# Patient Record
Sex: FEMALE | Race: WHITE | NOT HISPANIC OR LATINO | Employment: OTHER | URBAN - METROPOLITAN AREA
[De-identification: names, ages, dates, MRNs, and addresses within clinical notes are randomized per-mention and may not be internally consistent; named-entity substitution may affect disease eponyms.]

---

## 2021-01-15 DIAGNOSIS — Z23 NEED FOR VACCINATION: ICD-10-CM

## 2021-07-04 ENCOUNTER — APPOINTMENT (OUTPATIENT)
Dept: RADIOLOGY | Facility: MEDICAL CENTER | Age: 71
DRG: 638 | End: 2021-07-04
Attending: EMERGENCY MEDICINE
Payer: MEDICARE

## 2021-07-04 ENCOUNTER — HOSPITAL ENCOUNTER (INPATIENT)
Facility: MEDICAL CENTER | Age: 71
LOS: 2 days | DRG: 638 | End: 2021-07-09
Attending: EMERGENCY MEDICINE | Admitting: INTERNAL MEDICINE
Payer: MEDICARE

## 2021-07-04 DIAGNOSIS — E66.01 CLASS 3 SEVERE OBESITY IN ADULT, UNSPECIFIED BMI, UNSPECIFIED OBESITY TYPE, UNSPECIFIED WHETHER SERIOUS COMORBIDITY PRESENT (HCC): ICD-10-CM

## 2021-07-04 DIAGNOSIS — E86.0 DEHYDRATION: ICD-10-CM

## 2021-07-04 DIAGNOSIS — E11.69 TYPE 2 DIABETES MELLITUS WITH OTHER SPECIFIED COMPLICATION, WITH LONG-TERM CURRENT USE OF INSULIN (HCC): ICD-10-CM

## 2021-07-04 DIAGNOSIS — L89.301 PRESSURE INJURY OF BUTTOCK, STAGE 1, UNSPECIFIED LATERALITY: ICD-10-CM

## 2021-07-04 DIAGNOSIS — R73.9 HYPERGLYCEMIA: ICD-10-CM

## 2021-07-04 DIAGNOSIS — R79.89 ELEVATED TROPONIN: ICD-10-CM

## 2021-07-04 DIAGNOSIS — Z79.4 TYPE 2 DIABETES MELLITUS WITH OTHER SPECIFIED COMPLICATION, WITH LONG-TERM CURRENT USE OF INSULIN (HCC): ICD-10-CM

## 2021-07-04 DIAGNOSIS — I48.92 ATRIAL FLUTTER, UNSPECIFIED TYPE (HCC): ICD-10-CM

## 2021-07-04 DIAGNOSIS — N17.9 AKI (ACUTE KIDNEY INJURY) (HCC): ICD-10-CM

## 2021-07-04 PROBLEM — E87.1 HYPONATREMIA: Status: ACTIVE | Noted: 2021-07-04

## 2021-07-04 PROBLEM — I10 HTN (HYPERTENSION): Status: ACTIVE | Noted: 2021-07-04

## 2021-07-04 LAB
ALBUMIN SERPL BCP-MCNC: 3 G/DL (ref 3.2–4.9)
ALBUMIN/GLOB SERPL: 0.8 G/DL
ALP SERPL-CCNC: 139 U/L (ref 30–99)
ALT SERPL-CCNC: 11 U/L (ref 2–50)
ANION GAP SERPL CALC-SCNC: 11 MMOL/L (ref 7–16)
ANION GAP SERPL CALC-SCNC: 13 MMOL/L (ref 7–16)
APPEARANCE UR: CLEAR
AST SERPL-CCNC: 11 U/L (ref 12–45)
BASOPHILS # BLD AUTO: 0.4 % (ref 0–1.8)
BASOPHILS # BLD: 0.03 K/UL (ref 0–0.12)
BILIRUB SERPL-MCNC: 1 MG/DL (ref 0.1–1.5)
BILIRUB UR QL STRIP.AUTO: NEGATIVE
BUN SERPL-MCNC: 33 MG/DL (ref 8–22)
BUN SERPL-MCNC: 35 MG/DL (ref 8–22)
CALCIUM SERPL-MCNC: 9.1 MG/DL (ref 8.5–10.5)
CALCIUM SERPL-MCNC: 9.1 MG/DL (ref 8.5–10.5)
CHLORIDE SERPL-SCNC: 92 MMOL/L (ref 96–112)
CHLORIDE SERPL-SCNC: 94 MMOL/L (ref 96–112)
CO2 SERPL-SCNC: 22 MMOL/L (ref 20–33)
CO2 SERPL-SCNC: 23 MMOL/L (ref 20–33)
COLOR UR: YELLOW
CREAT SERPL-MCNC: 1.72 MG/DL (ref 0.5–1.4)
CREAT SERPL-MCNC: 1.92 MG/DL (ref 0.5–1.4)
CREAT UR-MCNC: 36.4 MG/DL
EKG IMPRESSION: NORMAL
EOSINOPHIL # BLD AUTO: 0.11 K/UL (ref 0–0.51)
EOSINOPHIL NFR BLD: 1.5 % (ref 0–6.9)
ERYTHROCYTE [DISTWIDTH] IN BLOOD BY AUTOMATED COUNT: 49.7 FL (ref 35.9–50)
EST. AVERAGE GLUCOSE BLD GHB EST-MCNC: 427 MG/DL
GLOBULIN SER CALC-MCNC: 3.8 G/DL (ref 1.9–3.5)
GLUCOSE BLD-MCNC: 198 MG/DL (ref 65–99)
GLUCOSE BLD-MCNC: 257 MG/DL (ref 65–99)
GLUCOSE BLD-MCNC: 370 MG/DL (ref 65–99)
GLUCOSE BLD-MCNC: 372 MG/DL (ref 65–99)
GLUCOSE BLD-MCNC: 588 MG/DL (ref 65–99)
GLUCOSE BLD-MCNC: >600 MG/DL (ref 65–99)
GLUCOSE SERPL-MCNC: 638 MG/DL (ref 65–99)
GLUCOSE SERPL-MCNC: 771 MG/DL (ref 65–99)
GLUCOSE UR STRIP.AUTO-MCNC: >=1000 MG/DL
HBA1C MFR BLD: 16.5 % (ref 4–5.6)
HCT VFR BLD AUTO: 34.9 % (ref 37–47)
HGB BLD-MCNC: 12.4 G/DL (ref 12–16)
IMM GRANULOCYTES # BLD AUTO: 0.06 K/UL (ref 0–0.11)
IMM GRANULOCYTES NFR BLD AUTO: 0.8 % (ref 0–0.9)
KETONES UR STRIP.AUTO-MCNC: NEGATIVE MG/DL
LEUKOCYTE ESTERASE UR QL STRIP.AUTO: NEGATIVE
LYMPHOCYTES # BLD AUTO: 2.12 K/UL (ref 1–4.8)
LYMPHOCYTES NFR BLD: 28.3 % (ref 22–41)
MCH RBC QN AUTO: 33.7 PG (ref 27–33)
MCHC RBC AUTO-ENTMCNC: 35.5 G/DL (ref 33.6–35)
MCV RBC AUTO: 94.8 FL (ref 81.4–97.8)
MICRO URNS: ABNORMAL
MONOCYTES # BLD AUTO: 0.58 K/UL (ref 0–0.85)
MONOCYTES NFR BLD AUTO: 7.7 % (ref 0–13.4)
NEUTROPHILS # BLD AUTO: 4.59 K/UL (ref 2–7.15)
NEUTROPHILS NFR BLD: 61.3 % (ref 44–72)
NITRITE UR QL STRIP.AUTO: NEGATIVE
NRBC # BLD AUTO: 0 K/UL
NRBC BLD-RTO: 0 /100 WBC
PH UR STRIP.AUTO: 5 [PH] (ref 5–8)
PLATELET # BLD AUTO: 171 K/UL (ref 164–446)
PMV BLD AUTO: 11.9 FL (ref 9–12.9)
POTASSIUM SERPL-SCNC: 4.2 MMOL/L (ref 3.6–5.5)
POTASSIUM SERPL-SCNC: 4.3 MMOL/L (ref 3.6–5.5)
PROT SERPL-MCNC: 6.8 G/DL (ref 6–8.2)
PROT UR QL STRIP: NEGATIVE MG/DL
RBC # BLD AUTO: 3.68 M/UL (ref 4.2–5.4)
RBC UR QL AUTO: NEGATIVE
SODIUM SERPL-SCNC: 127 MMOL/L (ref 135–145)
SODIUM SERPL-SCNC: 128 MMOL/L (ref 135–145)
SODIUM UR-SCNC: 40 MMOL/L
SP GR UR STRIP.AUTO: 1.02
TROPONIN T SERPL-MCNC: 25 NG/L (ref 6–19)
UROBILINOGEN UR STRIP.AUTO-MCNC: 0.2 MG/DL
WBC # BLD AUTO: 7.5 K/UL (ref 4.8–10.8)

## 2021-07-04 PROCEDURE — 96372 THER/PROPH/DIAG INJ SC/IM: CPT

## 2021-07-04 PROCEDURE — 80048 BASIC METABOLIC PNL TOTAL CA: CPT

## 2021-07-04 PROCEDURE — G0378 HOSPITAL OBSERVATION PER HR: HCPCS

## 2021-07-04 PROCEDURE — 96374 THER/PROPH/DIAG INJ IV PUSH: CPT

## 2021-07-04 PROCEDURE — 700102 HCHG RX REV CODE 250 W/ 637 OVERRIDE(OP): Performed by: NURSE PRACTITIONER

## 2021-07-04 PROCEDURE — 83036 HEMOGLOBIN GLYCOSYLATED A1C: CPT

## 2021-07-04 PROCEDURE — A9270 NON-COVERED ITEM OR SERVICE: HCPCS | Performed by: NURSE PRACTITIONER

## 2021-07-04 PROCEDURE — 71045 X-RAY EXAM CHEST 1 VIEW: CPT

## 2021-07-04 PROCEDURE — 85025 COMPLETE CBC W/AUTO DIFF WBC: CPT

## 2021-07-04 PROCEDURE — 96375 TX/PRO/DX INJ NEW DRUG ADDON: CPT

## 2021-07-04 PROCEDURE — 99285 EMERGENCY DEPT VISIT HI MDM: CPT

## 2021-07-04 PROCEDURE — 700111 HCHG RX REV CODE 636 W/ 250 OVERRIDE (IP): Performed by: STUDENT IN AN ORGANIZED HEALTH CARE EDUCATION/TRAINING PROGRAM

## 2021-07-04 PROCEDURE — 700105 HCHG RX REV CODE 258: Performed by: STUDENT IN AN ORGANIZED HEALTH CARE EDUCATION/TRAINING PROGRAM

## 2021-07-04 PROCEDURE — 82962 GLUCOSE BLOOD TEST: CPT

## 2021-07-04 PROCEDURE — 700105 HCHG RX REV CODE 258: Performed by: EMERGENCY MEDICINE

## 2021-07-04 PROCEDURE — 81003 URINALYSIS AUTO W/O SCOPE: CPT

## 2021-07-04 PROCEDURE — 82570 ASSAY OF URINE CREATININE: CPT

## 2021-07-04 PROCEDURE — 93005 ELECTROCARDIOGRAM TRACING: CPT

## 2021-07-04 PROCEDURE — 97161 PT EVAL LOW COMPLEX 20 MIN: CPT

## 2021-07-04 PROCEDURE — 700102 HCHG RX REV CODE 250 W/ 637 OVERRIDE(OP): Performed by: EMERGENCY MEDICINE

## 2021-07-04 PROCEDURE — 84484 ASSAY OF TROPONIN QUANT: CPT

## 2021-07-04 PROCEDURE — 80053 COMPREHEN METABOLIC PANEL: CPT

## 2021-07-04 PROCEDURE — 84300 ASSAY OF URINE SODIUM: CPT

## 2021-07-04 PROCEDURE — 700102 HCHG RX REV CODE 250 W/ 637 OVERRIDE(OP): Performed by: STUDENT IN AN ORGANIZED HEALTH CARE EDUCATION/TRAINING PROGRAM

## 2021-07-04 PROCEDURE — 99220 PR INITIAL OBSERVATION CARE,LEVL III: CPT | Performed by: STUDENT IN AN ORGANIZED HEALTH CARE EDUCATION/TRAINING PROGRAM

## 2021-07-04 PROCEDURE — 97166 OT EVAL MOD COMPLEX 45 MIN: CPT

## 2021-07-04 RX ORDER — SODIUM CHLORIDE, SODIUM LACTATE, POTASSIUM CHLORIDE, AND CALCIUM CHLORIDE .6; .31; .03; .02 G/100ML; G/100ML; G/100ML; G/100ML
2000 INJECTION, SOLUTION INTRAVENOUS ONCE
Status: DISCONTINUED | OUTPATIENT
Start: 2021-07-04 | End: 2021-07-04

## 2021-07-04 RX ORDER — SODIUM CHLORIDE 9 MG/ML
2000 INJECTION, SOLUTION INTRAVENOUS ONCE
Status: DISCONTINUED | OUTPATIENT
Start: 2021-07-04 | End: 2021-07-04

## 2021-07-04 RX ORDER — DEXTROSE AND SODIUM CHLORIDE 10; .45 G/100ML; G/100ML
INJECTION, SOLUTION INTRAVENOUS CONTINUOUS
Status: DISCONTINUED | OUTPATIENT
Start: 2021-07-04 | End: 2021-07-04

## 2021-07-04 RX ORDER — AMOXICILLIN 250 MG
2 CAPSULE ORAL 2 TIMES DAILY
Status: DISCONTINUED | OUTPATIENT
Start: 2021-07-04 | End: 2021-07-09 | Stop reason: HOSPADM

## 2021-07-04 RX ORDER — FLUOXETINE HYDROCHLORIDE 20 MG/1
40 CAPSULE ORAL DAILY
Status: ON HOLD | COMMUNITY
End: 2021-07-09

## 2021-07-04 RX ORDER — ENALAPRILAT 1.25 MG/ML
1.25 INJECTION INTRAVENOUS EVERY 6 HOURS PRN
Status: DISCONTINUED | OUTPATIENT
Start: 2021-07-04 | End: 2021-07-09 | Stop reason: HOSPADM

## 2021-07-04 RX ORDER — POLYETHYLENE GLYCOL 3350 17 G/17G
1 POWDER, FOR SOLUTION ORAL
Status: DISCONTINUED | OUTPATIENT
Start: 2021-07-04 | End: 2021-07-09 | Stop reason: HOSPADM

## 2021-07-04 RX ORDER — LISINOPRIL 20 MG/1
20 TABLET ORAL DAILY
Status: ON HOLD | COMMUNITY
End: 2021-07-09 | Stop reason: SDUPTHER

## 2021-07-04 RX ORDER — SODIUM CHLORIDE, SODIUM LACTATE, POTASSIUM CHLORIDE, CALCIUM CHLORIDE 600; 310; 30; 20 MG/100ML; MG/100ML; MG/100ML; MG/100ML
INJECTION, SOLUTION INTRAVENOUS CONTINUOUS
Status: DISCONTINUED | OUTPATIENT
Start: 2021-07-04 | End: 2021-07-05

## 2021-07-04 RX ORDER — SIMVASTATIN 40 MG
40 TABLET ORAL NIGHTLY
Status: ON HOLD | COMMUNITY
End: 2021-07-09

## 2021-07-04 RX ORDER — MAGNESIUM SULFATE HEPTAHYDRATE 40 MG/ML
4 INJECTION, SOLUTION INTRAVENOUS
Status: DISCONTINUED | OUTPATIENT
Start: 2021-07-04 | End: 2021-07-04

## 2021-07-04 RX ORDER — MAGNESIUM SULFATE HEPTAHYDRATE 40 MG/ML
2 INJECTION, SOLUTION INTRAVENOUS
Status: DISCONTINUED | OUTPATIENT
Start: 2021-07-04 | End: 2021-07-09 | Stop reason: HOSPADM

## 2021-07-04 RX ORDER — HEPARIN SODIUM 5000 [USP'U]/ML
5000 INJECTION, SOLUTION INTRAVENOUS; SUBCUTANEOUS EVERY 8 HOURS
Status: DISCONTINUED | OUTPATIENT
Start: 2021-07-04 | End: 2021-07-07

## 2021-07-04 RX ORDER — INSULIN LISPRO 100 [IU]/ML
4 INJECTION, SOLUTION INTRAVENOUS; SUBCUTANEOUS
Status: DISCONTINUED | OUTPATIENT
Start: 2021-07-04 | End: 2021-07-07

## 2021-07-04 RX ORDER — DEXTROSE AND SODIUM CHLORIDE 5; .45 G/100ML; G/100ML
INJECTION, SOLUTION INTRAVENOUS CONTINUOUS
Status: DISCONTINUED | OUTPATIENT
Start: 2021-07-04 | End: 2021-07-04

## 2021-07-04 RX ORDER — MICONAZOLE NITRATE 20 MG/G
CREAM TOPICAL 2 TIMES DAILY
Status: DISCONTINUED | OUTPATIENT
Start: 2021-07-04 | End: 2021-07-09 | Stop reason: HOSPADM

## 2021-07-04 RX ORDER — M-VIT,TX,IRON,MINS/CALC/FOLIC 27MG-0.4MG
1 TABLET ORAL DAILY
COMMUNITY
End: 2022-01-04

## 2021-07-04 RX ORDER — VITAMIN B COMPLEX
5000 TABLET ORAL DAILY
COMMUNITY
End: 2022-01-04

## 2021-07-04 RX ORDER — DEXTROSE MONOHYDRATE 25 G/50ML
50 INJECTION, SOLUTION INTRAVENOUS
Status: DISCONTINUED | OUTPATIENT
Start: 2021-07-04 | End: 2021-07-04

## 2021-07-04 RX ORDER — DEXTROSE MONOHYDRATE 25 G/50ML
50 INJECTION, SOLUTION INTRAVENOUS
Status: DISCONTINUED | OUTPATIENT
Start: 2021-07-04 | End: 2021-07-09 | Stop reason: HOSPADM

## 2021-07-04 RX ORDER — INSULIN LISPRO 100 [IU]/ML
3-14 INJECTION, SOLUTION INTRAVENOUS; SUBCUTANEOUS
Status: DISCONTINUED | OUTPATIENT
Start: 2021-07-04 | End: 2021-07-09 | Stop reason: HOSPADM

## 2021-07-04 RX ORDER — ACETAMINOPHEN 325 MG/1
650 TABLET ORAL EVERY 6 HOURS PRN
Status: DISCONTINUED | OUTPATIENT
Start: 2021-07-04 | End: 2021-07-09 | Stop reason: HOSPADM

## 2021-07-04 RX ORDER — LEVOTHYROXINE SODIUM 0.1 MG/1
100 TABLET ORAL
Status: ON HOLD | COMMUNITY
End: 2021-07-09 | Stop reason: SDUPTHER

## 2021-07-04 RX ORDER — ASPIRIN 81 MG/1
81 TABLET, CHEWABLE ORAL DAILY
Status: ON HOLD | COMMUNITY
End: 2021-07-09 | Stop reason: SDUPTHER

## 2021-07-04 RX ORDER — SODIUM CHLORIDE 9 MG/ML
1000 INJECTION, SOLUTION INTRAVENOUS ONCE
Status: COMPLETED | OUTPATIENT
Start: 2021-07-04 | End: 2021-07-04

## 2021-07-04 RX ORDER — LABETALOL HYDROCHLORIDE 5 MG/ML
10 INJECTION, SOLUTION INTRAVENOUS EVERY 4 HOURS PRN
Status: DISCONTINUED | OUTPATIENT
Start: 2021-07-04 | End: 2021-07-09 | Stop reason: HOSPADM

## 2021-07-04 RX ORDER — NYSTATIN 100000 [USP'U]/G
POWDER TOPICAL 2 TIMES DAILY
Status: DISCONTINUED | OUTPATIENT
Start: 2021-07-04 | End: 2021-07-09 | Stop reason: HOSPADM

## 2021-07-04 RX ORDER — ONDANSETRON 4 MG/1
4 TABLET, ORALLY DISINTEGRATING ORAL EVERY 4 HOURS PRN
Status: DISCONTINUED | OUTPATIENT
Start: 2021-07-04 | End: 2021-07-09 | Stop reason: HOSPADM

## 2021-07-04 RX ORDER — BISACODYL 10 MG
10 SUPPOSITORY, RECTAL RECTAL
Status: DISCONTINUED | OUTPATIENT
Start: 2021-07-04 | End: 2021-07-09 | Stop reason: HOSPADM

## 2021-07-04 RX ORDER — DABIGATRAN ETEXILATE 150 MG/1
150 CAPSULE ORAL 2 TIMES DAILY
Status: ON HOLD | COMMUNITY
End: 2021-07-09

## 2021-07-04 RX ORDER — INSULIN LISPRO 100 [IU]/ML
2-9 INJECTION, SOLUTION INTRAVENOUS; SUBCUTANEOUS EVERY 6 HOURS
Status: DISCONTINUED | OUTPATIENT
Start: 2021-07-04 | End: 2021-07-04

## 2021-07-04 RX ORDER — ONDANSETRON 2 MG/ML
4 INJECTION INTRAMUSCULAR; INTRAVENOUS EVERY 4 HOURS PRN
Status: DISCONTINUED | OUTPATIENT
Start: 2021-07-04 | End: 2021-07-09 | Stop reason: HOSPADM

## 2021-07-04 RX ADMIN — MICONAZOLE NITRATE: 20 CREAM TOPICAL at 18:07

## 2021-07-04 RX ADMIN — INSULIN HUMAN 10 UNITS: 100 INJECTION, SOLUTION PARENTERAL at 03:03

## 2021-07-04 RX ADMIN — NYSTATIN: 100000 POWDER TOPICAL at 18:07

## 2021-07-04 RX ADMIN — SODIUM CHLORIDE, POTASSIUM CHLORIDE, SODIUM LACTATE AND CALCIUM CHLORIDE: 600; 310; 30; 20 INJECTION, SOLUTION INTRAVENOUS at 20:52

## 2021-07-04 RX ADMIN — HEPARIN SODIUM 5000 UNITS: 5000 INJECTION, SOLUTION INTRAVENOUS; SUBCUTANEOUS at 12:37

## 2021-07-04 RX ADMIN — INSULIN LISPRO 3 UNITS: 100 INJECTION, SOLUTION INTRAVENOUS; SUBCUTANEOUS at 21:08

## 2021-07-04 RX ADMIN — INSULIN LISPRO 12 UNITS: 100 INJECTION, SOLUTION INTRAVENOUS; SUBCUTANEOUS at 12:35

## 2021-07-04 RX ADMIN — INSULIN LISPRO 7 UNITS: 100 INJECTION, SOLUTION INTRAVENOUS; SUBCUTANEOUS at 18:08

## 2021-07-04 RX ADMIN — SODIUM CHLORIDE 1000 ML: 9 INJECTION, SOLUTION INTRAVENOUS at 03:03

## 2021-07-04 RX ADMIN — NYSTATIN: 100000 POWDER TOPICAL at 09:44

## 2021-07-04 RX ADMIN — INSULIN GLARGINE 27 UNITS: 100 INJECTION, SOLUTION SUBCUTANEOUS at 18:05

## 2021-07-04 RX ADMIN — INSULIN LISPRO 4 UNITS: 100 INJECTION, SOLUTION INTRAVENOUS; SUBCUTANEOUS at 18:06

## 2021-07-04 RX ADMIN — HEPARIN SODIUM 5000 UNITS: 5000 INJECTION, SOLUTION INTRAVENOUS; SUBCUTANEOUS at 21:08

## 2021-07-04 RX ADMIN — HEPARIN SODIUM 5000 UNITS: 5000 INJECTION, SOLUTION INTRAVENOUS; SUBCUTANEOUS at 09:03

## 2021-07-04 RX ADMIN — INSULIN LISPRO 4 UNITS: 100 INJECTION, SOLUTION INTRAVENOUS; SUBCUTANEOUS at 12:33

## 2021-07-04 RX ADMIN — SODIUM CHLORIDE, POTASSIUM CHLORIDE, SODIUM LACTATE AND CALCIUM CHLORIDE: 600; 310; 30; 20 INJECTION, SOLUTION INTRAVENOUS at 05:20

## 2021-07-04 RX ADMIN — SODIUM CHLORIDE, POTASSIUM CHLORIDE, SODIUM LACTATE AND CALCIUM CHLORIDE: 600; 310; 30; 20 INJECTION, SOLUTION INTRAVENOUS at 11:49

## 2021-07-04 ASSESSMENT — COGNITIVE AND FUNCTIONAL STATUS - GENERAL
DRESSING REGULAR UPPER BODY CLOTHING: A LITTLE
TURNING FROM BACK TO SIDE WHILE IN FLAT BAD: A LITTLE
SUGGESTED CMS G CODE MODIFIER DAILY ACTIVITY: CK
DAILY ACTIVITIY SCORE: 19
CLIMB 3 TO 5 STEPS WITH RAILING: A LITTLE
MOVING TO AND FROM BED TO CHAIR: A LOT
MOVING FROM LYING ON BACK TO SITTING ON SIDE OF FLAT BED: A LITTLE
SUGGESTED CMS G CODE MODIFIER DAILY ACTIVITY: CK
STANDING UP FROM CHAIR USING ARMS: A LITTLE
CLIMB 3 TO 5 STEPS WITH RAILING: A LITTLE
DRESSING REGULAR UPPER BODY CLOTHING: A LITTLE
STANDING UP FROM CHAIR USING ARMS: A LITTLE
DRESSING REGULAR LOWER BODY CLOTHING: A LITTLE
MOBILITY SCORE: 19
PERSONAL GROOMING: A LITTLE
MOBILITY SCORE: 17
SUGGESTED CMS G CODE MODIFIER MOBILITY: CK
HELP NEEDED FOR BATHING: A LITTLE
DRESSING REGULAR LOWER BODY CLOTHING: A LOT
PERSONAL GROOMING: A LITTLE
WALKING IN HOSPITAL ROOM: A LITTLE
DAILY ACTIVITIY SCORE: 17
WALKING IN HOSPITAL ROOM: A LITTLE
MOVING TO AND FROM BED TO CHAIR: A LITTLE
HELP NEEDED FOR BATHING: A LOT
TOILETING: A LITTLE
SUGGESTED CMS G CODE MODIFIER MOBILITY: CK
TOILETING: A LITTLE
MOVING FROM LYING ON BACK TO SITTING ON SIDE OF FLAT BED: A LITTLE

## 2021-07-04 ASSESSMENT — LIFESTYLE VARIABLES
HOW MANY TIMES IN THE PAST YEAR HAVE YOU HAD 5 OR MORE DRINKS IN A DAY: 0
TOTAL SCORE: 0
CONSUMPTION TOTAL: NEGATIVE
DOES PATIENT WANT TO STOP DRINKING: CANNOT ASSESS
EVER FELT BAD OR GUILTY ABOUT YOUR DRINKING: NO
ALCOHOL_USE: NO
ON A TYPICAL DAY WHEN YOU DRINK ALCOHOL HOW MANY DRINKS DO YOU HAVE: 0
EVER HAD A DRINK FIRST THING IN THE MORNING TO STEADY YOUR NERVES TO GET RID OF A HANGOVER: NO
TOTAL SCORE: 0
TOTAL SCORE: 0
HAVE YOU EVER FELT YOU SHOULD CUT DOWN ON YOUR DRINKING: NO
HAVE PEOPLE ANNOYED YOU BY CRITICIZING YOUR DRINKING: NO
AVERAGE NUMBER OF DAYS PER WEEK YOU HAVE A DRINK CONTAINING ALCOHOL: 0

## 2021-07-04 ASSESSMENT — GAIT ASSESSMENTS
DISTANCE (FEET): 50
ASSISTIVE DEVICE: FRONT WHEEL WALKER
DISTANCE (FEET): 20
DEVIATION: SHUFFLED GAIT;DECREASED BASE OF SUPPORT
GAIT LEVEL OF ASSIST: MINIMAL ASSIST

## 2021-07-04 ASSESSMENT — PATIENT HEALTH QUESTIONNAIRE - PHQ9
1. LITTLE INTEREST OR PLEASURE IN DOING THINGS: NOT AT ALL
2. FEELING DOWN, DEPRESSED, IRRITABLE, OR HOPELESS: NOT AT ALL
SUM OF ALL RESPONSES TO PHQ9 QUESTIONS 1 AND 2: 0

## 2021-07-04 ASSESSMENT — FIBROSIS 4 INDEX: FIB4 SCORE: 1.38

## 2021-07-04 ASSESSMENT — PAIN DESCRIPTION - PAIN TYPE
TYPE: ACUTE PAIN
TYPE: ACUTE PAIN

## 2021-07-04 ASSESSMENT — ACTIVITIES OF DAILY LIVING (ADL): TOILETING: INDEPENDENT

## 2021-07-04 NOTE — THERAPY
Physical Therapy   Initial Evaluation     Patient Name: Beverley Dejesus  Age:  70 y.o., Sex:  female  Medical Record #: [id number]  Today's Date: 7/4/2021     Precautions: Fall Risk    Assessment  Patient is 70 y.o. female admitted with weakness, GLF, and hyperglycemia (BS in 700s).  Pt reports living in a mobile home with roommates who reside independently. Pt sleeps in a recliner at baseline and uses a SPC. At time of eval, pt evaluated ~50ft with FWW and CGA. Pt ambulated with very slow gait speed due to reported foot pain which pt attributed to diabetes. PT will cont while in acute care setting to address strength, balance, activity tolerance, and safety.     Plan    Recommend Physical Therapy 3 times per week until therapy goals are met for the following treatments:  Gait Training, Neuro Re-Education / Balance, Self Care/Home Evaluation, Stair Training, Therapeutic Activities and Therapeutic Exercises    DC Equipment Recommendations: Front-Wheel Walker  Discharge Recommendations: Recommend home health for continued physical therapy services        07/04/21 0821   Prior Living Situation   Prior Services None   Housing / Facility Mobile Home   Steps Into Home 5   Steps In Home 0   Rail Both Rail (Steps into Home)   Bathroom Set up Bathtub / Shower Combination   Equipment Owned Single Point Cane   Lives with - Patient's Self Care Capacity Unrelated Adult   Comments pt lives with unrelated roommates. All work and are independent living   Prior Level of Functional Mobility   Bed Mobility Independent   Transfer Status Independent   Ambulation Independent   Distance Ambulation (Feet)   (community)   Assistive Devices Used Single Point Cane   Stairs Independent   History of Falls   History of Falls Yes   Date of Last Fall   (reason for admit)   Sensation Lower Body   Lower Extremity Sensation   X   Comments chronic neuropathy   Gait Analysis   Gait Level Of Assist Minimal Assist  (CGA fo safety)   Assistive Device Front  Wheel Walker   Distance (Feet) 50   # of Times Distance was Traveled 1   Deviation Shuffled Gait;Decreased Base Of Support   # of Stairs Climbed 0   Weight Bearing Status no restrictions   Comments very cautious when ambulating   Bed Mobility    Supine to Sit Supervised   Sit to Supine Minimal Assist   Scooting Supervised   Rolling Supervised   Comments sleeps in recliner   Functional Mobility   Sit to Stand Supervised   Bed, Chair, Wheelchair Transfer Minimal Assist  (CGA for safety)   Transfer Method Stand Step   Mobility in hallway FWW   Short Term Goals    Short Term Goal # 1 pt will be able to ambulate 150ft with LRAD and SPV in 6tx in order to return home   Short Term Goal # 2 pt will be able to negotiate 5 steps with LRAD and SPV in 6tx in order to return home   Anticipated Discharge Equipment and Recommendations   DC Equipment Recommendations Front-Wheel Walker   Discharge Recommendations Recommend home health for continued physical therapy services

## 2021-07-04 NOTE — ED PROVIDER NOTES
"ED Provider Note    Scribed for Daniel Walsh M.D. by Maverick Wolfe. 7/4/2021,  1:54 AM.    Means of Arrival: EMS  History obtained from: Patient  History limited by: None      CHIEF COMPLAINT  Chief Complaint   Patient presents with   • Weakness     Pt states she wasat home wehn she slid off her, felt weak and was unable to stand up on her own. Per EMS pt's blood glucose read \"High\" on glucometer. Pt is insulin dependent T2DM, and has not been able to afford her unsulin for the last month. Pt has no other complaints.    • High Blood Sugar       HPI  Beverley Dejesus is a 70 y.o. female with history of diabetes and aflutter who presents to the Emergency Department via EMS for worsening general weakness secondary to ground level fall. Patient was able to ambulate to the bathroom but when she went to sit on the toilet, she fell to the ground. She states she was able to stand up and ambulate back to her bedroom. She went to sit on her bed, and she fell forward, falling to the floor. She was unable to get herself up this time, and had to call her neighbor to pick her up.  She denies any head strike or loss of consciousness.  The patient is an insulin dependent diabetic, who admits to worsening weakness over the past couple of months. She has been unable to take her insulin or check her blood sugars lately, because she can not afford her treatment. She admits that she finds it increasingly harder to concentrate or exert herself without her insulin. She currently lives in a large mobile home community. She further endorses associated swollen feet for a couple weakness. She also endorses redness of the legs, but denies head pain, loss of consciousness, or dizziness.    REVIEW OF SYSTEMS  CONSTITUTIONAL:  No fever. Weakness. No dizziness.  CARDIOVASCULAR:  No chest discomfort.  RESPIRATORY:  No pleuritic chest pain.  GASTROINTESTINAL:  No abdominal pain.  GENITOURINARY:   No dysuria.  MUSCULOSKELETAL:  No arthralgia. Leg pain. " "Leg redness. Swollen feet. No head pain. SKIN:  No rash or suspicious lesions.  NEUROLOGIC:   No headache. No loss of consciouness    See HPI for further details.   All other systems are negative.     PAST MEDICAL HISTORY  No past medical history noted    FAMILY HISTORY  No family history noted    SOCIAL HISTORY   None noted    SURGICAL HISTORY  No past surgical history noted    CURRENT MEDICATIONS  No current outpatient medications  Insulin    ALLERGIES  No Known Allergies    PHYSICAL EXAM  VITAL SIGNS: /59   Pulse 85   Temp 36.7 °C (98.1 °F) (Temporal)   Resp 18   Ht 1.626 m (5' 4\")   Wt (!) 136 kg (300 lb)   SpO2 97%   BMI 51.49 kg/m²    Gen: Alert, no acute distress  HEENT: ATNC, dry mucous membranes, no tenderness  Eyes: PERRL, EOMI, normal conjunctiva.   Neck: trachea midline, nontender  Resp: no respiratory distress, clear to auscultation bilaterally  CV: No JVD.  Irregularly irregular rate  Abd: non-distended, nontender  Ext: No deformities. 2+ pitting edema bilateral lower extremities. Venous stasis changes of bilateral lower extremities   Psych: normal mood  Neuro: speech fluent, moves all extremities, GCS 15      DIAGNOSTIC STUDIES / PROCEDURES     EKG  Results for orders placed or performed during the hospital encounter of 21   EKG   Result Value Ref Range    Report       [facility] Emergency Dept.    Test Date:  2021  Pt Name:    GAETANO RONQUILLO                  Department: ER  MRN:        [id number]                      Room:       RD 11  Gender:     Female                       Technician: [id number]  :        [id number]                   Requested By:ER TRIAGE PROTOCOL  Order #:    [id number]                    Reading MD: Daniel Walsh    Measurements  Intervals                                Axis  Rate:       92                           P:  MA:                                      QRS:        -18  QRSD:       142                          T:          -12  QT:         " 360  QTc:        446    Interpretive Statements  ATRIAL FLUTTER, A-RATE 258  MULTIFORM VENTRICULAR PREMATURE COMPLEXES  IVCD, CONSIDER ATYPICAL RBBB  LEFT VENTRICULAR HYPERTROPHY  INFERIOR INFARCT, AGE INDETERMINATE  No previous ECG available for comparison  Electronically Signed On 7-4-2021 3:15:47 PDT by Daniel Walsh          LABS     Labs Reviewed   COMP METABOLIC PANEL - Abnormal; Notable for the following components:       Result Value    Sodium 127 (*)     Chloride 92 (*)     Glucose 771 (*)     Bun 35 (*)     Creatinine 1.92 (*)     AST(SGOT) 11 (*)     Alkaline Phosphatase 139 (*)     Albumin 3.0 (*)     Globulin 3.8 (*)     All other components within normal limits   CBC WITH DIFFERENTIAL - Abnormal; Notable for the following components:    RBC 3.68 (*)     Hematocrit 34.9 (*)     MCH 33.7 (*)     MCHC 35.5 (*)     All other components within normal limits   TROPONIN - Abnormal; Notable for the following components:    Troponin T 25 (*)     All other components within normal limits   URINALYSIS - Abnormal; Notable for the following components:    Glucose >=1000 (*)     All other components within normal limits   ESTIMATED GFR - Abnormal; Notable for the following components:    GFR If  31 (*)     GFR If Non  26 (*)     All other components within normal limits   POCT GLUCOSE DEVICE RESULTS - Abnormal; Notable for the following components:    Glucose - Accu-Ck >600 (*)     All other components within normal limits   BASIC METABOLIC PANEL   HEMOGLOBIN A1C   URINE SODIUM RANDOM   URINE CREATININE RANDOM   All labs reviewed by me.    RADIOLOGY  DX-CHEST-PORTABLE (1 VIEW)   Final Result      Hypoinflation without other evidence for acute cardiopulmonary disease.      All imaging reviewed and interpreted by me as shown above    COURSE & MEDICAL DECISION MAKING  Pertinent Labs & Imaging studies reviewed. (See chart for details)    1:54 AM Patient seen and examined at bedside by  Resident. Ordered for labs and imaging to evaluate. Patient will be treated with NS 1 L for her symptoms.     2:50 AM- At this time I compared notes with the Resident and evaluated the patient for myself. Patient reports she recently moved to [place]. Plan of care was discussed. Patient was give the opportunity to ask questions. Patient verbalizes understanding and agreement to this plan of care.      HYDRATION: Based on the patient's presentation of Dehydration the patient was given IV fluids. IV Hydration was used because oral hydration was not adequate alone. Upon recheck following hydration, the patient was improved.       Medical Decision Making:  Patient with uncontrolled diabetes presents with weakness, dehydration, recent falls.  There is no evidence of head trauma.  Patient's weakness is likely from her nonadherence to medications and uncontrolled diabetes.  Labs demonstrate profound blood sugar elevations but no evidence of DKA or HHS.  Low suspicion for intracranial pathology.    The patient was also found to have an elevated creatinine of unknown duration.  Likely JULIETA in the setting of dehydration.  Given this, patient was given IV fluids, and will be hospitalized.  No evidence of urinary tract infection, anemia.  EKG nonischemic.  Troponin minimally elevated, likely due to demand ischemia/renal dysfunction.        Case discussed with Dr. Nye , who accepts hospitalization. Patient will be hospitalized in guarded condition.    FINAL IMPRESSION  1. Hyperglycemia    2. JULIETA (acute kidney injury) (HCC)    3. Dehydration    4. Elevated troponin            I, Maverick Wolfe (Scribe), am scribing for, and in the presence of, Daniel Walsh M.D..    Electronically signed by: Maverick Wolfe (Scribe), 7/4/2021    IDaniel M.D. personally performed the services described in this documentation, as scribed by Maverick Wolfe in my presence, and it is both accurate and complete.    The note accurately reflects work and  decisions made by me.  Daniel Walsh M.D.  7/4/2021  3:28 AM      This dictation was created using voice recognition software. The accuracy of the dictation is limited to the abilities of the software. I expect there may be some errors of grammar and possibly content. The nursing notes were reviewed and certain aspects of this information were incorporated into this note.

## 2021-07-04 NOTE — ASSESSMENT & PLAN NOTE
-likely 2/2 dehydration  -Fluid resuscitation  -Glucose control  -Daily renal panel    Improving.    7/7: There might be some CKD component as well.

## 2021-07-04 NOTE — ED NOTES
Technician from Lab called with critical result of blood glucose 771 at 0252. Critical lab result read back to 0252.   Dr. Walsh notified of critical lab result at 0253.  Critical lab result read back by  0253.

## 2021-07-04 NOTE — THERAPY
Occupational Therapy   Initial Evaluation     Patient Name: Beverley Dejesus  Age:  70 y.o., Sex:  female  Medical Record #: [id number]  Today's Date: 7/4/2021     Precautions  Precautions: (P) Fall Risk  Comments: (P) balance, strength and endurance limitations    Assessment  Patient is 70 y.o. female with a diagnosis of weakness, hyperglycemia.  Additional factors influencing patient status / progress: limited community support. willl benefit from OT to focus on ADLS, endurance, transfers.      Plan    Recommend Occupational Therapy 3 times per week until therapy goals are met for the following treatments:  Adaptive Equipment, Self Care/Activities of Daily Living, Therapeutic Activities and Therapeutic Exercises.    DC Equipment Recommendations: (P) Front-Wheel Walker  Discharge Recommendations: (P) Recommend home health for continued occupational therapy services     Subjective    Pleasant and cooperative     Objective       07/04/21 0751   Total Time Spent   Total Time Spent (Mins) 40   Charge Group   OT Evaluation OT Evaluation Mod   Initial Contact Note    Initial Contact Note Order Received and Verified, Occupational Therapy Evaluation in Progress with Full Report to Follow.   Prior Living Situation   Prior Services None   Housing / Facility Mobile Home   Steps Into Home 5   Steps In Home 0   Rail Left Rail  (Steps into Home)   Bathroom Set up Bathtub / Shower Combination   Equipment Owned Single Point Cane   Lives with - Patient's Self Care Capacity Other (Comments)  (unrelated room mates)   Prior Level of ADL Function   Self Feeding Independent   Grooming / Hygiene Independent   Bathing Independent   Dressing Independent   Toileting Independent   Prior Level of IADL Function   Medication Management Independent   Laundry Independent   Kitchen Mobility Independent   Finances Independent   Home Management Requires Assist   Shopping Independent   Prior Level Of Mobility Independent With Device in Home;Independent  With Device in Community   Driving / Transportation Driving Independent   History of Falls   History of Falls Yes   Date of Last Fall   (reason for admit)   Precautions   Precautions Fall Risk   Comments balance, strength and endurance limitations   Vitals   Pulse (!) 107   Patient BP Position Supine   Blood Pressure  (!) 98/56   Respiration 18   Pulse Oximetry 99 %   O2 (LPM) 0   O2 Delivery Device None - Room Air   Pain 0 - 10 Group   Therapist Pain Assessment During Activity;Nurse Notified;Post Activity Pain Same as Prior to Activity  (mild in feet, did not rate)   Cognition    Cognition / Consciousness WDL   Passive ROM Upper Body   Passive ROM Upper Body WDL   Active ROM Upper Body   Active ROM Upper Body  WDL   Dominant Hand Right   Strength Upper Body   Upper Body Strength  WDL   Sensation Upper Body   Upper Extremity Sensation  WDL   Upper Body Muscle Tone   Upper Body Muscle Tone  WDL   Coordination Upper Body   Coordination WDL   Balance Assessment   Sitting Balance (Static) Fair +   Sitting Balance (Dynamic) Fair +   Standing Balance (Static) Fair   Standing Balance (Dynamic) Fair -   Weight Shift Sitting Fair   Weight Shift Standing Fair   Bed Mobility    Supine to Sit Supervised   Sit to Supine Supervised   Scooting Supervised   Rolling Supervised   ADL Assessment   Eating Supervision   Grooming Supervision;Seated   Bathing   (NT)   Upper Body Dressing Supervision   Lower Body Dressing Maximal Assist  (reports wearing slip on shoes at home, no socks)   Toileting Minimal Assist   How much help from another person does the patient currently need...   Putting on and taking off regular lower body clothing? 2   Bathing (including washing, rinsing, and drying)? 2   Toileting, which includes using a toilet, bedpan, or urinal? 3   Putting on and taking off regular upper body clothing? 3   Taking care of personal grooming such as brushing teeth? 3   Eating meals? 4   6 Clicks Daily Activity Score 17   Functional  Mobility   Sit to Stand   (SBA)   Bed, Chair, Wheelchair Transfer   (SBA)   Transfer Method Stand Step   Distance (Feet) 20   # of Times Distance was Traveled 2   Visual Perception   Visual Perception  WDL   Activity Tolerance   Sitting Edge of Bed 6 mins   Standing 5 mins   Patient / Family Goals   Patient / Family Goal #1 get stronger   Short Term Goals   Short Term Goal # 1 distant supervision for toielting tasks   Short Term Goal # 2 set up for LB dressing, appropriate AE use   Short Term Goal # 3 tolerate 10 minutes standing at sink to complete grooming, prior to resting   Education Group   Role of Occupational Therapist Patient Response Patient;Acceptance;Explanation;Demonstration;Verbal Demonstration;Reinforcement Needed   Problem List   Problem List Decreased Active Daily Living Skills;Decreased Functional Mobility;Decreased Activity Tolerance;Impaired Postural Control / Balance   Anticipated Discharge Equipment and Recommendations   DC Equipment Recommendations Front-Wheel Walker   Discharge Recommendations Recommend home health for continued occupational therapy services   Interdisciplinary Plan of Care Collaboration   IDT Collaboration with  Physician;Nursing   Patient Position at End of Therapy In Bed;Call Light within Reach;Tray Table within Reach;Phone within Reach   Collaboration Comments report given   Session Information   Date / Session Number  7/4,1 ( 1/3, 7/20)   Priority 3      07/04/21 0751   Total Time Spent   Total Time Spent (Mins) 40   Charge Group   OT Evaluation OT Evaluation Mod   Initial Contact Note    Initial Contact Note Order Received and Verified, Occupational Therapy Evaluation in Progress with Full Report to Follow.   Prior Living Situation   Prior Services None   Housing / Facility Mobile Home   Steps Into Home 5   Steps In Home 0   Rail Left Rail  (Steps into Home)   Bathroom Set up Bathtub / Shower Combination   Equipment Owned Single Point Cane   Lives with - Patient's Self  Care Capacity Other (Comments)  (unrelated room mates)   Prior Level of ADL Function   Self Feeding Independent   Grooming / Hygiene Independent   Bathing Independent   Dressing Independent   Toileting Independent   Prior Level of IADL Function   Medication Management Independent   Laundry Independent   Kitchen Mobility Independent   Finances Independent   Home Management Requires Assist   Shopping Independent   Prior Level Of Mobility Independent With Device in Home;Independent With Device in Community   Driving / Transportation Driving Independent   History of Falls   History of Falls Yes   Date of Last Fall   (reason for admit)   Precautions   Precautions Fall Risk   Comments balance, strength and endurance limitations   Vitals   Pulse (!) 107   Patient BP Position Supine   Blood Pressure  (!) 98/56   Respiration 18   Pulse Oximetry 99 %   O2 (LPM) 0   O2 Delivery Device None - Room Air   Pain 0 - 10 Group   Therapist Pain Assessment During Activity;Nurse Notified;Post Activity Pain Same as Prior to Activity  (mild in feet, did not rate)   Cognition    Cognition / Consciousness WDL   Passive ROM Upper Body   Passive ROM Upper Body WDL   Active ROM Upper Body   Active ROM Upper Body  WDL   Dominant Hand Right   Strength Upper Body   Upper Body Strength  WDL   Sensation Upper Body   Upper Extremity Sensation  WDL   Upper Body Muscle Tone   Upper Body Muscle Tone  WDL   Coordination Upper Body   Coordination WDL   Balance Assessment   Sitting Balance (Static) Fair +   Sitting Balance (Dynamic) Fair +   Standing Balance (Static) Fair   Standing Balance (Dynamic) Fair -   Weight Shift Sitting Fair   Weight Shift Standing Fair   Bed Mobility    Supine to Sit Supervised   Sit to Supine Supervised   Scooting Supervised   Rolling Supervised   ADL Assessment   Eating Supervision   Grooming Supervision;Seated   Bathing   (NT)   Upper Body Dressing Supervision   Lower Body Dressing Maximal Assist  (reports wearing slip on  shoes at home, no socks)   Toileting Minimal Assist   How much help from another person does the patient currently need...   Putting on and taking off regular lower body clothing? 2   Bathing (including washing, rinsing, and drying)? 2   Toileting, which includes using a toilet, bedpan, or urinal? 3   Putting on and taking off regular upper body clothing? 3   Taking care of personal grooming such as brushing teeth? 3   Eating meals? 4   6 Clicks Daily Activity Score 17   Functional Mobility   Sit to Stand   (SBA)   Bed, Chair, Wheelchair Transfer   (SBA)   Transfer Method Stand Step   Distance (Feet) 20   # of Times Distance was Traveled 2   Visual Perception   Visual Perception  WDL   Activity Tolerance   Sitting Edge of Bed 6 mins   Standing 5 mins   Patient / Family Goals   Patient / Family Goal #1 get stronger   Short Term Goals   Short Term Goal # 1 distant supervision for toielting tasks   Short Term Goal # 2 set up for LB dressing, appropriate AE use   Short Term Goal # 3 tolerate 10 minutes standing at sink to complete grooming, prior to resting   Education Group   Role of Occupational Therapist Patient Response Patient;Acceptance;Explanation;Demonstration;Verbal Demonstration;Reinforcement Needed   Problem List   Problem List Decreased Active Daily Living Skills;Decreased Functional Mobility;Decreased Activity Tolerance;Impaired Postural Control / Balance   Anticipated Discharge Equipment and Recommendations   DC Equipment Recommendations Front-Wheel Walker   DischargeRecommendations Recommend home health for continued occupational therapy services   Interdisciplinary Plan of Care Collaboration   IDT Collaboration with  Physician;Nursing   Patient Position at End of Therapy In Bed;Call Light within Reach;Tray Table within Reach;Phone within Reach   Collaboration Comments report given   Session Information   Date / Session Number  7/4,1 ( 1/3, 7/20)   Priority 3

## 2021-07-04 NOTE — PROGRESS NOTES
4 Eyes Skin Assessment Completed by GARLAND Diaz and GARLAND Hurst.     Head WDL  Ears WDL  Nose WDL  Mouth WDL  Neck WDL  Breast/Chest Redness and Rash  Shoulder Blades WDL  Spine WDL  (R) Arm/Elbow/Hand Edema  (L) Arm/Elbow/Hand Edema  Abdomen Redness and Rash  Groin Redness and Rash  Scrotum/Coccyx/Buttocks Redness  (R) Leg Redness  (L) Leg Redness  (R) Heel/Foot/Toe Edema  (L) Heel/Foot/Toe Edema          Interventions In Place InterDry     Possible Skin Injury Yes     Pictures Uploaded Into Epic Yes  Wound Consult Placed Yes  RN Wound Prevention Protocol Ordered Yes

## 2021-07-04 NOTE — PROGRESS NOTES
Patient admitted earlier this morning by my colleague.  Seen and examined at the bedside.  Insulin regimens have been advanced to facilitate better glucose control.  Nystatin and wound care ordered as she has multiple regions with intertrigo and a sacral decubitus per RN.  PT has evaluated the patient and ambulated her in the hallway.  They recommend home health at discharge presently.  Patient lives alone and needs to demonstrate ability to manage her own insulin and get medication refills.  She has capacity for medical decision-making.  It is not clear if she is competent for self administration. Renal panel in a.m.

## 2021-07-04 NOTE — PROGRESS NOTES
4 Eyes Skin Assessment Completed by GARLAND Melo and GARLAND Becerra.    Head WDL  Ears WDL  Nose WDL  Mouth WDL  Neck WDL  Breast/Chest Redness and Rash  Shoulder Blades WDL  Spine WDL  (R) Arm/Elbow/Hand Edema  (L) Arm/Elbow/Hand Edema  Abdomen Redness and Rash  Groin Redness and Rash  Scrotum/Coccyx/Buttocks Redness  (R) Leg Redness  (L) Leg Redness  (R) Heel/Foot/Toe Edema  (L) Heel/Foot/Toe Edema          Devices In Places Pulse Ox      Interventions In Place InterDry    Possible Skin Injury Yes    Pictures Uploaded Into Epic Yes  Wound Consult Placed Yes  RN Wound Prevention Protocol Ordered Yes

## 2021-07-04 NOTE — ED TRIAGE NOTES
"• Weakness       Pt states she was at home when she slid off her bed, felt weak and was unable to stand up on her own. Per EMS pt's blood glucose read \"High\" on glucometer. Pt is insulin dependent T2DM, and has not been able to afford her unsulin for the last month. Pt has no other complaints. Hx A-flutter.    • High Blood Sugar     .  /59   Pulse 85   Temp 36.7 °C (98.1 °F) (Temporal)   Resp 16   Ht 1.626 m (5' 4\")   Wt (!) 136 kg (300 lb)   SpO2 95%   BMI 51.49 kg/m²     "

## 2021-07-04 NOTE — ED NOTES
Pt transferred to T213 at this time. Pt is A&Ox4 with stable vitals upon transfer. All personal belongings transferred with pt.

## 2021-07-04 NOTE — CARE PLAN
The patient is Watcher - Medium risk of patient condition declining or worsening    Shift Goals  Patient Goals: rest    Progress made toward(s) clinical / shift goals:    Problem: Fall Risk  Goal: Patient will remain free from falls  Outcome: Progressing  Note: Fall precautions in place. Patient compliant with fall protocol. No falls this shift. Patient utilizes call light appropriately. Pt worked with PT today and walked halls. Patient ambulating with stand by assist to and from bathroom. Will continue to monitor.     Problem: Skin Integrity  Goal: Skin integrity is maintained or improved  Outcome: Progressing  Note: Patient admitted with red/fungal rash in skin folds. Nystatin powder applied, photos taken and places in chart, wound care consulted and MD aware. Will continue to monitor and follow wound care instructions as ordered.     Patient is not progressing towards the following goals:    Problem: Diabetes Management  Goal: Patient will achieve and maintain glucose in satisfactory range  Outcome: Not Progressing  Note: Patient is a non-compliant diabetic. Admitted with hyperglycemia. Blood sugars checked ACHS and insulin administered as ordered. Diabetic education ordering and pending.

## 2021-07-04 NOTE — ASSESSMENT & PLAN NOTE
Corrected sodium in setting of hyperglycemia  Will likely correct with IVF and glucose correction    7/8: Resolved

## 2021-07-04 NOTE — ASSESSMENT & PLAN NOTE
We will restart patient's lisinopril at a dose of 5 mg with holding parameters.  Monitor, make changes accordingly.    7/8: We have discontinued lisinopril, continue metoprolol for now. We will try to titrate metoprolol up if possible.

## 2021-07-04 NOTE — H&P
"Hospital Medicine History & Physical Note    Date of Service  7/4/2021    Primary Care Physician  No primary care provider on file.    Consultants    Code Status  Full Code    Chief Complaint  Chief Complaint   Patient presents with   • Weakness     Pt states she wasat home wehn she slid off her, felt weak and was unable to stand up on her own. Per EMS pt's blood glucose read \"High\" on glucometer. Pt is insulin dependent T2DM, and has not been able to afford her unsulin for the last month. Pt has no other complaints.    • High Blood Sugar       History of Presenting Illness  70F with diabetes and aflutter presented with worsening general weakness secondary to ground level falls which were characterized by \"sliding to the floor\" Patient is insulin dependent who has had worsening weakness for the past months while not being able to afford her treatment or check her blood sugars. Pt endorses more difficulty concentrating. She lives in a mobile home community. She endorses swollen feet for the past few weeks with redness of the legs. Otherwise, patient denies sob, cough, chest pain/pressure, congestion, diaphoresis, dizziness, weakness, unintentional weight changes, fever, chills, nausea, vomiting, hemoptysis, diarrhea, constipation, dysuria/dyspareunia, polyuria, or polydipsia. Denies recent history/use of malignancy, drug, alcohol, or cannabinoid use.    Upon admission, notable findings include Na 127, AG 13, Glucose 771, Cr 1.92    Will admit patient for hyperglycemia and JULIETA. Does not appear to have DKA.    I discussed the plan of care with patient.    Review of Systems  ROS  All systems reviewed and negative except as noted in HPI.    Past Medical History  Dm2, htn    Surgical History   has no past surgical history on file.    Family History  Family History of HTN    Family history reviewed with patient. There is no family history that is pertinent to the chief complaint.     Social History       Allergies  No " Known Allergies    Medications  None       Physical Exam  Temp:  [36.7 °C (98.1 °F)] 36.7 °C (98.1 °F)  Pulse:  [] 73  Resp:  [12-19] 18  BP: (114-133)/(53-61) 116/53  SpO2:  [95 %-99 %] 98 %    Physical Exam  I have reviewed patients' vitals and Labs    Constitutional: Resting comfortably in NAD   HENT: Normocephalic, no obvious evidence of acute trauma.  Eyes: No scleral icterus. Normal conjunctiva   Neck: Comfortable movement without any obvious restriction in the range of motion.  Cardiovascular: Upon ascultation I appreciate a regular heart rhythm and a normal rate with no murmurs, rubs or gallops  Thorax & Lungs: No respiratory distress. No wheezing, rales or rhonchi heard on ausculation.  there is no obvious chest wall tenderness. I appreciate normal air movement throughout.   Abdomen: The abdomen is not visibly distended. Upon palpation, I find it to be without tenderness.  No mass appreciated.  Skin: The exposed portions of skin reveal no obvious rash or other abnormalities.  Extremities/Musculoskeletal: no lower extremity edema with no asymmetry. 2+ pitting edema  Neurologic: Alert & oriented. No focal deficits observed.   Psychiatric: Normal affect appropriate for the clinical situation.     Laboratory:  Recent Labs     07/04/21  0159   WBC 7.5   RBC 3.68*   HEMOGLOBIN 12.4   HEMATOCRIT 34.9*   MCV 94.8   MCH 33.7*   MCHC 35.5*   RDW 49.7   PLATELETCT 171   MPV 11.9     Recent Labs     07/04/21 0159   SODIUM 127*   POTASSIUM 4.2   CHLORIDE 92*   CO2 22   GLUCOSE 771*   BUN 35*   CREATININE 1.92*   CALCIUM 9.1     Recent Labs     07/04/21  0159   ALTSGPT 11   ASTSGOT 11*   ALKPHOSPHAT 139*   TBILIRUBIN 1.0   GLUCOSE 771*         No results for input(s): NTPROBNP in the last 72 hours.      Recent Labs     07/04/21  0159   TROPONINT 25*       Imaging:  DX-CHEST-PORTABLE (1 VIEW)   Final Result      Hypoinflation without other evidence for acute cardiopulmonary disease.          EKG:  I have personally  reviewed the images and compared with prior images.    Assessment/Plan:  I anticipate this patient is appropriate for observation status at this time.    Hyperglycemia- (present on admission)  Assessment & Plan  Initial Insulin bolus weight based ordered  Continue Basal + ISS  A1c  Hypoglycemia protocol      JULIETA (acute kidney injury) (HCC)- (present on admission)  Assessment & Plan  - c/w LR  - F/u Urine Na and Urine Cr  - Renal US pending response to IVF  - Monitor repeat labs  - likely 2/2 dehydration      HTN (hypertension)  Assessment & Plan  Continue Home Medications  Labetalol ivp prn with parameters      Hyponatremia- (present on admission)  Assessment & Plan  Corrected sodium in setting of hyperglycemia  Will likely correct with IVF and glucose correction    Dehydration- (present on admission)  Assessment & Plan  IVF    VTE prophylaxis: heparin ppx

## 2021-07-05 PROBLEM — L89.301 DECUBITUS ULCER OF BUTTOCK, STAGE 1: Status: ACTIVE | Noted: 2021-07-05

## 2021-07-05 PROBLEM — E11.9 TYPE 2 DIABETES MELLITUS, WITH LONG-TERM CURRENT USE OF INSULIN (HCC): Status: ACTIVE | Noted: 2021-07-04

## 2021-07-05 PROBLEM — Z79.4 TYPE 2 DIABETES MELLITUS, WITH LONG-TERM CURRENT USE OF INSULIN (HCC): Status: ACTIVE | Noted: 2021-07-04

## 2021-07-05 PROBLEM — E66.01 CLASS 3 SEVERE OBESITY IN ADULT (HCC): Status: ACTIVE | Noted: 2021-07-05

## 2021-07-05 LAB
ALBUMIN SERPL BCP-MCNC: 2.6 G/DL (ref 3.2–4.9)
BUN SERPL-MCNC: 22 MG/DL (ref 8–22)
CALCIUM SERPL-MCNC: 8.3 MG/DL (ref 8.5–10.5)
CHLORIDE SERPL-SCNC: 104 MMOL/L (ref 96–112)
CHOLEST SERPL-MCNC: 103 MG/DL (ref 100–199)
CO2 SERPL-SCNC: 23 MMOL/L (ref 20–33)
CREAT SERPL-MCNC: 1.35 MG/DL (ref 0.5–1.4)
ERYTHROCYTE [DISTWIDTH] IN BLOOD BY AUTOMATED COUNT: 48.5 FL (ref 35.9–50)
GLUCOSE BLD-MCNC: 200 MG/DL (ref 65–99)
GLUCOSE BLD-MCNC: 222 MG/DL (ref 65–99)
GLUCOSE BLD-MCNC: 266 MG/DL (ref 65–99)
GLUCOSE BLD-MCNC: 281 MG/DL (ref 65–99)
GLUCOSE SERPL-MCNC: 199 MG/DL (ref 65–99)
HCT VFR BLD AUTO: 34 % (ref 37–47)
HDLC SERPL-MCNC: 48 MG/DL
HGB BLD-MCNC: 10.8 G/DL (ref 12–16)
LDLC SERPL CALC-MCNC: 26 MG/DL
MCH RBC QN AUTO: 29.3 PG (ref 27–33)
MCHC RBC AUTO-ENTMCNC: 31.8 G/DL (ref 33.6–35)
MCV RBC AUTO: 92.1 FL (ref 81.4–97.8)
PHOSPHATE SERPL-MCNC: 2.8 MG/DL (ref 2.5–4.5)
PLATELET # BLD AUTO: 150 K/UL (ref 164–446)
PMV BLD AUTO: 11.9 FL (ref 9–12.9)
POTASSIUM SERPL-SCNC: 3.6 MMOL/L (ref 3.6–5.5)
RBC # BLD AUTO: 3.69 M/UL (ref 4.2–5.4)
SODIUM SERPL-SCNC: 136 MMOL/L (ref 135–145)
TRIGL SERPL-MCNC: 147 MG/DL (ref 0–149)
WBC # BLD AUTO: 6.3 K/UL (ref 4.8–10.8)

## 2021-07-05 PROCEDURE — 80061 LIPID PANEL: CPT

## 2021-07-05 PROCEDURE — 700102 HCHG RX REV CODE 250 W/ 637 OVERRIDE(OP): Performed by: STUDENT IN AN ORGANIZED HEALTH CARE EDUCATION/TRAINING PROGRAM

## 2021-07-05 PROCEDURE — G0378 HOSPITAL OBSERVATION PER HR: HCPCS

## 2021-07-05 PROCEDURE — 82962 GLUCOSE BLOOD TEST: CPT | Mod: 91

## 2021-07-05 PROCEDURE — 85027 COMPLETE CBC AUTOMATED: CPT

## 2021-07-05 PROCEDURE — 80069 RENAL FUNCTION PANEL: CPT

## 2021-07-05 PROCEDURE — A9270 NON-COVERED ITEM OR SERVICE: HCPCS | Performed by: STUDENT IN AN ORGANIZED HEALTH CARE EDUCATION/TRAINING PROGRAM

## 2021-07-05 PROCEDURE — 700111 HCHG RX REV CODE 636 W/ 250 OVERRIDE (IP): Performed by: STUDENT IN AN ORGANIZED HEALTH CARE EDUCATION/TRAINING PROGRAM

## 2021-07-05 PROCEDURE — 36415 COLL VENOUS BLD VENIPUNCTURE: CPT

## 2021-07-05 PROCEDURE — 96372 THER/PROPH/DIAG INJ SC/IM: CPT

## 2021-07-05 PROCEDURE — 99225 PR SUBSEQUENT OBSERVATION CARE,LEVEL II: CPT | Performed by: STUDENT IN AN ORGANIZED HEALTH CARE EDUCATION/TRAINING PROGRAM

## 2021-07-05 RX ADMIN — HEPARIN SODIUM 5000 UNITS: 5000 INJECTION, SOLUTION INTRAVENOUS; SUBCUTANEOUS at 14:13

## 2021-07-05 RX ADMIN — INSULIN LISPRO 3 UNITS: 100 INJECTION, SOLUTION INTRAVENOUS; SUBCUTANEOUS at 08:11

## 2021-07-05 RX ADMIN — MICONAZOLE NITRATE: 20 CREAM TOPICAL at 17:22

## 2021-07-05 RX ADMIN — MICONAZOLE NITRATE: 20 CREAM TOPICAL at 06:20

## 2021-07-05 RX ADMIN — ACETAMINOPHEN 650 MG: 325 TABLET, FILM COATED ORAL at 02:46

## 2021-07-05 RX ADMIN — INSULIN LISPRO 4 UNITS: 100 INJECTION, SOLUTION INTRAVENOUS; SUBCUTANEOUS at 08:11

## 2021-07-05 RX ADMIN — HEPARIN SODIUM 5000 UNITS: 5000 INJECTION, SOLUTION INTRAVENOUS; SUBCUTANEOUS at 21:50

## 2021-07-05 RX ADMIN — INSULIN LISPRO 7 UNITS: 100 INJECTION, SOLUTION INTRAVENOUS; SUBCUTANEOUS at 17:23

## 2021-07-05 RX ADMIN — HEPARIN SODIUM 5000 UNITS: 5000 INJECTION, SOLUTION INTRAVENOUS; SUBCUTANEOUS at 06:19

## 2021-07-05 RX ADMIN — INSULIN LISPRO 4 UNITS: 100 INJECTION, SOLUTION INTRAVENOUS; SUBCUTANEOUS at 21:50

## 2021-07-05 RX ADMIN — NYSTATIN: 100000 POWDER TOPICAL at 06:19

## 2021-07-05 RX ADMIN — INSULIN LISPRO 7 UNITS: 100 INJECTION, SOLUTION INTRAVENOUS; SUBCUTANEOUS at 12:35

## 2021-07-05 RX ADMIN — INSULIN LISPRO 4 UNITS: 100 INJECTION, SOLUTION INTRAVENOUS; SUBCUTANEOUS at 17:23

## 2021-07-05 RX ADMIN — INSULIN LISPRO 4 UNITS: 100 INJECTION, SOLUTION INTRAVENOUS; SUBCUTANEOUS at 12:34

## 2021-07-05 RX ADMIN — NYSTATIN: 100000 POWDER TOPICAL at 17:22

## 2021-07-05 ASSESSMENT — PATIENT HEALTH QUESTIONNAIRE - PHQ9
SUM OF ALL RESPONSES TO PHQ9 QUESTIONS 1 AND 2: 0
1. LITTLE INTEREST OR PLEASURE IN DOING THINGS: NOT AT ALL
2. FEELING DOWN, DEPRESSED, IRRITABLE, OR HOPELESS: NOT AT ALL

## 2021-07-05 ASSESSMENT — ENCOUNTER SYMPTOMS
GASTROINTESTINAL NEGATIVE: 1
CONSTITUTIONAL NEGATIVE: 1
CARDIOVASCULAR NEGATIVE: 1
RESPIRATORY NEGATIVE: 1

## 2021-07-05 ASSESSMENT — PAIN DESCRIPTION - PAIN TYPE
TYPE: ACUTE PAIN
TYPE: ACUTE PAIN

## 2021-07-05 ASSESSMENT — FIBROSIS 4 INDEX: FIB4 SCORE: 1.57

## 2021-07-05 NOTE — FACE TO FACE
Face to Face Supporting Documentation - Home Health    The encounter with this patient was in whole or in part the primary reason for home health admission.    Date of encounter:   Patient:                    MRN:                       YOB: 2022  Beverley Dejesus  [id number]  1950     Home health to see patient for:  Home health aide, Physical Therapy evaluation and treatment and Occupational therapy evaluation and treatment    Skilled need for:  Recent Deterioration of Health Status .    Homebound status evidenced by:  Need the aid of supportive devices such as crutches, canes, wheelchairs or walkers. Leaving home requires a considerable and taxing effort. There is a normal inability to leave the home.    Community Physician to provide follow up care: No primary care provider on file.     Optional Interventions? No      I certify the face to face encounter for this home health care referral meets the CMS requirements and the encounter/clinical assessment with the patient was, in whole, or in part, for the medical condition(s) listed above, which is the primary reason for home health care. Based on my clinical findings: the service(s) are medically necessary, support the need for home health care, and the homebound criteria are met.  I certify that this patient has had a face to face encounter by myself.  [place] D.O. - NPI: [id number]

## 2021-07-05 NOTE — DIETARY
NUTRITION SERVICES: BMI - Pt with BMI >40 (=Body mass index is 53.81 kg/m².), Class III obesity. Weight loss counseling not appropriate in acute care setting.     Additionally, alert received for newly identified wound. Wound team consult pending, will await wound staging to make recommendations if appropriate.     RECOMMEND - If appropriate at DC please refer to outpatient nutrition services for weight management.     RD will monitor per dept policy.

## 2021-07-05 NOTE — CARE PLAN
The patient is Stable - Low risk of patient condition declining or worsening    Shift Goals  Clinical Goals: improved blood sugar  Patient Goals: rest        Problem: Fall Risk  Goal: Patient will remain free from falls  Outcome: Progressing      Pt remains free from falls; educated on fall risk and precautions in place. Pt uses a walker/assist of one.    Problem: Knowledge Deficit - Standard  Goal: Patient and family/care givers will demonstrate understanding of plan of care, disease process/condition, diagnostic tests and medications  Outcome: Progressing     Educated pt on plan of care and importance of blood glucose management program.

## 2021-07-05 NOTE — DISCHARGE PLANNING
Care Transition Team Discharge Planning    Anticipated Discharge Disposition: DC with HH -pending.    Action: Lsw met with patient to complete CTT assessment. She presented A&Ox4 e/b answering answers on the facesheet correctly. She reported her phone number being . Her physical address is [address], [place] [address]. She lives with roommates.    The patient selected for choices of [facility], [facility], and [facility].    Lsw faxed CHOICE form to DPA. Lsw placed CHOICE form in CM basket.    Barriers to Discharge: Awaiting medical clearance.    Plan: Lsw will assist medical team with DC planning.    Care Transition Team Assessment    Information Source  Orientation Level: Oriented X4  Information Given By: Patient  Informant's Name:  (Asim Oleg)  Who is responsible for making decisions for patient? : Patient    Readmission Evaluation  Is this a readmission?: No    Elopement Risk  Legal Hold: No  Ambulatory or Self Mobile in Wheelchair: No-Not an Elopement Risk  Elopement Risk: Not at Risk for Elopement    Interdisciplinary Discharge Planning  Lives with - Patient's Self Care Capacity: Unrelated Adult  Patient or legal guardian wants to designate a caregiver: No  Housing / Facility: Mobile Home  Prior Services: None    Discharge Preparedness  What is your plan after discharge?: Home with help  What are your discharge supports?: Other (comment)  Difficulity with ADLs: None  Difficulity with IADLs: None         Finances  Financial Barriers to Discharge: No  Prescription Coverage: Yes    Vision / Hearing Impairment  Vision Impairment : Yes  Right Eye Vision: Impaired, Wears Glasses  Left Eye Vision: Impaired, Wears Glasses  Hearing Impairment : No         Advance Directive  Advance Directive?: None  Advance Directive offered?: AD Booklet refused    Domestic Abuse  Have you ever been the victim of abuse or violence?: No  Physical Abuse or Sexual Abuse: No  Verbal Abuse or Emotional Abuse: No  Possible  Abuse/Neglect Reported to:: Not Applicable    Psychological Assessment  History of Substance Abuse: None  History of Psychiatric Problems: No  Non-compliant with Treatment: No  Newly Diagnosed Illness: Yes    Discharge Risks or Barriers  Discharge risks or barriers?: No PCP  Patient risk factors: No PCP    Anticipated Discharge Information  Discharge Disposition: Still a Patient (30)  Discharge Address: [facility] [address], [place] [address]  Discharge Contact Phone Number:  ([phone])

## 2021-07-05 NOTE — PROGRESS NOTES
[facility] Medicine Daily Progress Note    Date of Service  7/5/2021    Chief Complaint  Beverley Dejesus is a 70 y.o. female admitted 7/4/2021 with hyperglycemia complicated by dehydration and JULIETA.    Interval Problem Update  Patient seen and examined at bedside this morning.  No acute events overnight.     Blood sugars better controlled on current insulin regimen.  JULIETA improving on IV fluids.  PT/OT recommending discharge home with home health, but patient needs bedside diabetic education teaching prior to be discharged back home. She has to show she is able to check her blood sugars herself and administer insulin as well. A1c 16.5.    I have personally seen and examined the patient at bedside. I discussed the plan of care with patient, bedside RN and .    Consultants/Specialty  none    Code Status  Full Code    Disposition  Patient is not medically cleared.   Anticipate discharge to to home with organized home healthcare and close outpatient follow-up.  I have placed the appropriate orders for post-discharge needs.    Review of Systems  Review of Systems   Constitutional: Negative.    Respiratory: Negative.    Cardiovascular: Negative.    Gastrointestinal: Negative.    All other systems reviewed and are negative.       Physical Exam  Temp:  [36.2 °C (97.2 °F)-36.7 °C (98 °F)] 36.2 °C (97.2 °F)  Pulse:  [] 92  Resp:  [17-20] 19  BP: ()/(46-52) 111/51  SpO2:  [95 %-97 %] 96 %    Physical Exam  Constitutional:       Appearance: She is obese.   HENT:      Head: Normocephalic and atraumatic.   Eyes:      Conjunctiva/sclera: Conjunctivae normal.   Cardiovascular:      Rate and Rhythm: Normal rate.      Pulses: Normal pulses.      Heart sounds: Normal heart sounds.   Pulmonary:      Effort: Pulmonary effort is normal.      Breath sounds: Normal breath sounds.   Abdominal:      General: Bowel sounds are normal.      Palpations: Abdomen is soft.   Musculoskeletal:         General: Swelling present.    Skin:     General: Skin is warm.   Neurological:      General: No focal deficit present.      Mental Status: She is alert and oriented to person, place, and time.   Psychiatric:         Mood and Affect: Mood normal.         Behavior: Behavior normal.         Fluids    Intake/Output Summary (Last 24 hours) at 7/5/2021 0936  Last data filed at 7/4/2021 2000  Gross per 24 hour   Intake 2350 ml   Output --   Net 2350 ml       Laboratory  Recent Labs     07/04/21  0159 07/05/21  0424   WBC 7.5 6.3   RBC 3.68* 3.69*   HEMOGLOBIN 12.4 10.8*   HEMATOCRIT 34.9* 34.0*   MCV 94.8 92.1   MCH 33.7* 29.3   MCHC 35.5* 31.8*   RDW 49.7 48.5   PLATELETCT 171 150*   MPV 11.9 11.9     Recent Labs     07/04/21  0159 07/04/21  0511 07/05/21  0424   SODIUM 127* 128* 136   POTASSIUM 4.2 4.3 3.6   CHLORIDE 92* 94* 104   CO2 22 23 23   GLUCOSE 771* 638* 199*   BUN 35* 33* 22   CREATININE 1.92* 1.72* 1.35   CALCIUM 9.1 9.1 8.3*             Recent Labs     07/05/21  0424   TRIGLYCERIDE 147   HDL 48   LDL 26       Imaging  DX-CHEST-PORTABLE (1 VIEW)   Final Result      Hypoinflation without other evidence for acute cardiopulmonary disease.           Assessment/Plan  * Type 2 diabetes mellitus, with long-term current use of insulin ([facility])- (present on admission)  Assessment & Plan  Uncontrolled.  A1c 16.5.    7/4 advance SSI to high dose and add mealtime coverage  Continue long-acting insulin at night.  Diabetes education as patient has [employer] and will need an insulin covered by her insurance as well as likely home health for med education, compliance monitoring  Hypoglycemia protocol.    Decubitus ulcer of buttock, stage 1  Assessment & Plan  Follow-up wound care recommendations.    Class 3 severe obesity in adult (HCC)  Assessment & Plan  Patient has been counseled on diet and lifestyle modifications  Recommended outpatient weight management program and bariatric surgery evaluation  Pt educated on the increase of morbidity and mortality  associated with excess weight including DM, Heart Disease, HTN, stroke, and sleep apnea.  Pt advised weight loss of 5% through reduced calorie, low carb diet and 150 mins of exercise a week    HTN (hypertension)  Assessment & Plan  Continue Home Medications.  Labetalol ivp prn with parameters      Hyponatremia- (present on admission)  Assessment & Plan  Corrected sodium in setting of hyperglycemia  Will likely correct with IVF and glucose correction    Improving.    Dehydration- (present on admission)  Assessment & Plan  IVF    JULIETA (acute kidney injury) (HCC)- (present on admission)  Assessment & Plan  -likely 2/2 dehydration  -Fluid resuscitation  -Glucose control  -Daily renal panel    Improving.           VTE prophylaxis: heparin ppx    I have performed a physical exam and reviewed and updated ROS and Plan today (7/5/2021). In review of yesterday's note (7/4/2021), there are no changes except as documented above.

## 2021-07-05 NOTE — ASSESSMENT & PLAN NOTE
Patient has been counseled on diet and lifestyle modifications  Recommended outpatient weight management program and bariatric surgery evaluation  Pt educated on the increase of morbidity and mortality associated with excess weight including DM, Heart Disease, HTN, stroke, and sleep apnea.  Pt advised weight loss of 5% through reduced calorie, low carb diet and 150 mins of exercise a week

## 2021-07-06 PROBLEM — D64.9 ANEMIA: Status: ACTIVE | Noted: 2021-07-06

## 2021-07-06 PROBLEM — D69.6 THROMBOCYTOPENIA (HCC): Status: ACTIVE | Noted: 2021-07-06

## 2021-07-06 LAB
ANION GAP SERPL CALC-SCNC: 10 MMOL/L (ref 7–16)
BUN SERPL-MCNC: 19 MG/DL (ref 8–22)
CALCIUM SERPL-MCNC: 8.6 MG/DL (ref 8.5–10.5)
CHLORIDE SERPL-SCNC: 105 MMOL/L (ref 96–112)
CO2 SERPL-SCNC: 24 MMOL/L (ref 20–33)
CREAT SERPL-MCNC: 1.2 MG/DL (ref 0.5–1.4)
GLUCOSE BLD-MCNC: 157 MG/DL (ref 65–99)
GLUCOSE BLD-MCNC: 194 MG/DL (ref 65–99)
GLUCOSE BLD-MCNC: 204 MG/DL (ref 65–99)
GLUCOSE BLD-MCNC: 227 MG/DL (ref 65–99)
GLUCOSE SERPL-MCNC: 193 MG/DL (ref 65–99)
POTASSIUM SERPL-SCNC: 3.8 MMOL/L (ref 3.6–5.5)
SODIUM SERPL-SCNC: 139 MMOL/L (ref 135–145)

## 2021-07-06 PROCEDURE — 80048 BASIC METABOLIC PNL TOTAL CA: CPT

## 2021-07-06 PROCEDURE — 36415 COLL VENOUS BLD VENIPUNCTURE: CPT

## 2021-07-06 PROCEDURE — 82962 GLUCOSE BLOOD TEST: CPT | Mod: 91

## 2021-07-06 PROCEDURE — 99225 PR SUBSEQUENT OBSERVATION CARE,LEVEL II: CPT | Performed by: INTERNAL MEDICINE

## 2021-07-06 PROCEDURE — 96372 THER/PROPH/DIAG INJ SC/IM: CPT

## 2021-07-06 PROCEDURE — 700111 HCHG RX REV CODE 636 W/ 250 OVERRIDE (IP): Performed by: STUDENT IN AN ORGANIZED HEALTH CARE EDUCATION/TRAINING PROGRAM

## 2021-07-06 PROCEDURE — A9270 NON-COVERED ITEM OR SERVICE: HCPCS | Performed by: INTERNAL MEDICINE

## 2021-07-06 PROCEDURE — G0378 HOSPITAL OBSERVATION PER HR: HCPCS

## 2021-07-06 PROCEDURE — 700102 HCHG RX REV CODE 250 W/ 637 OVERRIDE(OP): Performed by: INTERNAL MEDICINE

## 2021-07-06 RX ORDER — LISINOPRIL 5 MG/1
5 TABLET ORAL
Status: DISCONTINUED | OUTPATIENT
Start: 2021-07-06 | End: 2021-07-08

## 2021-07-06 RX ADMIN — INSULIN LISPRO 3 UNITS: 100 INJECTION, SOLUTION INTRAVENOUS; SUBCUTANEOUS at 17:50

## 2021-07-06 RX ADMIN — INSULIN LISPRO 4 UNITS: 100 INJECTION, SOLUTION INTRAVENOUS; SUBCUTANEOUS at 12:38

## 2021-07-06 RX ADMIN — INSULIN LISPRO 4 UNITS: 100 INJECTION, SOLUTION INTRAVENOUS; SUBCUTANEOUS at 17:49

## 2021-07-06 RX ADMIN — HEPARIN SODIUM 5000 UNITS: 5000 INJECTION, SOLUTION INTRAVENOUS; SUBCUTANEOUS at 20:13

## 2021-07-06 RX ADMIN — MICONAZOLE NITRATE: 20 CREAM TOPICAL at 05:52

## 2021-07-06 RX ADMIN — INSULIN LISPRO 3 UNITS: 100 INJECTION, SOLUTION INTRAVENOUS; SUBCUTANEOUS at 20:13

## 2021-07-06 RX ADMIN — LISINOPRIL 5 MG: 5 TABLET ORAL at 14:51

## 2021-07-06 RX ADMIN — INSULIN LISPRO 4 UNITS: 100 INJECTION, SOLUTION INTRAVENOUS; SUBCUTANEOUS at 08:06

## 2021-07-06 RX ADMIN — NYSTATIN: 100000 POWDER TOPICAL at 05:52

## 2021-07-06 RX ADMIN — HEPARIN SODIUM 5000 UNITS: 5000 INJECTION, SOLUTION INTRAVENOUS; SUBCUTANEOUS at 05:52

## 2021-07-06 RX ADMIN — HEPARIN SODIUM 5000 UNITS: 5000 INJECTION, SOLUTION INTRAVENOUS; SUBCUTANEOUS at 14:51

## 2021-07-06 ASSESSMENT — ENCOUNTER SYMPTOMS
NERVOUS/ANXIOUS: 0
FEVER: 0
FALLS: 0
COUGH: 0
BACK PAIN: 0
ABDOMINAL PAIN: 0
CHILLS: 0
BLURRED VISION: 0
DOUBLE VISION: 0
VOMITING: 0
HEARTBURN: 0
SHORTNESS OF BREATH: 0
HEADACHES: 0
DIZZINESS: 0
PALPITATIONS: 0

## 2021-07-06 ASSESSMENT — LIFESTYLE VARIABLES: SUBSTANCE_ABUSE: 0

## 2021-07-06 NOTE — PROGRESS NOTES
Assumed care of patient this am; AOx4. Patient denies SOB and pain at this time. Bed low and locked. Discussed discharge plan with patient. Will continue to monitor.

## 2021-07-06 NOTE — DISCHARGE PLANNING
Anticipated Discharge Disposition:   Home with Home Health, with FWW    Action:   Discussed discharge planning needs during rounds. Informed MD about need for FWW order. Per MD, pt needs DM Education, order in place.     Pt has an appointment to establish PCP on 7/8/2021.  Floyd Lozano M.D. On 7/8/2021.      Specialty: Internal Medicine  Why: 08:00 AM. , Establish PCP. Bring insurance card, picture ID and any medications you are taking.   Contact information  [address] [place] [address]   [place] [address] [phone]     RN CM called [facility], informed of PCP appointment. Per Sadiq from , they still need to review the file, then he will update DPA.     Barriers to Discharge:   Medical clearance  DM Education   acceptance  FWW DME order and delivery    Plan:   Hospital Care Management will continue to follow and assist with discharge planning needs.

## 2021-07-06 NOTE — FACE TO FACE
Face to Face Note  -  Durable Medical Equipment    Jeovany Currie M.D. - NPI: [id number]  I certify that this patient is under my care and that they had a durable medical equipment(DME)face to face encounter by myself that meets the physician DME face-to-face encounter requirements with this patient on:    Date of encounter:   Patient:                    MRN:                       YOB: 2022  Beverley Matthews Oleg  [id number]  1950     The encounter with the patient was in whole, or in part, for the following medical condition, which is the primary reason for durable medical equipment:  Other - debility    I certify that, based on my findings, the following durable medical equipment is medically necessary:  Walkers.      My Clinical findings support the need for the above equipment due to:  Other - debility

## 2021-07-06 NOTE — PROGRESS NOTES
[facility] Medicine Daily Progress Note    Date of Service  7/6/2021    Chief Complaint  Beverley Dejesus is a 70 y.o. female admitted 7/4/2021 with hyperglycemia complicated by dehydration and JULIETA.    Interval Problem Update  7/6: I have seen patient at bedside this morning.  Patient currently laying in bed comfortably.  We are pending diabetes mellitus educator.  Blood sugar somewhat better controlled.  The patient will require insulin upon discharge.  The patient will also require close follow-up as an outpatient at discharge.  As per nursing no other overnight events reported.      I have personally seen and examined the patient at bedside. I discussed the plan of care with patient, bedside RN and .    Consultants/Specialty  none    Code Status  Full Code    Disposition  Patient is not medically cleared.   Anticipate discharge to to home with organized home healthcare and close outpatient follow-up.  I have placed the appropriate orders for post-discharge needs.    Review of Systems  Review of Systems   Constitutional: Negative for chills and fever.        Generalized weakness   HENT: Negative for hearing loss and nosebleeds.    Eyes: Negative for blurred vision and double vision.   Respiratory: Negative for cough and shortness of breath.    Cardiovascular: Negative for chest pain and palpitations.   Gastrointestinal: Negative for abdominal pain, heartburn and vomiting.   Genitourinary: Negative for dysuria and urgency.   Musculoskeletal: Negative for back pain and falls.   Skin: Negative for itching and rash.   Neurological: Negative for dizziness and headaches.   Psychiatric/Behavioral: Negative for substance abuse. The patient is not nervous/anxious.         Physical Exam  Temp:  [36.1 °C (96.9 °F)-36.4 °C (97.6 °F)] 36.3 °C (97.3 °F)  Pulse:  [60-96] 66  Resp:  [18] 18  BP: (101-128)/(53-77) 111/53  SpO2:  [96 %-98 %] 98 %    Physical Exam  Constitutional:       General: She is not in acute  distress.     Appearance: She is obese. She is not diaphoretic.   HENT:      Head: Normocephalic and atraumatic.      Mouth/Throat:      Pharynx: No oropharyngeal exudate or posterior oropharyngeal erythema.   Eyes:      General:         Right eye: No discharge.         Left eye: No discharge.   Cardiovascular:      Rate and Rhythm: Normal rate and regular rhythm.      Pulses: Normal pulses.      Heart sounds: Normal heart sounds.   Pulmonary:      Effort: Pulmonary effort is normal.      Breath sounds: Normal breath sounds.   Abdominal:      General: Bowel sounds are normal. There is no distension.      Palpations: Abdomen is soft.      Tenderness: There is no abdominal tenderness.   Musculoskeletal:         General: Swelling present.   Skin:     General: Skin is warm and dry.   Neurological:      General: No focal deficit present.      Mental Status: She is alert and oriented to person, place, and time.   Psychiatric:         Mood and Affect: Mood normal.         Behavior: Behavior normal.         Fluids    Intake/Output Summary (Last 24 hours) at 7/6/2021 1441  Last data filed at 7/6/2021 1200  Gross per 24 hour   Intake 480 ml   Output --   Net 480 ml       Laboratory  Recent Labs     07/04/21  0159 07/05/21  0424   WBC 7.5 6.3   RBC 3.68* 3.69*   HEMOGLOBIN 12.4 10.8*   HEMATOCRIT 34.9* 34.0*   MCV 94.8 92.1   MCH 33.7* 29.3   MCHC 35.5* 31.8*   RDW 49.7 48.5   PLATELETCT 171 150*   MPV 11.9 11.9     Recent Labs     07/04/21  0511 07/05/21  0424 07/06/21  0440   SODIUM 128* 136 139   POTASSIUM 4.3 3.6 3.8   CHLORIDE 94* 104 105   CO2 23 23 24   GLUCOSE 638* 199* 193*   BUN 33* 22 19   CREATININE 1.72* 1.35 1.20   CALCIUM 9.1 8.3* 8.6             Recent Labs     07/05/21  0424   TRIGLYCERIDE 147   HDL 48   LDL 26       Imaging  DX-CHEST-PORTABLE (1 VIEW)   Final Result      Hypoinflation without other evidence for acute cardiopulmonary disease.           Assessment/Plan  * Type 2 diabetes mellitus, with  long-term current use of insulin (HCC)- (present on admission)  Assessment & Plan  Uncontrolled.  A1c 16.5.    7/4 advance SSI to high dose and add mealtime coverage  Continue long-acting insulin at night.  Diabetes education as patient has [employer] and will need an insulin covered by her insurance as well as likely home health for med education, compliance monitoring  Hypoglycemia protocol.    7/6: Pending DM educator.    Thrombocytopenia (HCC)  Assessment & Plan  Mild, we will repeat CBC if it continues to go down we will consider further workup.  Monitor repeat CBC.    Anemia  Assessment & Plan  No signs of bleeding  Repeat CBC, monitor    Decubitus ulcer of buttock, stage 1  Assessment & Plan  Follow-up wound care recommendations.    Class 3 severe obesity in adult ([facility])  Assessment & Plan  Patient has been counseled on diet and lifestyle modifications  Recommended outpatient weight management program and bariatric surgery evaluation  Pt educated on the increase of morbidity and mortality associated with excess weight including DM, Heart Disease, HTN, stroke, and sleep apnea.  Pt advised weight loss of 5% through reduced calorie, low carb diet and 150 mins of exercise a week    HTN (hypertension)  Assessment & Plan  We will restart patient's lisinopril at a dose of 5 mg with holding parameters.  Monitor, make changes accordingly.      Hyponatremia- (present on admission)  Assessment & Plan  Corrected sodium in setting of hyperglycemia  Will likely correct with IVF and glucose correction    7/6: Resolved    JULIETA (acute kidney injury) (HCC)- (present on admission)  Assessment & Plan  -likely 2/2 dehydration  -Fluid resuscitation  -Glucose control  -Daily renal panel    Improving.    7/6: There might be some CKD component as well.           VTE prophylaxis: heparin ppx    I have performed a physical exam and reviewed and updated ROS and Plan today (7/6/2021). In review of yesterday's note (7/5/2021), there are no  changes except as documented above.

## 2021-07-06 NOTE — WOUND TEAM
In to see pt for moisture associated skin damage to multiple skin folds of pt's torso anterior and posterior r/t body habitus. Most significant under panniculus, L lateral fold near axilla, L breast and buttocks/sacrococcygeal area. Nystatin powder with Interdry cloth for anterior/lateral folds and antifungal barrier paste to perineum, gluteal fold and sacrococcygeal area. Wound team not following, please consult if condition changes.

## 2021-07-06 NOTE — ASSESSMENT & PLAN NOTE
Mild, we will repeat CBC if it continues to go down we will consider further workup.  Monitor repeat CBC.    7/8: Improving.

## 2021-07-06 NOTE — DISCHARGE PLANNING
Anticipated Discharge Disposition:   Home with Home Health, with [facility]    Action:   HH order in place, pending acceptance. Pt does not have a PCP on file. PT/OT recommended [facility]. Will inform MD for order in rounds.     RN CM spoke to pt at bedside. Discussed discharge planning. Received DME choice. Received HH choice. Choice form faxed to [facility]. Per pt, she just switched to [employer] recently and has not found a PCP yet.  Pt is agreeable with RN JERICHO looking for PCP.     RN JERICHO called  line, received information for [facility] ([phone]) and [facility] ([phone]). RN JERICHO called [facility].  to check for available appointment but thinks they do not have an opening for a while.  to call back this RN JERICHO.     RN JERICHO called [facility]. Per , they do not have an available appointment for 2 weeks.     Pt has a cane at home. Pt does not use home oxygen. Per pt, her friend can pick her up at discharge.     Barriers to Discharge:   Medical clearance  Home Health acceptance  PCP appointment    Plan:   Hospital Care Management will continue to follow and assist with discharge planning needs.

## 2021-07-06 NOTE — DISCHARGE PLANNING
Received Choice form at 1445  Agency/Facility Name: [facility]   Referral sent per Choice form @ 1445     @1445  Agency/Facility Name: [facility]   Spoke To: Admission  Outcome: Declined due to insufficient staffing.    @1330  Agency/Facility Name: [facility]   Spoke To: Sadiq  Outcome: Will have managers check referral and call back.    Received Choice form at 1010  Agency/Facility Name: [facility]   Referral sent per Choice form @ 1010    @0920  Agency/Facility Name: [facility]   Spoke To: Smitha  Outcome: Did not receive referral, resent.

## 2021-07-06 NOTE — CARE PLAN
The patient is Stable - Low risk of patient condition declining or worsening    Shift Goals  Clinical Goals: improved blood glucose  Patient Goals: rest    Progress made toward(s) clinical / shift goals: improved blood glucose range    Problem: Knowledge Deficit - Standard  Goal: Patient and family/care givers will demonstrate understanding of plan of care, disease process/condition, diagnostic tests and medications  Outcome: Progressing  Note: Patient will be able express understanding of treatment regimen rationale.     Problem: Diabetes Management  Goal: Patient will achieve and maintain glucose in satisfactory range  Note: Patient will be able express understanding of treatment regimen rationale.

## 2021-07-07 ENCOUNTER — APPOINTMENT (OUTPATIENT)
Dept: RADIOLOGY | Facility: MEDICAL CENTER | Age: 71
DRG: 638 | End: 2021-07-07
Attending: INTERNAL MEDICINE
Payer: MEDICARE

## 2021-07-07 PROBLEM — I48.92 ATRIAL FLUTTER (HCC): Status: ACTIVE | Noted: 2021-07-07

## 2021-07-07 LAB
ALBUMIN SERPL BCP-MCNC: 2.9 G/DL (ref 3.2–4.9)
ALBUMIN/GLOB SERPL: 0.9 G/DL
ALP SERPL-CCNC: 105 U/L (ref 30–99)
ALT SERPL-CCNC: 11 U/L (ref 2–50)
ANION GAP SERPL CALC-SCNC: 11 MMOL/L (ref 7–16)
AST SERPL-CCNC: 23 U/L (ref 12–45)
BASOPHILS # BLD AUTO: 0.6 % (ref 0–1.8)
BASOPHILS # BLD: 0.04 K/UL (ref 0–0.12)
BILIRUB SERPL-MCNC: 0.6 MG/DL (ref 0.1–1.5)
BUN SERPL-MCNC: 13 MG/DL (ref 8–22)
CALCIUM SERPL-MCNC: 8.5 MG/DL (ref 8.5–10.5)
CHLORIDE SERPL-SCNC: 105 MMOL/L (ref 96–112)
CO2 SERPL-SCNC: 24 MMOL/L (ref 20–33)
CREAT SERPL-MCNC: 1.07 MG/DL (ref 0.5–1.4)
EKG IMPRESSION: NORMAL
EOSINOPHIL # BLD AUTO: 0.11 K/UL (ref 0–0.51)
EOSINOPHIL NFR BLD: 1.8 % (ref 0–6.9)
ERYTHROCYTE [DISTWIDTH] IN BLOOD BY AUTOMATED COUNT: 49.4 FL (ref 35.9–50)
GLOBULIN SER CALC-MCNC: 3.3 G/DL (ref 1.9–3.5)
GLUCOSE SERPL-MCNC: 146 MG/DL (ref 65–99)
HCT VFR BLD AUTO: 36.4 % (ref 37–47)
HGB BLD-MCNC: 11.8 G/DL (ref 12–16)
IMM GRANULOCYTES # BLD AUTO: 0.06 K/UL (ref 0–0.11)
IMM GRANULOCYTES NFR BLD AUTO: 1 % (ref 0–0.9)
LYMPHOCYTES # BLD AUTO: 2.18 K/UL (ref 1–4.8)
LYMPHOCYTES NFR BLD: 35.2 % (ref 22–41)
MAGNESIUM SERPL-MCNC: 1.6 MG/DL (ref 1.5–2.5)
MCH RBC QN AUTO: 30.2 PG (ref 27–33)
MCHC RBC AUTO-ENTMCNC: 32.4 G/DL (ref 33.6–35)
MCV RBC AUTO: 93.1 FL (ref 81.4–97.8)
MONOCYTES # BLD AUTO: 0.44 K/UL (ref 0–0.85)
MONOCYTES NFR BLD AUTO: 7.1 % (ref 0–13.4)
NEUTROPHILS # BLD AUTO: 3.37 K/UL (ref 2–7.15)
NEUTROPHILS NFR BLD: 54.3 % (ref 44–72)
NRBC # BLD AUTO: 0 K/UL
NRBC BLD-RTO: 0 /100 WBC
PLATELET # BLD AUTO: 154 K/UL (ref 164–446)
PMV BLD AUTO: 11.1 FL (ref 9–12.9)
POTASSIUM SERPL-SCNC: 4.1 MMOL/L (ref 3.6–5.5)
PROT SERPL-MCNC: 6.2 G/DL (ref 6–8.2)
RBC # BLD AUTO: 3.91 M/UL (ref 4.2–5.4)
SODIUM SERPL-SCNC: 140 MMOL/L (ref 135–145)
WBC # BLD AUTO: 6.2 K/UL (ref 4.8–10.8)

## 2021-07-07 PROCEDURE — 82962 GLUCOSE BLOOD TEST: CPT | Mod: 91

## 2021-07-07 PROCEDURE — 96372 THER/PROPH/DIAG INJ SC/IM: CPT

## 2021-07-07 PROCEDURE — 700102 HCHG RX REV CODE 250 W/ 637 OVERRIDE(OP): Performed by: INTERNAL MEDICINE

## 2021-07-07 PROCEDURE — 97116 GAIT TRAINING THERAPY: CPT

## 2021-07-07 PROCEDURE — 93010 ELECTROCARDIOGRAM REPORT: CPT | Performed by: INTERNAL MEDICINE

## 2021-07-07 PROCEDURE — 700111 HCHG RX REV CODE 636 W/ 250 OVERRIDE (IP): Performed by: INTERNAL MEDICINE

## 2021-07-07 PROCEDURE — 93005 ELECTROCARDIOGRAM TRACING: CPT | Performed by: INTERNAL MEDICINE

## 2021-07-07 PROCEDURE — 99214 OFFICE O/P EST MOD 30 MIN: CPT | Performed by: INTERNAL MEDICINE

## 2021-07-07 PROCEDURE — 700111 HCHG RX REV CODE 636 W/ 250 OVERRIDE (IP): Performed by: STUDENT IN AN ORGANIZED HEALTH CARE EDUCATION/TRAINING PROGRAM

## 2021-07-07 PROCEDURE — 36415 COLL VENOUS BLD VENIPUNCTURE: CPT

## 2021-07-07 PROCEDURE — 85025 COMPLETE CBC W/AUTO DIFF WBC: CPT

## 2021-07-07 PROCEDURE — 96366 THER/PROPH/DIAG IV INF ADDON: CPT

## 2021-07-07 PROCEDURE — 97530 THERAPEUTIC ACTIVITIES: CPT

## 2021-07-07 PROCEDURE — 99232 SBSQ HOSP IP/OBS MODERATE 35: CPT | Performed by: INTERNAL MEDICINE

## 2021-07-07 PROCEDURE — 96365 THER/PROPH/DIAG IV INF INIT: CPT

## 2021-07-07 PROCEDURE — 96375 TX/PRO/DX INJ NEW DRUG ADDON: CPT

## 2021-07-07 PROCEDURE — 770020 HCHG ROOM/CARE - TELE (206)

## 2021-07-07 PROCEDURE — A9270 NON-COVERED ITEM OR SERVICE: HCPCS | Performed by: INTERNAL MEDICINE

## 2021-07-07 PROCEDURE — G0378 HOSPITAL OBSERVATION PER HR: HCPCS

## 2021-07-07 PROCEDURE — 80053 COMPREHEN METABOLIC PANEL: CPT

## 2021-07-07 PROCEDURE — 83735 ASSAY OF MAGNESIUM: CPT

## 2021-07-07 RX ORDER — FUROSEMIDE 10 MG/ML
40 INJECTION INTRAMUSCULAR; INTRAVENOUS ONCE
Status: COMPLETED | OUTPATIENT
Start: 2021-07-07 | End: 2021-07-07

## 2021-07-07 RX ORDER — DABIGATRAN ETEXILATE 150 MG/1
150 CAPSULE ORAL 2 TIMES DAILY
Status: DISCONTINUED | OUTPATIENT
Start: 2021-07-07 | End: 2021-07-09 | Stop reason: HOSPADM

## 2021-07-07 RX ORDER — MAGNESIUM SULFATE HEPTAHYDRATE 40 MG/ML
2 INJECTION, SOLUTION INTRAVENOUS ONCE
Status: COMPLETED | OUTPATIENT
Start: 2021-07-07 | End: 2021-07-07

## 2021-07-07 RX ADMIN — INSULIN LISPRO 4 UNITS: 100 INJECTION, SOLUTION INTRAVENOUS; SUBCUTANEOUS at 12:52

## 2021-07-07 RX ADMIN — INSULIN LISPRO 4 UNITS: 100 INJECTION, SOLUTION INTRAVENOUS; SUBCUTANEOUS at 17:35

## 2021-07-07 RX ADMIN — FUROSEMIDE 40 MG: 10 INJECTION, SOLUTION INTRAVENOUS at 12:51

## 2021-07-07 RX ADMIN — MICONAZOLE NITRATE: 20 CREAM TOPICAL at 17:43

## 2021-07-07 RX ADMIN — METOPROLOL TARTRATE 12.5 MG: 25 TABLET, FILM COATED ORAL at 08:41

## 2021-07-07 RX ADMIN — MAGNESIUM SULFATE 2 G: 2 INJECTION INTRAVENOUS at 08:40

## 2021-07-07 RX ADMIN — INSULIN LISPRO 4 UNITS: 100 INJECTION, SOLUTION INTRAVENOUS; SUBCUTANEOUS at 08:42

## 2021-07-07 RX ADMIN — METOPROLOL TARTRATE 25 MG: 25 TABLET, FILM COATED ORAL at 17:42

## 2021-07-07 RX ADMIN — INSULIN LISPRO 3 UNITS: 100 INJECTION, SOLUTION INTRAVENOUS; SUBCUTANEOUS at 08:43

## 2021-07-07 RX ADMIN — DABIGATRAN ETEXILATE MESYLATE 150 MG: 150 CAPSULE ORAL at 12:58

## 2021-07-07 RX ADMIN — NYSTATIN: 100000 POWDER TOPICAL at 17:43

## 2021-07-07 RX ADMIN — LISINOPRIL 5 MG: 5 TABLET ORAL at 05:26

## 2021-07-07 RX ADMIN — INSULIN LISPRO 4 UNITS: 100 INJECTION, SOLUTION INTRAVENOUS; SUBCUTANEOUS at 20:14

## 2021-07-07 RX ADMIN — DABIGATRAN ETEXILATE MESYLATE 150 MG: 150 CAPSULE ORAL at 17:42

## 2021-07-07 RX ADMIN — HEPARIN SODIUM 5000 UNITS: 5000 INJECTION, SOLUTION INTRAVENOUS; SUBCUTANEOUS at 05:26

## 2021-07-07 ASSESSMENT — LIFESTYLE VARIABLES: SUBSTANCE_ABUSE: 0

## 2021-07-07 ASSESSMENT — ENCOUNTER SYMPTOMS
BACK PAIN: 0
BLURRED VISION: 0
VOMITING: 0
DOUBLE VISION: 0
HEARTBURN: 0
CHILLS: 0
NERVOUS/ANXIOUS: 0
COUGH: 0
SHORTNESS OF BREATH: 0
DIZZINESS: 0
HEADACHES: 0
PALPITATIONS: 0
FEVER: 0
FALLS: 0
ABDOMINAL PAIN: 0

## 2021-07-07 ASSESSMENT — COGNITIVE AND FUNCTIONAL STATUS - GENERAL
WALKING IN HOSPITAL ROOM: A LITTLE
MOBILITY SCORE: 18
MOVING FROM LYING ON BACK TO SITTING ON SIDE OF FLAT BED: A LOT
CLIMB 3 TO 5 STEPS WITH RAILING: A LITTLE
MOVING TO AND FROM BED TO CHAIR: A LITTLE
SUGGESTED CMS G CODE MODIFIER MOBILITY: CK
STANDING UP FROM CHAIR USING ARMS: A LITTLE

## 2021-07-07 ASSESSMENT — GAIT ASSESSMENTS
GAIT LEVEL OF ASSIST: SUPERVISED
DISTANCE (FEET): 150
ASSISTIVE DEVICE: FRONT WHEEL WALKER
DEVIATION: BRADYKINETIC

## 2021-07-07 NOTE — ASSESSMENT & PLAN NOTE
Resume home medication of metoprolol and dabigatran    Cardiology was consulted, we appreciate further recommendations.    7/8: Patient still on atrial flutter, it appears patient will undergo RUDI/Cardioversion tomorrow by cardiology. Patient's HR very labile, BP was borderline low, so we discontinued lisinopril and as per cardiology ordered her digoxin 500 mcg, followed by 250 mcg 8 hours later followed by 250 mcg 8 hours later.

## 2021-07-07 NOTE — PROGRESS NOTES
Assumed care of patient this am; AOx4. Patient denies SOB and pain at this time. Bed low and locked. Discussed plan of care with patient. Will continue to monitor.

## 2021-07-07 NOTE — PROGRESS NOTES
Diabetes education: Met with pt this afternoon. Please see consult note.  Plan: Please send prescriptions to [facility] [place] as [facility] not contracted with [employer].  Pt will need prescriptions for Lantus solostar pens ( please order 90 day supply) sabino pen needles, Accucheck guide test strips and lancets ( CDE to give meter tomorrow). If to have fast acting please try Novolog flex pen, if sliding scale written out and for ac only. Please use dm supplies order set F2 for correct meter supplies and dx code. CDE to follow up tomorrow before discharge, to give and instruct on meter as well as instruct on insulin pens. Pt has handouts.

## 2021-07-07 NOTE — CONSULTS
Diabetes education: Met with pt this afternoon. Pt has a hx of diabetes previously on NPH insulin but stopped when she changed from [employer] to [employer] as the insulin was too expensive. Discussed NPH and regular from [facility] for $25 a vial as well as Reli on meter and test strips.   Pt was admitted with blood sugar of 771 and Hg a1c of 16.5%.  Pt had used insulin with vials and syringes as well as was given a Reli On meter and strips from [facility].  Pt is currently on Semglee 35 units pm with  Admelog 4 units tid meals and Admelog sliding scale coverage ac and hs. Blood sugars have been  222 ( 4 units), 227  (  4 + 4 units), and 204 ( 4 + 4 units).  Pt is open to pens or vials as well as a new meter covered by insurance.  CDE called [facility] pharmacy and True Metrix, Accucheck Guide and Guide me as well as Accucheck Desirae and Sabino are covered. Lantus solostar pens are covered  ( $131/90 mail order or $141/90 days retail) , Novolin N ( flex pen or vial) are the same price. Humalog is not covered . If wanting short acting can try Novolog flex pens.  Plan: Please send prescriptions to [facility] as [facility] not contracted with [facility].  Pt will need prescriptions for Lantus solostar pens ( please order 90 day supply) sabino pen needles, Accucheck guide test strips and lancets ( CDE to give meter tomorrow). If to have fast acting please try Novolog flex pen, if sliding scale written out and for ac only. Please use dm supplies order set F2 for correct meter supplies and dx code. CDE to follow up tomorrow before discharge.

## 2021-07-07 NOTE — PROGRESS NOTES
Hospital Medicine Daily Progress Note    Date of Service  7/7/2021    Chief Complaint  Beverley Dejesus is a 70 y.o. female admitted 7/4/2021 with hyperglycemia complicated by dehydration and JULIETA.    Interval Problem Update  7/6: I have seen patient at bedside this morning.  Patient currently laying in bed comfortably.  We are pending diabetes mellitus educator.  Blood sugar somewhat better controlled.  The patient will require insulin upon discharge.  The patient will also require close follow-up as an outpatient at discharge.  As per nursing no other overnight events reported.    7/7: Patient seen at bedside this morning.  She was found to have atrial flutter, as per patient she has a history of atrial flutter.  We have consulted cardiology, we appreciate further recommendations.  As per nursing no other overnight events reported.      I have personally seen and examined the patient at bedside. I discussed the plan of care with patient, bedside RN and .    Consultants/Specialty  Cardiology    Code Status  Full Code    Disposition  Patient is not medically cleared.   Anticipate discharge to to home with organized home healthcare and close outpatient follow-up.  I have placed the appropriate orders for post-discharge needs.    Review of Systems  Review of Systems   Constitutional: Negative for chills and fever.        Generalized weakness   HENT: Negative for hearing loss and nosebleeds.    Eyes: Negative for blurred vision and double vision.   Respiratory: Negative for cough and shortness of breath.    Cardiovascular: Negative for chest pain and palpitations.   Gastrointestinal: Negative for abdominal pain, heartburn and vomiting.   Genitourinary: Negative for dysuria and urgency.   Musculoskeletal: Negative for back pain and falls.   Skin: Negative for itching and rash.   Neurological: Negative for dizziness and headaches.   Psychiatric/Behavioral: Negative for substance abuse. The patient is not  nervous/anxious.         Physical Exam  Temp:  [36.4 °C (97.5 °F)-36.6 °C (97.9 °F)] 36.4 °C (97.6 °F)  Pulse:  [] 72  Resp:  [18-20] 18  BP: (101-115)/(52-65) 110/53  SpO2:  [90 %-98 %] 90 %    Physical Exam  Constitutional:       General: She is not in acute distress.     Appearance: She is obese. She is not diaphoretic.   HENT:      Head: Normocephalic and atraumatic.      Mouth/Throat:      Pharynx: No oropharyngeal exudate or posterior oropharyngeal erythema.   Eyes:      General:         Right eye: No discharge.         Left eye: No discharge.   Cardiovascular:      Rate and Rhythm: Tachycardia present. Rhythm irregular.      Pulses: Normal pulses.      Heart sounds: Normal heart sounds.   Pulmonary:      Effort: Pulmonary effort is normal.      Breath sounds: Normal breath sounds.   Abdominal:      General: Bowel sounds are normal. There is no distension.      Palpations: Abdomen is soft.      Tenderness: There is no abdominal tenderness.   Musculoskeletal:         General: Swelling present.   Skin:     General: Skin is warm and dry.   Neurological:      General: No focal deficit present.      Mental Status: She is alert and oriented to person, place, and time.   Psychiatric:         Mood and Affect: Mood normal.         Behavior: Behavior normal.         Fluids    Intake/Output Summary (Last 24 hours) at 7/7/2021 1248  Last data filed at 7/7/2021 0736  Gross per 24 hour   Intake 720 ml   Output --   Net 720 ml       Laboratory  Recent Labs     07/05/21  0424 07/07/21  0326   WBC 6.3 6.2   RBC 3.69* 3.91*   HEMOGLOBIN 10.8* 11.8*   HEMATOCRIT 34.0* 36.4*   MCV 92.1 93.1   MCH 29.3 30.2   MCHC 31.8* 32.4*   RDW 48.5 49.4   PLATELETCT 150* 154*   MPV 11.9 11.1     Recent Labs     07/05/21  0424 07/06/21  0440 07/07/21  0326   SODIUM 136 139 140   POTASSIUM 3.6 3.8 4.1   CHLORIDE 104 105 105   CO2 23 24 24   GLUCOSE 199* 193* 146*   BUN 22 19 13   CREATININE 1.35 1.20 1.07   CALCIUM 8.3* 8.6 8.5              Recent Labs     07/05/21  0424   TRIGLYCERIDE 147   HDL 48   LDL 26       Imaging  IR-US GUIDED PIV   Final Result    Ultrasound-guided PERIPHERAL IV INSERTION performed by    qualified nursing staff as above.      DX-CHEST-PORTABLE (1 VIEW)   Final Result      Hypoinflation without other evidence for acute cardiopulmonary disease.      CL-CARDIOVERSION    (Results Pending)   EC-RUDI W/O CONT    (Results Pending)        Assessment/Plan  * Atrial flutter (HCC)  Assessment & Plan  Resume home medication of metoprolol and dabigatran    Cardiology was consulted, we appreciate further recommendations.    Type 2 diabetes mellitus, with long-term current use of insulin ([facility])- (present on admission)  Assessment & Plan  Uncontrolled.  A1c 16.5.    7/4 advance SSI to high dose and add mealtime coverage  Continue long-acting insulin at night.  Diabetes education as patient has [employer] and will need an insulin covered by her insurance as well as likely home health for med education, compliance monitoring  Hypoglycemia protocol.    7/6: Pending DM educator.    7/7: We will increase lantus to 45 U and continue with sliding scale. Monitor, make changes accordingly.    Thrombocytopenia (HCC)  Assessment & Plan  Mild, we will repeat CBC if it continues to go down we will consider further workup.  Monitor repeat CBC.    7/7: Improving.    Anemia  Assessment & Plan  No signs of bleeding  Repeat CBC, monitor    Decubitus ulcer of buttock, stage 1  Assessment & Plan  Follow-up wound care recommendations.    Class 3 severe obesity in adult (HCC)  Assessment & Plan  Patient has been counseled on diet and lifestyle modifications  Recommended outpatient weight management program and bariatric surgery evaluation  Pt educated on the increase of morbidity and mortality associated with excess weight including DM, Heart Disease, HTN, stroke, and sleep apnea.  Pt advised weight loss of 5% through reduced calorie, low carb diet and 150 mins of  exercise a week    HTN (hypertension)  Assessment & Plan  We will restart patient's lisinopril at a dose of 5 mg with holding parameters.  Monitor, make changes accordingly.      Hyponatremia- (present on admission)  Assessment & Plan  Corrected sodium in setting of hyperglycemia  Will likely correct with IVF and glucose correction    7/6: Resolved    JULIETA (acute kidney injury) (HCC)- (present on admission)  Assessment & Plan  -likely 2/2 dehydration  -Fluid resuscitation  -Glucose control  -Daily renal panel    Improving.    7/7: There might be some CKD component as well.           VTE prophylaxis: therapeutic anticoagulation with dabigatran    I have performed a physical exam and reviewed and updated ROS and Plan today (7/7/2021). In review of yesterday's note (7/6/2021), there are no changes except as documented above.

## 2021-07-07 NOTE — PROGRESS NOTES
"Received report. Patient has no IV access. Day RN attempted multiple times. Informed Charge RN that patient is a hard stick and needs IV US. Charge nurse put out a page to SICU to have someone try to get an IV in patient.    Patient stated \"I don't need an IV.\"      "

## 2021-07-07 NOTE — CONSULTS
"Reason for Consult:  Asked by Dr Jeovany Nichols* to see this patient with atrial flutter    CC:   Chief Complaint   Patient presents with   • Weakness     Pt states she wasat home wehn she slid off her, felt weak and was unable to stand up on her own. Per EMS pt's blood glucose read \"High\" on glucometer. Pt is insulin dependent T2DM, and has not been able to afford her unsulin for the last month. Pt has no other complaints.    • High Blood Sugar       HPI:      70 year old woman with PMH prior atrial fluttler s/p DCCV on predaxa and rate control, HTN, DM2 presents with mechanical fall. She doesn't feel palpitations. Denies current cardiac symptoms. No cardiac complaints surrounding fall. She has run out of medication and noncompliant for at least a week now. Denies toxic social habits.     Medications / Drug list prior to admission:  No current facility-administered medications on file prior to encounter.     Current Outpatient Medications on File Prior to Encounter   Medication Sig Dispense Refill   • dabigatran (PRADAXA) 150 MG Cap capsule Take 150 mg by mouth 2 times a day.     • levothyroxine (SYNTHROID) 100 MCG Tab Take 100 mcg by mouth every morning on an empty stomach.     • lisinopril (PRINIVIL) 20 MG Tab Take 20 mg by mouth every day.     • simvastatin (ZOCOR) 40 MG Tab Take 40 mg by mouth every evening.     • aspirin (ASA) 81 MG Chew Tab chewable tablet Chew 81 mg every day.     • FLUoxetine (PROZAC) 20 MG Cap Take 40 mg by mouth every day.     • vitamin D (CHOLECALCIFEROL) 1000 Unit (25 mcg) Tab Take 5,000 Units by mouth every day.     • therapeutic multivitamin-minerals (THERAGRAN-M) Tab Take 1 tablet by mouth every day.     • metoprolol tartrate (LOPRESSOR) 25 MG Tab Take  by mouth 2 times a day. Patient is unsure of what dose she takes.     • insulin aspart (NOVOLOG) 100 UNIT/ML Solution Inject  under the skin 3 times a day before meals. Patient has not been taking her insulin for a long time " due to insurance issues. Patient state she's unable to watson her insulin now.   Indications: Type 2 Diabetes         Current list of administered Medications:    Current Facility-Administered Medications:   •  metoprolol tartrate (LOPRESSOR) tablet 12.5 mg, 12.5 mg, Oral, TWICE DAILY, Jeovany Currie M.D., 12.5 mg at 07/07/21 0841  •  dabigatran (PRADAXA) capsule 150 mg, 150 mg, Oral, BID, Jeovany Currie M.D.  •  insulin glargine (Semglee) injection, 45 Units, Subcutaneous, Q EVENING **AND** [DISCONTINUED] insulin lispro (AdmeLOG) injection, 2-9 Units, Subcutaneous, Q6HRS **AND** [CANCELED] POC blood glucose manual result, , , Q6H **AND** [CANCELED] NOTIFY MD and PharmD, , , Once **AND** [DISCONTINUED] glucose 4 g chewable tablet 16 g, 16 g, Oral, Q15 MIN PRN **AND** [DISCONTINUED] dextrose 50% (D50W) injection 50 mL, 50 mL, Intravenous, Q15 MIN PRN, Gerardo Nye M.D.  •  lisinopril (PRINIVIL) tablet 5 mg, 5 mg, Oral, Q DAY, Jeovany Currie M.D., 5 mg at 07/07/21 0526  •  magnesium sulfate IVPB premix 2 g, 2 g, Intravenous, Once PRN **OR** [DISCONTINUED] magnesium sulfate IVPB premix 4 g, 4 g, Intravenous, Once PRN, Gerardo Nye M.D.  •  senna-docusate (PERICOLACE or SENOKOT S) 8.6-50 MG per tablet 2 tablet, 2 tablet, Oral, BID **AND** polyethylene glycol/lytes (MIRALAX) PACKET 1 Packet, 1 Packet, Oral, QDAY PRN **AND** magnesium hydroxide (MILK OF MAGNESIA) suspension 30 mL, 30 mL, Oral, QDAY PRN **AND** bisacodyl (DULCOLAX) suppository 10 mg, 10 mg, Rectal, QDAY PRN, Gerardo Nye M.D.  •  acetaminophen (Tylenol) tablet 650 mg, 650 mg, Oral, Q6HRS PRN, Gerardo Nye M.D., 650 mg at 07/05/21 0246  •  enalaprilat (VASOTEC) injection 1.25 mg, 1.25 mg, Intravenous, Q6HRS PRN, Gerardo Nye M.D.  •  labetalol (NORMODYNE/TRANDATE) injection 10 mg, 10 mg, Intravenous, Q4HRS PRN, Gerardo Nye M.D.  •  ondansetron (ZOFRAN) syringe/vial injection 4 mg, 4 mg,  Intravenous, Q4HRS PRN, Gerardo Nye M.D.  •  ondansetron (ZOFRAN ODT) dispertab 4 mg, 4 mg, Oral, Q4HRS PRN, Gerardo Nye M.D.  •  [DISCONTINUED] insulin lispro (AdmeLOG) injection, 4 Units, Subcutaneous, TID AC, 4 Units at 07/07/21 0842 **AND** insulin lispro (AdmeLOG) injection, 3-14 Units, Subcutaneous, 4X/DAY ACHS, 3 Units at 07/07/21 0843 **AND** POC blood glucose manual result, , , Q AC AND BEDTIME(S) **AND** NOTIFY MD and PharmD, , , Once **AND** glucose 4 g chewable tablet 16 g, 16 g, Oral, Q15 MIN PRN **AND** dextrose 50% (D50W) injection 50 mL, 50 mL, Intravenous, Q15 MIN PRN, Danish MORRISON Olde, A.P.N.  •  nystatin (MYCOSTATIN) powder, , Topical, BID, Danish M Olde, A.P.N., Given at 07/06/21 0552  •  miconazole 2%-zinc oxide (Carol) topical cream, , Topical, BID, Danish M Olde, A.P.N., Given at 07/06/21 0552    History reviewed. No pertinent past medical history.    History reviewed. No pertinent surgical history.    History reviewed. No pertinent family history.  Patient family history was personally reviewed, no pertinent family history to current presentation    Social History     Socioeconomic History   • Marital status: Single     Spouse name: Not on file   • Number of children: Not on file   • Years of education: Not on file   • Highest education level: Not on file   Occupational History   • Not on file   Tobacco Use   • Smoking status: Never Smoker   • Smokeless tobacco: Never Used   Substance and Sexual Activity   • Alcohol use: Not on file   • Drug use: Not on file   • Sexual activity: Not on file   Other Topics Concern   • Not on file   Social History Narrative   • Not on file     Social Determinants of Health     Financial Resource Strain:    • Difficulty of Paying Living Expenses:    Food Insecurity:    • Worried About Running Out of Food in the Last Year:    • Ran Out of Food in the Last Year:    Transportation Needs:    • Lack of Transportation (Medical):    • Lack of Transportation  (Non-Medical):    Physical Activity:    • Days of Exercise per Week:    • Minutes of Exercise per Session:    Stress:    • Feeling of Stress :    Social Connections:    • Frequency of Communication with Friends and Family:    • Frequency of Social Gatherings with Friends and Family:    • Attends Zoroastrian Services:    • Active Member of Clubs or Organizations:    • Attends Club or Organization Meetings:    • Marital Status:    Intimate Partner Violence:    • Fear of Current or Ex-Partner:    • Emotionally Abused:    • Physically Abused:    • Sexually Abused:        ALLERGIES:  No Known Allergies    Review of systems:  A complete review of symptoms was reviewed with patient. This is reviewed in H&P and PMH. ALL OTHERS reviewed and negative    Physical exam:  Patient Vitals for the past 24 hrs:   BP Temp Temp src Pulse Resp SpO2   07/07/21 0736 113/52 36.5 °C (97.7 °F) Temporal 100 18 97 %   07/07/21 0400 115/57 36.5 °C (97.7 °F) Temporal 68 20 96 %   07/06/21 2000 112/53 36.4 °C (97.5 °F) Temporal (!) 134 20 94 %   07/06/21 1604 101/65 36.6 °C (97.9 °F) Temporal 87 18 98 %     General: No acute distress.   EYES: no jaundice  HEENT: OP clear   Neck: No bruits No JVD.   CVS: irreg. S1 + S2. No M/R/G. 1+ edema.  Resp: CTAB. No wheezing or crackles/rhonchi.  Abdomen: Soft, NT, ND,  Skin: Grossly nothing acute no obvious rashes  Neurological: Alert, Moves all extremities, no cranial nerve defects on limited exam  Extremities: Pulse 2+ in b/l LE. No cyanosis.     Data:  Laboratory studies personally reviewed by me:  Recent Results (from the past 24 hour(s))   POCT glucose device results    Collection Time: 07/06/21 12:21 PM   Result Value Ref Range    Glucose - Accu-Ck 204 (H) 65 - 99 mg/dL   POCT glucose device results    Collection Time: 07/06/21  5:47 PM   Result Value Ref Range    Glucose - Accu-Ck 157 (H) 65 - 99 mg/dL   POCT glucose device results    Collection Time: 07/06/21  8:07 PM   Result Value Ref Range     Glucose - Accu-Ck 194 (H) 65 - 99 mg/dL   CBC WITH DIFFERENTIAL    Collection Time: 07/07/21  3:26 AM   Result Value Ref Range    WBC 6.2 4.8 - 10.8 K/uL    RBC 3.91 (L) 4.20 - 5.40 M/uL    Hemoglobin 11.8 (L) 12.0 - 16.0 g/dL    Hematocrit 36.4 (L) 37.0 - 47.0 %    MCV 93.1 81.4 - 97.8 fL    MCH 30.2 27.0 - 33.0 pg    MCHC 32.4 (L) 33.6 - 35.0 g/dL    RDW 49.4 35.9 - 50.0 fL    Platelet Count 154 (L) 164 - 446 K/uL    MPV 11.1 9.0 - 12.9 fL    Neutrophils-Polys 54.30 44.00 - 72.00 %    Lymphocytes 35.20 22.00 - 41.00 %    Monocytes 7.10 0.00 - 13.40 %    Eosinophils 1.80 0.00 - 6.90 %    Basophils 0.60 0.00 - 1.80 %    Immature Granulocytes 1.00 (H) 0.00 - 0.90 %    Nucleated RBC 0.00 /100 WBC    Neutrophils (Absolute) 3.37 2.00 - 7.15 K/uL    Lymphs (Absolute) 2.18 1.00 - 4.80 K/uL    Monos (Absolute) 0.44 0.00 - 0.85 K/uL    Eos (Absolute) 0.11 0.00 - 0.51 K/uL    Baso (Absolute) 0.04 0.00 - 0.12 K/uL    Immature Granulocytes (abs) 0.06 0.00 - 0.11 K/uL    NRBC (Absolute) 0.00 K/uL   Comp Metabolic Panel    Collection Time: 07/07/21  3:26 AM   Result Value Ref Range    Sodium 140 135 - 145 mmol/L    Potassium 4.1 3.6 - 5.5 mmol/L    Chloride 105 96 - 112 mmol/L    Co2 24 20 - 33 mmol/L    Anion Gap 11.0 7.0 - 16.0    Glucose 146 (H) 65 - 99 mg/dL    Bun 13 8 - 22 mg/dL    Creatinine 1.07 0.50 - 1.40 mg/dL    Calcium 8.5 8.5 - 10.5 mg/dL    AST(SGOT) 23 12 - 45 U/L    ALT(SGPT) 11 2 - 50 U/L    Alkaline Phosphatase 105 (H) 30 - 99 U/L    Total Bilirubin 0.6 0.1 - 1.5 mg/dL    Albumin 2.9 (L) 3.2 - 4.9 g/dL    Total Protein 6.2 6.0 - 8.2 g/dL    Globulin 3.3 1.9 - 3.5 g/dL    A-G Ratio 0.9 g/dL   MAGNESIUM    Collection Time: 07/07/21  3:26 AM   Result Value Ref Range    Magnesium 1.6 1.5 - 2.5 mg/dL   ESTIMATED GFR    Collection Time: 07/07/21  3:26 AM   Result Value Ref Range    GFR If African American >60 >60 mL/min/1.73 m 2    GFR If Non African American 50 (A) >60 mL/min/1.73 m 2   EKG    Collection Time:  21  7:06 AM   Result Value Ref Range    Report       [facility]    Test Date:  2021  Pt Name:    GAETANO RONQUILLO                  Department: [facility]  MRN:        [id number]                      Room:       S170  Gender:     Female                       Technician: PRICILLA  :        [id number]                   Requested By:SATURNINO ANTHONY  Order #:    [id number]                    Reading MD:    Measurements  Intervals                                Axis  Rate:       85                           P:  WA:                                      QRS:        2  QRSD:       116                          T:          28  QT:         348  QTc:        414    Interpretive Statements  ATRIAL FLUTTER, A-RATE 288  INCOMPLETE RIGHT BUNDLE BRANCH BLOCK  Compared to ECG 2021 02:53:39  Incomplete right bundle-branch block now present  Ventricular premature complex(es) no longer present  Left ventricular hypertrophy no longer present  Myocardial infarct finding no longer present         All pertinent features of laboratory and imaging reviewed including primary images where applicable      Principal Problem:    Type 2 diabetes mellitus, with long-term current use of insulin ([facility]) POA: Yes  Active Problems:    JULIETA (acute kidney injury) ([facility]) POA: Yes    Hyponatremia POA: Yes    HTN (hypertension) POA: Unknown    Class 3 severe obesity in adult ([facility]) POA: Unknown    Decubitus ulcer of buttock, stage 1 POA: Unknown    Anemia POA: Unknown    Thrombocytopenia ([facility]) POA: Unknown  Resolved Problems:    * No resolved hospital problems. *      Assessment / Plan:  70 year old woman with PMH prior atrial fluttler s/p DCCV on predaxa and rate control, HTN, DM2 presents with mechanical fall found atrial flutter.    -plan RUDI/DCCV. Soonest available is Friday.  -predaxa for cva prevention. chadsvasc 4.   -titrate rate control with metoprolol for goal <110  -consider SGLT2i at discharge  -follow up outpatient w EP for ablation  thereafter    I personally discussed her case with Dr Currie    It is my pleasure to participate in the care of Ms. Dejesus.  Please do not hesitate to contact me with questions or concerns.    Jon Doan MD  Cardiologist [facility]

## 2021-07-07 NOTE — THERAPY
"Physical Therapy   Daily Treatment     Patient Name: Beverley Dejesus  Age:  70 y.o., Sex:  female  Medical Record #: [id number]  Today's Date: 7/7/2021     Precautions: Fall Risk    Assessment    Pt agreeable to work with PT, bariatric FWW at bedside. Overall progressing well, able to complete mobility with SPV to Min A. Pt required greatest assist to stand from low toilet height, which pt reported having much taller toilet at home. Able to negotiate 2 steps with railing, slight difficulty managing FWW on stairs due to FWW weight. Pt receptive to various strategies to manage FWW in home and on stairs and how to potentially \"wean off\" FWW and return to SPC use. Agreeable to home health PT to assist with this. PT will continue to follow while in house.     Plan    Continue current treatment plan.    DC Equipment Recommendations: Front-Wheel Walker (has obtained at bedside)  Discharge Recommendations: Recommend home health for continued physical therapy services         07/07/21 1024   Precautions   Precautions Fall Risk   Vitals   O2 Delivery Device None - Room Air   Pain 0 - 10 Group   Therapist Pain Assessment During Activity;Nurse Notified  (no pain reported)   Cognition    Cognition / Consciousness WDL   Comments receptive to PT   Active ROM Lower Body    Active ROM Lower Body  WDL   Strength Lower Body   Lower Body Strength  X   Comments generalized weakness due to deconditioning   Balance   Sitting Balance (Static) Good   Sitting Balance (Dynamic) Fair +   Standing Balance (Static) Fair +   Standing Balance (Dynamic) Fair   Weight Shift Sitting Good   Weight Shift Standing Fair   Skilled Intervention Verbal Cuing   Comments w/ tang FWW   Gait Analysis   Gait Level Of Assist Supervised   Assistive Device Front Wheel Walker   Distance (Feet) 150   # of Times Distance was Traveled 1   Deviation Bradykinetic  (decreased step length)   # of Stairs Climbed 2   Level of Assist with Stairs Minimal Assist   Weight Bearing " Status no restrictions   Skilled Intervention Verbal Cuing   Comments intermittent rest breaks due to SOB. difficulty on stairs with FWW management   Bed Mobility    Supine to Sit Supervised   Sit to Supine   (seated in chair at end of session)   Scooting Supervised   Skilled Intervention Verbal Cuing   Functional Mobility   Sit to Stand Supervised   Bed, Chair, Wheelchair Transfer Supervised   Toilet Transfers Moderate Assist  (from low toilet height)   Transfer Method Stand Step   Skilled Intervention Verbal Cuing   Short Term Goals    Short Term Goal # 1 pt will be able to ambulate 150ft with LRAD and SPV in 6tx in order to return home   Goal Outcome # 1 Goal met   Short Term Goal # 2 pt will be able to negotiate 5 steps with LRAD and SPV in 6tx in order to return home   Goal Outcome # 2 Progressing as expected   Anticipated Discharge Equipment and Recommendations   Discharge Recommendations Recommend home health for continued physical therapy services

## 2021-07-07 NOTE — DISCHARGE PLANNING
Received Choice form at 0797  Agency/Facility Name: [facility]  Referral sent per Choice form @ 0784

## 2021-07-07 NOTE — DISCHARGE PLANNING
[facility]Facility Name: [facility]  Spoke To: Danae  Outcome: Can see patient on Saturday if MD is ok with that. They can have patient meet with Dr. Angeles to set up with PCP and he would do a home visit.

## 2021-07-07 NOTE — CARE PLAN
Problem: Knowledge Deficit - Standard  Goal: Patient and family/care givers will demonstrate understanding of plan of care, disease process/condition, diagnostic tests and medications  Outcome: Progressing     Problem: Fall Risk  Goal: Patient will remain free from falls  Outcome: Progressing     Problem: Skin Integrity  Goal: Skin integrity is maintained or improved  Outcome: Progressing     Problem: Diabetes Management  Goal: Patient will achieve and maintain glucose in satisfactory range  Outcome: Progressing     Problem: Knowledge Deficit - Diabetes  Goal: Patient will demonstrate knowledge of insulin injection, symptoms, and treatment of hypoglycemia and diet prior to discharge  Outcome: Progressing     Problem: Discharge Planning - Diabetes  Goal: Patient's continuum of care needs will be met  Outcome: Progressing     Problem: Skin Integrity - Diabetes  Goal: Patient's skin on legs and feet will remain intact while hospitalized  Outcome: Progressing     Problem: Infection - Diabetes  Goal: Patient will remain free from signs and symptoms of infection  Outcome: Progressing  Goal: Promotion wound healing, line and drain management  Outcome: Progressing     Problem: Respiratory  Goal: Patient will achieve/maintain optimum respiratory ventilation and gas exchange  Outcome: Progressing     Problem: Fluid Balance or Risk for Fluid Volume Deficit  Goal: Patient will demonstrate adequate hydration and vital signs  Outcome: Progressing     Problem: Nutrition Deficit - Diabetes  Goal: Patient will demonstrate adequate hydration and vital signs  Outcome: Progressing     Problem: Diabetic Ulcer  Goal: Early identification of diabetic foot wound and initiation of appropriate interventions  Outcome: Progressing     Problem: Pain - Standard  Goal: Alleviation of pain or a reduction in pain to the patient’s comfort goal  Outcome: Progressing   The patient is Stable - Low risk of patient condition declining or  worsening    Shift Goals  Clinical Goals: improved blood glucose  Patient Goals: rest   Family Goals: na    Progress made toward(s) clinical / shift goals:  BG monitored and insulin provided as ordered    Patient is not progressing towards the following goals:

## 2021-07-08 LAB
ALBUMIN SERPL BCP-MCNC: 2.8 G/DL (ref 3.2–4.9)
ALBUMIN/GLOB SERPL: 0.8 G/DL
ALP SERPL-CCNC: 101 U/L (ref 30–99)
ALT SERPL-CCNC: 12 U/L (ref 2–50)
ANION GAP SERPL CALC-SCNC: 11 MMOL/L (ref 7–16)
AST SERPL-CCNC: 25 U/L (ref 12–45)
BASOPHILS # BLD AUTO: 0.5 % (ref 0–1.8)
BASOPHILS # BLD: 0.03 K/UL (ref 0–0.12)
BILIRUB SERPL-MCNC: 0.8 MG/DL (ref 0.1–1.5)
BUN SERPL-MCNC: 16 MG/DL (ref 8–22)
CALCIUM SERPL-MCNC: 8.4 MG/DL (ref 8.5–10.5)
CHLORIDE SERPL-SCNC: 104 MMOL/L (ref 96–112)
CO2 SERPL-SCNC: 24 MMOL/L (ref 20–33)
CREAT SERPL-MCNC: 1.19 MG/DL (ref 0.5–1.4)
EOSINOPHIL # BLD AUTO: 0.12 K/UL (ref 0–0.51)
EOSINOPHIL NFR BLD: 1.9 % (ref 0–6.9)
ERYTHROCYTE [DISTWIDTH] IN BLOOD BY AUTOMATED COUNT: 50.3 FL (ref 35.9–50)
GLOBULIN SER CALC-MCNC: 3.3 G/DL (ref 1.9–3.5)
GLUCOSE SERPL-MCNC: 149 MG/DL (ref 65–99)
HCT VFR BLD AUTO: 36.4 % (ref 37–47)
HGB BLD-MCNC: 11.6 G/DL (ref 12–16)
IMM GRANULOCYTES # BLD AUTO: 0.07 K/UL (ref 0–0.11)
IMM GRANULOCYTES NFR BLD AUTO: 1.1 % (ref 0–0.9)
LYMPHOCYTES # BLD AUTO: 2.54 K/UL (ref 1–4.8)
LYMPHOCYTES NFR BLD: 40.8 % (ref 22–41)
MAGNESIUM SERPL-MCNC: 1.8 MG/DL (ref 1.5–2.5)
MCH RBC QN AUTO: 30.3 PG (ref 27–33)
MCHC RBC AUTO-ENTMCNC: 31.9 G/DL (ref 33.6–35)
MCV RBC AUTO: 95 FL (ref 81.4–97.8)
MONOCYTES # BLD AUTO: 0.62 K/UL (ref 0–0.85)
MONOCYTES NFR BLD AUTO: 10 % (ref 0–13.4)
NEUTROPHILS # BLD AUTO: 2.85 K/UL (ref 2–7.15)
NEUTROPHILS NFR BLD: 45.7 % (ref 44–72)
NRBC # BLD AUTO: 0.02 K/UL
NRBC BLD-RTO: 0.3 /100 WBC
PLATELET # BLD AUTO: 172 K/UL (ref 164–446)
PMV BLD AUTO: 10.9 FL (ref 9–12.9)
POTASSIUM SERPL-SCNC: 3.9 MMOL/L (ref 3.6–5.5)
PROT SERPL-MCNC: 6.1 G/DL (ref 6–8.2)
RBC # BLD AUTO: 3.83 M/UL (ref 4.2–5.4)
SODIUM SERPL-SCNC: 139 MMOL/L (ref 135–145)
WBC # BLD AUTO: 6.2 K/UL (ref 4.8–10.8)

## 2021-07-08 PROCEDURE — 700102 HCHG RX REV CODE 250 W/ 637 OVERRIDE(OP): Performed by: INTERNAL MEDICINE

## 2021-07-08 PROCEDURE — 83735 ASSAY OF MAGNESIUM: CPT

## 2021-07-08 PROCEDURE — 99232 SBSQ HOSP IP/OBS MODERATE 35: CPT | Performed by: INTERNAL MEDICINE

## 2021-07-08 PROCEDURE — 36415 COLL VENOUS BLD VENIPUNCTURE: CPT

## 2021-07-08 PROCEDURE — 99233 SBSQ HOSP IP/OBS HIGH 50: CPT | Performed by: INTERNAL MEDICINE

## 2021-07-08 PROCEDURE — 770020 HCHG ROOM/CARE - TELE (206)

## 2021-07-08 PROCEDURE — 94762 N-INVAS EAR/PLS OXIMTRY CONT: CPT

## 2021-07-08 PROCEDURE — 80053 COMPREHEN METABOLIC PANEL: CPT

## 2021-07-08 PROCEDURE — 85025 COMPLETE CBC W/AUTO DIFF WBC: CPT

## 2021-07-08 PROCEDURE — 700111 HCHG RX REV CODE 636 W/ 250 OVERRIDE (IP): Performed by: INTERNAL MEDICINE

## 2021-07-08 PROCEDURE — A9270 NON-COVERED ITEM OR SERVICE: HCPCS | Performed by: INTERNAL MEDICINE

## 2021-07-08 PROCEDURE — 82962 GLUCOSE BLOOD TEST: CPT

## 2021-07-08 RX ORDER — MAGNESIUM SULFATE 1 G/100ML
1 INJECTION INTRAVENOUS ONCE
Status: COMPLETED | OUTPATIENT
Start: 2021-07-08 | End: 2021-07-08

## 2021-07-08 RX ORDER — DIGOXIN 0.25 MG/ML
250 INJECTION INTRAMUSCULAR; INTRAVENOUS ONCE
Status: COMPLETED | OUTPATIENT
Start: 2021-07-09 | End: 2021-07-09

## 2021-07-08 RX ORDER — DIGOXIN 0.25 MG/ML
250 INJECTION INTRAMUSCULAR; INTRAVENOUS ONCE
Status: COMPLETED | OUTPATIENT
Start: 2021-07-08 | End: 2021-07-08

## 2021-07-08 RX ORDER — LANCETS 30 GAUGE
EACH MISCELLANEOUS
Qty: 100 EACH | Refills: 0 | Status: SHIPPED | OUTPATIENT
Start: 2021-07-08

## 2021-07-08 RX ORDER — GLUCOSAMINE HCL/CHONDROITIN SU 500-400 MG
CAPSULE ORAL
Qty: 100 EACH | Refills: 0 | Status: SHIPPED | OUTPATIENT
Start: 2021-07-08

## 2021-07-08 RX ORDER — DIGOXIN 0.25 MG/ML
500 INJECTION INTRAMUSCULAR; INTRAVENOUS ONCE
Status: COMPLETED | OUTPATIENT
Start: 2021-07-08 | End: 2021-07-08

## 2021-07-08 RX ADMIN — MICONAZOLE NITRATE: 20 CREAM TOPICAL at 20:00

## 2021-07-08 RX ADMIN — METOPROLOL TARTRATE 25 MG: 25 TABLET, FILM COATED ORAL at 05:24

## 2021-07-08 RX ADMIN — DIGOXIN 250 MCG: 0.25 INJECTION INTRAMUSCULAR; INTRAVENOUS at 21:57

## 2021-07-08 RX ADMIN — INSULIN LISPRO 4 UNITS: 100 INJECTION, SOLUTION INTRAVENOUS; SUBCUTANEOUS at 17:37

## 2021-07-08 RX ADMIN — DABIGATRAN ETEXILATE MESYLATE 150 MG: 150 CAPSULE ORAL at 05:24

## 2021-07-08 RX ADMIN — MICONAZOLE NITRATE: 20 CREAM TOPICAL at 07:59

## 2021-07-08 RX ADMIN — LISINOPRIL 5 MG: 5 TABLET ORAL at 05:24

## 2021-07-08 RX ADMIN — MAGNESIUM SULFATE 1 G: 1 INJECTION INTRAVENOUS at 07:55

## 2021-07-08 RX ADMIN — DIGOXIN 500 MCG: 0.25 INJECTION INTRAMUSCULAR; INTRAVENOUS at 14:42

## 2021-07-08 RX ADMIN — INSULIN LISPRO 4 UNITS: 100 INJECTION, SOLUTION INTRAVENOUS; SUBCUTANEOUS at 12:49

## 2021-07-08 RX ADMIN — INSULIN LISPRO 7 UNITS: 100 INJECTION, SOLUTION INTRAVENOUS; SUBCUTANEOUS at 21:53

## 2021-07-08 RX ADMIN — NYSTATIN: 100000 POWDER TOPICAL at 07:59

## 2021-07-08 RX ADMIN — NYSTATIN: 100000 POWDER TOPICAL at 20:00

## 2021-07-08 RX ADMIN — METOPROLOL TARTRATE 25 MG: 25 TABLET, FILM COATED ORAL at 17:28

## 2021-07-08 RX ADMIN — DABIGATRAN ETEXILATE MESYLATE 150 MG: 150 CAPSULE ORAL at 17:28

## 2021-07-08 ASSESSMENT — CHA2DS2 SCORE
HYPERTENSION: NO
SEX: FEMALE
DIABETES: YES
PRIOR STROKE OR TIA OR THROMBOEMBOLISM: NO
CHF OR LEFT VENTRICULAR DYSFUNCTION: NO
AGE 75 OR GREATER: NO
CHA2DS2 VASC SCORE: 3
VASCULAR DISEASE: NO
AGE 65 TO 74: YES

## 2021-07-08 ASSESSMENT — ENCOUNTER SYMPTOMS
CHILLS: 0
HEADACHES: 0
BACK PAIN: 0
BLURRED VISION: 0
DIZZINESS: 0
ABDOMINAL PAIN: 0
NERVOUS/ANXIOUS: 0
DOUBLE VISION: 0
SHORTNESS OF BREATH: 0
HEARTBURN: 0
FALLS: 0
PALPITATIONS: 0
FEVER: 0
VOMITING: 0
COUGH: 0

## 2021-07-08 ASSESSMENT — LIFESTYLE VARIABLES: SUBSTANCE_ABUSE: 0

## 2021-07-08 NOTE — PROGRESS NOTES
"       [facility] Cardiology Follow-up Note    Date of Service:    7/8/2021      Name:   Beverley Dejesus   YOB: 1950  Age:   70 y.o.  female   MRN:   [id number]      Chief Complaint: High blood sugar, weakness, fall    Attending Provider: Dr. Carlos Currie    HPI:  Beverley Dejesus is a 70 year old woman with PMH of atrial fluttler s/p DCCV on pradaxa and rate control, HTN, DM2, who presents with mechanical fall.     Per Cardiology consult \"she doesn't feel palpitations. Denies current cardiac symptoms. No cardiac complaints surrounding fall. She has run out of medication and noncompliant for at least a week now. Denies toxic social habits\"    Interim Events:  - Personal Telemetry interpretation: Afib/flutter 120-140's  - Overnight events: denies  - Vitals: 's/60-80's  - Labs reviewed: glucose down, Cr improved      ROS  Constitutional: + fatigue.  Respiratory:  Denies shortness of breath, no cough.  Cardiovascular: denies chest pain. + lower extremity edema.  Denies orthopnea or PND.  : denies polyuria, no dysuria.  GI:  Denies nausea/vomiting.  No abdominal distention.  Neuro:  Denies dizziness, syncope.  Hem/lymph: Denies easy bleeding/bruising.      All other review of systems reviewed and negative.    Past medical, surgical, social, and family history reviewed and unchanged from admission except as noted in HPI.    Medications: Reviewed in MAR  Current Facility-Administered Medications   Medication Dose Frequency Provider Last Rate Last Admin   • digoxin (LANOXIN) injection 500 mcg  500 mcg Once Jeovany Currie M.D.        Followed by   • digoxin (LANOXIN) injection 250 mcg  250 mcg Once Jeovany Currie M.D.        Followed by   • [START ON 7/9/2021] digoxin (LANOXIN) injection 250 mcg  250 mcg Once Jeovany Currie M.D.       • dabigatran (PRADAXA) capsule 150 mg  150 mg BID Jeovany Currie M.D.   150 mg at 07/08/21 0524   • insulin glargine (Semglee) " "injection  45 Units Q EVENING Jeovany Currie M.D.   45 Units at 07/07/21 1735   • metoprolol tartrate (LOPRESSOR) tablet 25 mg  25 mg TWICE DAILY Jon Doan M.D.   25 mg at 07/08/21 0524   • magnesium sulfate IVPB premix 2 g  2 g Once PRN Gerardo Nye M.D.       • senna-docusate (PERICOLACE or SENOKOT S) 8.6-50 MG per tablet 2 tablet  2 tablet BID Gerardo Nye M.D.        And   • polyethylene glycol/lytes (MIRALAX) PACKET 1 Packet  1 Packet QDAY PRN Gerardo Nye M.D.        And   • magnesium hydroxide (MILK OF MAGNESIA) suspension 30 mL  30 mL QDAY PRN Gerardo Nye M.D.        And   • bisacodyl (DULCOLAX) suppository 10 mg  10 mg QDAY PRN Gerardo Nye M.D.       • acetaminophen (Tylenol) tablet 650 mg  650 mg Q6HRS PRN Gerardo Nye M.D.   650 mg at 07/05/21 0246   • enalaprilat (VASOTEC) injection 1.25 mg  1.25 mg Q6HRS PRN Gerardo Nye M.D.       • labetalol (NORMODYNE/TRANDATE) injection 10 mg  10 mg Q4HRS PRN Gerardo Nye M.D.       • ondansetron (ZOFRAN) syringe/vial injection 4 mg  4 mg Q4HRS PRN Gerardo Nye M.D.       • ondansetron (ZOFRAN ODT) dispertab 4 mg  4 mg Q4HRS PRN Gerardo Nye M.D.       • insulin lispro (AdmeLOG) injection  3-14 Units 4X/DAY ACHS Danish Romero, A.P.N.   4 Units at 07/08/21 1249    And   • glucose 4 g chewable tablet 16 g  16 g Q15 MIN PRN Danish Romero, A.P.N.        And   • dextrose 50% (D50W) injection 50 mL  50 mL Q15 MIN PRN Danish Romero, A.P.N.       • nystatin (MYCOSTATIN) powder   BID Danish Romero, A.P.N.   Given at 07/08/21 0759   • miconazole 2%-zinc oxide (Carol) topical cream   BID DAVID RezaNFermin   Given at 07/08/21 0759   Last reviewed on 7/4/2021  3:15 PM by Emily Johnson R.N.    No Known Allergies    Physical Exam  Body mass index is 54.38 kg/m². /56   Pulse (!) 107   Temp 36.1 °C (97 °F) (Temporal)   Resp 18   Ht 1.626 m (5' 4\")   Wt (!) 144 kg (316 lb 12.8 oz)   SpO2 99%    "   Vitals:    07/08/21 0000 07/08/21 0400 07/08/21 0746 07/08/21 1254   BP: 125/80 119/62 (!) 94/66 132/56   Pulse: 93 66 88 (!) 107   Resp: 20 16 18 18   Temp: 36.2 °C (97.1 °F) 36.4 °C (97.6 °F) 36.1 °C (96.9 °F) 36.1 °C (97 °F)   TempSrc: Temporal Temporal Temporal Temporal   SpO2: 96% 96% 99% 99%   Weight:       Height:        Oxygen Therapy:  Pulse Oximetry: 99 %, O2 (LPM): 0, O2 Delivery Device: None - Room Air    General: no acute distress, obese  Neck: no JVD, no bruits  Lungs: CTAB, normal effort. no wheezing, rales, or rhonchi  Heart: tachycardia, regular, normal S1 /S2, no murmur, no rub  EXT: 1+ lower extremity edema, 2+ radial pulses. 1+ pedal pulses.   Abdomen: protuberant, soft, non tender, non distended  Neurological: No focal deficits, no facial asymmetry.  Normal speech  Psychiatric: Appropriate affect, alert and oriented x 3  Skin: Warm and dry extremities, no rashes. Bilateral redness to Lower shin/ankle    Labs (personally reviewed):     Lab Results   Component Value Date/Time    SODIUM 139 07/08/2021 03:44 AM    POTASSIUM 3.9 07/08/2021 03:44 AM    CHLORIDE 104 07/08/2021 03:44 AM    CO2 24 07/08/2021 03:44 AM    GLUCOSE 149 (H) 07/08/2021 03:44 AM    BUN 16 07/08/2021 03:44 AM    CREATININE 1.19 07/08/2021 03:44 AM     Lab Results   Component Value Date/Time    ALKPHOSPHAT 101 (H) 07/08/2021 03:44 AM    ASTSGOT 25 07/08/2021 03:44 AM    ALTSGPT 12 07/08/2021 03:44 AM    TBILIRUBIN 0.8 07/08/2021 03:44 AM      Lab Results   Component Value Date/Time    CHOLSTRLTOT 103 07/05/2021 04:24 AM    LDL 26 07/05/2021 04:24 AM    HDL 48 07/05/2021 04:24 AM    TRIGLYCERIDE 147 07/05/2021 04:24 AM         Cardiac Imaging and Procedures Review:      EKG 7/7/21: My Personal interpretation reveals Aflutter 85    Assessment and Medical Decision Making:    Atrial fibrillation  -Afib/flutter 120-140's  -Symptoms: denies  -[id number] Score 4  -Risk Factors: Age, HTN, DM, obesity,   -Medications: Dig load today,  continue metop tartrate 25mg bid  -Anticoagulation: continue pradaxa   -DCCV: tomorrow  -EP Consultation Warranted?: F/U with EP outpatient for ablation    Uncontrolled DM2  -HgA1C 16.5  -SSI per Hospitalist    Morbid obesity  -discussed this as contributing risk factor of Afib     Thank you for allowing me to participate in this patients care.  Please contact me with any questions or concerns.    Please see Dr. Doan attestation for additions and further recommendations.    MONSERRAT Kenyon.   [facility]  [phone]

## 2021-07-08 NOTE — PROGRESS NOTES
Hospital Medicine Daily Progress Note    Date of Service  7/8/2021    Chief Complaint  Beverley Dejesus is a 70 y.o. female admitted 7/4/2021 with hyperglycemia complicated by dehydration and JULIETA.    Interval Problem Update  7/6: I have seen patient at bedside this morning.  Patient currently laying in bed comfortably.  We are pending diabetes mellitus educator.  Blood sugar somewhat better controlled.  The patient will require insulin upon discharge.  The patient will also require close follow-up as an outpatient at discharge.  As per nursing no other overnight events reported.    7/7: Patient seen at bedside this morning.  She was found to have atrial flutter, as per patient she has a history of atrial flutter.  We have consulted cardiology, we appreciate further recommendations.  As per nursing no other overnight events reported.    7/8: Patient seen at bedside this morning.  Patient is scheduled to have RUDI/cardioversion tomorrow by cardiology.  Heart rate has been very labile today.  I have consulted cardiology and they recommend digoxin which we have started.  We have also discontinued lisinopril to see if we could titrate metoprolol upwards.  Otherwise patient currently sitting by the bed asymptomatic, not complaining of any pain.  As per nursing no other overnight events reported.      I have personally seen and examined the patient at bedside. I discussed the plan of care with patient, bedside RN and .    Consultants/Specialty  Cardiology    Code Status  Full Code    Disposition  Patient is not medically cleared.   Anticipate discharge to to home with organized home healthcare and close outpatient follow-up.  I have placed the appropriate orders for post-discharge needs.    Review of Systems  Review of Systems   Constitutional: Negative for chills and fever.        Generalized weakness   HENT: Negative for hearing loss and nosebleeds.    Eyes: Negative for blurred vision and double vision.    Respiratory: Negative for cough and shortness of breath.    Cardiovascular: Negative for chest pain and palpitations.   Gastrointestinal: Negative for abdominal pain, heartburn and vomiting.   Genitourinary: Negative for dysuria and urgency.   Musculoskeletal: Negative for back pain and falls.   Skin: Negative for itching and rash.   Neurological: Negative for dizziness and headaches.   Psychiatric/Behavioral: Negative for substance abuse. The patient is not nervous/anxious.         Physical Exam  Temp:  [36.1 °C (96.9 °F)-36.4 °C (97.6 °F)] 36.1 °C (97 °F)  Pulse:  [] 107  Resp:  [16-20] 18  BP: ()/(48-80) 132/56  SpO2:  [94 %-99 %] 99 %    Physical Exam  Constitutional:       General: She is not in acute distress.     Appearance: She is obese. She is not diaphoretic.   HENT:      Head: Normocephalic and atraumatic.      Mouth/Throat:      Pharynx: No oropharyngeal exudate or posterior oropharyngeal erythema.   Eyes:      General:         Right eye: No discharge.         Left eye: No discharge.   Cardiovascular:      Rate and Rhythm: Tachycardia present. Rhythm irregular.      Pulses: Normal pulses.      Heart sounds: Normal heart sounds.   Pulmonary:      Effort: Pulmonary effort is normal.      Breath sounds: Normal breath sounds.   Abdominal:      General: Bowel sounds are normal. There is no distension.      Palpations: Abdomen is soft.      Tenderness: There is no abdominal tenderness.   Musculoskeletal:         General: Swelling present.   Skin:     General: Skin is warm and dry.   Neurological:      General: No focal deficit present.      Mental Status: She is alert and oriented to person, place, and time.   Psychiatric:         Mood and Affect: Mood normal.         Behavior: Behavior normal.         Fluids    Intake/Output Summary (Last 24 hours) at 7/8/2021 1416  Last data filed at 7/8/2021 0900  Gross per 24 hour   Intake 340 ml   Output --   Net 340 ml       Laboratory  Recent Labs      07/07/21  0326 07/08/21  0344   WBC 6.2 6.2   RBC 3.91* 3.83*   HEMOGLOBIN 11.8* 11.6*   HEMATOCRIT 36.4* 36.4*   MCV 93.1 95.0   MCH 30.2 30.3   MCHC 32.4* 31.9*   RDW 49.4 50.3*   PLATELETCT 154* 172   MPV 11.1 10.9     Recent Labs     07/06/21  0440 07/07/21  0326 07/08/21  0344   SODIUM 139 140 139   POTASSIUM 3.8 4.1 3.9   CHLORIDE 105 105 104   CO2 24 24 24   GLUCOSE 193* 146* 149*   BUN 19 13 16   CREATININE 1.20 1.07 1.19   CALCIUM 8.6 8.5 8.4*                   Imaging  IR-US GUIDED PIV   Final Result    Ultrasound-guided PERIPHERAL IV INSERTION performed by    qualified nursing staff as above.      DX-CHEST-PORTABLE (1 VIEW)   Final Result      Hypoinflation without other evidence for acute cardiopulmonary disease.      CL-CARDIOVERSION    (Results Pending)   EC-RUDI W/O CONT    (Results Pending)        Assessment/Plan  * Atrial flutter (HCC)  Assessment & Plan  Resume home medication of metoprolol and dabigatran    Cardiology was consulted, we appreciate further recommendations.    7/8: Patient still on atrial flutter, it appears patient will undergo RUDI/Cardioversion tomorrow by cardiology. Patient's HR very labile, BP was borderline low, so we discontinued lisinopril and as per cardiology ordered her digoxin 500 mcg, followed by 250 mcg 8 hours later followed by 250 mcg 8 hours later.     Type 2 diabetes mellitus, with long-term current use of insulin (HCC)- (present on admission)  Assessment & Plan  Uncontrolled.  A1c 16.5.    7/4 advance SSI to high dose and add mealtime coverage  Continue long-acting insulin at night.  Diabetes education as patient has [employer] and will need an insulin covered by her insurance as well as likely home health for med education, compliance monitoring  Hypoglycemia protocol.    7/6: Pending DM educator.    7/8: We will continue lantus to 45 U and continue with sliding scale. Monitor, make changes accordingly.    Thrombocytopenia (HCC)  Assessment & Plan  Mild, we will repeat  CBC if it continues to go down we will consider further workup.  Monitor repeat CBC.    7/8: Improving.    Anemia  Assessment & Plan  No signs of bleeding  Repeat CBC, monitor    Decubitus ulcer of buttock, stage 1  Assessment & Plan  Follow-up wound care recommendations.    Class 3 severe obesity in adult (HCC)  Assessment & Plan  Patient has been counseled on diet and lifestyle modifications  Recommended outpatient weight management program and bariatric surgery evaluation  Pt educated on the increase of morbidity and mortality associated with excess weight including DM, Heart Disease, HTN, stroke, and sleep apnea.  Pt advised weight loss of 5% through reduced calorie, low carb diet and 150 mins of exercise a week    HTN (hypertension)  Assessment & Plan  We will restart patient's lisinopril at a dose of 5 mg with holding parameters.  Monitor, make changes accordingly.    7/8: We have discontinued lisinopril, continue metoprolol for now. We will try to titrate metoprolol up if possible.      Hyponatremia- (present on admission)  Assessment & Plan  Corrected sodium in setting of hyperglycemia  Will likely correct with IVF and glucose correction    7/8: Resolved    JULIETA (acute kidney injury) (HCC)- (present on admission)  Assessment & Plan  -likely 2/2 dehydration  -Fluid resuscitation  -Glucose control  -Daily renal panel    Improving.    7/7: There might be some CKD component as well.           VTE prophylaxis: therapeutic anticoagulation with dabigatran    I have performed a physical exam and reviewed and updated ROS and Plan today (7/8/2021). In review of yesterday's note (7/7/2021), there are no changes except as documented above.

## 2021-07-08 NOTE — CARE PLAN
The patient is Watcher - Medium risk of patient condition declining or worsening    Shift Goals  Clinical Goals: maintain glycemic control  Patient Goals: rest  Family Goals: na    Progress made toward(s) clinical / shift goals:  glycemic control    Patient is not progressing towards the following goals:discharge      Problem: Knowledge Deficit - Standard  Goal: Patient and family/care givers will demonstrate understanding of plan of care, disease process/condition, diagnostic tests and medications  Outcome: Progressing  Note: Patient will be able express understanding of treatment regimen rationale.     Problem: Diabetes Management  Goal: Patient will achieve and maintain glucose in satisfactory range  Outcome: Progressing  Note: Patient will be able express understanding of treatment regimen rationale.      Problem: Knowledge Deficit - Diabetes  Goal: Patient will demonstrate knowledge of insulin injection, symptoms, and treatment of hypoglycemia and diet prior to discharge  Outcome: Progressing  Note: Patient will be able express understanding of treatment regimen rationale.

## 2021-07-08 NOTE — PROGRESS NOTES
Diabetes education: Pt not to be discharged to day but needs further cardiac work up. Pt not feeling up to learning pen and meter. Will follow up tomorrow.  Plan: Please send prescriptions to [facility] as [facility] not contracted with [email or web address].  Pt will need prescriptions for Lantus solostar pens ( please order 90 day supply) sabino pen needles, Accucheck guide test strips and lancets ( CDE to give meter tomorrow). If to have fast acting please try Novolog flex pen, if sliding scale written out and for ac only. Please use dm supplies order set F2 for correct meter supplies and dx code. CDE to follow up tomorrow before discharge, to give and instruct on meter as well as instruct on insulin pens. Pt has handouts.

## 2021-07-08 NOTE — CARE PLAN
Problem: Knowledge Deficit - Standard  Goal: Patient and family/care givers will demonstrate understanding of plan of care, disease process/condition, diagnostic tests and medications  Outcome: Progressing     Problem: Fall Risk  Goal: Patient will remain free from falls  Outcome: Progressing     Problem: Skin Integrity  Goal: Skin integrity is maintained or improved  Outcome: Progressing     Problem: Diabetes Management  Goal: Patient will achieve and maintain glucose in satisfactory range  Outcome: Progressing     Problem: Knowledge Deficit - Diabetes  Goal: Patient will demonstrate knowledge of insulin injection, symptoms, and treatment of hypoglycemia and diet prior to discharge  Outcome: Progressing     Problem: Discharge Planning - Diabetes  Goal: Patient's continuum of care needs will be met  Outcome: Progressing     Problem: Skin Integrity - Diabetes  Goal: Patient's skin on legs and feet will remain intact while hospitalized  Outcome: Progressing     Problem: Infection - Diabetes  Goal: Patient will remain free from signs and symptoms of infection  Outcome: Progressing  Goal: Promotion wound healing, line and drain management  Outcome: Progressing     Problem: Respiratory  Goal: Patient will achieve/maintain optimum respiratory ventilation and gas exchange  Outcome: Progressing     Problem: Fluid Balance or Risk for Fluid Volume Deficit  Goal: Patient will demonstrate adequate hydration and vital signs  Outcome: Progressing     Problem: Nutrition Deficit - Diabetes  Goal: Patient will demonstrate adequate hydration and vital signs  Outcome: Progressing     Problem: Diabetic Ulcer  Goal: Early identification of diabetic foot wound and initiation of appropriate interventions  Outcome: Progressing     Problem: Pain - Standard  Goal: Alleviation of pain or a reduction in pain to the patient’s comfort goal  Outcome: Progressing   The patient is Stable - Low risk of patient condition declining or  worsening    Shift Goals  Clinical Goals: monitor BG  Patient Goals: rest  Family Goals: na    Progress made toward(s) clinical / shift goals:  monitor BG provide insulin as ordered    Patient is not progressing towards the following goals:

## 2021-07-09 ENCOUNTER — ANESTHESIA EVENT (OUTPATIENT)
Dept: CARDIOLOGY | Facility: MEDICAL CENTER | Age: 71
DRG: 638 | End: 2021-07-09
Payer: MEDICARE

## 2021-07-09 ENCOUNTER — PATIENT OUTREACH (OUTPATIENT)
Dept: HEALTH INFORMATION MANAGEMENT | Facility: OTHER | Age: 71
End: 2021-07-09

## 2021-07-09 ENCOUNTER — ANESTHESIA (OUTPATIENT)
Dept: CARDIOLOGY | Facility: MEDICAL CENTER | Age: 71
DRG: 638 | End: 2021-07-09
Payer: MEDICARE

## 2021-07-09 ENCOUNTER — HOSPITAL ENCOUNTER (OUTPATIENT)
Dept: CARDIOLOGY | Facility: MEDICAL CENTER | Age: 71
End: 2021-07-09
Attending: INTERNAL MEDICINE
Payer: MEDICARE

## 2021-07-09 VITALS
WEIGHT: 293 LBS | RESPIRATION RATE: 18 BRPM | TEMPERATURE: 97.2 F | OXYGEN SATURATION: 98 % | SYSTOLIC BLOOD PRESSURE: 115 MMHG | BODY MASS INDEX: 50.02 KG/M2 | HEART RATE: 69 BPM | DIASTOLIC BLOOD PRESSURE: 81 MMHG | HEIGHT: 64 IN

## 2021-07-09 LAB
ALBUMIN SERPL BCP-MCNC: 2.7 G/DL (ref 3.2–4.9)
ALBUMIN/GLOB SERPL: 0.8 G/DL
ALP SERPL-CCNC: 100 U/L (ref 30–99)
ALT SERPL-CCNC: 16 U/L (ref 2–50)
ANION GAP SERPL CALC-SCNC: 13 MMOL/L (ref 7–16)
AST SERPL-CCNC: 31 U/L (ref 12–45)
BASOPHILS # BLD AUTO: 0.7 % (ref 0–1.8)
BASOPHILS # BLD: 0.04 K/UL (ref 0–0.12)
BILIRUB SERPL-MCNC: 0.8 MG/DL (ref 0.1–1.5)
BUN SERPL-MCNC: 18 MG/DL (ref 8–22)
CALCIUM SERPL-MCNC: 8.4 MG/DL (ref 8.5–10.5)
CHLORIDE SERPL-SCNC: 107 MMOL/L (ref 96–112)
CO2 SERPL-SCNC: 20 MMOL/L (ref 20–33)
CREAT SERPL-MCNC: 1.05 MG/DL (ref 0.5–1.4)
EKG IMPRESSION: NORMAL
EOSINOPHIL # BLD AUTO: 0.12 K/UL (ref 0–0.51)
EOSINOPHIL NFR BLD: 2 % (ref 0–6.9)
ERYTHROCYTE [DISTWIDTH] IN BLOOD BY AUTOMATED COUNT: 49.9 FL (ref 35.9–50)
GLOBULIN SER CALC-MCNC: 3.4 G/DL (ref 1.9–3.5)
GLUCOSE BLD-MCNC: 127 MG/DL (ref 65–99)
GLUCOSE BLD-MCNC: 139 MG/DL (ref 65–99)
GLUCOSE BLD-MCNC: 150 MG/DL (ref 65–99)
GLUCOSE BLD-MCNC: 183 MG/DL (ref 65–99)
GLUCOSE BLD-MCNC: 210 MG/DL (ref 65–99)
GLUCOSE BLD-MCNC: 218 MG/DL (ref 65–99)
GLUCOSE BLD-MCNC: 223 MG/DL (ref 65–99)
GLUCOSE BLD-MCNC: 240 MG/DL (ref 65–99)
GLUCOSE BLD-MCNC: 241 MG/DL (ref 65–99)
GLUCOSE BLD-MCNC: 254 MG/DL (ref 65–99)
GLUCOSE SERPL-MCNC: 157 MG/DL (ref 65–99)
HCT VFR BLD AUTO: 35.8 % (ref 37–47)
HGB BLD-MCNC: 11.4 G/DL (ref 12–16)
IMM GRANULOCYTES # BLD AUTO: 0.07 K/UL (ref 0–0.11)
IMM GRANULOCYTES NFR BLD AUTO: 1.2 % (ref 0–0.9)
LV EJECT FRACT  99904: 65
LYMPHOCYTES # BLD AUTO: 1.97 K/UL (ref 1–4.8)
LYMPHOCYTES NFR BLD: 33.2 % (ref 22–41)
MAGNESIUM SERPL-MCNC: 1.9 MG/DL (ref 1.5–2.5)
MCH RBC QN AUTO: 31.1 PG (ref 27–33)
MCHC RBC AUTO-ENTMCNC: 31.8 G/DL (ref 33.6–35)
MCV RBC AUTO: 97.5 FL (ref 81.4–97.8)
MONOCYTES # BLD AUTO: 0.62 K/UL (ref 0–0.85)
MONOCYTES NFR BLD AUTO: 10.5 % (ref 0–13.4)
NEUTROPHILS # BLD AUTO: 3.11 K/UL (ref 2–7.15)
NEUTROPHILS NFR BLD: 52.4 % (ref 44–72)
NRBC # BLD AUTO: 0 K/UL
NRBC BLD-RTO: 0 /100 WBC
PLATELET # BLD AUTO: 168 K/UL (ref 164–446)
PMV BLD AUTO: 11.1 FL (ref 9–12.9)
POTASSIUM SERPL-SCNC: 4.2 MMOL/L (ref 3.6–5.5)
PROT SERPL-MCNC: 6.1 G/DL (ref 6–8.2)
RBC # BLD AUTO: 3.67 M/UL (ref 4.2–5.4)
SODIUM SERPL-SCNC: 140 MMOL/L (ref 135–145)
WBC # BLD AUTO: 5.9 K/UL (ref 4.8–10.8)

## 2021-07-09 PROCEDURE — A9270 NON-COVERED ITEM OR SERVICE: HCPCS | Performed by: INTERNAL MEDICINE

## 2021-07-09 PROCEDURE — 93325 DOPPLER ECHO COLOR FLOW MAPG: CPT

## 2021-07-09 PROCEDURE — 93312 ECHO TRANSESOPHAGEAL: CPT | Mod: 26 | Performed by: INTERNAL MEDICINE

## 2021-07-09 PROCEDURE — 700111 HCHG RX REV CODE 636 W/ 250 OVERRIDE (IP): Performed by: INTERNAL MEDICINE

## 2021-07-09 PROCEDURE — 83735 ASSAY OF MAGNESIUM: CPT

## 2021-07-09 PROCEDURE — 700111 HCHG RX REV CODE 636 W/ 250 OVERRIDE (IP): Performed by: ANESTHESIOLOGY

## 2021-07-09 PROCEDURE — 700102 HCHG RX REV CODE 250 W/ 637 OVERRIDE(OP): Performed by: INTERNAL MEDICINE

## 2021-07-09 PROCEDURE — 36415 COLL VENOUS BLD VENIPUNCTURE: CPT

## 2021-07-09 PROCEDURE — 97530 THERAPEUTIC ACTIVITIES: CPT

## 2021-07-09 PROCEDURE — 5A2204Z RESTORATION OF CARDIAC RHYTHM, SINGLE: ICD-10-PCS | Performed by: INTERNAL MEDICINE

## 2021-07-09 PROCEDURE — 80053 COMPREHEN METABOLIC PANEL: CPT

## 2021-07-09 PROCEDURE — 99239 HOSP IP/OBS DSCHRG MGMT >30: CPT | Performed by: INTERNAL MEDICINE

## 2021-07-09 PROCEDURE — 82962 GLUCOSE BLOOD TEST: CPT

## 2021-07-09 PROCEDURE — 93010 ELECTROCARDIOGRAM REPORT: CPT | Performed by: INTERNAL MEDICINE

## 2021-07-09 PROCEDURE — 92960 CARDIOVERSION ELECTRIC EXT: CPT | Performed by: INTERNAL MEDICINE

## 2021-07-09 PROCEDURE — 700105 HCHG RX REV CODE 258: Performed by: ANESTHESIOLOGY

## 2021-07-09 PROCEDURE — 304952 CL-CARDIOVERSION

## 2021-07-09 PROCEDURE — B24BZZ4 ULTRASONOGRAPHY OF HEART WITH AORTA, TRANSESOPHAGEAL: ICD-10-PCS | Performed by: INTERNAL MEDICINE

## 2021-07-09 PROCEDURE — 160002 HCHG RECOVERY MINUTES (STAT)

## 2021-07-09 PROCEDURE — 85025 COMPLETE CBC W/AUTO DIFF WBC: CPT

## 2021-07-09 PROCEDURE — 93005 ELECTROCARDIOGRAM TRACING: CPT | Performed by: INTERNAL MEDICINE

## 2021-07-09 RX ORDER — MICONAZOLE NITRATE 20 MG/G
1 CREAM TOPICAL 2 TIMES DAILY
Qty: 1 PACKET | Refills: 0 | Status: SHIPPED | OUTPATIENT
Start: 2021-07-09 | End: 2022-01-04

## 2021-07-09 RX ORDER — INSULIN GLARGINE 100 [IU]/ML
45 INJECTION, SOLUTION SUBCUTANEOUS EVERY EVENING
Qty: 42 ML | Refills: 0 | Status: SHIPPED | OUTPATIENT
Start: 2021-07-09 | End: 2021-10-07

## 2021-07-09 RX ORDER — SODIUM CHLORIDE 9 MG/ML
INJECTION, SOLUTION INTRAVENOUS
Status: DISCONTINUED | OUTPATIENT
Start: 2021-07-09 | End: 2021-07-09 | Stop reason: SURG

## 2021-07-09 RX ORDER — ATORVASTATIN CALCIUM 40 MG/1
40 TABLET, FILM COATED ORAL EVERY EVENING
Qty: 30 TABLET | Refills: 0 | Status: SHIPPED | OUTPATIENT
Start: 2021-07-09

## 2021-07-09 RX ORDER — LEVOTHYROXINE SODIUM 0.1 MG/1
100 TABLET ORAL
Qty: 30 TABLET | Refills: 0 | Status: SHIPPED | OUTPATIENT
Start: 2021-07-09

## 2021-07-09 RX ORDER — ONDANSETRON 2 MG/ML
4 INJECTION INTRAMUSCULAR; INTRAVENOUS
Status: CANCELLED | OUTPATIENT
Start: 2021-07-09

## 2021-07-09 RX ORDER — HALOPERIDOL 5 MG/ML
1 INJECTION INTRAMUSCULAR
Status: CANCELLED | OUTPATIENT
Start: 2021-07-09

## 2021-07-09 RX ORDER — ASPIRIN 81 MG/1
81 TABLET, CHEWABLE ORAL DAILY
Qty: 100 TABLET | Refills: 0 | Status: SHIPPED | OUTPATIENT
Start: 2021-07-09

## 2021-07-09 RX ORDER — SODIUM CHLORIDE 9 MG/ML
INJECTION, SOLUTION INTRAVENOUS CONTINUOUS
Status: CANCELLED | OUTPATIENT
Start: 2021-07-09

## 2021-07-09 RX ORDER — DABIGATRAN ETEXILATE 150 MG/1
150 CAPSULE ORAL 2 TIMES DAILY
Qty: 60 CAPSULE | Refills: 0 | Status: SHIPPED | OUTPATIENT
Start: 2021-07-09

## 2021-07-09 RX ORDER — SODIUM CHLORIDE 9 MG/ML
INJECTION, SOLUTION INTRAVENOUS CONTINUOUS
Status: DISCONTINUED | OUTPATIENT
Start: 2021-07-09 | End: 2021-07-09

## 2021-07-09 RX ORDER — ATORVASTATIN CALCIUM 40 MG/1
40 TABLET, FILM COATED ORAL EVERY EVENING
Status: DISCONTINUED | OUTPATIENT
Start: 2021-07-09 | End: 2021-07-09 | Stop reason: HOSPADM

## 2021-07-09 RX ORDER — NYSTATIN 100000 [USP'U]/G
1 POWDER TOPICAL 2 TIMES DAILY
Qty: 15 G | Refills: 0 | Status: SHIPPED | OUTPATIENT
Start: 2021-07-09 | End: 2022-01-04

## 2021-07-09 RX ORDER — LABETALOL HYDROCHLORIDE 5 MG/ML
5 INJECTION, SOLUTION INTRAVENOUS
Status: CANCELLED | OUTPATIENT
Start: 2021-07-09

## 2021-07-09 RX ORDER — LISINOPRIL 5 MG/1
5 TABLET ORAL DAILY
Qty: 30 TABLET | Refills: 0 | Status: SHIPPED | OUTPATIENT
Start: 2021-07-09 | End: 2022-02-17

## 2021-07-09 RX ORDER — ONDANSETRON 2 MG/ML
4 INJECTION INTRAMUSCULAR; INTRAVENOUS
Status: DISCONTINUED | OUTPATIENT
Start: 2021-07-09 | End: 2021-07-09 | Stop reason: HOSPADM

## 2021-07-09 RX ORDER — HALOPERIDOL 5 MG/ML
1 INJECTION INTRAMUSCULAR
Status: DISCONTINUED | OUTPATIENT
Start: 2021-07-09 | End: 2021-07-09 | Stop reason: HOSPADM

## 2021-07-09 RX ORDER — LABETALOL HYDROCHLORIDE 5 MG/ML
5 INJECTION, SOLUTION INTRAVENOUS
Status: DISCONTINUED | OUTPATIENT
Start: 2021-07-09 | End: 2021-07-09 | Stop reason: HOSPADM

## 2021-07-09 RX ORDER — LISINOPRIL 20 MG/1
5 TABLET ORAL DAILY
Qty: 30 TABLET | Refills: 0 | Status: SHIPPED | OUTPATIENT
Start: 2021-07-09 | End: 2021-07-09 | Stop reason: SDUPTHER

## 2021-07-09 RX ADMIN — PROPOFOL 30 MG: 10 INJECTION, EMULSION INTRAVENOUS at 11:08

## 2021-07-09 RX ADMIN — METOPROLOL TARTRATE 25 MG: 25 TABLET, FILM COATED ORAL at 05:54

## 2021-07-09 RX ADMIN — ATORVASTATIN CALCIUM 40 MG: 40 TABLET, FILM COATED ORAL at 17:05

## 2021-07-09 RX ADMIN — PROPOFOL 30 MG: 10 INJECTION, EMULSION INTRAVENOUS at 11:10

## 2021-07-09 RX ADMIN — NYSTATIN: 100000 POWDER TOPICAL at 17:06

## 2021-07-09 RX ADMIN — FENTANYL CITRATE 50 MCG: 50 INJECTION, SOLUTION INTRAMUSCULAR; INTRAVENOUS at 11:06

## 2021-07-09 RX ADMIN — DABIGATRAN ETEXILATE MESYLATE 150 MG: 150 CAPSULE ORAL at 17:05

## 2021-07-09 RX ADMIN — PROPOFOL 30 MG: 10 INJECTION, EMULSION INTRAVENOUS at 11:11

## 2021-07-09 RX ADMIN — PROPOFOL 50 MG: 10 INJECTION, EMULSION INTRAVENOUS at 11:06

## 2021-07-09 RX ADMIN — METOPROLOL TARTRATE 25 MG: 25 TABLET, FILM COATED ORAL at 17:06

## 2021-07-09 RX ADMIN — MICONAZOLE NITRATE: 20 CREAM TOPICAL at 08:14

## 2021-07-09 RX ADMIN — NYSTATIN: 100000 POWDER TOPICAL at 08:11

## 2021-07-09 RX ADMIN — MICONAZOLE NITRATE: 20 CREAM TOPICAL at 17:06

## 2021-07-09 RX ADMIN — DABIGATRAN ETEXILATE MESYLATE 150 MG: 150 CAPSULE ORAL at 05:53

## 2021-07-09 RX ADMIN — DIGOXIN 250 MCG: 0.25 INJECTION INTRAMUSCULAR; INTRAVENOUS at 05:54

## 2021-07-09 RX ADMIN — INSULIN LISPRO 4 UNITS: 100 INJECTION, SOLUTION INTRAVENOUS; SUBCUTANEOUS at 17:21

## 2021-07-09 RX ADMIN — SODIUM CHLORIDE: 9 INJECTION, SOLUTION INTRAVENOUS at 11:04

## 2021-07-09 ASSESSMENT — COGNITIVE AND FUNCTIONAL STATUS - GENERAL
CLIMB 3 TO 5 STEPS WITH RAILING: A LITTLE
MOBILITY SCORE: 18
SUGGESTED CMS G CODE MODIFIER DAILY ACTIVITY: CH
SUGGESTED CMS G CODE MODIFIER MOBILITY: CK
DRESSING REGULAR LOWER BODY CLOTHING: A LITTLE
MOVING TO AND FROM BED TO CHAIR: A LITTLE
WALKING IN HOSPITAL ROOM: A LITTLE
MOVING FROM LYING ON BACK TO SITTING ON SIDE OF FLAT BED: A LITTLE
TOILETING: A LITTLE
DAILY ACTIVITIY SCORE: 24
TURNING FROM BACK TO SIDE WHILE IN FLAT BAD: A LITTLE
HELP NEEDED FOR BATHING: A LITTLE
PERSONAL GROOMING: A LITTLE
STANDING UP FROM CHAIR USING ARMS: A LITTLE

## 2021-07-09 ASSESSMENT — PAIN DESCRIPTION - PAIN TYPE
TYPE: ACUTE PAIN

## 2021-07-09 NOTE — PROCEDURES
Electrical Cardioversion    Date/Time: 7/9/2021 11:15 AM  Performed by: Jon Doan M.D.  Authorized by: Jon Doan M.D.     Consent:     Consent obtained:  Verbal and written    Consent given by:  Patient    Risks discussed:  Cutaneous burn, death, induced arrhythmia and pain    Alternatives discussed:  No treatment, rate-control medication, anti-coagulation medication, alternative treatment, observation, delayed treatment and referral  Sedation:     Patient sedated: Yes      Sedation type:  Per anesthesia  Pre-procedure details:     Cardioversion basis:  Elective    Rhythm:  Atrial fibrillation    Electrode placement:  Anterior-posterior    Anticoagulation status:  Pradaxa  Attempt one:     Cardioversion mode:  Synchronous    Waveform:  Biphasic    Shock (Joules):  200    Shock outcome:  Conversion to normal sinus rhythm  Post-procedure details:     Patient status:  Awake    Patient tolerance of procedure:  Tolerated well, no immediate complications

## 2021-07-09 NOTE — DISCHARGE SUMMARY
"Discharge Summary    CHIEF COMPLAINT ON ADMISSION  Chief Complaint   Patient presents with   • Weakness     Pt states she wasat home wehn she slid off her, felt weak and was unable to stand up on her own. Per EMS pt's blood glucose read \"High\" on glucometer. Pt is insulin dependent T2DM, and has not been able to afford her unsulin for the last month. Pt has no other complaints.    • High Blood Sugar       Reason for Admission  EMS     Admission Date  7/4/2021    CODE STATUS  Full Code    HPI & HOSPITAL COURSE  As per HPI:  \"70F with diabetes and aflutter presented with worsening general weakness secondary to ground level falls which were characterized by \"sliding to the floor\" Patient is insulin dependent who has had worsening weakness for the past months while not being able to afford her treatment or check her blood sugars. Pt endorses more difficulty concentrating. She lives in a mobile home community. She endorses swollen feet for the past few weeks with redness of the legs. Otherwise, patient denies sob, cough, chest pain/pressure, congestion, diaphoresis, dizziness, weakness, unintentional weight changes, fever, chills, nausea, vomiting, hemoptysis, diarrhea, constipation, dysuria/dyspareunia, polyuria, or polydipsia. Denies recent history/use of malignancy, drug, alcohol, or cannabinoid use.     Upon admission, notable findings include Na 127, AG 13, Glucose 771, Cr 1.92     Will admit patient for hyperglycemia and JULIETA. Does not appear to have DKA.\"    The patient was admitted for further management and care.  Patient told me she has not had health insurance, that she was unable to go to the doctor for quite some time.  A1c during this hospitalization was found to be 16.5.  Diabetes educator was consulted, we started the patient on Lantus.  We will discharge the patient on Lantus 45 units in the evening.    The patient has a history of atrial flutter, during this hospitalization also the patient was found to " have heart rate in the 140s.  Cardiology was consulted.  They recommended RUDI with cardioversion which she underwent today without complication as per cardiology.  Cardiology recommended the patient to continue anticoagulation with dabigatran, metoprolol and to start the patient on empagliflozin.  We have started the patient on 10 mg of empagliflozin daily.  As per cardiology patient was converted back to sinus rhythm.    PT/OT evaluated the patient and recommended home health and a walker.  Home health has been set up by case management.    The patient mentions that she now has insurance and she will be able to follow-up with PCP.  She will require close follow-up with PCP and cardiology as outpatient.      Therefore, she is discharged in fair and stable condition to home with organized home healthcare and close outpatient follow-up.    The patient met 2-midnight criteria for an inpatient stay at the time of discharge.    Discharge Date  7/9/21    FOLLOW UP ITEMS POST DISCHARGE  Patient will require close follow-up with PCP and cardiology as an outpatient.    DISCHARGE DIAGNOSES  Principal Problem:    Atrial flutter (HCC) POA: Unknown  Active Problems:    Type 2 diabetes mellitus, with long-term current use of insulin (HCC) POA: Yes    JULIETA (acute kidney injury) (HCC) POA: Yes    Hyponatremia POA: Yes    HTN (hypertension) POA: Unknown    Class 3 severe obesity in adult (HCC) POA: Unknown    Decubitus ulcer of buttock, stage 1 POA: Unknown    Anemia POA: Unknown    Thrombocytopenia (HCC) POA: Unknown  Resolved Problems:    * No resolved hospital problems. *      FOLLOW UP  No future appointments.  [facility]  [address]  [facility] [address]  [phone]        Joyce Ferguson, F.N.P.  [address]  [facility] [address]  [phone]    On 7/23/2021  Please go to your hospital follow up appointment and [facility] care appointment on 7/23 at 10:50am for check in. Please bring insurance card, ID,  list of medications, and a face mask.Thank you      MEDICATIONS ON DISCHARGE     Medication List      START taking these medications      Instructions   Alcohol Swabs   Doctor's comments: Per formulary preference. ICD-10 code: E11.65 Uncontrolled type 2 Diabetes Mellitus  Wipe site with prep pad prior to injection.     atorvastatin 40 MG Tabs  Commonly known as: LIPITOR   Take 1 tablet by mouth every evening.  Dose: 40 mg     * Blood Glucose Meter Kit   Doctor's comments: Or per formulary preference. ICD-10 code: E11.65 Uncontrolled type 2 Diabetes Mellitus  Test blood sugar as recommended by provider. Accucheck Guide blood glucose monitoring kit.     * Blood Glucose Test Strips   Doctor's comments: Or per formulary preference. ICD-10 code: E11.65 Uncontrolled type 2 Diabetes Mellitus  Use one Accucheck Guide strip to test blood sugar three times daily.     Empagliflozin 10 MG Tabs   Take 10 mg by mouth every day.  Dose: 10 mg     Insulin Pen Needle 32 G x 4 mm   Doctor's comments: Per patient/formulary preference. ICD-10 code: E11.65 Uncontrolled type 2 Diabetes Mellitus  Use one pen needle in pen device to inject insulin once daily.     Lancets   Doctor's comments: Or per formulary preference. ICD-10 code: E11.65 Uncontrolled type 2 Diabetes Mellitus  Use one Accucheck Guide lancet to test blood sugar three times daily.     Lantus SoloStar 100 UNIT/ML Sopn injection  Generic drug: insulin glargine   Inject 45 Units under the skin every evening for 90 days.  Dose: 45 Units     miconazole 2%-zinc oxide 2 % Crea topical cream   Apply 1 Each topically 2 times a day.  Dose: 1 Each     nystatin powder  Commonly known as: MYCOSTATIN   Apply 1 g topically 2 times a day.  Dose: 1 Application         * This list has 2 medication(s) that are the same as other medications prescribed for you. Read the directions carefully, and ask your doctor or other care provider to review them with you.            CHANGE how you take these  medications      Instructions   lisinopril 5 MG Tabs  What changed:   · medication strength  · how much to take  Commonly known as: PRINIVIL   Take 1 tablet by mouth every day.  Dose: 5 mg     metoprolol tartrate 25 MG Tabs  What changed:   · how much to take  · when to take this  · additional instructions  Commonly known as: LOPRESSOR   Take 1 tablet by mouth 2 times a day.  Dose: 25 mg        CONTINUE taking these medications      Instructions   aspirin 81 MG Chew chewable tablet  Commonly known as: ASA   Chew 1 tablet every day.  Dose: 81 mg     dabigatran 150 MG Caps capsule  Commonly known as: PRADAXA   Take 1 capsule by mouth 2 times a day.  Dose: 150 mg     levothyroxine 100 MCG Tabs  Commonly known as: SYNTHROID   Take 1 tablet by mouth every morning on an empty stomach.  Dose: 100 mcg     therapeutic multivitamin-minerals Tabs   Take 1 tablet by mouth every day.  Dose: 1 tablet     vitamin D 1000 Unit (25 mcg) Tabs  Commonly known as: cholecalciferol   Take 5,000 Units by mouth every day.  Dose: 5,000 Units        STOP taking these medications    FLUoxetine 20 MG Caps  Commonly known as: PROZAC     insulin aspart 100 UNIT/ML Soln  Commonly known as: NovoLOG     simvastatin 40 MG Tabs  Commonly known as: ZOCOR            Allergies  No Known Allergies    DIET  Orders Placed This Encounter   Procedures   • Diet Order Diet: Consistent CHO (Diabetic)     Standing Status:   Standing     Number of Occurrences:   1     Order Specific Question:   Diet:     Answer:   Consistent CHO (Diabetic) [4]       ACTIVITY  As tolerated. and directed by PT.  Weight bearing as tolerated and directed by PT.    CONSULTATIONS  Cardiology  Diabetes Educator    PROCEDURES  RUDI with cardioversion on 7/9/21    EC-RUDI W/O CONT   Final Result      IR-US GUIDED PIV   Final Result    Ultrasound-guided PERIPHERAL IV INSERTION performed by    qualified nursing staff as above.      DX-CHEST-PORTABLE (1 VIEW)   Final Result      Hypoinflation  without other evidence for acute cardiopulmonary disease.      CL-CARDIOVERSION    (Results Pending)       LABORATORY  Lab Results   Component Value Date    SODIUM 140 07/09/2021    POTASSIUM 4.2 07/09/2021    CHLORIDE 107 07/09/2021    CO2 20 07/09/2021    GLUCOSE 157 (H) 07/09/2021    BUN 18 07/09/2021    CREATININE 1.05 07/09/2021        Lab Results   Component Value Date    WBC 5.9 07/09/2021    HEMOGLOBIN 11.4 (L) 07/09/2021    HEMATOCRIT 35.8 (L) 07/09/2021    PLATELETCT 168 07/09/2021        Total time of the discharge process exceeds 35 minutes.

## 2021-07-09 NOTE — PROGRESS NOTES
Assumed care of patient. Chart review done. Patient AAOx4. NPO for procedure.  Pain control regiment reviewed. Bed low and locked.

## 2021-07-09 NOTE — DISCHARGE PLANNING
Anticipated Discharge Disposition:   Home with Home Health, with [facility]    Action:   Discussed discharge planning needs during rounds. Discharge order in place. Per bedside RN. Pt will discharge today. [facility]  accepted pt to resume service. Per chart review, [facility] has been delivered.    RN JERICHO called [facility] , spoke to Melissa. Pr JENNIFER Torres will see pt tomorrow.     Barriers to Discharge:   None.    Plan:   Discharge to home with [facility] and [facility].   Hospital Care Management will continue to follow and assist with discharge planning needs.

## 2021-07-09 NOTE — ANESTHESIA POSTPROCEDURE EVALUATION
Patient: Beverley Dejesus    Procedure Summary     Date: 07/09/21 Room / Location: [facility] - ECHOCARDIOLOGY [facility]    Anesthesia Start: 1104 Anesthesia Stop: 1125    Procedures:       CL-CARDIOVERSION      EC-RUDI W/O CONT Diagnosis: (See Assoicated Dx)    Scheduled Providers: Jon Doan M.D.; Sergio Ball M.D. Responsible Provider: Sergio Ball M.D.    Anesthesia Type: MAC ASA Status: 3          Final Anesthesia Type: MAC  Last vitals  BP        Temp        Pulse       Resp        SpO2          Anesthesia Post Evaluation    Patient location during evaluation: PACU  Patient participation: complete - patient participated  Level of consciousness: awake and alert    Airway patency: patent  Anesthetic complications: no  Cardiovascular status: hemodynamically stable  Respiratory status: acceptable  Hydration status: euvolemic    PONV: none          No complications documented.     Nurse Pain Score: 0 (NPRS)

## 2021-07-09 NOTE — PROGRESS NOTES
TIFFANY Delacruz w/ PVC  HR 75-82 jumped to 150 at [id number] and had dropped in the 40s at [phone]  -/07/-

## 2021-07-09 NOTE — ANESTHESIA PREPROCEDURE EVALUATION
Relevant Problems   CARDIAC   (positive) Atrial flutter (HCC)   (positive) HTN (hypertension)         (positive) JULIETA (acute kidney injury) (HCC)      ENDO   (positive) Type 2 diabetes mellitus, with long-term current use of insulin (HCC)       Physical Exam    Airway   Mallampati: II  TM distance: >3 FB  Neck ROM: full       Cardiovascular - normal exam  Rhythm: regular  Rate: normal  (-) murmur     Dental - normal exam           Pulmonary - normal exam  Breath sounds clear to auscultation     Abdominal    Neurological - normal exam                 Anesthesia Plan    ASA 3   ASA physical status 3 criteria: morbid obesity - BMI greater than or equal to 40    Plan - MAC               Induction: intravenous          Informed Consent:    Anesthetic plan and risks discussed with patient.         Statement Selected

## 2021-07-09 NOTE — THERAPY
Occupational Therapy  Daily Treatment     Patient Name: Beverley Dejesus  Age:  70 y.o., Sex:  female  Medical Record #: [id number]  Today's Date: 7/9/2021     Precautions  Precautions: (P) Fall Risk  Comments: balance, strength and endurance limitations    Assessment    Pt is at or near his/her functional baseline. Pt with no further skilled OT needs in the acute care setting at this time.     Plan    Discharge secondary to goals met.    DC Equipment Recommendations: Front-Wheel Walker  Discharge Recommendations: (P) Recommend home health for continued occupational therapy services       07/09/21 0732   Activities of Daily Living   Grooming Supervision   Upper Body Dressing Supervision   Lower Body Dressing Supervision   Toileting Supervision   Functional Mobility   Sit to Stand Supervised   Bed, Chair, Wheelchair Transfer Supervised   Short Term Goals   Short Term Goal # 1 distant supervision for toielting tasks   Goal Outcome # 1 Goal met   Short Term Goal # 2 set up for LB dressing, appropriate AE use   Goal Outcome # 2 Goal met   Short Term Goal # 3 tolerate 10 minutes standing at sink to complete grooming, prior to resting   Goal Outcome # 3 Goal met

## 2021-07-09 NOTE — OR NURSING
Patient arrived to PACU in stable condition. Report received from Nora HAQUE and Dr Ball. AOX4 and denies pain or nausea. SR. EKG completed. She is stable and appropriate to return to her room.    Pt scheduled appt for med clearance

## 2021-07-09 NOTE — ANESTHESIA TIME REPORT
Anesthesia Start and Stop Event Times     Date Time Event    7/9/2021 1046 Ready for Procedure     1104 Anesthesia Start     1125 Anesthesia Stop        Responsible Staff  07/09/21    Name Role Begin End    Sergio Ball M.D. Anesth 1104 1125        Preop Diagnosis (Free Text):  Pre-op Diagnosis             Preop Diagnosis (Codes):    Post op Diagnosis  Atrial fibrillation (HCC)      Premium Reason  Non-Premium    Comments:

## 2021-07-09 NOTE — CARE PLAN
The patient is Stable - Low risk of patient condition declining or worsening    Shift Goals  Clinical Goals: maintain blood glucose WNL  Patient Goals: rest  Family Goals: na    Progress made toward(s) clinical / shift goals:  Patients blood glucose WNL. Had successful cardioversion this shift.    Problem: Knowledge Deficit - Standard  Goal: Patient and family/care givers will demonstrate understanding of plan of care, disease process/condition, diagnostic tests and medications  Outcome: Progressing  Note: Patient to verbalize understanding of diagnosis and treatments      Problem: Fall Risk  Goal: Patient will remain free from falls  Outcome: Progressing  Note: Patient to remain free of falls and injury      Problem: Skin Integrity  Goal: Skin integrity is maintained or improved  Outcome: Progressing  Note: Patient to remain free of skin breakdown

## 2021-07-09 NOTE — DISCHARGE INSTRUCTIONS
Discharge Instructions    Discharged to home by car with friend. Discharged via wheelchair, hospital escort: Yes.  Special equipment needed: Walker    Be sure to schedule a follow-up appointment with your primary care doctor or any specialists as instructed.     Discharge Plan:   Diet Plan: Discussed  Activity Level: Discussed  Confirmed Follow up Appointment: Appointment Scheduled  Confirmed Symptoms Management: Discussed  Medication Reconciliation Updated: Yes    I understand that a diet low in cholesterol, fat, and sodium is recommended for good health. Unless I have been given specific instructions below for another diet, I accept this instruction as my diet prescription.   Other diet: diabetic carb controlled      Special Instructions:     · Is patient discharged on Warfarin / Coumadin?   No     Depression / Suicide Risk    As you are discharged from this [facility] facility, it is important to learn how to keep safe from harming yourself.    Recognize the warning signs:  · Abrupt changes in personality, positive or negative- including increase in energy   · Giving away possessions  · Change in eating patterns- significant weight changes-  positive or negative  · Change in sleeping patterns- unable to sleep or sleeping all the time   · Unwillingness or inability to communicate  · Depression  · Unusual sadness, discouragement and loneliness  · Talk of wanting to die  · Neglect of personal appearance   · Rebelliousness- reckless behavior  · Withdrawal from people/activities they love  · Confusion- inability to concentrate     If you or a loved one observes any of these behaviors or has concerns about self-harm, here's what you can do:  · Talk about it- your feelings and reasons for harming yourself  · Remove any means that you might use to hurt yourself (examples: pills, rope, extension cords, firearm)  · Get professional help from the community (Mental Health, Substance Abuse, psychological counseling)  · Do  not be alone:Call your Safe Contact- someone whom you trust who will be there for you.  · Call your local CRISIS HOTLINE 160-5470 or 721-938-5357  · Call your local Children's Mobile Crisis Response Team [place] (470) 871-5352 or www.[employer]  · Call the toll free National Suicide Prevention Hotlines   · National Suicide Prevention Lifeline 110-196-UKMZ (2215)  · National [employer] Line Network 800-SUICIDE (756-3333)

## 2021-07-09 NOTE — CARE PLAN
Problem: Knowledge Deficit - Standard  Goal: Patient and family/care givers will demonstrate understanding of plan of care, disease process/condition, diagnostic tests and medications  Outcome: Progressing     Problem: Fall Risk  Goal: Patient will remain free from falls  Outcome: Progressing     Problem: Skin Integrity  Goal: Skin integrity is maintained or improved  Outcome: Progressing     Problem: Diabetes Management  Goal: Patient will achieve and maintain glucose in satisfactory range  Outcome: Progressing     Problem: Knowledge Deficit - Diabetes  Goal: Patient will demonstrate knowledge of insulin injection, symptoms, and treatment of hypoglycemia and diet prior to discharge  Outcome: Progressing     Problem: Discharge Planning - Diabetes  Goal: Patient's continuum of care needs will be met  Outcome: Progressing     Problem: Skin Integrity - Diabetes  Goal: Patient's skin on legs and feet will remain intact while hospitalized  Outcome: Progressing     Problem: Infection - Diabetes  Goal: Patient will remain free from signs and symptoms of infection  Outcome: Progressing  Goal: Promotion wound healing, line and drain management  Outcome: Progressing     Problem: Respiratory  Goal: Patient will achieve/maintain optimum respiratory ventilation and gas exchange  Outcome: Progressing     Problem: Fluid Balance or Risk for Fluid Volume Deficit  Goal: Patient will demonstrate adequate hydration and vital signs  Outcome: Progressing     Problem: Nutrition Deficit - Diabetes  Goal: Patient will demonstrate adequate hydration and vital signs  Outcome: Progressing     Problem: Diabetic Ulcer  Goal: Early identification of diabetic foot wound and initiation of appropriate interventions  Outcome: Progressing     Problem: Pain - Standard  Goal: Alleviation of pain or a reduction in pain to the patient’s comfort goal  Outcome: Progressing   The patient is Stable - Low risk of patient condition declining or  worsening    Shift Goals  Clinical Goals: maintain bp and BG  Patient Goals: rest   Family Goals: na    Progress made toward(s) clinical / shift goals:  bp and bg medication given as ordered    Patient is not progressing towards the following goals:

## 2021-07-10 NOTE — PROGRESS NOTES
Patient discharged home with freind via wheelchair by staff. Discharge information and packet provided. Diabetes education done. PIV removed.

## 2021-07-10 NOTE — PROGRESS NOTES
Diabetes education: Met with pt x 2 before discharge. This am post procedure she was not yet ready for education. Met before discharge, gave and instructed on Accucheck Guide meter and insulin pens( pt practiced with saline pens and practice device). CDE spoke with MD who sent orders in for 90 days. CDE called [facility], gave information on insurance ( she had not yet used this pharmacy) and pt's Lantus: $206 as it was 100 days and 3 full boxes, Jardiance was $141 and 90 days and Pradaxa was $141 for 90. Discussed mailorder will be cheaper. Reviewed how to take Jardiance. Questions answered.

## 2022-01-04 ENCOUNTER — HOSPITAL ENCOUNTER (INPATIENT)
Facility: MEDICAL CENTER | Age: 72
LOS: 10 days | DRG: 871 | End: 2022-01-15
Attending: EMERGENCY MEDICINE | Admitting: STUDENT IN AN ORGANIZED HEALTH CARE EDUCATION/TRAINING PROGRAM
Payer: MEDICARE

## 2022-01-04 ENCOUNTER — APPOINTMENT (OUTPATIENT)
Dept: RADIOLOGY | Facility: MEDICAL CENTER | Age: 72
DRG: 871 | End: 2022-01-04
Attending: EMERGENCY MEDICINE
Payer: MEDICARE

## 2022-01-04 DIAGNOSIS — R65.21 SEVERE SEPSIS WITH SEPTIC SHOCK (HCC): ICD-10-CM

## 2022-01-04 DIAGNOSIS — A41.9 SEVERE SEPSIS WITH SEPTIC SHOCK (HCC): ICD-10-CM
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
## 2022-01-04 DIAGNOSIS — E27.40 ADRENAL INSUFFICIENCY (HCC): ICD-10-CM

## 2022-01-04 DIAGNOSIS — L03.119 CELLULITIS OF LOWER EXTREMITY, UNSPECIFIED LATERALITY: ICD-10-CM

## 2022-01-04 DIAGNOSIS — L89.301 PRESSURE INJURY OF BUTTOCK, STAGE 1, UNSPECIFIED LATERALITY: ICD-10-CM

## 2022-01-04 PROBLEM — E11.65 TYPE 2 DIABETES MELLITUS WITH HYPERGLYCEMIA, WITH LONG-TERM CURRENT USE OF INSULIN (HCC): Status: ACTIVE | Noted: 2021-07-04

## 2022-01-04 LAB
ALBUMIN SERPL BCP-MCNC: 2.5 G/DL (ref 3.2–4.9)
ALBUMIN/GLOB SERPL: 0.6 G/DL
ALP SERPL-CCNC: 109 U/L (ref 30–99)
ALT SERPL-CCNC: 7 U/L (ref 2–50)
ANION GAP SERPL CALC-SCNC: 12 MMOL/L (ref 7–16)
AST SERPL-CCNC: 6 U/L (ref 12–45)
BASOPHILS # BLD AUTO: 0.3 % (ref 0–1.8)
BASOPHILS # BLD: 0.04 K/UL (ref 0–0.12)
BILIRUB SERPL-MCNC: 0.5 MG/DL (ref 0.1–1.5)
BUN SERPL-MCNC: 110 MG/DL (ref 8–22)
BURR CELLS BLD QL SMEAR: NORMAL
CALCIUM SERPL-MCNC: 8.7 MG/DL (ref 8.5–10.5)
CHLORIDE SERPL-SCNC: 109 MMOL/L (ref 96–112)
CO2 SERPL-SCNC: 12 MMOL/L (ref 20–33)
COMMENT 1642: NORMAL
CREAT SERPL-MCNC: 2.55 MG/DL (ref 0.5–1.4)
EKG IMPRESSION: NORMAL
EOSINOPHIL # BLD AUTO: 0.1 K/UL (ref 0–0.51)
EOSINOPHIL NFR BLD: 0.9 % (ref 0–6.9)
ERYTHROCYTE [DISTWIDTH] IN BLOOD BY AUTOMATED COUNT: 55.3 FL (ref 35.9–50)
GLOBULIN SER CALC-MCNC: 4.2 G/DL (ref 1.9–3.5)
GLUCOSE BLD-MCNC: 211 MG/DL (ref 65–99)
GLUCOSE SERPL-MCNC: 354 MG/DL (ref 65–99)
HCT VFR BLD AUTO: 34.2 % (ref 37–47)
HGB BLD-MCNC: 10.1 G/DL (ref 12–16)
IMM GRANULOCYTES # BLD AUTO: 0.1 K/UL (ref 0–0.11)
IMM GRANULOCYTES NFR BLD AUTO: 0.9 % (ref 0–0.9)
LACTATE BLD-SCNC: 1.7 MMOL/L (ref 0.5–2)
LYMPHOCYTES # BLD AUTO: 1.15 K/UL (ref 1–4.8)
LYMPHOCYTES NFR BLD: 10 % (ref 22–41)
MCH RBC QN AUTO: 27.7 PG (ref 27–33)
MCHC RBC AUTO-ENTMCNC: 29.5 G/DL (ref 33.6–35)
MCV RBC AUTO: 93.7 FL (ref 81.4–97.8)
MONOCYTES # BLD AUTO: 0.69 K/UL (ref 0–0.85)
MONOCYTES NFR BLD AUTO: 6 % (ref 0–13.4)
MORPHOLOGY BLD-IMP: NORMAL
NEUTROPHILS # BLD AUTO: 9.45 K/UL (ref 2–7.15)
NEUTROPHILS NFR BLD: 81.9 % (ref 44–72)
NRBC # BLD AUTO: 0 K/UL
NRBC BLD-RTO: 0 /100 WBC
PLATELET # BLD AUTO: 307 K/UL (ref 164–446)
PLATELET BLD QL SMEAR: NORMAL
PMV BLD AUTO: 10.3 FL (ref 9–12.9)
POIKILOCYTOSIS BLD QL SMEAR: NORMAL
POTASSIUM SERPL-SCNC: 5.7 MMOL/L (ref 3.6–5.5)
PROT SERPL-MCNC: 6.7 G/DL (ref 6–8.2)
RBC # BLD AUTO: 3.65 M/UL (ref 4.2–5.4)
RBC BLD AUTO: PRESENT
SODIUM SERPL-SCNC: 133 MMOL/L (ref 135–145)
TROPONIN T SERPL-MCNC: 29 NG/L (ref 6–19)
WBC # BLD AUTO: 11.5 K/UL (ref 4.8–10.8)

## 2022-01-04 PROCEDURE — 700111 HCHG RX REV CODE 636 W/ 250 OVERRIDE (IP): Performed by: STUDENT IN AN ORGANIZED HEALTH CARE EDUCATION/TRAINING PROGRAM

## 2022-01-04 PROCEDURE — 96375 TX/PRO/DX INJ NEW DRUG ADDON: CPT

## 2022-01-04 PROCEDURE — 36415 COLL VENOUS BLD VENIPUNCTURE: CPT

## 2022-01-04 PROCEDURE — G0378 HOSPITAL OBSERVATION PER HR: HCPCS

## 2022-01-04 PROCEDURE — 84484 ASSAY OF TROPONIN QUANT: CPT

## 2022-01-04 PROCEDURE — 70450 CT HEAD/BRAIN W/O DYE: CPT | Mod: MG

## 2022-01-04 PROCEDURE — 72125 CT NECK SPINE W/O DYE: CPT | Mod: MF

## 2022-01-04 PROCEDURE — A9270 NON-COVERED ITEM OR SERVICE: HCPCS | Performed by: STUDENT IN AN ORGANIZED HEALTH CARE EDUCATION/TRAINING PROGRAM

## 2022-01-04 PROCEDURE — 80053 COMPREHEN METABOLIC PANEL: CPT

## 2022-01-04 PROCEDURE — 82962 GLUCOSE BLOOD TEST: CPT

## 2022-01-04 PROCEDURE — 700105 HCHG RX REV CODE 258: Performed by: STUDENT IN AN ORGANIZED HEALTH CARE EDUCATION/TRAINING PROGRAM

## 2022-01-04 PROCEDURE — 93005 ELECTROCARDIOGRAM TRACING: CPT | Performed by: EMERGENCY MEDICINE

## 2022-01-04 PROCEDURE — 83605 ASSAY OF LACTIC ACID: CPT

## 2022-01-04 PROCEDURE — 93922 UPR/L XTREMITY ART 2 LEVELS: CPT

## 2022-01-04 PROCEDURE — 99285 EMERGENCY DEPT VISIT HI MDM: CPT

## 2022-01-04 PROCEDURE — 700102 HCHG RX REV CODE 250 W/ 637 OVERRIDE(OP): Performed by: STUDENT IN AN ORGANIZED HEALTH CARE EDUCATION/TRAINING PROGRAM

## 2022-01-04 PROCEDURE — 96372 THER/PROPH/DIAG INJ SC/IM: CPT

## 2022-01-04 PROCEDURE — 700105 HCHG RX REV CODE 258: Performed by: EMERGENCY MEDICINE

## 2022-01-04 PROCEDURE — 99220 PR INITIAL OBSERVATION CARE,LEVL III: CPT | Performed by: STUDENT IN AN ORGANIZED HEALTH CARE EDUCATION/TRAINING PROGRAM

## 2022-01-04 PROCEDURE — 85025 COMPLETE CBC W/AUTO DIFF WBC: CPT

## 2022-01-04 PROCEDURE — 700101 HCHG RX REV CODE 250: Performed by: STUDENT IN AN ORGANIZED HEALTH CARE EDUCATION/TRAINING PROGRAM

## 2022-01-04 PROCEDURE — 87040 BLOOD CULTURE FOR BACTERIA: CPT

## 2022-01-04 PROCEDURE — 700111 HCHG RX REV CODE 636 W/ 250 OVERRIDE (IP): Performed by: EMERGENCY MEDICINE

## 2022-01-04 PROCEDURE — 96365 THER/PROPH/DIAG IV INF INIT: CPT

## 2022-01-04 RX ORDER — PREDNISOLONE ACETATE 10 MG/ML
1 SUSPENSION/ DROPS OPHTHALMIC 4 TIMES DAILY
COMMUNITY
End: 2022-02-17

## 2022-01-04 RX ORDER — KETOROLAC TROMETHAMINE 5 MG/ML
1 SOLUTION OPHTHALMIC 4 TIMES DAILY
COMMUNITY
End: 2022-02-17

## 2022-01-04 RX ORDER — DABIGATRAN ETEXILATE 150 MG/1
150 CAPSULE ORAL 2 TIMES DAILY
Status: DISCONTINUED | OUTPATIENT
Start: 2022-01-04 | End: 2022-01-15 | Stop reason: HOSPADM

## 2022-01-04 RX ORDER — SODIUM CHLORIDE 9 MG/ML
1000 INJECTION, SOLUTION INTRAVENOUS ONCE
Status: COMPLETED | OUTPATIENT
Start: 2022-01-04 | End: 2022-01-04

## 2022-01-04 RX ORDER — PREDNISOLONE ACETATE 10 MG/ML
1 SUSPENSION/ DROPS OPHTHALMIC 4 TIMES DAILY
Status: DISCONTINUED | OUTPATIENT
Start: 2022-01-04 | End: 2022-01-09

## 2022-01-04 RX ORDER — HYDROMORPHONE HYDROCHLORIDE 1 MG/ML
0.5 INJECTION, SOLUTION INTRAMUSCULAR; INTRAVENOUS; SUBCUTANEOUS EVERY 6 HOURS PRN
Status: DISCONTINUED | OUTPATIENT
Start: 2022-01-04 | End: 2022-01-05

## 2022-01-04 RX ORDER — LEVOTHYROXINE SODIUM 0.05 MG/1
100 TABLET ORAL
Status: DISCONTINUED | OUTPATIENT
Start: 2022-01-05 | End: 2022-01-15 | Stop reason: HOSPADM

## 2022-01-04 RX ORDER — CIPROFLOXACIN HYDROCHLORIDE 3.5 MG/ML
1 SOLUTION/ DROPS TOPICAL 4 TIMES DAILY
Status: DISCONTINUED | OUTPATIENT
Start: 2022-01-04 | End: 2022-01-15 | Stop reason: HOSPADM

## 2022-01-04 RX ORDER — ATORVASTATIN CALCIUM 40 MG/1
40 TABLET, FILM COATED ORAL EVERY EVENING
Status: DISCONTINUED | OUTPATIENT
Start: 2022-01-04 | End: 2022-01-15 | Stop reason: HOSPADM

## 2022-01-04 RX ORDER — DEXTROSE MONOHYDRATE 25 G/50ML
50 INJECTION, SOLUTION INTRAVENOUS
Status: DISCONTINUED | OUTPATIENT
Start: 2022-01-04 | End: 2022-01-15 | Stop reason: HOSPADM

## 2022-01-04 RX ORDER — ASPIRIN 81 MG/1
81 TABLET, CHEWABLE ORAL DAILY
Status: DISCONTINUED | OUTPATIENT
Start: 2022-01-05 | End: 2022-01-15 | Stop reason: HOSPADM

## 2022-01-04 RX ORDER — OFLOXACIN 3 MG/ML
1 SOLUTION/ DROPS OPHTHALMIC 4 TIMES DAILY
COMMUNITY
End: 2022-02-17

## 2022-01-04 RX ADMIN — PREDNISOLONE ACETATE 1 DROP: 10 SUSPENSION/ DROPS OPHTHALMIC at 21:24

## 2022-01-04 RX ADMIN — DABIGATRAN ETEXILATE MESYLATE 150 MG: 150 CAPSULE ORAL at 21:15

## 2022-01-04 RX ADMIN — ATORVASTATIN CALCIUM 40 MG: 40 TABLET, FILM COATED ORAL at 21:23

## 2022-01-04 RX ADMIN — CIPROFLOXACIN HYDROCHLORIDE 1 DROP: 3 SOLUTION/ DROPS OPHTHALMIC at 22:39

## 2022-01-04 RX ADMIN — SODIUM CHLORIDE 1000 ML: 9 INJECTION, SOLUTION INTRAVENOUS at 22:34

## 2022-01-04 RX ADMIN — INSULIN HUMAN 6 UNITS: 100 INJECTION, SOLUTION PARENTERAL at 21:26

## 2022-01-04 RX ADMIN — SODIUM CHLORIDE 1000 ML: 9 INJECTION, SOLUTION INTRAVENOUS at 18:13

## 2022-01-04 RX ADMIN — SODIUM CHLORIDE 3 G: 900 INJECTION INTRAVENOUS at 20:20

## 2022-01-04 RX ADMIN — INSULIN GLARGINE 15 UNITS: 100 INJECTION, SOLUTION SUBCUTANEOUS at 22:39

## 2022-01-04 RX ADMIN — METOPROLOL TARTRATE 25 MG: 25 TABLET, FILM COATED ORAL at 22:34

## 2022-01-04 RX ADMIN — SODIUM CHLORIDE 1000 ML: 9 INJECTION, SOLUTION INTRAVENOUS at 20:34

## 2022-01-04 RX ADMIN — HYDROMORPHONE HYDROCHLORIDE 0.5 MG: 1 INJECTION, SOLUTION INTRAMUSCULAR; INTRAVENOUS; SUBCUTANEOUS at 22:56

## 2022-01-04 ASSESSMENT — ENCOUNTER SYMPTOMS
FEVER: 0
FALLS: 1
HEARTBURN: 0
NECK PAIN: 0
COUGH: 0
BLURRED VISION: 0
TREMORS: 0
HEADACHES: 0
BRUISES/BLEEDS EASILY: 0
TINGLING: 0
DOUBLE VISION: 0
MYALGIAS: 0
PHOTOPHOBIA: 0
HEMOPTYSIS: 0
VOMITING: 0
PALPITATIONS: 0
HALLUCINATIONS: 0
CHILLS: 0
DEPRESSION: 0
NAUSEA: 0
ABDOMINAL PAIN: 0
DIZZINESS: 0

## 2022-01-04 ASSESSMENT — LIFESTYLE VARIABLES: SUBSTANCE_ABUSE: 1

## 2022-01-04 ASSESSMENT — FIBROSIS 4 INDEX: FIB4 SCORE: 3.32

## 2022-01-04 ASSESSMENT — PAIN DESCRIPTION - PAIN TYPE: TYPE: ACUTE PAIN

## 2022-01-05 ENCOUNTER — APPOINTMENT (OUTPATIENT)
Dept: CARDIOLOGY | Facility: MEDICAL CENTER | Age: 72
DRG: 871 | End: 2022-01-05
Attending: STUDENT IN AN ORGANIZED HEALTH CARE EDUCATION/TRAINING PROGRAM
Payer: MEDICARE

## 2022-01-05 ENCOUNTER — APPOINTMENT (OUTPATIENT)
Dept: RADIOLOGY | Facility: MEDICAL CENTER | Age: 72
DRG: 871 | End: 2022-01-05
Attending: INTERNAL MEDICINE
Payer: MEDICARE

## 2022-01-05 PROBLEM — R65.21 SEPTIC SHOCK (HCC): Status: ACTIVE | Noted: 2022-01-05

## 2022-01-05 PROBLEM — A41.9 SEPTIC SHOCK (HCC): Status: ACTIVE | Noted: 2022-01-05

## 2022-01-05 LAB
ALBUMIN SERPL BCP-MCNC: 2.4 G/DL (ref 3.2–4.9)
ALBUMIN/GLOB SERPL: 0.7 G/DL
ALP SERPL-CCNC: 92 U/L (ref 30–99)
ALT SERPL-CCNC: 10 U/L (ref 2–50)
AMPHET UR QL SCN: NEGATIVE
ANION GAP SERPL CALC-SCNC: 14 MMOL/L (ref 7–16)
ANION GAP SERPL CALC-SCNC: 14 MMOL/L (ref 7–16)
ANION GAP SERPL CALC-SCNC: 9 MMOL/L (ref 7–16)
AST SERPL-CCNC: 12 U/L (ref 12–45)
BARBITURATES UR QL SCN: NEGATIVE
BASOPHILS # BLD AUTO: 0.3 % (ref 0–1.8)
BASOPHILS # BLD: 0.03 K/UL (ref 0–0.12)
BENZODIAZ UR QL SCN: NEGATIVE
BILIRUB SERPL-MCNC: 0.6 MG/DL (ref 0.1–1.5)
BUN SERPL-MCNC: 103 MG/DL (ref 8–22)
BUN SERPL-MCNC: 68 MG/DL (ref 8–22)
BUN SERPL-MCNC: 88 MG/DL (ref 8–22)
BZE UR QL SCN: NEGATIVE
CALCIUM SERPL-MCNC: 7.9 MG/DL (ref 8.5–10.5)
CALCIUM SERPL-MCNC: 8.7 MG/DL (ref 8.5–10.5)
CALCIUM SERPL-MCNC: 9 MG/DL (ref 8.5–10.5)
CANNABINOIDS UR QL SCN: NEGATIVE
CHLORIDE SERPL-SCNC: 116 MMOL/L (ref 96–112)
CHLORIDE SERPL-SCNC: 118 MMOL/L (ref 96–112)
CHLORIDE SERPL-SCNC: 118 MMOL/L (ref 96–112)
CO2 SERPL-SCNC: 10 MMOL/L (ref 20–33)
CO2 SERPL-SCNC: 11 MMOL/L (ref 20–33)
CO2 SERPL-SCNC: 17 MMOL/L (ref 20–33)
CORTIS SERPL-MCNC: 10.3 UG/DL (ref 0–23)
CREAT SERPL-MCNC: 1.41 MG/DL (ref 0.5–1.4)
CREAT SERPL-MCNC: 1.58 MG/DL (ref 0.5–1.4)
CREAT SERPL-MCNC: 1.98 MG/DL (ref 0.5–1.4)
EKG IMPRESSION: NORMAL
EOSINOPHIL # BLD AUTO: 0.2 K/UL (ref 0–0.51)
EOSINOPHIL NFR BLD: 1.9 % (ref 0–6.9)
ERYTHROCYTE [DISTWIDTH] IN BLOOD BY AUTOMATED COUNT: 55.1 FL (ref 35.9–50)
EST. AVERAGE GLUCOSE BLD GHB EST-MCNC: 235 MG/DL
GLOBULIN SER CALC-MCNC: 3.5 G/DL (ref 1.9–3.5)
GLUCOSE BLD-MCNC: 118 MG/DL (ref 65–99)
GLUCOSE BLD-MCNC: 131 MG/DL (ref 65–99)
GLUCOSE BLD-MCNC: 161 MG/DL (ref 65–99)
GLUCOSE BLD-MCNC: 165 MG/DL (ref 65–99)
GLUCOSE SERPL-MCNC: 135 MG/DL (ref 65–99)
GLUCOSE SERPL-MCNC: 154 MG/DL (ref 65–99)
GLUCOSE SERPL-MCNC: 176 MG/DL (ref 65–99)
HBA1C MFR BLD: 9.8 % (ref 4–5.6)
HCT VFR BLD AUTO: 32.9 % (ref 37–47)
HGB BLD-MCNC: 9.8 G/DL (ref 12–16)
IMM GRANULOCYTES # BLD AUTO: 0.09 K/UL (ref 0–0.11)
IMM GRANULOCYTES NFR BLD AUTO: 0.9 % (ref 0–0.9)
LACTATE BLD-SCNC: 1.7 MMOL/L (ref 0.5–2)
LV EJECT FRACT  99904: 65
LV EJECT FRACT MOD 2C 99903: 38.34
LV EJECT FRACT MOD 4C 99902: 63.37
LV EJECT FRACT MOD BP 99901: 54.76
LYMPHOCYTES # BLD AUTO: 1.2 K/UL (ref 1–4.8)
LYMPHOCYTES NFR BLD: 11.6 % (ref 22–41)
MAGNESIUM SERPL-MCNC: 2.1 MG/DL (ref 1.5–2.5)
MCH RBC QN AUTO: 28.3 PG (ref 27–33)
MCHC RBC AUTO-ENTMCNC: 29.8 G/DL (ref 33.6–35)
MCV RBC AUTO: 95.1 FL (ref 81.4–97.8)
METHADONE UR QL SCN: NEGATIVE
MONOCYTES # BLD AUTO: 0.62 K/UL (ref 0–0.85)
MONOCYTES NFR BLD AUTO: 6 % (ref 0–13.4)
NEUTROPHILS # BLD AUTO: 8.23 K/UL (ref 2–7.15)
NEUTROPHILS NFR BLD: 79.3 % (ref 44–72)
NRBC # BLD AUTO: 0 K/UL
NRBC BLD-RTO: 0 /100 WBC
OPIATES UR QL SCN: NEGATIVE
OXYCODONE UR QL SCN: NEGATIVE
PCP UR QL SCN: NEGATIVE
PLATELET # BLD AUTO: 316 K/UL (ref 164–446)
PMV BLD AUTO: 10.3 FL (ref 9–12.9)
POTASSIUM SERPL-SCNC: 5.4 MMOL/L (ref 3.6–5.5)
POTASSIUM SERPL-SCNC: 5.8 MMOL/L (ref 3.6–5.5)
POTASSIUM SERPL-SCNC: 5.9 MMOL/L (ref 3.6–5.5)
PROPOXYPH UR QL SCN: NEGATIVE
PROT SERPL-MCNC: 5.9 G/DL (ref 6–8.2)
RBC # BLD AUTO: 3.46 M/UL (ref 4.2–5.4)
SCCMEC + MECA PNL NOSE NAA+PROBE: NEGATIVE
SODIUM SERPL-SCNC: 142 MMOL/L (ref 135–145)
SODIUM SERPL-SCNC: 142 MMOL/L (ref 135–145)
SODIUM SERPL-SCNC: 143 MMOL/L (ref 135–145)
TROPONIN T SERPL-MCNC: 25 NG/L (ref 6–19)
WBC # BLD AUTO: 10.4 K/UL (ref 4.8–10.8)

## 2022-01-05 PROCEDURE — 700111 HCHG RX REV CODE 636 W/ 250 OVERRIDE (IP): Performed by: HOSPITALIST

## 2022-01-05 PROCEDURE — 700111 HCHG RX REV CODE 636 W/ 250 OVERRIDE (IP): Performed by: STUDENT IN AN ORGANIZED HEALTH CARE EDUCATION/TRAINING PROGRAM

## 2022-01-05 PROCEDURE — 87205 SMEAR GRAM STAIN: CPT

## 2022-01-05 PROCEDURE — 700101 HCHG RX REV CODE 250: Performed by: STUDENT IN AN ORGANIZED HEALTH CARE EDUCATION/TRAINING PROGRAM

## 2022-01-05 PROCEDURE — 82533 TOTAL CORTISOL: CPT

## 2022-01-05 PROCEDURE — 700102 HCHG RX REV CODE 250 W/ 637 OVERRIDE(OP): Performed by: STUDENT IN AN ORGANIZED HEALTH CARE EDUCATION/TRAINING PROGRAM

## 2022-01-05 PROCEDURE — 700111 HCHG RX REV CODE 636 W/ 250 OVERRIDE (IP): Performed by: INTERNAL MEDICINE

## 2022-01-05 PROCEDURE — 99291 CRITICAL CARE FIRST HOUR: CPT | Performed by: INTERNAL MEDICINE

## 2022-01-05 PROCEDURE — 93010 ELECTROCARDIOGRAM REPORT: CPT | Performed by: INTERNAL MEDICINE

## 2022-01-05 PROCEDURE — A9270 NON-COVERED ITEM OR SERVICE: HCPCS | Performed by: INTERNAL MEDICINE

## 2022-01-05 PROCEDURE — 81001 URINALYSIS AUTO W/SCOPE: CPT

## 2022-01-05 PROCEDURE — 76775 US EXAM ABDO BACK WALL LIM: CPT

## 2022-01-05 PROCEDURE — 700117 HCHG RX CONTRAST REV CODE 255: Performed by: STUDENT IN AN ORGANIZED HEALTH CARE EDUCATION/TRAINING PROGRAM

## 2022-01-05 PROCEDURE — 700102 HCHG RX REV CODE 250 W/ 637 OVERRIDE(OP): Performed by: INTERNAL MEDICINE

## 2022-01-05 PROCEDURE — 82962 GLUCOSE BLOOD TEST: CPT | Mod: 91

## 2022-01-05 PROCEDURE — 700105 HCHG RX REV CODE 258: Performed by: INTERNAL MEDICINE

## 2022-01-05 PROCEDURE — 93306 TTE W/DOPPLER COMPLETE: CPT | Mod: 26 | Performed by: INTERNAL MEDICINE

## 2022-01-05 PROCEDURE — 80053 COMPREHEN METABOLIC PANEL: CPT

## 2022-01-05 PROCEDURE — 87186 SC STD MICRODIL/AGAR DIL: CPT

## 2022-01-05 PROCEDURE — 87641 MR-STAPH DNA AMP PROBE: CPT

## 2022-01-05 PROCEDURE — 84484 ASSAY OF TROPONIN QUANT: CPT

## 2022-01-05 PROCEDURE — 99233 SBSQ HOSP IP/OBS HIGH 50: CPT | Mod: GC | Performed by: HOSPITALIST

## 2022-01-05 PROCEDURE — 96367 TX/PROPH/DG ADDL SEQ IV INF: CPT

## 2022-01-05 PROCEDURE — 700105 HCHG RX REV CODE 258: Performed by: HOSPITALIST

## 2022-01-05 PROCEDURE — 93005 ELECTROCARDIOGRAM TRACING: CPT | Performed by: STUDENT IN AN ORGANIZED HEALTH CARE EDUCATION/TRAINING PROGRAM

## 2022-01-05 PROCEDURE — 83735 ASSAY OF MAGNESIUM: CPT

## 2022-01-05 PROCEDURE — 83605 ASSAY OF LACTIC ACID: CPT

## 2022-01-05 PROCEDURE — 85025 COMPLETE CBC W/AUTO DIFF WBC: CPT

## 2022-01-05 PROCEDURE — 83036 HEMOGLOBIN GLYCOSYLATED A1C: CPT

## 2022-01-05 PROCEDURE — 80048 BASIC METABOLIC PNL TOTAL CA: CPT

## 2022-01-05 PROCEDURE — 96375 TX/PRO/DX INJ NEW DRUG ADDON: CPT

## 2022-01-05 PROCEDURE — 93306 TTE W/DOPPLER COMPLETE: CPT

## 2022-01-05 PROCEDURE — 87077 CULTURE AEROBIC IDENTIFY: CPT

## 2022-01-05 PROCEDURE — A9270 NON-COVERED ITEM OR SERVICE: HCPCS | Performed by: STUDENT IN AN ORGANIZED HEALTH CARE EDUCATION/TRAINING PROGRAM

## 2022-01-05 PROCEDURE — 700105 HCHG RX REV CODE 258: Performed by: STUDENT IN AN ORGANIZED HEALTH CARE EDUCATION/TRAINING PROGRAM

## 2022-01-05 PROCEDURE — 80307 DRUG TEST PRSMV CHEM ANLYZR: CPT

## 2022-01-05 PROCEDURE — 770022 HCHG ROOM/CARE - ICU (200)

## 2022-01-05 PROCEDURE — 87070 CULTURE OTHR SPECIMN AEROBIC: CPT

## 2022-01-05 RX ORDER — LINEZOLID 2 MG/ML
600 INJECTION, SOLUTION INTRAVENOUS EVERY 12 HOURS
Status: DISCONTINUED | OUTPATIENT
Start: 2022-01-05 | End: 2022-01-05

## 2022-01-05 RX ORDER — SODIUM CHLORIDE, SODIUM LACTATE, POTASSIUM CHLORIDE, CALCIUM CHLORIDE 600; 310; 30; 20 MG/100ML; MG/100ML; MG/100ML; MG/100ML
2000 INJECTION, SOLUTION INTRAVENOUS ONCE
Status: COMPLETED | OUTPATIENT
Start: 2022-01-05 | End: 2022-01-05

## 2022-01-05 RX ORDER — SODIUM CHLORIDE, SODIUM LACTATE, POTASSIUM CHLORIDE, AND CALCIUM CHLORIDE .6; .31; .03; .02 G/100ML; G/100ML; G/100ML; G/100ML
500 INJECTION, SOLUTION INTRAVENOUS ONCE
Status: COMPLETED | OUTPATIENT
Start: 2022-01-05 | End: 2022-01-05

## 2022-01-05 RX ORDER — SODIUM BICARBONATE IN D5W 150/1000ML
PLASTIC BAG, INJECTION (ML) INTRAVENOUS CONTINUOUS
Status: DISCONTINUED | OUTPATIENT
Start: 2022-01-05 | End: 2022-01-07

## 2022-01-05 RX ORDER — CALCIUM GLUCONATE 20 MG/ML
2 INJECTION, SOLUTION INTRAVENOUS ONCE
Status: COMPLETED | OUTPATIENT
Start: 2022-01-05 | End: 2022-01-05

## 2022-01-05 RX ORDER — NYSTATIN 100000 [USP'U]/G
POWDER TOPICAL 2 TIMES DAILY
Status: COMPLETED | OUTPATIENT
Start: 2022-01-05 | End: 2022-01-11

## 2022-01-05 RX ORDER — HYDROMORPHONE HYDROCHLORIDE 1 MG/ML
.25-.5 INJECTION, SOLUTION INTRAMUSCULAR; INTRAVENOUS; SUBCUTANEOUS EVERY 4 HOURS PRN
Status: DISCONTINUED | OUTPATIENT
Start: 2022-01-05 | End: 2022-01-07

## 2022-01-05 RX ORDER — DEXTROSE MONOHYDRATE 25 G/50ML
50 INJECTION, SOLUTION INTRAVENOUS ONCE
Status: COMPLETED | OUTPATIENT
Start: 2022-01-05 | End: 2022-01-05

## 2022-01-05 RX ORDER — MIDODRINE HYDROCHLORIDE 5 MG/1
10 TABLET ORAL 3 TIMES DAILY
Status: DISCONTINUED | OUTPATIENT
Start: 2022-01-05 | End: 2022-01-06

## 2022-01-05 RX ORDER — NOREPINEPHRINE BITARTRATE 0.03 MG/ML
0-30 INJECTION, SOLUTION INTRAVENOUS CONTINUOUS
Status: DISCONTINUED | OUTPATIENT
Start: 2022-01-05 | End: 2022-01-07

## 2022-01-05 RX ORDER — LINEZOLID 2 MG/ML
600 INJECTION, SOLUTION INTRAVENOUS EVERY 12 HOURS
Status: DISCONTINUED | OUTPATIENT
Start: 2022-01-05 | End: 2022-01-06

## 2022-01-05 RX ORDER — SODIUM CHLORIDE, SODIUM LACTATE, POTASSIUM CHLORIDE, AND CALCIUM CHLORIDE .6; .31; .03; .02 G/100ML; G/100ML; G/100ML; G/100ML
1000 INJECTION, SOLUTION INTRAVENOUS ONCE
Status: COMPLETED | OUTPATIENT
Start: 2022-01-05 | End: 2022-01-05

## 2022-01-05 RX ADMIN — MIDODRINE HYDROCHLORIDE 10 MG: 5 TABLET ORAL at 20:24

## 2022-01-05 RX ADMIN — SODIUM CHLORIDE, POTASSIUM CHLORIDE, SODIUM LACTATE AND CALCIUM CHLORIDE 500 ML: 600; 310; 30; 20 INJECTION, SOLUTION INTRAVENOUS at 18:27

## 2022-01-05 RX ADMIN — SODIUM CHLORIDE, POTASSIUM CHLORIDE, SODIUM LACTATE AND CALCIUM CHLORIDE 500 ML: 600; 310; 30; 20 INJECTION, SOLUTION INTRAVENOUS at 07:54

## 2022-01-05 RX ADMIN — NYSTATIN: 100000 POWDER TOPICAL at 18:29

## 2022-01-05 RX ADMIN — INSULIN HUMAN 3 UNITS: 100 INJECTION, SOLUTION PARENTERAL at 18:31

## 2022-01-05 RX ADMIN — CIPROFLOXACIN HYDROCHLORIDE 1 DROP: 3 SOLUTION/ DROPS OPHTHALMIC at 18:28

## 2022-01-05 RX ADMIN — CIPROFLOXACIN HYDROCHLORIDE 1 DROP: 3 SOLUTION/ DROPS OPHTHALMIC at 13:44

## 2022-01-05 RX ADMIN — LINEZOLID 600 MG: 600 INJECTION, SOLUTION INTRAVENOUS at 08:53

## 2022-01-05 RX ADMIN — HYDROMORPHONE HYDROCHLORIDE 0.5 MG: 1 INJECTION, SOLUTION INTRAMUSCULAR; INTRAVENOUS; SUBCUTANEOUS at 09:08

## 2022-01-05 RX ADMIN — DABIGATRAN ETEXILATE MESYLATE 150 MG: 150 CAPSULE ORAL at 18:29

## 2022-01-05 RX ADMIN — INSULIN GLARGINE 15 UNITS: 100 INJECTION, SOLUTION SUBCUTANEOUS at 18:30

## 2022-01-05 RX ADMIN — SODIUM CHLORIDE, POTASSIUM CHLORIDE, SODIUM LACTATE AND CALCIUM CHLORIDE 500 ML: 600; 310; 30; 20 INJECTION, SOLUTION INTRAVENOUS at 06:45

## 2022-01-05 RX ADMIN — PREDNISOLONE ACETATE 1 DROP: 10 SUSPENSION/ DROPS OPHTHALMIC at 03:12

## 2022-01-05 RX ADMIN — DEXTROSE MONOHYDRATE 50 ML: 25 INJECTION, SOLUTION INTRAVENOUS at 08:00

## 2022-01-05 RX ADMIN — SODIUM BICARBONATE: 84 INJECTION, SOLUTION INTRAVENOUS at 16:13

## 2022-01-05 RX ADMIN — HUMAN ALBUMIN MICROSPHERES AND PERFLUTREN 3 ML: 10; .22 INJECTION, SOLUTION INTRAVENOUS at 10:47

## 2022-01-05 RX ADMIN — LEVOTHYROXINE SODIUM 100 MCG: 0.1 TABLET ORAL at 05:45

## 2022-01-05 RX ADMIN — PREDNISOLONE ACETATE 1 DROP: 10 SUSPENSION/ DROPS OPHTHALMIC at 08:07

## 2022-01-05 RX ADMIN — PREDNISOLONE ACETATE 1 DROP: 10 SUSPENSION/ DROPS OPHTHALMIC at 14:13

## 2022-01-05 RX ADMIN — DABIGATRAN ETEXILATE MESYLATE 150 MG: 150 CAPSULE ORAL at 05:45

## 2022-01-05 RX ADMIN — CIPROFLOXACIN HYDROCHLORIDE 1 DROP: 3 SOLUTION/ DROPS OPHTHALMIC at 20:25

## 2022-01-05 RX ADMIN — SODIUM BICARBONATE 100 MEQ: 84 INJECTION, SOLUTION INTRAVENOUS at 15:39

## 2022-01-05 RX ADMIN — LINEZOLID 600 MG: 600 INJECTION, SOLUTION INTRAVENOUS at 18:29

## 2022-01-05 RX ADMIN — PREDNISOLONE ACETATE 1 DROP: 10 SUSPENSION/ DROPS OPHTHALMIC at 20:25

## 2022-01-05 RX ADMIN — CIPROFLOXACIN HYDROCHLORIDE 1 DROP: 3 SOLUTION/ DROPS OPHTHALMIC at 11:16

## 2022-01-05 RX ADMIN — INSULIN HUMAN 6.5 UNITS: 100 INJECTION, SOLUTION PARENTERAL at 08:01

## 2022-01-05 RX ADMIN — ATORVASTATIN CALCIUM 40 MG: 40 TABLET, FILM COATED ORAL at 18:29

## 2022-01-05 RX ADMIN — ASPIRIN 81 MG: 81 TABLET, CHEWABLE ORAL at 05:45

## 2022-01-05 RX ADMIN — SODIUM CHLORIDE, POTASSIUM CHLORIDE, SODIUM LACTATE AND CALCIUM CHLORIDE 1000 ML: 600; 310; 30; 20 INJECTION, SOLUTION INTRAVENOUS at 12:05

## 2022-01-05 RX ADMIN — NYSTATIN: 100000 POWDER TOPICAL at 08:53

## 2022-01-05 RX ADMIN — SODIUM CHLORIDE, POTASSIUM CHLORIDE, SODIUM LACTATE AND CALCIUM CHLORIDE 2000 ML: 600; 310; 30; 20 INJECTION, SOLUTION INTRAVENOUS at 10:19

## 2022-01-05 RX ADMIN — PIPERACILLIN AND TAZOBACTAM 4.5 G: 4; .5 INJECTION, POWDER, LYOPHILIZED, FOR SOLUTION INTRAVENOUS; PARENTERAL at 14:13

## 2022-01-05 RX ADMIN — PIPERACILLIN AND TAZOBACTAM 4.5 G: 4; .5 INJECTION, POWDER, LYOPHILIZED, FOR SOLUTION INTRAVENOUS; PARENTERAL at 11:15

## 2022-01-05 RX ADMIN — CALCIUM GLUCONATE 2 G: 20 INJECTION, SOLUTION INTRAVENOUS at 08:04

## 2022-01-05 RX ADMIN — PIPERACILLIN AND TAZOBACTAM 4.5 G: 4; .5 INJECTION, POWDER, LYOPHILIZED, FOR SOLUTION INTRAVENOUS; PARENTERAL at 20:25

## 2022-01-05 ASSESSMENT — PAIN DESCRIPTION - PAIN TYPE
TYPE: ACUTE PAIN

## 2022-01-05 ASSESSMENT — LIFESTYLE VARIABLES
EVER HAD A DRINK FIRST THING IN THE MORNING TO STEADY YOUR NERVES TO GET RID OF A HANGOVER: NO
DOES PATIENT WANT TO STOP DRINKING: NO
HAVE YOU EVER FELT YOU SHOULD CUT DOWN ON YOUR DRINKING: NO
EVER FELT BAD OR GUILTY ABOUT YOUR DRINKING: NO
CONSUMPTION TOTAL: NEGATIVE
ALCOHOL_USE: NO
TOTAL SCORE: 0
HOW MANY TIMES IN THE PAST YEAR HAVE YOU HAD 5 OR MORE DRINKS IN A DAY: 0
TOTAL SCORE: 0
ON A TYPICAL DAY WHEN YOU DRINK ALCOHOL HOW MANY DRINKS DO YOU HAVE: 1
HAVE PEOPLE ANNOYED YOU BY CRITICIZING YOUR DRINKING: NO
AVERAGE NUMBER OF DAYS PER WEEK YOU HAVE A DRINK CONTAINING ALCOHOL: 1
TOTAL SCORE: 0

## 2022-01-05 ASSESSMENT — PATIENT HEALTH QUESTIONNAIRE - PHQ9
SUM OF ALL RESPONSES TO PHQ9 QUESTIONS 1 AND 2: 0
1. LITTLE INTEREST OR PLEASURE IN DOING THINGS: NOT AT ALL
2. FEELING DOWN, DEPRESSED, IRRITABLE, OR HOPELESS: NOT AT ALL
2. FEELING DOWN, DEPRESSED, IRRITABLE, OR HOPELESS: NOT AT ALL
1. LITTLE INTEREST OR PLEASURE IN DOING THINGS: NOT AT ALL
SUM OF ALL RESPONSES TO PHQ9 QUESTIONS 1 AND 2: 0

## 2022-01-05 ASSESSMENT — COGNITIVE AND FUNCTIONAL STATUS - GENERAL
TOILETING: A LITTLE
EATING MEALS: A LITTLE
TURNING FROM BACK TO SIDE WHILE IN FLAT BAD: A LITTLE
MOVING TO AND FROM BED TO CHAIR: A LITTLE
MOBILITY SCORE: 18
SUGGESTED CMS G CODE MODIFIER DAILY ACTIVITY: CJ
HELP NEEDED FOR BATHING: A LITTLE
DRESSING REGULAR LOWER BODY CLOTHING: A LITTLE
CLIMB 3 TO 5 STEPS WITH RAILING: A LITTLE
MOVING FROM LYING ON BACK TO SITTING ON SIDE OF FLAT BED: A LITTLE
STANDING UP FROM CHAIR USING ARMS: A LITTLE
DAILY ACTIVITIY SCORE: 20
SUGGESTED CMS G CODE MODIFIER MOBILITY: CK
WALKING IN HOSPITAL ROOM: A LITTLE

## 2022-01-05 ASSESSMENT — ENCOUNTER SYMPTOMS
DIZZINESS: 1
SHORTNESS OF BREATH: 0

## 2022-01-05 ASSESSMENT — FIBROSIS 4 INDEX: FIB4 SCORE: 0.52

## 2022-01-05 NOTE — ASSESSMENT & PLAN NOTE
Rate control  Echo with normal LV systolic function and reduced RV systolic function  Optimize potassium and magnesium

## 2022-01-05 NOTE — ASSESSMENT & PLAN NOTE
-Patient states that wounds on her legs started as blisters approximately 3 weeks prior to admission  -Severe cellulitis, circumferential, involving both legs, resulting in septic shock  -Wound cultures positive for Klebsiell oxytoca/Raoultell ornithiolytica   -Continue IV ceftriaxone for total of 10 days of antibiotics  -Wound care - following  -SNF referral placed- but taking forever to auth, now plan is HH    LEFT Leg      01/09/22 1500       RIGHT Leg

## 2022-01-05 NOTE — ASSESSMENT & PLAN NOTE
-CT imaging at admission negative for significant injury  -Fall likely related to infection/orthostasis  -PT/OT

## 2022-01-05 NOTE — ED NOTES
Patient assisted to bed linen change and boosted up the bed for comfort. Call light within reach.

## 2022-01-05 NOTE — ASSESSMENT & PLAN NOTE
-Uncontrolled with hyperglycemia, hemoglobin A1c is 9.8  -Glargine 15 units daily and sliding scale  -Once she is off steroids, she may not require supplemental insulin and may be able to transition to orals

## 2022-01-05 NOTE — PROGRESS NOTES
Daily Progress Note:     Date of Service: 1/5/2022  Primary Team: [facility] Team   Attending: Dr Jefferson  Senior Resident: Dr. Avila  Intern: Dr. Solitario  Contact:  [phone]    Chief Complaint:   Ground-level fall    Subjective:  Was paged by the nurse about patient being lethargic around 6 am and hypotensive with a 3.6-second pause on telemetry monitoring. On my evaluation the patient was lethargic falling asleep in between the interview.  A liter of LR bolus was ordered per sepsis protocol.  Antibiotics were changed to linezolid given the underlying JULIETA.  Stat EKG was ordered along with troponins.  Insulin along with dextrose and calcium gluconate were given for underlying hyperkalemia.  The patient was able to communicate that she has had this bilateral lower extremity wounds that started about a month ago.  MIGEL with moderate left peripheral artery disease, but it was a difficult study.  However despite the liter of bolus the patient remained soft on the blood pressure 77/49.  Hence critical care was consulted to evaluate for pressor support given the underlying septic/cardiogenic shock.  The patient has been transferred to SICU.      Consultants/Specialty:  Critical care  Review of Systems: Limited by patient's lethargy  Review of Systems   Respiratory: Negative for shortness of breath.    Cardiovascular: Negative for chest pain.   Neurological: Positive for dizziness.       Objective Data:   Physical Exam:   Vitals:   Temp:  [36.2 °C (97.1 °F)-37 °C (98.6 °F)] 36.2 °C (97.1 °F)  Pulse:  [114-134] 130  Resp:  [15-23] 18  BP: ()/(37-76) 91/47  SpO2:  [93 %-100 %] 93 %     Physical Exam  Constitutional:       General: She is not in acute distress.     Appearance: She is obese. She is ill-appearing. She is not diaphoretic.   Eyes:      Comments: Drowsy   Cardiovascular:      Rate and Rhythm: Rhythm irregular.   Pulmonary:      Effort: Pulmonary effort is normal. No respiratory distress.      Breath  sounds: No wheezing.   Abdominal:      General: There is no distension.      Palpations: Abdomen is soft.      Tenderness: There is no abdominal tenderness.   Musculoskeletal:      Comments: Bilateral lower extremity scattered ulcerations   Skin:     Comments: Bilateral inframammary erythema   Neurological:      Mental Status: She is oriented to person, place, and time.      Comments: The patient was able to answer my questions to establish alert and orientation x4 however was extremely lethargic falling asleep in between my questions.           Labs:   Lab Results   Component Value Date/Time    SODIUM 142 01/05/2022 11:25 AM    POTASSIUM 5.9 (H) 01/05/2022 11:25 AM    CHLORIDE 118 (H) 01/05/2022 11:25 AM    CO2 10 (L) 01/05/2022 11:25 AM    GLUCOSE 154 (H) 01/05/2022 11:25 AM    BUN 88 (HH) 01/05/2022 11:25 AM    CREATININE 1.58 (H) 01/05/2022 11:25 AM      Lab Results   Component Value Date/Time    WBC 10.4 01/05/2022 06:08 AM    RBC 3.46 (L) 01/05/2022 06:08 AM    HEMOGLOBIN 9.8 (L) 01/05/2022 06:08 AM    HEMATOCRIT 32.9 (L) 01/05/2022 06:08 AM    MCV 95.1 01/05/2022 06:08 AM    MCH 28.3 01/05/2022 06:08 AM    MCHC 29.8 (L) 01/05/2022 06:08 AM    MPV 10.3 01/05/2022 06:08 AM    NEUTSPOLYS 79.30 (H) 01/05/2022 06:08 AM    LYMPHOCYTES 11.60 (L) 01/05/2022 06:08 AM    MONOCYTES 6.00 01/05/2022 06:08 AM    EOSINOPHILS 1.90 01/05/2022 06:08 AM    BASOPHILS 0.30 01/05/2022 06:08 AM      Imaging:   EC-ECHOCARDIOGRAM COMPLETE W/ CONT   Final Result      US-RENAL   Final Result      1.  No hydronephrosis   2.  Atrophic appearing kidneys bilaterally with cortical thinning   3.  Incidentally noted cholelithiasis      US-MIGEL SINGLE LEVEL BILAT   Final Result      CT-HEAD W/O   Final Result      1.  No acute intracranial abnormality is identified.   2.  There are periventricular and subcortical white matter changes present.  This finding is nonspecific and could be from previous small vessel ischemia, demyelination, or  gliosis.         CT-CSPINE WITHOUT PLUS RECONS   Final Result      Negative for cervical spine fracture or subluxation        Problem Representation:     * Severe sepsis with septic shock (HCC)  Assessment & Plan  This is Sepsis Present on admission  SIRS criteria identified on my evaluation include: Tachycardia, with heart rate greater than 90 BPM and Leukocytosis, with WBC greater than 12,000  Source is lower extremity wounds  Sepsis protocol initiated  Fluid resuscitation ordered per protocol  IV antibiotics as appropriate for source of sepsis  While organ dysfunction may be noted elsewhere in this problem list or in the chart, degree of organ dysfunction does not meet CMS criteria for severe sepsis  Critical care consult for septic shock.        Lower extremity cellulitis  Assessment & Plan  Initially only Unasyn which was later switched to linezolid   Wound care    Fall from ground level- (present on admission)  Assessment & Plan  Uses cane to walk at home  Likely orthostatic in nature  CT head and CT C-spine negative for acute fracture.     Atrial flutter (HCC)- (present on admission)  Assessment & Plan  Continue with Pradaxa 150 mg twice daily  Continue metoprolol 25 mg twice daily    Primary hypertension- (present on admission)  Assessment & Plan  Hold lisinopril given JULIETA  Blood pressure normotensive at this time may resume blood pressure medications if blood pressure persistently elevated    JULIETA (acute kidney injury) (HCC)- (present on admission)  Assessment & Plan  Likely prerenal due to dehydration  Continue with IV fluids  Avoid nephrotoxic agents  Renally adjust all medications  Monitor creatinine    Type 2 diabetes mellitus with hyperglycemia, with long-term current use of insulin (HCC)  Assessment & Plan  Continue with atorvastatin 40 mg nightly and aspirin 81 mg daily  Lantus/insulin sliding scale with Frequent Accu-Cheks and hypoglycemia protocol  Last A1c 16.5 6 months ago.  Repeat hemoglobin A1c  9.8.      Class 3 severe obesity in adult (HCC)- (present on admission)  Assessment & Plan  Counseled on weight loss and dietary restriction    Dehydration- (present on admission)  Assessment & Plan  Continue with IV fluids

## 2022-01-05 NOTE — DIETARY
NUTRITION SERVICES: BMI - Pt with BMI >40 (=Body mass index is 48.06 kg/m².), Class III obesity. Weight loss counseling not appropriate in acute care setting.   RECOMMEND - If appropriate at DC please refer to outpatient services for weight management.

## 2022-01-05 NOTE — ASSESSMENT & PLAN NOTE
Due to intravascular volume depletion  Renal ultrasound with atrophic kidneys and cortical thinning consistent with CKD and no evidence of hydronephrosis  Stop bicarbonate drip  Monitor renal function and urine output  Avoid nephrotoxins and renal dose medications  Renal function improving

## 2022-01-05 NOTE — ED PROVIDER NOTES
ED Provider Note    CHIEF COMPLAINT  Chief Complaint   Patient presents with   • T-5000 GLF     hit head -trauma +thinners       HPI  Beverley Dejesus is a 71 y.o. female who presents after ground-level fall.  Patient has a longstanding history of diabetes and hypertension as well as atrial flutter.  She is on Pradaxa.  Patient reports that she has longstanding chronic wounds that have been worsening on her bilateral lower extremities and was going going to be seen by her physician today.  Her physician does housecalls and she was getting ready to see them and stood up from her chair. With this she felt very dizzy, took a few steps and then slipped falling striking the back of her head and her neck.  She reports associated mild headache and neck pain since the injury.  Patient reports that she called #1.  Upon their arrival patient had an elevated glucose in the 400s.  She was very dizzy for them as well, whenever she attempted to sit up.  Patient reports that her dizziness entirely resolves whenever she is lying flat.  Dizziness is described as feeling like she is going to pass out.  Patient denies any fevers or chills but does report diffuse weakness.  She denies any focal weakness or numbness.  Patient reports lower extremity rash for approximately the last 3 weeks.  She reports that it is very pruritic.  She reports it is mildly painful.     REVIEW OF SYSTEMS  ROS    See HPI for further details. All other systems are negative.     PAST MEDICAL HISTORY   has a past medical history of Atrial flutter (HCC), Diabetes (HCC), Hyperlipidemia, Hypertension, and Neuropathy.    SOCIAL HISTORY  Social History     Tobacco Use   • Smoking status: Never Smoker   • Smokeless tobacco: Never Used   Substance and Sexual Activity   • Alcohol use: Not Currently   • Drug use: Never   • Sexual activity: Not on file       SURGICAL HISTORY  patient denies any surgical history    CURRENT MEDICATIONS  Home Medications     Reviewed by  Ivy Harris R.N. (Registered Nurse) on 01/04/22 at 1722  Med List Status: <None>   Medication Last Dose Status   Alcohol Swabs  Active   aspirin (ASA) 81 MG Chew Tab chewable tablet  Active   atorvastatin (LIPITOR) 40 MG Tab  Active   Blood Glucose Meter Kit  Active   Blood Glucose Test Strips  Active   dabigatran (PRADAXA) 150 MG Cap capsule  Active   Empagliflozin 10 MG Tab  Active   Insulin Pen Needle 32 G x 4 mm  Active   Lancets  Active   levothyroxine (SYNTHROID) 100 MCG Tab  Active   lisinopril (PRINIVIL) 5 MG Tab  Active   metoprolol tartrate (LOPRESSOR) 25 MG Tab  Active   miconazole 2%-zinc oxide (SYLVAIN) 2 % Cream topical cream  Active   nystatin (MYCOSTATIN) powder  Active   therapeutic multivitamin-minerals (THERAGRAN-M) Tab  Active   vitamin D (CHOLECALCIFEROL) 1000 Unit (25 mcg) Tab  Active                ALLERGIES  No Known Allergies    PHYSICAL EXAM  There were no vitals filed for this visit.    Physical Exam  Constitutional:       Appearance: She is well-developed.   HENT:      Head: Normocephalic and atraumatic.      Right Ear: Tympanic membrane normal.      Left Ear: Tympanic membrane normal.   Eyes:      Conjunctiva/sclera: Conjunctivae normal.   Cardiovascular:      Rate and Rhythm: Normal rate and regular rhythm.   Pulmonary:      Effort: Pulmonary effort is normal.      Breath sounds: Normal breath sounds.   Abdominal:      General: Bowel sounds are normal. There is no distension.      Palpations: Abdomen is soft.      Tenderness: There is no abdominal tenderness. There is no rebound.   Musculoskeletal:      Cervical back: Normal range of motion and neck supple.      Comments: Cervical spine tenderness at C4-C5 without any associated bony abnormality or crepitus    Bilateral lower extremities with rash that is approximately two thirds down the mid calf, most pronounced on anterior aspect, with scattered low-grade ulcerations and scabs as well as some minimal overlying erythema.   Distal pulses are very difficult to palpate however tissues are warm well perfused and cap refill is instantaneous.   Skin:     General: Skin is warm and dry.      Findings: No rash.   Neurological:      Mental Status: She is alert and oriented to person, place, and time.   Psychiatric:         Behavior: Behavior normal.           DIAGNOSTIC STUDIES / PROCEDURES    EKG  See below    LABS  Results for orders placed or performed during the hospital encounter of 01/04/22   CBC WITH DIFFERENTIAL   Result Value Ref Range    WBC 11.5 (H) 4.8 - 10.8 K/uL    RBC 3.65 (L) 4.20 - 5.40 M/uL    Hemoglobin 10.1 (L) 12.0 - 16.0 g/dL    Hematocrit 34.2 (L) 37.0 - 47.0 %    MCV 93.7 81.4 - 97.8 fL    MCH 27.7 27.0 - 33.0 pg    MCHC 29.5 (L) 33.6 - 35.0 g/dL    RDW 55.3 (H) 35.9 - 50.0 fL    Platelet Count 307 164 - 446 K/uL    MPV 10.3 9.0 - 12.9 fL    Neutrophils-Polys 81.90 (H) 44.00 - 72.00 %    Lymphocytes 10.00 (L) 22.00 - 41.00 %    Monocytes 6.00 0.00 - 13.40 %    Eosinophils 0.90 0.00 - 6.90 %    Basophils 0.30 0.00 - 1.80 %    Immature Granulocytes 0.90 0.00 - 0.90 %    Nucleated RBC 0.00 /100 WBC    Neutrophils (Absolute) 9.45 (H) 2.00 - 7.15 K/uL    Lymphs (Absolute) 1.15 1.00 - 4.80 K/uL    Monos (Absolute) 0.69 0.00 - 0.85 K/uL    Eos (Absolute) 0.10 0.00 - 0.51 K/uL    Baso (Absolute) 0.04 0.00 - 0.12 K/uL    Immature Granulocytes (abs) 0.10 0.00 - 0.11 K/uL    NRBC (Absolute) 0.00 K/uL   COMP METABOLIC PANEL   Result Value Ref Range    Sodium 133 (L) 135 - 145 mmol/L    Potassium 5.7 (H) 3.6 - 5.5 mmol/L    Chloride 109 96 - 112 mmol/L    Co2 12 (L) 20 - 33 mmol/L    Anion Gap 12.0 7.0 - 16.0    Glucose 354 (H) 65 - 99 mg/dL    Bun 110 (HH) 8 - 22 mg/dL    Creatinine 2.55 (H) 0.50 - 1.40 mg/dL    Calcium 8.7 8.5 - 10.5 mg/dL    AST(SGOT) 6 (L) 12 - 45 U/L    ALT(SGPT) 7 2 - 50 U/L    Alkaline Phosphatase 109 (H) 30 - 99 U/L    Total Bilirubin 0.5 0.1 - 1.5 mg/dL    Albumin 2.5 (L) 3.2 - 4.9 g/dL    Total Protein 6.7  6.0 - 8.2 g/dL    Globulin 4.2 (H) 1.9 - 3.5 g/dL    A-G Ratio 0.6 g/dL   LACTIC ACID   Result Value Ref Range    Lactic Acid 1.7 0.5 - 2.0 mmol/L   TROPONIN   Result Value Ref Range    Troponin T 29 (H) 6 - 19 ng/L   ESTIMATED GFR   Result Value Ref Range    GFR If  22 (A) >60 mL/min/1.73 m 2    GFR If Non  18 (A) >60 mL/min/1.73 m 2   PERIPHERAL SMEAR REVIEW   Result Value Ref Range    Peripheral Smear Review see below    PLATELET ESTIMATE   Result Value Ref Range    Plt Estimation Normal    MORPHOLOGY   Result Value Ref Range    RBC Morphology Present     Poikilocytosis 2+     Echinocytes 2+    DIFFERENTIAL COMMENT   Result Value Ref Range    Comments-Diff see below    EKG   Result Value Ref Range    Report       [facility] Emergency Dept.    Test Date:  2022  Pt Name:    GAETANO RONQUILLO                  Department: ER  MRN:        [id number]                      Room:       [facility]  Gender:     Female                       Technician: [id number]  :        [id number]                   Requested By:SHI MONET  Order #:    [id number]                    Reading MD: Garth Babcock MD    Measurements  Intervals                                Axis  Rate:       126                          P:          88  CO:         129                          QRS:        -74  QRSD:       100                          T:          63  QT:         335  QTc:        486    Interpretive Statements  EKG is sinus tachycardia, normal axis normal intervals no ST changes  consistent  with acute regional ischemia  Electronically Signed On 2022 19:21:33 PST by Garth Babcock MD     POCT glucose device results   Result Value Ref Range    Glucose - Accu-Ck 211 (H) 65 - 99 mg/dL         RADIOLOGY  US-MIGEL SINGLE LEVEL BILAT   Final Result      CT-HEAD W/O   Final Result      1.  No acute intracranial abnormality is identified.   2.  There are periventricular and subcortical white matter changes  present.  This finding is nonspecific and could be from previous small vessel ischemia, demyelination, or gliosis.         CT-CSPINE WITHOUT PLUS RECONS   Final Result      Negative for cervical spine fracture or subluxation              COURSE & MEDICAL DECISION MAKING  Pertinent Labs & Imaging studies reviewed. (See chart for details)    Patient here with symptoms consistent with orthostasis.  Patient struck her head and is on Pradaxa.  She also has some tenderness of her cervical spine.  Well check CT head and CT cervical spine for traumatic evaluation.  Patient lower extremities reveal dermatitis that does appear secondarily infected.  Patient CT head and CT C-spine are unremarkable.  Basic labs were checked and reveal an associated acute kidney injury with elevated BUN.  Patient denies any black or bloody stool.  Laboratory results likely secondary to severe dehydration.  Giving IV fluids.  Patient's potassium is minimally elevated.  EKG fails to reveal any evidence of acute changes.  Patient will be admitted for further care, I have discussed the case with hospitalist who will admit for further care.  Patient has had blood cultures drawn, IV antibiotics have been ordered.        FINAL IMPRESSION  1.  Acute kidney injury, dehydration, orthostasis, head trauma in the setting of anticoagulation, cervical strain    Electronically signed by: Sadiq Liang M.D., 1/4/2022 5:24 PM

## 2022-01-05 NOTE — ASSESSMENT & PLAN NOTE
Severe sepsis present on admission  Shock developed after admission  Associated acute kidney injury  Skin and soft tissue source  Continue empiric source directed intravenous antimicrobial chemotherapy  Shock has resolved  Sepsis is improved

## 2022-01-05 NOTE — PROGRESS NOTES
Pt transferred via flex RN on [facility]. Pt A&Ox4, on RA. Requires x2 assist with FWW to ambulate from gurney to bed. Pt oriented to room and call light. Bed alarm plugged in and on. Tele monitor in place, monitor room notified.

## 2022-01-05 NOTE — PROGRESS NOTES
Patient persistently hypotensive despite fluids, transfer orders placed.   Patient transferred to S126 with rapid RN's, report called to GARLAND Ngo

## 2022-01-05 NOTE — PROGRESS NOTES
4 Eyes Skin Assessment Completed by GARLAND Espitia and GARLAND Callejas.    Head WDL  Ears WDL  Nose WDL  Mouth WDL  Neck WDL  Breast/Chest Redness, Non-Blanching, Excoriation and Shiny R & L breast moisture-associated skin breakdown, open wounds noted beneath R breast.  Shoulder Blades WDL  Spine WDL  (R) Arm/Elbow/Hand WDL  (L) Arm/Elbow/Hand WDL  Abdomen Redness, moisture-associated breakdown noted beneath pannus, open wounds noted  Groin WDL  Scrotum/Coccyx/Buttocks Redness and Blanching, bruising  (R) Leg Redness, Abrasion, Shiny, Weeping and Edema, abrasion behind R knee  (L) Leg Redness, Scar, Scab, Swelling, Shiny and Weeping  (R) Heel/Foot/Toe Redness, blister R greater toe  (L) Heel/Foot/Toe Dryness          Devices In Places Tele Box, Blood Pressure Cuff and Pulse Ox      Interventions In Place Sacral Mepilex, Waffle Overlay, Pillows, Q2 Turns, Barrier Cream, Dri-Hari Pads and Pressure Redistribution Mattress    Possible Skin Injury Yes    Pictures Uploaded Into Epic Yes  Wound Consult Placed Yes  RN Wound Prevention Protocol Ordered Yes     Inner dry placed beneath bilateral breasts and pannus, bilateral lower extremities dressed per protocol, heels floated with pillows, sacral mepilex placed

## 2022-01-05 NOTE — PROGRESS NOTES
834: Pt arrived to ICU as HLOC      Vitals:   · HR: 130  · BP: 79/44  · RR: 16  · SaO2: 98 on 0L O2  · Wt: 128.5kg  · Temp 97.1 Temporl   _____________________________________________________________    2 RN skin check completed with Michaelle ALTAMIRANO    Areas of concern/Skin observations:  · Wound to Sacrum / unpealed mepilex, photo retaken  · Wounds to legs covered in gauze wrap  · Left arm pit redness and moistness / photo retaken  · Pannis extremely excoriated with multiple wounds / open to air / interdry in place, assessed   · Behind knees wounds   · All crevases red and excoriated   · Under breasts red   · Lower legs extremely excoriated / scabbed / red / painful     Devices in use, assessed under and interventions (as appropriate) for skin protection:  · SCDs, BP cuff, SaO2 monitor  · Came with purewick / removed       Interventions in place such as:   · q2 hour turns  · Keeping skin clean and dry  · Following wound orders  · interdry in crevases   · Use of products such as barrier wipes/cream  · Waffle cushion while OOB: TBD  · Bed Type: moved to low air loss  · Mobility: TBD  ______________________________________________________________    Personal belongings:   · Gray slippers  · Pink purse  · Gray pants  · Cell phone   · Wallet   · Shirt   · bra  ____________________________________________________________    4 Eyes Skin Assessment Completed by GARLAND Carney and GARLAND Gaytan.    Head WDL  Ears Redness  Nose Redness  Mouth WDL  Neck Redness  Breast/Chest Redness  Shoulder Blades Redness  Spine Redness  (R) Arm/Elbow/Hand Redness  (L) Arm/Elbow/Hand Redness  Abdomen Redness  Groin Redness  Scrotum/Coccyx/Buttocks Redness  (R) Leg Redness  (L) Leg Redness  (R) Heel/Foot/Toe Redness  (L) Heel/Foot/Toe Redness    (see above for detailed assessment)      Devices In Places ECG      Interventions In Place N/A (see note above)    Possible Skin Injury Yes    Pictures Uploaded Into Epic Yes  Wound Consult Placed Yes  RN Wound  Prevention Protocol Ordered Yes

## 2022-01-05 NOTE — CARE PLAN
The patient is Watcher - Medium risk of patient condition declining or worsening         Progress made toward(s) clinical / shift goals:        Problem: Knowledge Deficit - Standard  Goal: Patient and family/care givers will demonstrate understanding of plan of care, disease process/condition, diagnostic tests and medications  Outcome: Progressing     Problem: Skin Integrity  Goal: Skin integrity is maintained or improved  Outcome: Progressing     Problem: Fall Risk  Goal: Patient will remain free from falls  Outcome: Progressing       Patient is not progressing towards the following goals: n/a

## 2022-01-05 NOTE — CONSULTS
PULMONARY AND CRITICAL CARE MEDICINE CONSULTATION    Date of Consultation:  1/5/2022    Requesting Physician:  Ben Butler MD    Consulting Physician:  Jeovany Mario MD    Reason for Consultation: Severe sepsis with septic shock    Chief Complaint: Severe sepsis with septic shock    History of Present Illness:    I was kindly asked to see and evaluate Beverley Dejesus, a 71 y.o. female for evaluation and management of the above problem.    This lady has a history of DM type II, atrial flutter on Pradaxa, primary hypertension, peripheral neuropathy, dyslipidemia and hypothyroidism.  She was admitted to [facility] on or about 1/4/2021 following a ground-level fall.  She has been weak and lightheaded.  She has had poor oral intake.  She has had some nausea and vomiting.  She denies abdominal pain or diarrhea.  She denies chest pain or chest pressure.  She denies cough, sputum production or hemoptysis.  She is not short of breath.  She has had wounds on both lower extremities for several days and they have been draining.  She has felt weak, but denies myalgias, arthralgias, fever or sweats.    Medications Prior to Admission:    No current facility-administered medications on file prior to encounter.     Current Outpatient Medications on File Prior to Encounter   Medication Sig Dispense Refill   • ketorolac (ACULAR) 0.5 % Solution Administer 1 Drop into both eyes 4 times a day.     • ofloxacin (OCUFLOX) 0.3 % Solution Administer 1 Drop into both eyes 4 times a day.     • prednisoLONE acetate (PRED FORTE) 1 % Suspension Administer 1 Drop into the left eye 4 times a day.     • aspirin (ASA) 81 MG Chew Tab chewable tablet Chew 1 tablet every day. 100 tablet 0   • levothyroxine (SYNTHROID) 100 MCG Tab Take 1 tablet by mouth every morning on an empty stomach. 30 tablet 0   • dabigatran (PRADAXA) 150 MG Cap capsule Take 1 capsule by mouth 2 times a day. 60 capsule 0   • metoprolol tartrate (LOPRESSOR) 25 MG Tab Take 1  tablet by mouth 2 times a day. 60 tablet 0   • atorvastatin (LIPITOR) 40 MG Tab Take 1 tablet by mouth every evening. 30 tablet 0   • Empagliflozin 10 MG Tab Take 10 mg by mouth every day. 90 tablet 0   • lisinopril (PRINIVIL) 5 MG Tab Take 1 tablet by mouth every day. 30 tablet 0   • Blood Glucose Meter Kit Test blood sugar as recommended by provider. Accucheck Guide blood glucose monitoring kit. 1 Kit 0   • Blood Glucose Test Strips Use one Accucheck Guide strip to test blood sugar three times daily. 100 Strip 0   • Lancets Use one Accucheck Guide lancet to test blood sugar three times daily. 100 Each 0   • Alcohol Swabs Wipe site with prep pad prior to injection. 100 Each 0   • Insulin Pen Needle 32 G x 4 mm Use one pen needle in pen device to inject insulin once daily. 100 Each 0       Current Medications:      Current Facility-Administered Medications:   •  nystatin (MYCOSTATIN) powder, , Topical, BID, Candida Avila M.D., Given at 01/05/22 0853  •  piperacillin-tazobactam (ZOSYN) 4,000 mg of piperacillin in  mL IVPB, 4,000 mg of piperacillin, Intravenous, Once **AND** piperacillin-tazobactam (ZOSYN) 4,000 mg of piperacillin in  mL IVPB, 4,000 mg of piperacillin, Intravenous, Q8HRS, Chaim Jeffersno M.D.  •  Linezolid (ZYVOX) premix 600 mg, 600 mg, Intravenous, Q12HRS, Jeovany Mario M.D.  •  HYDROmorphone (Dilaudid) injection 0.25-0.5 mg, 0.25-0.5 mg, Intravenous, Q4HRS PRN, Jeovany Mario M.D.  •  lactated ringers infusion, 2,000 mL, Intravenous, Once, Jeovany Mario M.D.  •  aspirin (ASA) chewable tab 81 mg, 81 mg, Oral, DAILY, Ben Butler M.D., 81 mg at 01/05/22 0545  •  atorvastatin (LIPITOR) tablet 40 mg, 40 mg, Oral, Q EVENING, Ben Butler M.D., 40 mg at 01/04/22 2123  •  dabigatran (PRADAXA) capsule 150 mg, 150 mg, Oral, BID, Ben Butler M.D., 150 mg at 01/05/22 0511  •  levothyroxine (SYNTHROID) tablet 100 mcg, 100 mcg, Oral, AM ES, Ben Butler M.D., 100  mcg at 01/05/22 0545  •  ciprofloxacin (CILOXIN) 0.3 % ophthalmic solution 1 Drop, 1 Drop, Both Eyes, 4X/DAY, Ben Butler M.D., 1 Drop at 01/04/22 2239  •  prednisoLONE acetate (PRED FORTE) 1 % ophthalmic suspension 1 Drop, 1 Drop, Left Eye, 4XDAY, Ben Butler M.D., 1 Drop at 01/05/22 0807  •  POC Blood Glucose, , , Q6H **AND** insulin regular (HumuLIN R,NovoLIN R) injection, 3-15 Units, Subcutaneous, Q6HRS, Ben Butler M.D., 6 Units at 01/04/22 2126  •  Notify MD and PharmD if FSBG level is less than or equal to 70 mg/dL or patient is showing signs/symptoms of hypoglycemia (tachycardia, palpitations, diaphoresis, clammy, tremulousness, nausea, confused), , , Once **AND** Administer 20 grams of glucose (approximately 8 ounces of fruit juice) every 15 minutes PRN FSBS less than 70 mg/dL, , , PRN **AND** dextrose 50% (D50W) injection 50 mL, 50 mL, Intravenous, Q15 MIN PRN, Ben Butler M.D.  •  insulin GLARGINE (Lantus,Semglee) injection, 15 Units, Subcutaneous, Q EVENING, Ben Butler M.D., 15 Units at 01/04/22 2239    Allergies:    Patient has no known allergies.    Past Surgical History:    History reviewed. No pertinent surgical history.    Past Medical History:    Past Medical History:   Diagnosis Date   • Atrial flutter (HCC)    • Diabetes (HCC)    • Hyperlipidemia    • Hypertension    • Neuropathy        Social History:    Social History     Socioeconomic History   • Marital status: Single     Spouse name: Not on file   • Number of children: Not on file   • Years of education: Not on file   • Highest education level: Not on file   Occupational History   • Not on file   Tobacco Use   • Smoking status: Never Smoker   • Smokeless tobacco: Never Used   Substance and Sexual Activity   • Alcohol use: Not Currently   • Drug use: Never   • Sexual activity: Not on file   Other Topics Concern   • Not on file   Social History Narrative   • Not on file     Social Determinants of Health     Financial  "Resource Strain:    • Difficulty of Paying Living Expenses: Not on file   Food Insecurity:    • Worried About Running Out of Food in the Last Year: Not on file   • Ran Out of Food in the Last Year: Not on file   Transportation Needs:    • Lack of Transportation (Medical): Not on file   • Lack of Transportation (Non-Medical): Not on file   Physical Activity:    • Days of Exercise per Week: Not on file   • Minutes of Exercise per Session: Not on file   Stress:    • Feeling of Stress : Not on file   Social Connections:    • Frequency of Communication with Friends and Family: Not on file   • Frequency of Social Gatherings with Friends and Family: Not on file   • Attends Confucianist Services: Not on file   • Active Member of Clubs or Organizations: Not on file   • Attends Club or Organization Meetings: Not on file   • Marital Status: Not on file   Intimate Partner Violence:    • Fear of Current or Ex-Partner: Not on file   • Emotionally Abused: Not on file   • Physically Abused: Not on file   • Sexually Abused: Not on file   Housing Stability:    • Unable to Pay for Housing in the Last Year: Not on file   • Number of Places Lived in the Last Year: Not on file   • Unstable Housing in the Last Year: Not on file       Family History:    History reviewed. No pertinent family history.    Review of System:    Review of Systems   Unable to perform ROS: Acuity of condition       Physical Examination:    BP (!) 91/47   Pulse (!) 130   Temp 36.2 °C (97.1 °F) (Temporal)   Resp 18   Ht 1.626 m (5' 4\")   Wt (!) 129 kg (283 lb 4.7 oz)   SpO2 93%   BMI 48.63 kg/m²   Physical Exam  Constitutional:       Appearance: She is obese. She is not diaphoretic.   HENT:      Head: Normocephalic.      Nose: Nose normal.      Mouth/Throat:      Mouth: Mucous membranes are dry.   Eyes:      Pupils: Pupils are equal, round, and reactive to light.   Cardiovascular:      Comments: Atrial flutter with rapid ventricular response  Pulmonary:      " Breath sounds: No wheezing or rales.   Abdominal:      General: There is no distension.      Tenderness: There is no abdominal tenderness. There is no guarding.   Musculoskeletal:         General: Normal range of motion.      Cervical back: Normal range of motion.   Skin:     Comments: She has erythema with wounds on both lower extremities.  She has erythema in both axilla and under her breasts.  See photographs.   Neurological:      General: No focal deficit present.      Mental Status: She is oriented to person, place, and time.      Cranial Nerves: No cranial nerve deficit.         Laboratory Data:        Recent Labs     01/04/22  1815 01/05/22  0608   WBC 11.5* 10.4   RBC 3.65* 3.46*   HEMOGLOBIN 10.1* 9.8*   HEMATOCRIT 34.2* 32.9*   MCV 93.7 95.1   MCH 27.7 28.3   MCHC 29.5* 29.8*   RDW 55.3* 55.1*   PLATELETCT 307 316   MPV 10.3 10.3     Recent Labs     01/04/22  1815 01/05/22  0430   SODIUM 133* 143   POTASSIUM 5.7* 5.8*   CHLORIDE 109 118*   CO2 12* 11*   GLUCOSE 354* 135*   * 103*   CREATININE 2.55* 1.98*   CALCIUM 8.7 8.7                   Imaging:    I personally viewed all the available CXR and CT scan images as well as reviewed the radiology interpretation reports.    US-MIGEL SINGLE LEVEL BILAT   Final Result      CT-HEAD W/O   Final Result      1.  No acute intracranial abnormality is identified.   2.  There are periventricular and subcortical white matter changes present.  This finding is nonspecific and could be from previous small vessel ischemia, demyelination, or gliosis.         CT-CSPINE WITHOUT PLUS RECONS   Final Result      Negative for cervical spine fracture or subluxation      EC-ECHOCARDIOGRAM COMPLETE W/O CONT    (Results Pending)   US-RENAL    (Results Pending)       Assessment and Plan:    * Severe sepsis with septic shock (HCC)  Assessment & Plan  Severe sepsis present on admission  Shock developed after admission  Associated acute kidney injury  Skin and soft tissue  source  Continue IV fluid resuscitation  Continue empiric source directed intravenous antimicrobial chemotherapy    Lower extremity cellulitis  Assessment & Plan  Culture wound  Empiric Zosyn and linezolid  Wound care    Atrial flutter (HCC)- (present on admission)  Assessment & Plan  Rate control  Optimize potassium and magnesium  Echocardiogram on 7/9/2021 with normal LV systolic function    Primary hypertension- (present on admission)  Assessment & Plan  Hold lisinopril with JULIETA and hypotension  Hold metoprolol tartrate with hypotension    JULIETA (acute kidney injury) ([facility])- (present on admission)  Assessment & Plan  Due to intravascular volume depletion  Renal ultrasound  IV fluid resuscitation  Monitor renal function and urine output  Avoid nephrotoxins and renal dose medications    Type 2 diabetes mellitus with hyperglycemia, with long-term current use of insulin ([facility])  Assessment & Plan  Glycohemoglobin 9.8  Sliding scale insulin    Class 3 severe obesity in adult ([facility])- (present on admission)  Assessment & Plan       I have assessed and reassessed her respiratory status, blood pressure, hemodynamics and cardiovascular status.  This lady is critically ill with severe sepsis and septic shock.  She is at increased risk for worsening respiratory, cardiovascular and renal system dysfunction.    High risk of deterioration and worsening vital organ dysfunction and death without the above critical care interventions.    Thank you for allowing me to participate in the care of this lady.  I will continue to follow her with great interest.    The patient is critically ill.  Critical care time = 35 minutes in directly providing and coordinating critical care and extensive data review.  No time overlap and excludes procedures.    Discussed with hospitalist, RN    Jeovany Mario MD  Pulmonary and Critical Care Medicine

## 2022-01-05 NOTE — ED NOTES
Med rec complete per pt. Verified eye drops with [facility] pharmacy in [place]. SHANEL

## 2022-01-05 NOTE — ASSESSMENT & PLAN NOTE
Wound culture with Klebsiella oxytoca  Nasal MRSA PCR negative  Change Zosyn to Rocephin  Wound care

## 2022-01-05 NOTE — ED NOTES
Patient resting comfortably at this time. Patient assisted to bed pan and tolerated well. Denies any pain or discomfort at this time. VSS. Call light within reach.

## 2022-01-05 NOTE — ASSESSMENT & PLAN NOTE
-Currently normotensive  -May need to restart Lisinopril and Metoprolol as her blood pressure recovers after sepsis

## 2022-01-05 NOTE — ED TRIAGE NOTES
Chief Complaint   Patient presents with   • T-5000 GLF     hit head -trauma +thinners     72 yo female bib EMS from home for above. Patient states she was attempting to leave her house to see her PCP today for wounds on her feet related to DM. Patient had MGLF and hit her head. -trauma. Patient takes pradaxa for atrial flutter.     Patient to CT and then Blue[facility]

## 2022-01-05 NOTE — PROGRESS NOTES
Monitor room called to notify that pt experienced a 3.6 sec pause on the monitor.     Pt also hypotensive with BP 91/37, MAP 55.    Dr. Schaefer notified. Per Dr. Schaefer, orders to follow for a lactic acid level.

## 2022-01-05 NOTE — H&P
Hospital Medicine History & Physical Note    Date of Service  1/4/2022    Primary Care Physician  No primary care provider on file.    Consultants  none     Specialist Names: none     Code Status  Full Code    Chief Complaint  Chief Complaint   Patient presents with   • T-5000 GLF     hit head -trauma +thinners       History of Presenting Illness  Beverley Dejesus is a 71 y.o. female past medical history of diabetes mellitus, atrial flutter on Pradaxa, hypertension, neuropathy who presented 1/4/2022 with ground-level fall.  Patient reports that she has been feeling weak lately and has had a physician that has come to her home to evaluate her.  Patient reports when standing up she felt dizzy and fell back on her head.  No relieving or exacerbating factors.  She denies any fevers, chills or shortness of breath.  Patient reports being vaccinated for COVID-19.    In the ER, CT head done which did not show any acute intracranial hemorrhage.  CT C-spine negative for acute cervical spine fracture or subluxation.    I discussed the plan of care with patient.    Review of Systems  Review of Systems   Constitutional: Negative for chills and fever.   HENT: Negative for hearing loss and tinnitus.    Eyes: Negative for blurred vision, double vision and photophobia.   Respiratory: Negative for cough and hemoptysis.    Cardiovascular: Negative for chest pain and palpitations.   Gastrointestinal: Negative for abdominal pain, heartburn, nausea and vomiting.   Genitourinary: Negative for dysuria and urgency.   Musculoskeletal: Positive for falls. Negative for myalgias and neck pain.   Skin: Positive for rash. Negative for itching.   Neurological: Negative for dizziness, tingling, tremors and headaches.   Endo/Heme/Allergies: Does not bruise/bleed easily.   Psychiatric/Behavioral: Positive for substance abuse. Negative for depression, hallucinations and suicidal ideas.       Past Medical History   has a past medical history of Atrial  flutter (HCC), Diabetes (HCC), Hyperlipidemia, Hypertension, and Neuropathy.    Surgical History  Reviewed and not pertinent     Family History  Reviewed and not pertinent   Family history reviewed with patient. There is no family history that is pertinent to the chief complaint.     Social History   reports that she has never smoked. She has never used smokeless tobacco. She reports previous alcohol use. She reports that she does not use drugs.    Allergies  No Known Allergies    Medications  Prior to Admission Medications   Prescriptions Last Dose Informant Patient Reported? Taking?   Alcohol Swabs   No No   Sig: Wipe site with prep pad prior to injection.   Blood Glucose Meter Kit   No No   Sig: Test blood sugar as recommended by provider. Accucheck Guide blood glucose monitoring kit.   Blood Glucose Test Strips   No No   Sig: Use one Accucheck Guide strip to test blood sugar three times daily.   Empagliflozin 10 MG Tab 1/4/2022 at Unknown time  No No   Sig: Take 10 mg by mouth every day.   Insulin Pen Needle 32 G x 4 mm   No No   Sig: Use one pen needle in pen device to inject insulin once daily.   Lancets   No No   Sig: Use one Accucheck Guide lancet to test blood sugar three times daily.   aspirin (ASA) 81 MG Chew Tab chewable tablet 1/4/2022 at Unknown time  No No   Sig: Chew 1 tablet every day.   atorvastatin (LIPITOR) 40 MG Tab 1/3/2022 at Unknown time  No No   Sig: Take 1 tablet by mouth every evening.   dabigatran (PRADAXA) 150 MG Cap capsule 1/4/2022 at am  No No   Sig: Take 1 capsule by mouth 2 times a day.   ketorolac (ACULAR) 0.5 % Solution 1/4/2022 at 1200  Yes Yes   Sig: Administer 1 Drop into both eyes 4 times a day.   levothyroxine (SYNTHROID) 100 MCG Tab 1/4/2022 at Unknown time  No No   Sig: Take 1 tablet by mouth every morning on an empty stomach.   lisinopril (PRINIVIL) 5 MG Tab 1/4/2022 at am  No No   Sig: Take 1 tablet by mouth every day.   metoprolol tartrate (LOPRESSOR) 25 MG Tab 1/4/2022  at am  No No   Sig: Take 1 tablet by mouth 2 times a day.   ofloxacin (OCUFLOX) 0.3 % Solution 1/4/2022 at 1200  Yes Yes   Sig: Administer 1 Drop into both eyes 4 times a day.   prednisoLONE acetate (PRED FORTE) 1 % Suspension 1/4/2022 at 1200  Yes Yes   Sig: Administer 1 Drop into the left eye 4 times a day.      Facility-Administered Medications: None       Physical Exam  Temp:  [36.3 °C (97.4 °F)-36.6 °C (97.9 °F)] 36.6 °C (97.9 °F)  Pulse:  [115-120] 120  Resp:  [19-23] 20  BP: (112-125)/(52-58) 125/56  SpO2:  [96 %-100 %] 99 %  Blood Pressure : 125/56   Temperature: 36.6 °C (97.9 °F)   Pulse: (!) 120   Respiration: 20   Pulse Oximetry: 99 %       Physical Exam  Vitals and nursing note reviewed.   Constitutional:       General: She is not in acute distress.     Appearance: Normal appearance. She is not ill-appearing.   HENT:      Head: Normocephalic and atraumatic.      Right Ear: Tympanic membrane normal.      Left Ear: Tympanic membrane normal.      Nose: Nose normal.      Mouth/Throat:      Mouth: Mucous membranes are moist.      Pharynx: Oropharynx is clear.   Eyes:      General:         Right eye: No discharge.         Left eye: No discharge.      Extraocular Movements: Extraocular movements intact.      Pupils: Pupils are equal, round, and reactive to light.   Cardiovascular:      Rate and Rhythm: Regular rhythm. Tachycardia present.      Pulses: Normal pulses.      Heart sounds: Normal heart sounds.   Pulmonary:      Effort: Pulmonary effort is normal. No respiratory distress.      Breath sounds: Normal breath sounds. No stridor. No wheezing or rhonchi.   Abdominal:      General: Bowel sounds are normal. There is no distension.      Palpations: Abdomen is soft. There is no mass.      Tenderness: There is no abdominal tenderness.      Hernia: No hernia is present.   Musculoskeletal:         General: No swelling, tenderness or signs of injury. Normal range of motion.      Cervical back: Neck supple.    Skin:     General: Skin is warm.      Capillary Refill: Capillary refill takes less than 2 seconds.      Coloration: Skin is not jaundiced or pale.      Findings: Rash present. No bruising or erythema.      Comments: Bilateral lower extremity rash with dark crusting and scabs noted   Neurological:      General: No focal deficit present.      Mental Status: She is alert and oriented to person, place, and time. Mental status is at baseline.      Cranial Nerves: No cranial nerve deficit.      Sensory: No sensory deficit.      Motor: No weakness.      Coordination: Coordination normal.   Psychiatric:         Mood and Affect: Mood normal.         Behavior: Behavior normal.         Laboratory:  Recent Labs     01/04/22  1815   WBC 11.5*   RBC 3.65*   HEMOGLOBIN 10.1*   HEMATOCRIT 34.2*   MCV 93.7   MCH 27.7   MCHC 29.5*   RDW 55.3*   PLATELETCT 307   MPV 10.3     Recent Labs     01/04/22  1815   SODIUM 133*   POTASSIUM 5.7*   CHLORIDE 109   CO2 12*   GLUCOSE 354*   *   CREATININE 2.55*   CALCIUM 8.7     Recent Labs     01/04/22  1815   ALTSGPT 7   ASTSGOT 6*   ALKPHOSPHAT 109*   TBILIRUBIN 0.5   GLUCOSE 354*               Recent Labs     01/04/22  1815   TROPONINT 29*       Imaging:  US-MIGEL SINGLE LEVEL BILAT   Final Result      CT-HEAD W/O   Final Result      1.  No acute intracranial abnormality is identified.   2.  There are periventricular and subcortical white matter changes present.  This finding is nonspecific and could be from previous small vessel ischemia, demyelination, or gliosis.         CT-CSPINE WITHOUT PLUS RECONS   Final Result      Negative for cervical spine fracture or subluxation          no X-Ray or EKG requiring interpretation    Assessment/Plan:  I anticipate this patient is appropriate for observation status at this time.    * Fall from ground level- (present on admission)  Assessment & Plan  Uses cane to walk at home  Likely orthostatic in nature  CT head and CT C-spine negative for  acute fracture.     Lower extremity cellulitis  Assessment & Plan  Continue with IV Unasyn   Wound care    Atrial flutter ([facility])- (present on admission)  Assessment & Plan  Continue with Pradaxa 150 mg twice daily  Continue metoprolol 25 mg twice daily    Class 3 severe obesity in adult ([facility])- (present on admission)  Assessment & Plan  Counseled on weight loss and dietary restriction    HTN (hypertension)- (present on admission)  Assessment & Plan  Hold lisinopril given JULIETA  Blood pressure normotensive at this time may resume blood pressure medications if blood pressure persistently elevated    Dehydration- (present on admission)  Assessment & Plan  Continue with IV fluids    JULIETA (acute kidney injury) ([facility])- (present on admission)  Assessment & Plan  Likely prerenal due to dehydration  Continue with IV fluids  Avoid nephrotoxic agents  Renally adjust all medications  Monitor creatinine    Type 2 diabetes mellitus with hyperglycemia, with long-term current use of insulin ([facility])  Assessment & Plan  Continue with atorvastatin 40 mg nightly and aspirin 81 mg daily  Lantus/insulin sliding scale with Frequent Accu-Cheks and hypoglycemia protocol  Last A1c 16.5 6 months ago.  Repeat hemoglobin A1c        VTE prophylaxis: therapeutic anticoagulation with pradaxa

## 2022-01-06 ENCOUNTER — APPOINTMENT (OUTPATIENT)
Dept: RADIOLOGY | Facility: MEDICAL CENTER | Age: 72
DRG: 871 | End: 2022-01-06
Attending: INTERNAL MEDICINE
Payer: MEDICARE

## 2022-01-06 PROBLEM — E27.40 ADRENAL INSUFFICIENCY (HCC): Status: ACTIVE | Noted: 2022-01-06

## 2022-01-06 LAB
ANION GAP SERPL CALC-SCNC: 10 MMOL/L (ref 7–16)
APPEARANCE UR: ABNORMAL
BACTERIA #/AREA URNS HPF: NEGATIVE /HPF
BASOPHILS # BLD AUTO: 0.4 % (ref 0–1.8)
BASOPHILS # BLD: 0.03 K/UL (ref 0–0.12)
BILIRUB UR QL STRIP.AUTO: NEGATIVE
BUN SERPL-MCNC: 57 MG/DL (ref 8–22)
CALCIUM SERPL-MCNC: 8.3 MG/DL (ref 8.5–10.5)
CHLORIDE SERPL-SCNC: 115 MMOL/L (ref 96–112)
CO2 SERPL-SCNC: 19 MMOL/L (ref 20–33)
COLOR UR: YELLOW
CREAT SERPL-MCNC: 1.44 MG/DL (ref 0.5–1.4)
EKG IMPRESSION: NORMAL
EOSINOPHIL # BLD AUTO: 0.19 K/UL (ref 0–0.51)
EOSINOPHIL NFR BLD: 2.4 % (ref 0–6.9)
EPI CELLS #/AREA URNS HPF: NEGATIVE /HPF
ERYTHROCYTE [DISTWIDTH] IN BLOOD BY AUTOMATED COUNT: 55.5 FL (ref 35.9–50)
GLUCOSE BLD-MCNC: 135 MG/DL (ref 65–99)
GLUCOSE BLD-MCNC: 253 MG/DL (ref 65–99)
GLUCOSE BLD-MCNC: 277 MG/DL (ref 65–99)
GLUCOSE SERPL-MCNC: 160 MG/DL (ref 65–99)
GLUCOSE UR STRIP.AUTO-MCNC: 250 MG/DL
GRAM STN SPEC: NORMAL
HCT VFR BLD AUTO: 28.6 % (ref 37–47)
HGB BLD-MCNC: 8.8 G/DL (ref 12–16)
HYALINE CASTS #/AREA URNS LPF: ABNORMAL /LPF
IMM GRANULOCYTES # BLD AUTO: 0.09 K/UL (ref 0–0.11)
IMM GRANULOCYTES NFR BLD AUTO: 1.1 % (ref 0–0.9)
KETONES UR STRIP.AUTO-MCNC: NEGATIVE MG/DL
LEUKOCYTE ESTERASE UR QL STRIP.AUTO: ABNORMAL
LYMPHOCYTES # BLD AUTO: 1.62 K/UL (ref 1–4.8)
LYMPHOCYTES NFR BLD: 20.5 % (ref 22–41)
MAGNESIUM SERPL-MCNC: 1.7 MG/DL (ref 1.5–2.5)
MCH RBC QN AUTO: 28.7 PG (ref 27–33)
MCHC RBC AUTO-ENTMCNC: 30.8 G/DL (ref 33.6–35)
MCV RBC AUTO: 93.2 FL (ref 81.4–97.8)
MICRO URNS: ABNORMAL
MONOCYTES # BLD AUTO: 0.56 K/UL (ref 0–0.85)
MONOCYTES NFR BLD AUTO: 7.1 % (ref 0–13.4)
NEUTROPHILS # BLD AUTO: 5.41 K/UL (ref 2–7.15)
NEUTROPHILS NFR BLD: 68.5 % (ref 44–72)
NITRITE UR QL STRIP.AUTO: NEGATIVE
NRBC # BLD AUTO: 0 K/UL
NRBC BLD-RTO: 0 /100 WBC
PH UR STRIP.AUTO: 5.5 [PH] (ref 5–8)
PHOSPHATE SERPL-MCNC: 2.7 MG/DL (ref 2.5–4.5)
PLATELET # BLD AUTO: 235 K/UL (ref 164–446)
PMV BLD AUTO: 10 FL (ref 9–12.9)
POTASSIUM SERPL-SCNC: 5.3 MMOL/L (ref 3.6–5.5)
PROT UR QL STRIP: NEGATIVE MG/DL
RBC # BLD AUTO: 3.07 M/UL (ref 4.2–5.4)
RBC # URNS HPF: ABNORMAL /HPF
RBC UR QL AUTO: ABNORMAL
SIGNIFICANT IND 70042: NORMAL
SITE SITE: NORMAL
SODIUM SERPL-SCNC: 144 MMOL/L (ref 135–145)
SOURCE SOURCE: NORMAL
SP GR UR STRIP.AUTO: 1.01
T4 FREE SERPL-MCNC: 0.97 NG/DL (ref 0.93–1.7)
TSH SERPL DL<=0.005 MIU/L-ACNC: 2.99 UIU/ML (ref 0.38–5.33)
UROBILINOGEN UR STRIP.AUTO-MCNC: 0.2 MG/DL
WBC # BLD AUTO: 7.9 K/UL (ref 4.8–10.8)
WBC #/AREA URNS HPF: ABNORMAL /HPF
YEAST BUDDING URNS QL: PRESENT /HPF

## 2022-01-06 PROCEDURE — 84100 ASSAY OF PHOSPHORUS: CPT

## 2022-01-06 PROCEDURE — 99291 CRITICAL CARE FIRST HOUR: CPT | Performed by: INTERNAL MEDICINE

## 2022-01-06 PROCEDURE — 97162 PT EVAL MOD COMPLEX 30 MIN: CPT

## 2022-01-06 PROCEDURE — 700105 HCHG RX REV CODE 258: Performed by: INTERNAL MEDICINE

## 2022-01-06 PROCEDURE — 84443 ASSAY THYROID STIM HORMONE: CPT

## 2022-01-06 PROCEDURE — 93005 ELECTROCARDIOGRAM TRACING: CPT | Performed by: INTERNAL MEDICINE

## 2022-01-06 PROCEDURE — 71045 X-RAY EXAM CHEST 1 VIEW: CPT

## 2022-01-06 PROCEDURE — 84439 ASSAY OF FREE THYROXINE: CPT

## 2022-01-06 PROCEDURE — 80048 BASIC METABOLIC PNL TOTAL CA: CPT

## 2022-01-06 PROCEDURE — B548ZZA ULTRASONOGRAPHY OF SUPERIOR VENA CAVA, GUIDANCE: ICD-10-PCS | Performed by: INTERNAL MEDICINE

## 2022-01-06 PROCEDURE — 700111 HCHG RX REV CODE 636 W/ 250 OVERRIDE (IP): Performed by: HOSPITALIST

## 2022-01-06 PROCEDURE — 82962 GLUCOSE BLOOD TEST: CPT

## 2022-01-06 PROCEDURE — 85025 COMPLETE CBC W/AUTO DIFF WBC: CPT

## 2022-01-06 PROCEDURE — 700101 HCHG RX REV CODE 250: Performed by: INTERNAL MEDICINE

## 2022-01-06 PROCEDURE — A9270 NON-COVERED ITEM OR SERVICE: HCPCS | Performed by: INTERNAL MEDICINE

## 2022-01-06 PROCEDURE — 700102 HCHG RX REV CODE 250 W/ 637 OVERRIDE(OP): Performed by: INTERNAL MEDICINE

## 2022-01-06 PROCEDURE — 700105 HCHG RX REV CODE 258: Performed by: HOSPITALIST

## 2022-01-06 PROCEDURE — 02HV33Z INSERTION OF INFUSION DEVICE INTO SUPERIOR VENA CAVA, PERCUTANEOUS APPROACH: ICD-10-PCS | Performed by: INTERNAL MEDICINE

## 2022-01-06 PROCEDURE — 36573 INSJ PICC RS&I 5 YR+: CPT

## 2022-01-06 PROCEDURE — 83735 ASSAY OF MAGNESIUM: CPT

## 2022-01-06 PROCEDURE — 770022 HCHG ROOM/CARE - ICU (200)

## 2022-01-06 PROCEDURE — 97166 OT EVAL MOD COMPLEX 45 MIN: CPT

## 2022-01-06 PROCEDURE — A9270 NON-COVERED ITEM OR SERVICE: HCPCS | Performed by: STUDENT IN AN ORGANIZED HEALTH CARE EDUCATION/TRAINING PROGRAM

## 2022-01-06 PROCEDURE — 700102 HCHG RX REV CODE 250 W/ 637 OVERRIDE(OP): Performed by: STUDENT IN AN ORGANIZED HEALTH CARE EDUCATION/TRAINING PROGRAM

## 2022-01-06 PROCEDURE — 93010 ELECTROCARDIOGRAM REPORT: CPT | Performed by: INTERNAL MEDICINE

## 2022-01-06 PROCEDURE — 700111 HCHG RX REV CODE 636 W/ 250 OVERRIDE (IP): Performed by: INTERNAL MEDICINE

## 2022-01-06 RX ORDER — MAGNESIUM SULFATE HEPTAHYDRATE 40 MG/ML
4 INJECTION, SOLUTION INTRAVENOUS ONCE
Status: COMPLETED | OUTPATIENT
Start: 2022-01-06 | End: 2022-01-06

## 2022-01-06 RX ORDER — OXYCODONE HYDROCHLORIDE 5 MG/1
5-10 TABLET ORAL EVERY 4 HOURS PRN
Status: DISCONTINUED | OUTPATIENT
Start: 2022-01-06 | End: 2022-01-15 | Stop reason: HOSPADM

## 2022-01-06 RX ADMIN — PREDNISOLONE ACETATE 1 DROP: 10 SUSPENSION/ DROPS OPHTHALMIC at 21:00

## 2022-01-06 RX ADMIN — PIPERACILLIN AND TAZOBACTAM 4.5 G: 4; .5 INJECTION, POWDER, LYOPHILIZED, FOR SOLUTION INTRAVENOUS; PARENTERAL at 22:00

## 2022-01-06 RX ADMIN — CIPROFLOXACIN HYDROCHLORIDE 1 DROP: 3 SOLUTION/ DROPS OPHTHALMIC at 08:50

## 2022-01-06 RX ADMIN — DABIGATRAN ETEXILATE MESYLATE 150 MG: 150 CAPSULE ORAL at 17:17

## 2022-01-06 RX ADMIN — HYDROMORPHONE HYDROCHLORIDE 0.5 MG: 1 INJECTION, SOLUTION INTRAMUSCULAR; INTRAVENOUS; SUBCUTANEOUS at 16:53

## 2022-01-06 RX ADMIN — ASPIRIN 81 MG: 81 TABLET, CHEWABLE ORAL at 05:53

## 2022-01-06 RX ADMIN — OXYCODONE 10 MG: 5 TABLET ORAL at 22:45

## 2022-01-06 RX ADMIN — INSULIN HUMAN 9 UNITS: 100 INJECTION, SOLUTION PARENTERAL at 12:39

## 2022-01-06 RX ADMIN — ATORVASTATIN CALCIUM 40 MG: 40 TABLET, FILM COATED ORAL at 17:17

## 2022-01-06 RX ADMIN — LEVOTHYROXINE SODIUM 100 MCG: 0.1 TABLET ORAL at 05:54

## 2022-01-06 RX ADMIN — INSULIN GLARGINE 15 UNITS: 100 INJECTION, SOLUTION SUBCUTANEOUS at 17:12

## 2022-01-06 RX ADMIN — PREDNISOLONE ACETATE 1 DROP: 10 SUSPENSION/ DROPS OPHTHALMIC at 08:50

## 2022-01-06 RX ADMIN — HYDROCORTISONE SODIUM SUCCINATE 100 MG: 100 INJECTION, POWDER, FOR SOLUTION INTRAMUSCULAR; INTRAVENOUS at 22:15

## 2022-01-06 RX ADMIN — SODIUM BICARBONATE: 84 INJECTION, SOLUTION INTRAVENOUS at 16:54

## 2022-01-06 RX ADMIN — DABIGATRAN ETEXILATE MESYLATE 150 MG: 150 CAPSULE ORAL at 05:55

## 2022-01-06 RX ADMIN — CIPROFLOXACIN HYDROCHLORIDE 1 DROP: 3 SOLUTION/ DROPS OPHTHALMIC at 16:47

## 2022-01-06 RX ADMIN — CIPROFLOXACIN HYDROCHLORIDE 1 DROP: 3 SOLUTION/ DROPS OPHTHALMIC at 21:30

## 2022-01-06 RX ADMIN — PREDNISOLONE ACETATE 1 DROP: 10 SUSPENSION/ DROPS OPHTHALMIC at 14:44

## 2022-01-06 RX ADMIN — NOREPINEPHRINE BITARTRATE 2 MCG/MIN: 1 INJECTION, SOLUTION, CONCENTRATE INTRAVENOUS at 06:06

## 2022-01-06 RX ADMIN — INSULIN HUMAN 3 UNITS: 100 INJECTION, SOLUTION PARENTERAL at 05:55

## 2022-01-06 RX ADMIN — SODIUM BICARBONATE: 84 INJECTION, SOLUTION INTRAVENOUS at 03:54

## 2022-01-06 RX ADMIN — MIDODRINE HYDROCHLORIDE 10 MG: 5 TABLET ORAL at 05:54

## 2022-01-06 RX ADMIN — PIPERACILLIN AND TAZOBACTAM 4.5 G: 4; .5 INJECTION, POWDER, LYOPHILIZED, FOR SOLUTION INTRAVENOUS; PARENTERAL at 12:38

## 2022-01-06 RX ADMIN — PIPERACILLIN AND TAZOBACTAM 4.5 G: 4; .5 INJECTION, POWDER, LYOPHILIZED, FOR SOLUTION INTRAVENOUS; PARENTERAL at 05:54

## 2022-01-06 RX ADMIN — HYDROCORTISONE SODIUM SUCCINATE 100 MG: 100 INJECTION, POWDER, FOR SOLUTION INTRAMUSCULAR; INTRAVENOUS at 14:43

## 2022-01-06 RX ADMIN — HYDROCORTISONE SODIUM SUCCINATE 100 MG: 100 INJECTION, POWDER, FOR SOLUTION INTRAMUSCULAR; INTRAVENOUS at 05:54

## 2022-01-06 RX ADMIN — HYDROMORPHONE HYDROCHLORIDE 0.5 MG: 1 INJECTION, SOLUTION INTRAMUSCULAR; INTRAVENOUS; SUBCUTANEOUS at 00:57

## 2022-01-06 RX ADMIN — CIPROFLOXACIN HYDROCHLORIDE 1 DROP: 3 SOLUTION/ DROPS OPHTHALMIC at 12:38

## 2022-01-06 RX ADMIN — NYSTATIN: 100000 POWDER TOPICAL at 17:17

## 2022-01-06 RX ADMIN — INSULIN HUMAN 9 UNITS: 100 INJECTION, SOLUTION PARENTERAL at 17:13

## 2022-01-06 RX ADMIN — MAGNESIUM SULFATE HEPTAHYDRATE 4 G: 40 INJECTION, SOLUTION INTRAVENOUS at 08:48

## 2022-01-06 RX ADMIN — NYSTATIN: 100000 POWDER TOPICAL at 05:55

## 2022-01-06 ASSESSMENT — PAIN DESCRIPTION - PAIN TYPE
TYPE: ACUTE PAIN

## 2022-01-06 ASSESSMENT — COGNITIVE AND FUNCTIONAL STATUS - GENERAL
MOBILITY SCORE: 11
CLIMB 3 TO 5 STEPS WITH RAILING: TOTAL
WALKING IN HOSPITAL ROOM: A LITTLE
DRESSING REGULAR LOWER BODY CLOTHING: A LOT
MOVING TO AND FROM BED TO CHAIR: UNABLE
MOVING FROM LYING ON BACK TO SITTING ON SIDE OF FLAT BED: UNABLE
HELP NEEDED FOR BATHING: A LOT
SUGGESTED CMS G CODE MODIFIER MOBILITY: CL
SUGGESTED CMS G CODE MODIFIER DAILY ACTIVITY: CK
DRESSING REGULAR UPPER BODY CLOTHING: A LITTLE
TOILETING: A LOT
TURNING FROM BACK TO SIDE WHILE IN FLAT BAD: A LOT
DAILY ACTIVITIY SCORE: 17
STANDING UP FROM CHAIR USING ARMS: A LITTLE

## 2022-01-06 ASSESSMENT — CHA2DS2 SCORE
PRIOR STROKE OR TIA OR THROMBOEMBOLISM: NO
CHF OR LEFT VENTRICULAR DYSFUNCTION: NO
AGE 75 OR GREATER: NO
AGE 65 TO 74: YES
DIABETES: YES
SEX: FEMALE
VASCULAR DISEASE: NO
CHA2DS2 VASC SCORE: 4
HYPERTENSION: YES

## 2022-01-06 ASSESSMENT — GAIT ASSESSMENTS
ASSISTIVE DEVICE: FRONT WHEEL WALKER
GAIT LEVEL OF ASSIST: MINIMAL ASSIST
DEVIATION: BRADYKINETIC;SHUFFLED GAIT
DISTANCE (FEET): 7

## 2022-01-06 ASSESSMENT — ACTIVITIES OF DAILY LIVING (ADL): TOILETING: INDEPENDENT

## 2022-01-06 NOTE — ASSESSMENT & PLAN NOTE
Cortisol inappropriately low at 10.3  Continue hydrocortisone, 100 mg IV every 8 hours  I recommend that beginning tomorrow (1/8) that her hydrocortisone dose be tapered by 50% daily until off

## 2022-01-06 NOTE — PROCEDURES
Vascular Access Team     Date of Insertion: 1/6/22  Arm Circumference: 42.5  Internal length: 39  External Length: 0  Vein Occupancy %: 37   Reason for PICC: vasopressors   Labs: WBC 7.9, , BUN 57, Cr 1.44, GFR 36, INR na  Blood cultures discussed with Dr. Mario prior to placement. Per Dr. Mario okay to place PICC prior to blood cultures remaining negative for 48 hours.      Consents confirmed, vessel patency confirmed with ultrasound. Risks and benefits of procedure explained to patient and education regarding central line associated bloodstream infections provided. Questions answered.      PICC placed in RUE per licensed provider order with ultrasound guidance.  5 Fr, triple lumen PICC placed in cephalic vein after 1 attempt(s). 2 mL of 1% lidocaine injected intradermally at the insertion site. A 21 gauge microintroducer needle and modified Seldinger technique was used to obtain access to the vein. 39 cm catheter inserted and brisk blood return was observed from each lumen upon aspiration. Line secured at the 0 cm marker. TCS stylet removed and observed to be fully intact. Each lumen flushed using pulsatile method without resistance with 10 mL 0.9% normal saline. PICC line secured with Biopatch and Tegaderm.     PICC tip placement location is NOT confirmed by nurse to be in the Superior Vena Cava (SVC) utilizing 3CG technology. PICC line is NOT appropriate for use at this time. STAT chest xray ordered to confirm placement. Patient tolerated procedure well, without complications.  Patient condition relayed to primary RN or ordering physician via this post procedure note in the EMR.      Ultrasound images uploaded to PACS and viewable in the EMR - yes  Ultrasound imaged printed and placed in paper chart - no     [employer] Power PICC ref # [id number], Lot # [id number], Expiration Date 09/30/2022

## 2022-01-06 NOTE — PROGRESS NOTES
Critical Care Progress Note    Date of admission  1/4/2022    Chief Complaint  71 y.o. female admitted 1/4/2022 with weakness and a GLF.  She had severe sepsis with JULIETA.  She has a history of DM 2, atrial flutter on Pradaxa, HTN, neuropathy, dyslipidemia and hypothyroidism.    Hospital Course      1/6 -    developed bradycardia with midodrine.  Start hydrocortisone for relative adrenal insufficiency.  Continue bicarbonate drip.  Titrating norepinephrine.      Interval Problem Update  Reviewed last 24 hour events:      Start hydrocortisone  Stop Zyvox  Atrial flutter  Bradycardia with midodrine - I will stop  98.6  Norepinephrine titrating  +2368 mL in the last 24  +3368 mL since admit  Excellent urine output  Replete magnesium      Review of Systems  Review of Systems   Unable to perform ROS: Acuity of condition        Vital Signs for last 24 hours   Temp:  [35.9 °C (96.7 °F)-37.1 °C (98.7 °F)] 36.8 °C (98.3 °F)  Pulse:  [] 74  Resp:  [12-30] 19  BP: ()/(32-73) 111/47  SpO2:  [66 %-100 %] 99 %    Hemodynamic parameters for last 24 hours       Respiratory Information for the last 24 hours       Physical Exam   Physical Exam  Constitutional:       Appearance: She is obese. She is not diaphoretic.   HENT:      Head: Normocephalic.      Nose: Nose normal.      Mouth/Throat:      Pharynx: Oropharynx is clear.   Eyes:      Pupils: Pupils are equal, round, and reactive to light.   Cardiovascular:      Comments: Atrial flutter  Pulmonary:      Breath sounds: No wheezing or rales.   Abdominal:      General: There is no distension.      Tenderness: There is no abdominal tenderness.   Musculoskeletal:         General: Normal range of motion.      Cervical back: Normal range of motion.   Skin:     Comments: Wounds on lower extremities.  See photos.   Neurological:      General: No focal deficit present.      Mental Status: She is oriented to person, place, and time.      Cranial Nerves: No cranial nerve deficit.          Medications  Current Facility-Administered Medications   Medication Dose Route Frequency Provider Last Rate Last Admin   • hydrocortisone sodium succinate PF (Solu-CORTEF) 100 MG injection 100 mg  100 mg Intravenous Q8HRS Jeovany Mario M.D.   100 mg at 01/06/22 0554   • magnesium sulfate IVPB premix 4 g  4 g Intravenous Once Jeovany Mario M.D.       • nystatin (MYCOSTATIN) powder   Topical BID Candida Avila M.D.   Given at 01/06/22 0555   • HYDROmorphone (Dilaudid) injection 0.25-0.5 mg  0.25-0.5 mg Intravenous Q4HRS PRN Jeovany Mario M.D.   0.5 mg at 01/06/22 0057   • MD Alert...ICU Electrolyte Replacement per Pharmacy   Other PHARMACY TO DOSE Jeovany Mario M.D.       • piperacillin-tazobactam (ZOSYN) 4.5 g in  mL IVPB  4.5 g Intravenous Q8HRS Chaim Jefferson M.D. 25 mL/hr at 01/06/22 0554 4.5 g at 01/06/22 0554   • sodium bicarbonate 150 mEq in D5W infusion (premix)   Intravenous Continuous Jeovany Mario M.D. 100 mL/hr at 01/06/22 0354 New Bag at 01/06/22 0354   • norepinephrine (Levophed) 8 mg in 250 mL NS infusion (premix)  0-30 mcg/min Intravenous Continuous Jeovany Mario M.D. 15 mL/hr at 01/06/22 0616 8 mcg/min at 01/06/22 0616   • aspirin (ASA) chewable tab 81 mg  81 mg Oral DAILY Ben Butler M.D.   81 mg at 01/06/22 0553   • atorvastatin (LIPITOR) tablet 40 mg  40 mg Oral Q EVENING Ben Butler M.D.   40 mg at 01/05/22 1829   • dabigatran (PRADAXA) capsule 150 mg  150 mg Oral BID Ben Butler M.D.   150 mg at 01/06/22 0555   • levothyroxine (SYNTHROID) tablet 100 mcg  100 mcg Oral AM ES Ben Butler M.D.   100 mcg at 01/06/22 0554   • ciprofloxacin (CILOXIN) 0.3 % ophthalmic solution 1 Drop  1 Drop Both Eyes 4X/DAY Ben Butler M.D.   1 Drop at 01/05/22 2025   • prednisoLONE acetate (PRED FORTE) 1 % ophthalmic suspension 1 Drop  1 Drop Left Eye 4XDAY Ben Butler M.D.   1 Drop at 01/05/22 2025   • insulin regular (HumuLIN  R,NovoLIN R) injection  3-15 Units Subcutaneous Q6HRS Ben Butler M.D.   3 Units at 01/06/22 0555   • dextrose 50% (D50W) injection 50 mL  50 mL Intravenous Q15 MIN PRN Ben Butler M.D.       • insulin GLARGINE (Lantus,Semglee) injection  15 Units Subcutaneous Q EVENING Ben Butler M.D.   15 Units at 01/05/22 1830       Fluids    Intake/Output Summary (Last 24 hours) at 1/6/2022 0646  Last data filed at 1/6/2022 0400  Gross per 24 hour   Intake 5967.91 ml   Output 3600 ml   Net 2367.91 ml       Laboratory          Recent Labs     01/05/22 1125 01/05/22 2220 01/06/22  0430   SODIUM 142 142 144   POTASSIUM 5.9* 5.4 5.3   CHLORIDE 118* 116* 115*   CO2 10* 17* 19*   BUN 88* 68* 57*   CREATININE 1.58* 1.41* 1.44*   MAGNESIUM 2.1  --  1.7   PHOSPHORUS  --   --  2.7   CALCIUM 9.0 7.9* 8.3*     Recent Labs     01/04/22 1815 01/05/22  0430 01/05/22 1125 01/05/22 2220 01/06/22  0430   ALTSGPT 7  --  10  --   --    ASTSGOT 6*  --  12  --   --    ALKPHOSPHAT 109*  --  92  --   --    TBILIRUBIN 0.5  --  0.6  --   --    GLUCOSE 354*   < > 154* 176* 160*    < > = values in this interval not displayed.     Recent Labs     01/04/22 1815 01/05/22  0608 01/05/22  1125 01/06/22  0430   WBC 11.5* 10.4  --  7.9   NEUTSPOLYS 81.90* 79.30*  --  68.50   LYMPHOCYTES 10.00* 11.60*  --  20.50*   MONOCYTES 6.00 6.00  --  7.10   EOSINOPHILS 0.90 1.90  --  2.40   BASOPHILS 0.30 0.30  --  0.40   ASTSGOT 6*  --  12  --    ALTSGPT 7  --  10  --    ALKPHOSPHAT 109*  --  92  --    TBILIRUBIN 0.5  --  0.6  --      Recent Labs     01/04/22  1815 01/05/22  0608 01/06/22  0430   RBC 3.65* 3.46* 3.07*   HEMOGLOBIN 10.1* 9.8* 8.8*   HEMATOCRIT 34.2* 32.9* 28.6*   PLATELETCT 307 316 235       Imaging  X-Ray:  I have personally reviewed the images and compared with prior images. and My impression is: Low lung volumes.  Clear lungs.    Assessment/Plan  * Severe sepsis with septic shock (HCC)  Assessment & Plan  Severe sepsis present on  admission  Shock developed after admission  Associated acute kidney injury  Skin and soft tissue source  Continue empiric source directed intravenous antimicrobial chemotherapy  I am titrating norepinephrine to keep mean arterial pressure greater than 65    Relative adrenal insufficiency (HCC)  Assessment & Plan  Cortisol inappropriately low at 10.3  Start hydrocortisone, 100 mg IV every 8 hours    Lower extremity cellulitis  Assessment & Plan  Follow-up wound cultures  Nasal MRSA PCR negative - stop linezolid  Continue empiric Zosyn  Wound care    Atrial flutter ([facility])- (present on admission)  Assessment & Plan  Rate control  Echo with normal LV systolic function and reduced RV systolic function  Optimize potassium and magnesium  Developed bradycardia with midodrine - midodrine discontinued    Primary hypertension- (present on admission)  Assessment & Plan  Hold lisinopril with JULIETA and hypotension  Hold metoprolol tartrate with hypotension    JULIETA (acute kidney injury) ([facility])- (present on admission)  Assessment & Plan  Due to intravascular volume depletion  Renal ultrasound with atrophic kidneys and cortical thinning consistent with CKD and no evidence of hydronephrosis  Bicarbonate drip  Monitor renal function and urine output  Avoid nephrotoxins and renal dose medications  Renal function improving    Type 2 diabetes mellitus with hyperglycemia, with long-term current use of insulin ([facility])  Assessment & Plan  Glycohemoglobin 9.8  Sliding scale insulin    Class 3 severe obesity in adult ([facility])- (present on admission)  Assessment & Plan          VTE:  Heparin  Ulcer: Not Indicated  Lines: Central Line  Ongoing indication addressed and [place] Catheter  Ongoing indication addressed    I have performed a physical exam and reviewed and updated ROS and Plan today (1/6/2022). In review of yesterday's note (1/5/2022), there are no changes except as documented above.     I have assessed and reassessed her respiratory status,  blood pressure, hemodynamics, cardiovascular status with titration of norepinephrine, neurologic status and urine output.  She is at increased risk for worsening respiratory, cardiovascular and renal system dysfunction.    Discussed patient condition and risk of morbidity and/or mortality with RN, RT, Pharmacy, Charge nurse / hot rounds and QA team     The patient remains critically ill.  Critical care time = 40 minutes in directly providing and coordinating critical care and extensive data review.  No time overlap and excludes procedures.    Jeovany Mario MD  Pulmonary and Critical Care Medicine

## 2022-01-06 NOTE — PROGRESS NOTES
"Per chest xray, \"Interval placement of a PICC line which is satisfactory in position.\" PICC appropriate for use at this time. Miriam HAQUE notified.      "

## 2022-01-06 NOTE — PROGRESS NOTES
Pt bradycardic as low as 30's-40's intermittently and A flutter. Levophed titrated up as pt remains hypotensive. Stat EKG ordered. Pt appears very lethargic and drifts off to sleep during conversation. Dr Mario notified via telephone of situation. MD to come to bedside shortly.

## 2022-01-06 NOTE — CARE PLAN
Problem: Pain - Standard  Goal: Alleviation of pain or a reduction in pain to the patient’s comfort goal  Outcome: Progressing  Pt c/o pain bilateral legs where wounds located     Problem: Knowledge Deficit - Standard  Goal: Patient and family/care givers will demonstrate understanding of plan of care, disease process/condition, diagnostic tests and medications  Outcome: Progressing   Pt updated on POC.    Problem: Skin Integrity  Goal: Skin integrity is maintained or improved  Outcome: Not Met  Pt with numerous wounds, skin breakdown     Problem: Fall Risk  Goal: Patient will remain free from falls  Outcome: Progressing   The patient is in hospital bed in low, locked position. Call light at bedside.    Shift Goals  Clinical Goals: hemodynamic stability, improvement in BP  Patient Goals: sleep, not be in pain  Family Goals: COREY    Progress made toward(s) clinical / shift goals:  pts BP improvement noted    Patient is not progressing towards the following goals: pt very limited mobility self and very poor skin turgor/condition.

## 2022-01-06 NOTE — DOCUMENTATION QUERY
[facility]                                                                       Query Response Note      PATIENT:               GAETANO RONQUILLO  ACCT #:                  [id number]  MRN:                     [id number]  :                      1950  ADMIT DATE:       2022 5:15 PM  DISCH DATE:          RESPONDING  PROVIDER #:        [id number]           QUERY TEXT:    Chronic Kidney Disease (CKD) is documented in the  Progress Note.  Please specify the disease stage (includes probable or suspected)    Stages are defined by the [employer] as follows:  CKD Stage I  GFR >= 90 ml / min per 1.73 m2 and persistent albuminuria  CKD Stage 2 GFR between 60 and 89 with persistent albuminuria  CKD Stage 3a GFR between 45 - 59  CKD Stage 3b GFR between 30-44  CKD Stage 4 GFR between 15 and 29   CKD Stage 5 GFR between <15 or End Stage Renal Disease    The patient's Clinical Indicators include:   Critical Care Progress Note: renal ultrasound with atrophic kidneys and cortical thinning consistent with CKD and no evidence of hydronephrosis    GFR 18,  GFR 36  21 GFR 52  Risk Factors: age, htn, diabetes   Treatment: monitor renal function and urine output, avoid nephrotoxins and renal dose medications, bicarb gtt     Thank you,  Paula Amezcua RN, BSN  Clinical   Connect via [employer]  Options provided:   -- Chronic kidney disease Stage 1   -- Chronic kidney disease Stage 2   -- Chronic kidney disease Stage 3 unspecified   -- Chronic kidney disease Stage 3a   -- Chronic kidney disease Stage 3b   -- Chronic kidney disease Stage 4   -- Chronic kidney disease Stage 5   -- Chronic kidney disease Stage 5, requiring dialysis   -- End Stage Renal Disease   -- Unable to determine      Query created by: Paula Amezcua on 2022 7:13 AM    RESPONSE TEXT:    Unable to determine          Electronically  signed by:  SATURNINO MENDOZA MD 1/6/2022 7:16 AM

## 2022-01-07 LAB
ANION GAP SERPL CALC-SCNC: 8 MMOL/L (ref 7–16)
BACTERIA WND AEROBE CULT: ABNORMAL
BACTERIA WND AEROBE CULT: ABNORMAL
BASOPHILS # BLD AUTO: 0.1 % (ref 0–1.8)
BASOPHILS # BLD: 0.01 K/UL (ref 0–0.12)
BUN SERPL-MCNC: 37 MG/DL (ref 8–22)
CALCIUM SERPL-MCNC: 7.8 MG/DL (ref 8.5–10.5)
CHLORIDE SERPL-SCNC: 107 MMOL/L (ref 96–112)
CO2 SERPL-SCNC: 25 MMOL/L (ref 20–33)
CREAT SERPL-MCNC: 1.29 MG/DL (ref 0.5–1.4)
EOSINOPHIL # BLD AUTO: 0 K/UL (ref 0–0.51)
EOSINOPHIL NFR BLD: 0 % (ref 0–6.9)
ERYTHROCYTE [DISTWIDTH] IN BLOOD BY AUTOMATED COUNT: 53.8 FL (ref 35.9–50)
GLUCOSE BLD-MCNC: 218 MG/DL (ref 65–99)
GLUCOSE BLD-MCNC: 230 MG/DL (ref 65–99)
GLUCOSE BLD-MCNC: 253 MG/DL (ref 65–99)
GLUCOSE SERPL-MCNC: 220 MG/DL (ref 65–99)
GRAM STN SPEC: ABNORMAL
HCT VFR BLD AUTO: 27.2 % (ref 37–47)
HGB BLD-MCNC: 8.3 G/DL (ref 12–16)
IMM GRANULOCYTES # BLD AUTO: 0.07 K/UL (ref 0–0.11)
IMM GRANULOCYTES NFR BLD AUTO: 0.8 % (ref 0–0.9)
LYMPHOCYTES # BLD AUTO: 1.04 K/UL (ref 1–4.8)
LYMPHOCYTES NFR BLD: 12.5 % (ref 22–41)
MAGNESIUM SERPL-MCNC: 2.3 MG/DL (ref 1.5–2.5)
MCH RBC QN AUTO: 27.9 PG (ref 27–33)
MCHC RBC AUTO-ENTMCNC: 30.5 G/DL (ref 33.6–35)
MCV RBC AUTO: 91.6 FL (ref 81.4–97.8)
MONOCYTES # BLD AUTO: 0.38 K/UL (ref 0–0.85)
MONOCYTES NFR BLD AUTO: 4.6 % (ref 0–13.4)
NEUTROPHILS # BLD AUTO: 6.84 K/UL (ref 2–7.15)
NEUTROPHILS NFR BLD: 82 % (ref 44–72)
NRBC # BLD AUTO: 0 K/UL
NRBC BLD-RTO: 0 /100 WBC
PHOSPHATE SERPL-MCNC: 3.1 MG/DL (ref 2.5–4.5)
PLATELET # BLD AUTO: 212 K/UL (ref 164–446)
PMV BLD AUTO: 9.9 FL (ref 9–12.9)
POTASSIUM SERPL-SCNC: 4.7 MMOL/L (ref 3.6–5.5)
RBC # BLD AUTO: 2.97 M/UL (ref 4.2–5.4)
SIGNIFICANT IND 70042: ABNORMAL
SITE SITE: ABNORMAL
SODIUM SERPL-SCNC: 140 MMOL/L (ref 135–145)
SOURCE SOURCE: ABNORMAL
WBC # BLD AUTO: 8.3 K/UL (ref 4.8–10.8)

## 2022-01-07 PROCEDURE — 770020 HCHG ROOM/CARE - TELE (206)

## 2022-01-07 PROCEDURE — 80048 BASIC METABOLIC PNL TOTAL CA: CPT

## 2022-01-07 PROCEDURE — 700105 HCHG RX REV CODE 258: Performed by: HOSPITALIST

## 2022-01-07 PROCEDURE — 700111 HCHG RX REV CODE 636 W/ 250 OVERRIDE (IP): Performed by: HOSPITALIST

## 2022-01-07 PROCEDURE — 700105 HCHG RX REV CODE 258: Performed by: INTERNAL MEDICINE

## 2022-01-07 PROCEDURE — 85025 COMPLETE CBC W/AUTO DIFF WBC: CPT

## 2022-01-07 PROCEDURE — 82962 GLUCOSE BLOOD TEST: CPT | Mod: 91

## 2022-01-07 PROCEDURE — 84100 ASSAY OF PHOSPHORUS: CPT

## 2022-01-07 PROCEDURE — A9270 NON-COVERED ITEM OR SERVICE: HCPCS | Performed by: STUDENT IN AN ORGANIZED HEALTH CARE EDUCATION/TRAINING PROGRAM

## 2022-01-07 PROCEDURE — A9270 NON-COVERED ITEM OR SERVICE: HCPCS | Performed by: INTERNAL MEDICINE

## 2022-01-07 PROCEDURE — 83735 ASSAY OF MAGNESIUM: CPT

## 2022-01-07 PROCEDURE — 99233 SBSQ HOSP IP/OBS HIGH 50: CPT | Performed by: INTERNAL MEDICINE

## 2022-01-07 PROCEDURE — 99232 SBSQ HOSP IP/OBS MODERATE 35: CPT | Performed by: HOSPITALIST

## 2022-01-07 PROCEDURE — 700111 HCHG RX REV CODE 636 W/ 250 OVERRIDE (IP): Performed by: INTERNAL MEDICINE

## 2022-01-07 PROCEDURE — 700102 HCHG RX REV CODE 250 W/ 637 OVERRIDE(OP): Performed by: INTERNAL MEDICINE

## 2022-01-07 PROCEDURE — 700102 HCHG RX REV CODE 250 W/ 637 OVERRIDE(OP): Performed by: STUDENT IN AN ORGANIZED HEALTH CARE EDUCATION/TRAINING PROGRAM

## 2022-01-07 RX ORDER — HYDROMORPHONE HYDROCHLORIDE 1 MG/ML
.25-.5 INJECTION, SOLUTION INTRAMUSCULAR; INTRAVENOUS; SUBCUTANEOUS EVERY 4 HOURS PRN
Status: DISCONTINUED | OUTPATIENT
Start: 2022-01-07 | End: 2022-01-08

## 2022-01-07 RX ADMIN — HYDROCORTISONE SODIUM SUCCINATE 100 MG: 100 INJECTION, POWDER, FOR SOLUTION INTRAMUSCULAR; INTRAVENOUS at 21:59

## 2022-01-07 RX ADMIN — NYSTATIN: 100000 POWDER TOPICAL at 17:21

## 2022-01-07 RX ADMIN — ASPIRIN 81 MG: 81 TABLET, CHEWABLE ORAL at 06:48

## 2022-01-07 RX ADMIN — INSULIN HUMAN 6 UNITS: 100 INJECTION, SOLUTION PARENTERAL at 00:58

## 2022-01-07 RX ADMIN — OXYCODONE 5 MG: 5 TABLET ORAL at 17:23

## 2022-01-07 RX ADMIN — ATORVASTATIN CALCIUM 40 MG: 40 TABLET, FILM COATED ORAL at 17:21

## 2022-01-07 RX ADMIN — PREDNISOLONE ACETATE 1 DROP: 10 SUSPENSION/ DROPS OPHTHALMIC at 08:48

## 2022-01-07 RX ADMIN — INSULIN HUMAN 6 UNITS: 100 INJECTION, SOLUTION PARENTERAL at 12:13

## 2022-01-07 RX ADMIN — DABIGATRAN ETEXILATE MESYLATE 150 MG: 150 CAPSULE ORAL at 06:48

## 2022-01-07 RX ADMIN — DABIGATRAN ETEXILATE MESYLATE 150 MG: 150 CAPSULE ORAL at 17:21

## 2022-01-07 RX ADMIN — LEVOTHYROXINE SODIUM 100 MCG: 0.1 TABLET ORAL at 06:48

## 2022-01-07 RX ADMIN — PREDNISOLONE ACETATE 1 DROP: 10 SUSPENSION/ DROPS OPHTHALMIC at 20:02

## 2022-01-07 RX ADMIN — INSULIN HUMAN 6 UNITS: 100 INJECTION, SOLUTION PARENTERAL at 06:54

## 2022-01-07 RX ADMIN — CEFTRIAXONE SODIUM 2 G: 2 INJECTION, POWDER, FOR SOLUTION INTRAMUSCULAR; INTRAVENOUS at 08:48

## 2022-01-07 RX ADMIN — NYSTATIN: 100000 POWDER TOPICAL at 06:53

## 2022-01-07 RX ADMIN — PREDNISOLONE ACETATE 1 DROP: 10 SUSPENSION/ DROPS OPHTHALMIC at 01:30

## 2022-01-07 RX ADMIN — INSULIN GLARGINE 15 UNITS: 100 INJECTION, SOLUTION SUBCUTANEOUS at 16:53

## 2022-01-07 RX ADMIN — INSULIN HUMAN 9 UNITS: 100 INJECTION, SOLUTION PARENTERAL at 16:53

## 2022-01-07 RX ADMIN — HYDROCORTISONE SODIUM SUCCINATE 100 MG: 100 INJECTION, POWDER, FOR SOLUTION INTRAMUSCULAR; INTRAVENOUS at 13:14

## 2022-01-07 RX ADMIN — CIPROFLOXACIN HYDROCHLORIDE 1 DROP: 3 SOLUTION/ DROPS OPHTHALMIC at 20:02

## 2022-01-07 RX ADMIN — HYDROCORTISONE SODIUM SUCCINATE 100 MG: 100 INJECTION, POWDER, FOR SOLUTION INTRAMUSCULAR; INTRAVENOUS at 06:49

## 2022-01-07 RX ADMIN — INSULIN HUMAN 6 UNITS: 100 INJECTION, SOLUTION PARENTERAL at 23:47

## 2022-01-07 RX ADMIN — OXYCODONE 10 MG: 5 TABLET ORAL at 04:10

## 2022-01-07 RX ADMIN — PIPERACILLIN AND TAZOBACTAM 4.5 G: 4; .5 INJECTION, POWDER, LYOPHILIZED, FOR SOLUTION INTRAVENOUS; PARENTERAL at 06:00

## 2022-01-07 RX ADMIN — CIPROFLOXACIN HYDROCHLORIDE 1 DROP: 3 SOLUTION/ DROPS OPHTHALMIC at 08:48

## 2022-01-07 RX ADMIN — PREDNISOLONE ACETATE 1 DROP: 10 SUSPENSION/ DROPS OPHTHALMIC at 13:14

## 2022-01-07 RX ADMIN — CIPROFLOXACIN HYDROCHLORIDE 1 DROP: 3 SOLUTION/ DROPS OPHTHALMIC at 17:21

## 2022-01-07 RX ADMIN — CIPROFLOXACIN HYDROCHLORIDE 1 DROP: 3 SOLUTION/ DROPS OPHTHALMIC at 13:14

## 2022-01-07 ASSESSMENT — ENCOUNTER SYMPTOMS
SORE THROAT: 0
EYE PAIN: 0
VOMITING: 0
ABDOMINAL PAIN: 0
DIZZINESS: 0
EYE DISCHARGE: 0
NAUSEA: 0
STRIDOR: 0
COUGH: 0
LOSS OF CONSCIOUSNESS: 0
SEIZURES: 0
HEMOPTYSIS: 0
SHORTNESS OF BREATH: 0
BRUISES/BLEEDS EASILY: 0
EYE REDNESS: 0
DIAPHORESIS: 0
PALPITATIONS: 0
MYALGIAS: 0
ORTHOPNEA: 0
HALLUCINATIONS: 0
DIARRHEA: 0
SPUTUM PRODUCTION: 0
FEVER: 0
CHILLS: 0
HEADACHES: 0

## 2022-01-07 ASSESSMENT — FIBROSIS 4 INDEX
FIB4 SCORE: 1.29
FIB4 SCORE: 1.29

## 2022-01-07 NOTE — CARE PLAN
The patient is Watcher - Medium risk of patient condition declining or worsening    Shift Goals  Clinical Goals: hemodynamic stability  Patient Goals: rest, comfort  Family Goals: COREY    Progress made toward(s) clinical / shift goals: Vasopressor held since 1300. Medicated per MAR for pain, wound care provided per orders.   Problem: Pain - Standard  Goal: Alleviation of pain or a reduction in pain to the patient’s comfort goal  Outcome: Progressing     Problem: Knowledge Deficit - Standard  Goal: Patient and family/care givers will demonstrate understanding of plan of care, disease process/condition, diagnostic tests and medications  Outcome: Progressing     Problem: Skin Integrity  Goal: Skin integrity is maintained or improved  Outcome: Progressing

## 2022-01-07 NOTE — THERAPY
"Occupational Therapy   Initial Evaluation     Patient Name: Beverley Dejesus  Age:  71 y.o., Sex:  female  Medical Record #: [id number]  Today's Date: 1/6/2022     Precautions: Fall Risk  Comments: BLE wounds    Assessment  Patient is 71 y.o. female admitted following GLF, hospital course complicated by severe sepsis with JULIETA requiring ICU transfer. Pt presents to OT eval limited by generalized weakness/fatigue that impacts her independence in ADLs. Pt reports PLOF complete independence for ADLs and IADLs however today pt was unable to complete hygiene following BM without maxA, pt reports she lives with roommates, none of whom she would be comfortable asking for assistance with ADLs or IADLs. Recommend post-acute placement at this time to increase activity tolerance and independence in basic self-care ADLs prior to DC home.     Plan    Recommend Occupational Therapy 3 times per week until therapy goals are met for the following treatments:  Adaptive Equipment, Self Care/Activities of Daily Living, Therapeutic Activities and Therapeutic Exercises.    DC Equipment Recommendations: Tub / Shower Seat,Grab Bar(s) by Toilet, Toilet aid  Discharge Recommendations: Recommend post-acute placement for additional occupational therapy services prior to discharge home     Subjective    \"Sometimes I can and sometimes I can't.\"  (pts response regarding completion of toileting hygiene at home)     Objective     01/06/22 1517   Prior Living Situation   Prior Services None   Housing / Facility Mobile Home   Steps Into Home 5   Rail Both Rail (Steps into Home)   Bathroom Set up Bathtub / Shower Combination;Grab Bars   Equipment Owned Front-Wheel Walker;Grab Bar(s) In Tub / Shower;Single Point Cane   Lives with - Patient's Self Care Capacity Unrelated Adult   Comments lives with 4 female roommates but reports she would not be comfortable asking them for any type of assistance   Prior Level of ADL Function   Self Feeding Independent "   Grooming / Hygiene Independent   Bathing Independent   Dressing Independent   Toileting Independent   Comments reports PLOF I but dressing/self care post toileting has been getting more challenging   Prior Level of IADL Function   Comments reports PLOF I but shopping had becoming more difficult   Precautions   Precautions Fall Risk   Comments BLE wounds   Pain 0 - 10 Group   Therapist Pain Assessment Nurse Notified;Post Activity Pain Same as Prior to Activity;0   Cognition    Level of Consciousness Alert   Balance Assessment   Sitting Balance (Static) Fair   Sitting Balance (Dynamic) Fair   Standing Balance (Static) Fair   Standing Balance (Dynamic) Fair -   Weight Shift Sitting Fair   Weight Shift Standing Fair   Comments standing with FWW   Bed Mobility    Supine to Sit Maximal Assist   Sit to Supine Moderate Assist   Scooting Minimal Assist   ADL Assessment   Eating Modified Independent   Grooming Supervision;Seated   Upper Body Dressing Supervision   Lower Body Dressing Maximal Assist   Toileting Maximal Assist   How much help from another person does the patient currently need...   6 Clicks Daily Activity Score 17   Functional Mobility   Sit to Stand Minimal Assist   Bed, Chair, Wheelchair Transfer Minimal Assist   Toilet Transfers Minimal Assist   Transfer Method Stand Pivot   Mobility pivot txf with FWW   ICU Target Mobility Level   ICU Mobility - Targeted Level Level 3B   Activity Tolerance   Sitting in Chair 10min   Sitting Edge of Bed 5min   Standing 3min   Patient / Family Goals   Patient / Family Goal #1 home asap   Short Term Goals   Short Term Goal # 1 pt will demo functional transfers with SPV   Short Term Goal # 2 pt will complete toileting ADL at SPV level   Short Term Goal # 3 pt will tolerate >5min standing grooming at SPV level   Education Group   Education Provided Role of Occupational Therapist;Home Safety;Activities of Daily Living   Role of Occupational Therapist Patient Response  Patient;Acceptance;Explanation;Verbal Demonstration   Home Safety Patient Response Patient;Acceptance;Explanation;Verbal Demonstration   ADL Patient Response Patient;Acceptance;Explanation;Verbal Demonstration;Action Demonstration   Problem List   Problem List Decreased Active Daily Living Skills;Decreased Homemaking Skills;Decreased Activity Tolerance;Decreased Functional Mobility;Impaired Cognitive Function;Impaired Postural Control / Balance

## 2022-01-07 NOTE — HOSPITAL COURSE
Ms. Beverley Dejesus has a PMHx of DMT2, atrial flutter, and hypertension patient presents the emergency room on 1/4/2022 with a ground-level fall.  CT scan of the head and C-spine did not reveal any acute pathology.  The patient was assessed to have found to have a significant cellulitis as well as dehydration with renal failure.  She was admitted to the hospital for management of these.     On 1/5/2022 she was noted to have ongoing hypotension despite fluid resuscitation, she was transferred to the ICU for septic shock.  Patient has been treated with broad-spectrum antibiotics and stress dose steroids.  Transferred out of the ICU on 1/7/2022 to telemetry.  Patient still noted BLE pain and existing wounds on ankles were examined. Patient's BP improved with steroids. Patient transferred to the medical floor for further management of care and work on placement.  Wound culture grew Klebsiella oxytoca/Raoultella ornithinolytica, finished 7 day course of ceftriaxone 1/13/2021. Wound care continue to manage patient's wounds on the floor.

## 2022-01-07 NOTE — PROGRESS NOTES
Assumed care of pt. Bedside report received from Miriam HAQUE. Pt was updated on plan of care. Call light, phone and personal belongings in reach. Bed alarm on and working properly, bed in lowest position, and locked.

## 2022-01-07 NOTE — PROGRESS NOTES
Critical Care Progress Note    Date of admission  1/4/2022    Chief Complaint  71 y.o. female admitted 1/4/2022 with weakness and a GLF.  She had severe sepsis with JULIETA.  She has a history of DM 2, atrial flutter on Pradaxa, HTN, neuropathy, dyslipidemia and hypothyroidism.    Hospital Course      1/6 -    developed bradycardia with midodrine.  Start hydrocortisone for relative adrenal insufficiency.  Continue bicarbonate drip.  Titrating norepinephrine.  1/7 -    change Zosyn to Rocephin.  Continue hydrocortisone and start tapering tomorrow.  Okay to transfer out of ICU.  Stop IV fluids.      Interval Problem Update  Reviewed last 24 hour events:      SR  Stop IV fluids  Off NE      She is feeling much better.  She has no cough, sputum production, hemoptysis, shortness of breath, angina, palpitations, syncope, abdominal pain, nausea, vomiting or diarrhea.      Review of Systems  Review of Systems   Constitutional: Negative for chills, diaphoresis and fever.   HENT: Negative for ear discharge, ear pain, nosebleeds and sore throat.    Eyes: Negative for pain, discharge and redness.   Respiratory: Negative for cough, hemoptysis, sputum production, shortness of breath and stridor.    Cardiovascular: Negative for chest pain, palpitations and orthopnea.   Gastrointestinal: Negative for abdominal pain, diarrhea, nausea and vomiting.   Genitourinary: Negative for dysuria, hematuria and urgency.   Musculoskeletal: Negative for myalgias.   Neurological: Negative for seizures, loss of consciousness and headaches.   Endo/Heme/Allergies: Does not bruise/bleed easily.   Psychiatric/Behavioral: Negative for hallucinations and suicidal ideas.        Vital Signs for last 24 hours   Pulse:  [] 72  Resp:  [8-74] 21  BP: ()/() 114/54  SpO2:  [80 %-100 %] 99 %    Hemodynamic parameters for last 24 hours       Respiratory Information for the last 24 hours       Physical Exam   Physical Exam  Constitutional:        General: She is not in acute distress.     Appearance: She is obese. She is not toxic-appearing or diaphoretic.   HENT:      Head: Normocephalic.      Nose: Nose normal.      Mouth/Throat:      Pharynx: Oropharynx is clear.   Eyes:      Pupils: Pupils are equal, round, and reactive to light.   Cardiovascular:      Comments: Atrial flutter  Pulmonary:      Effort: Pulmonary effort is normal. No respiratory distress.      Breath sounds: No stridor. No wheezing or rales.   Abdominal:      General: There is no distension.      Tenderness: There is no abdominal tenderness. There is no guarding.   Musculoskeletal:         General: Normal range of motion.      Cervical back: Normal range of motion and neck supple.   Skin:     Comments: Wounds on lower extremities.  See photos.   Neurological:      General: No focal deficit present.      Mental Status: She is alert and oriented to person, place, and time.      Cranial Nerves: No cranial nerve deficit.         Medications  Current Facility-Administered Medications   Medication Dose Route Frequency Provider Last Rate Last Admin   • cefTRIAXone (Rocephin) 2 g in  mL IVPB  2 g Intravenous Q24HRS Jeovany Mario M.D.       • hydrocortisone sodium succinate PF (Solu-CORTEF) 100 MG injection 100 mg  100 mg Intravenous Q8HRS Jeovany Mario M.D.   100 mg at 01/07/22 0649   • oxyCODONE immediate-release (ROXICODONE) tablet 5-10 mg  5-10 mg Oral Q4HRS PRN Ariadna Pride M.D.   10 mg at 01/07/22 0410   • nystatin (MYCOSTATIN) powder   Topical BID Candida Avila M.D.   Given at 01/07/22 0653   • HYDROmorphone (Dilaudid) injection 0.25-0.5 mg  0.25-0.5 mg Intravenous Q4HRS PRN Jeovany Mario M.D.   0.5 mg at 01/06/22 1653   • MD Alert...ICU Electrolyte Replacement per Pharmacy   Other PHARMACY TO DOSE Jeovany Mario M.D.       • aspirin (ASA) chewable tab 81 mg  81 mg Oral DAILY Ben Butler M.D.   81 mg at 01/07/22 0648   • atorvastatin  (LIPITOR) tablet 40 mg  40 mg Oral Q EVENING Ben Butler M.D.   40 mg at 01/06/22 1717   • dabigatran (PRADAXA) capsule 150 mg  150 mg Oral BID Ben Butler M.D.   150 mg at 01/07/22 0648   • levothyroxine (SYNTHROID) tablet 100 mcg  100 mcg Oral AM ES Ben Butler M.D.   100 mcg at 01/07/22 0648   • ciprofloxacin (CILOXIN) 0.3 % ophthalmic solution 1 Drop  1 Drop Both Eyes 4X/DAY Ben Butler M.D.   1 Drop at 01/06/22 2130   • prednisoLONE acetate (PRED FORTE) 1 % ophthalmic suspension 1 Drop  1 Drop Left Eye 4XDAY Ben Butler M.D.   1 Drop at 01/07/22 0130   • insulin regular (HumuLIN R,NovoLIN R) injection  3-15 Units Subcutaneous Q6HRS Ben Butler M.D.   6 Units at 01/07/22 0654   • dextrose 50% (D50W) injection 50 mL  50 mL Intravenous Q15 MIN PRN Ben Butler M.D.       • insulin GLARGINE (Lantus,Semglee) injection  15 Units Subcutaneous Q EVENING Ben Butler M.D.   15 Units at 01/06/22 1712       Fluids    Intake/Output Summary (Last 24 hours) at 1/7/2022 0705  Last data filed at 1/7/2022 0400  Gross per 24 hour   Intake 3390.87 ml   Output 1100 ml   Net 2290.87 ml       Laboratory          Recent Labs     01/05/22  1125 01/05/22  1125 01/05/22  2220 01/06/22  0430 01/07/22  0545   SODIUM 142   < > 142 144 140   POTASSIUM 5.9*   < > 5.4 5.3 4.7   CHLORIDE 118*   < > 116* 115* 107   CO2 10*   < > 17* 19* 25   BUN 88*   < > 68* 57* 37*   CREATININE 1.58*   < > 1.41* 1.44* 1.29   MAGNESIUM 2.1  --   --  1.7 2.3   PHOSPHORUS  --   --   --  2.7 3.1   CALCIUM 9.0   < > 7.9* 8.3* 7.8*    < > = values in this interval not displayed.     Recent Labs     01/04/22  1815 01/05/22  0430 01/05/22 1125 01/05/22 1125 01/05/22 2220 01/06/22 0430 01/07/22  0545   ALTSGPT 7  --  10  --   --   --   --    ASTSGOT 6*  --  12  --   --   --   --    ALKPHOSPHAT 109*  --  92  --   --   --   --    TBILIRUBIN 0.5  --  0.6  --   --   --   --    GLUCOSE 354*   < > 154*   < > 176* 160* 220*    < > = values  in this interval not displayed.     Recent Labs     01/04/22  1815 01/04/22  1815 01/05/22  0608 01/05/22  1125 01/06/22  0430 01/07/22  0545   WBC 11.5*   < > 10.4  --  7.9 8.3   NEUTSPOLYS 81.90*   < > 79.30*  --  68.50 82.00*   LYMPHOCYTES 10.00*   < > 11.60*  --  20.50* 12.50*   MONOCYTES 6.00   < > 6.00  --  7.10 4.60   EOSINOPHILS 0.90   < > 1.90  --  2.40 0.00   BASOPHILS 0.30   < > 0.30  --  0.40 0.10   ASTSGOT 6*  --   --  12  --   --    ALTSGPT 7  --   --  10  --   --    ALKPHOSPHAT 109*  --   --  92  --   --    TBILIRUBIN 0.5  --   --  0.6  --   --     < > = values in this interval not displayed.     Recent Labs     01/05/22  0608 01/06/22  0430 01/07/22  0545   RBC 3.46* 3.07* 2.97*   HEMOGLOBIN 9.8* 8.8* 8.3*   HEMATOCRIT 32.9* 28.6* 27.2*   PLATELETCT 316 235 212       Imaging  None    Assessment/Plan  * Severe sepsis with septic shock (HCC)  Assessment & Plan  Severe sepsis present on admission  Shock developed after admission  Associated acute kidney injury  Skin and soft tissue source  Continue empiric source directed intravenous antimicrobial chemotherapy  Shock has resolved  Sepsis is improved    Relative adrenal insufficiency (HCC)  Assessment & Plan  Cortisol inappropriately low at 10.3  Continue hydrocortisone, 100 mg IV every 8 hours  I recommend that beginning tomorrow (1/8) that her hydrocortisone dose be tapered by 50% daily until off    Lower extremity cellulitis  Assessment & Plan  Wound culture with Klebsiella oxytoca  Nasal MRSA PCR negative  Change Zosyn to Rocephin  Wound care    Atrial flutter (HCC)- (present on admission)  Assessment & Plan  Rate control  Echo with normal LV systolic function and reduced RV systolic function  Optimize potassium and magnesium    Primary hypertension- (present on admission)  Assessment & Plan  Continue to hold lisinopril and metoprolol tartrate and resume when clinically appropriate    JULIETA (acute kidney injury) (HCC)- (present on  admission)  Assessment & Plan  Due to intravascular volume depletion  Renal ultrasound with atrophic kidneys and cortical thinning consistent with CKD and no evidence of hydronephrosis  Stop bicarbonate drip  Monitor renal function and urine output  Avoid nephrotoxins and renal dose medications  Renal function improving    Type 2 diabetes mellitus with hyperglycemia, with long-term current use of insulin ([facility])  Assessment & Plan  Glycohemoglobin 9.8  Sliding scale insulin    Class 3 severe obesity in adult (HCC)- (present on admission)  Assessment & Plan          VTE:  Heparin  Ulcer: Not Indicated  Lines: Central Line  Ongoing indication addressed and [place] Catheter  Ongoing indication addressed    I have performed a physical exam and reviewed and updated ROS and Plan today (1/7/2022). In review of yesterday's note (1/6/2022), there are no changes except as documented above.     OK to transfer out of ICU.  [facility] will sign off.  Please call if you have any questions.    Discussed patient condition and risk of morbidity and/or mortality with RN, RT, Pharmacy, Charge nurse / hot rounds and QA team     Jeovany Mario MD  Pulmonary and Critical Care Medicine

## 2022-01-07 NOTE — CARE PLAN
The patient is Watcher - Medium risk of patient condition declining or worsening    Shift Goals  Clinical Goals: hemodynamic stability  Patient Goals: rest, comfort  Family Goals: COREY    Progress made toward(s) clinical / shift goals:    Problem: Skin Integrity  Goal: Skin integrity is maintained or improved  Outcome: Not Progressing       Patient is not progressing towards the following goals:      Problem: Skin Integrity  Goal: Skin integrity is maintained or improved  Outcome: Not Progressing

## 2022-01-07 NOTE — PROGRESS NOTES
4 Eyes Skin Assessment Completed by GARLAND Hoyt and GARLAND Spangler.    Head WDL  Ears Redness and Blanching  Nose WDL  Mouth WDL  Neck WDL  Breast/Chest Redness, Blanching, Rash and Excoriation, pt has known fungal wounds under breast and armpits. Wound consult ordered, dry flow and nystatin applied   Shoulder Blades Redness and Blanching  Spine WDL  (R) Arm/Elbow/Hand Redness, Blanching and Rash,   (L) Arm/Elbow/Hand Redness, Blanching and Rash  Abdomen Redness, Blanching, Rash and Bruising, pt has open skin on abdomen see photo   Groin Redness, Blanching and Rash. Pt has known irritated skin under pannus and groin wound consult in, dry flow and nystatin ordered   Scrotum/Coccyx/Buttocks Redness and Blanching, bruising  (R) Leg Redness, Blanching, Bruising, Swelling and Abrasion, pt has known wounds on legs. Wound care consulted,   (L) Leg Redness, Blanching, Bruising, Swelling and Abrasion, pt has known wounds on legs wound care consulted  (R) Heel/Foot/Toe Redness, Blanching and Swelling  (L) Heel/Foot/Toe Redness, Blanching and Swelling              Devices In Places Tele Box, Blood Pressure Cuff and Central Line      Interventions In Place Gray Ear Foams, Sacral Mepilex, TAP System, Pillows, Q2 Turns, Barrier Cream, Dri-Hari Pads, Heels Loaded W/Pillows and Pressure Redistribution Mattress    Possible Skin Injury yes    Pictures Uploaded Into Epic Yes  Wound Consult Placed Yes  RN Wound Prevention Protocol Ordered Yes

## 2022-01-07 NOTE — THERAPY
Physical Therapy   Initial Evaluation     Patient Name: Beverley Dejesus  Age:  71 y.o., Sex:  female  Medical Record #: [id number]  Today's Date: 1/6/2022     Precautions  Precautions: Fall Risk    Assessment  Ms. Dejesus is a 70 y/o female who presents to acute secondary to weakness and fall, found to have severe sepsis and cellulitis. Was transferred to ICU for hypotension and bradycardia. Pt very fearful of mobility due to LE wounds. Significant assist required to EOB, reports has been sleeping in recliner. Used lateral scoot to commode as fearful of fully WB on feet. Once finished provided with FWW and was able to perform STS with coaching from therapist. Pleasantly surprised that it did not hurt her feet and only light assist provided by therapist. Able to ambulate a few feet to bed. Educated on importance of increased out of bed time and standing on feet daily. Based on today's session recommend post acute placement, however anticipate quick improvements now that she is ambulatory.    Plan    Recommend Physical Therapy 3 times per week until therapy goals are met for the following treatments:  Bed Mobility, Gait Training, Stair Training, Therapeutic Activities and Therapeutic Exercises    DC Equipment Recommendations: Unable to determine at this time  Discharge Recommendations: Recommend post-acute placement for additional physical therapy services prior to discharge home          Objective       01/06/22 1516   Prior Living Situation   Prior Services None   Housing / Facility 1 Story House   Steps Into Home 5   Rail Both Rail (Steps into Home)   Equipment Owned Front-Wheel Walker;Single Point Cane   Lives with - Patient's Self Care Capacity Unrelated Adult   Comments lives with roommates   Prior Level of Functional Mobility   Bed Mobility Independent   Transfer Status Independent   Ambulation Independent   Distance Ambulation (Feet)   (community ambulator)   Assistive Devices Used Single Point Cane   Stairs  Independent   Comments reports her FWW is too big and bulky. Also states she hasn't been sleeping in her bed, has been using recliner as this is easier with her wounds.   History of Falls   History of Falls Yes   Date of Last Fall   (reason for current admission)   Cognition    Level of Consciousness Alert   Passive ROM Lower Body   Passive ROM Lower Body WDL   Active ROM Lower Body    Active ROM Lower Body  WDL   Strength Lower Body   Lower Body Strength  X   Gross Strength Generalized Weakness, Equal Bilaterally   Comments 3/5 B LE   Sensation Lower Body   Lower Extremity Sensation   WDL   Balance Assessment   Sitting Balance (Static) Fair   Sitting Balance (Dynamic) Fair   Standing Balance (Static) Fair   Standing Balance (Dynamic) Fair -   Weight Shift Sitting Fair   Weight Shift Standing Fair   Comments FWW   Gait Analysis   Gait Level Of Assist Minimal Assist   Assistive Device Front Wheel Walker   Distance (Feet) 7   # of Times Distance was Traveled 1   Deviation Bradykinetic;Shuffled Gait   Weight Bearing Status no restrictions   Bed Mobility    Supine to Sit Maximal Assist   Sit to Supine Moderate Assist   Scooting Minimal Assist   Functional Mobility   Sit to Stand Minimal Assist   Bed, Chair, Wheelchair Transfer Minimal Assist   ICU Target Mobility Level   ICU Mobility - Targeted Level Level 3B   How much difficulty does the patient currently have...   Turning over in bed (including adjusting bedclothes, sheets and blankets)? 2   Sitting down on and standing up from a chair with arms (e.g., wheelchair, bedside commode, etc.) 1   Moving from lying on back to sitting on the side of the bed? 1   How much help from another person does the patient currently need...   Moving to and from a bed to a chair (including a wheelchair)? 3   Need to walk in a hospital room? 3   Climbing 3-5 steps with a railing? 1   6 clicks Mobility Score 11   Short Term Goals    Short Term Goal # 1 Pt will perform functional transfers  with SPV in 6 visits to increase independence   Short Term Goal # 2 Pt will ambulate 150ft with FWW and SPV in 6 visits to increase independence   Short Term Goal # 3 Pt will ascend/descend 5 steps with SPV in 6 visits to enter/exit home   Short Term Goal # 4 Pt will perform supine<> sit with min assist in 6 visits to get in/out of bed

## 2022-01-07 NOTE — PROGRESS NOTES
[facility] Medicine Daily Progress Note    Date of Service  1/7/2022    Chief Complaint  Beverley Dejesus is a 71 y.o. female admitted 1/4/2022 with fall and lower extremity wounds    Hospital Course  Ms Dejesus has past medical history that includes diabetes, atrial flutter, and hypertension patient presents the emergency room on 1/4/2022 with a ground-level fall.  CT scan of the head and C-spine did not reveal any acute pathology.  The patient was assessed to have found to have a significant left wrist cellulitis as well as dehydration with renal failure.  She was admitted to the hospital.  On 1/5/2022 she was noted to have ongoing hypotension despite fluid resuscitation, she was transferred to the ICU for septic shock.  Patient has been treated with broad-spectrum antibiotics and stress dose steroids.  Transferred out of the ICU on 1/7/2022    Interval Problem Update  Patient seen and examined on the telemetry floor, bedside RN present  Patient is overall feeling better than she was before, had a mild episode of confusion this morning which has completely resolved at this point  No shortness of breath, no chest pain  Complains of pain in her bilateral lower extremities  Dressings at her wound changed, wounds examined, she has severe pain when guaze is removed, she indicates that the sores on her legs were previously blisters, at this point they are all clean appearing open wounds  We discussed plans for ongoing antibiotics and wound care, also discussed that healing of these wounds would take some time    I have personally seen and examined the patient at bedside. I discussed the plan of care with patient, bedside RN and critical care.  I discussed ICU course with Dr. Dudley    Consultants/Specialty  critical care    Code Status  Full Code    Disposition  Patient is not medically cleared for discharge.   Anticipate discharge to to skilled nursing facility.  I have placed the appropriate orders for  post-discharge needs.    Review of Systems  Review of Systems   Constitutional: Negative for chills and malaise/fatigue.   Respiratory: Negative for cough, hemoptysis and sputum production.    Cardiovascular: Negative for chest pain, palpitations and orthopnea.   Gastrointestinal: Negative for nausea and vomiting.   Skin: Negative for itching and rash.   Neurological: Negative for dizziness.   All other systems reviewed and are negative.       Physical Exam  Pulse:  [] 72  Resp:  [8-74] 21  BP: ()/() 114/54  SpO2:  [80 %-100 %] 99 %    Physical Exam  Constitutional:       General: She is not in acute distress.     Appearance: Normal appearance. She is normal weight.   HENT:      Head: Normocephalic and atraumatic.      Right Ear: External ear normal.      Left Ear: External ear normal.      Nose: Nose normal.      Mouth/Throat:      Mouth: Mucous membranes are moist.      Pharynx: Oropharynx is clear.   Eyes:      Extraocular Movements: Extraocular movements intact.      Conjunctiva/sclera: Conjunctivae normal.      Pupils: Pupils are equal, round, and reactive to light.   Cardiovascular:      Rate and Rhythm: Normal rate and regular rhythm.      Pulses: Normal pulses.   Pulmonary:      Effort: Pulmonary effort is normal.      Breath sounds: Normal breath sounds.   Abdominal:      General: Abdomen is flat. Bowel sounds are normal.      Palpations: Abdomen is soft.   Musculoskeletal:         General: Normal range of motion.      Cervical back: Normal range of motion and neck supple.   Skin:     General: Skin is warm and dry.      Capillary Refill: Capillary refill takes less than 2 seconds.      Coloration: Skin is not jaundiced.   Neurological:      General: No focal deficit present.      Mental Status: She is alert and oriented to person, place, and time.      Cranial Nerves: No cranial nerve deficit.      Gait: Gait normal.   Psychiatric:         Mood and Affect: Mood normal.         Behavior:  Behavior normal.         Fluids    Intake/Output Summary (Last 24 hours) at 1/7/2022 0710  Last data filed at 1/7/2022 0400  Gross per 24 hour   Intake 3390.87 ml   Output 1100 ml   Net 2290.87 ml       Laboratory  Recent Labs     01/05/22  0608 01/06/22  0430 01/07/22  0545   WBC 10.4 7.9 8.3   RBC 3.46* 3.07* 2.97*   HEMOGLOBIN 9.8* 8.8* 8.3*   HEMATOCRIT 32.9* 28.6* 27.2*   MCV 95.1 93.2 91.6   MCH 28.3 28.7 27.9   MCHC 29.8* 30.8* 30.5*   RDW 55.1* 55.5* 53.8*   PLATELETCT 316 235 212   MPV 10.3 10.0 9.9     Recent Labs     01/05/22  2220 01/06/22  0430 01/07/22  0545   SODIUM 142 144 140   POTASSIUM 5.4 5.3 4.7   CHLORIDE 116* 115* 107   CO2 17* 19* 25   GLUCOSE 176* 160* 220*   BUN 68* 57* 37*   CREATININE 1.41* 1.44* 1.29   CALCIUM 7.9* 8.3* 7.8*                   Imaging  DX-CHEST-FOR LINE PLACEMENT Perform procedure in: Patient's Room   Final Result      1.  Interval placement of a PICC line which is satisfactory in position.      2.  Bilateral interstitial infiltrates.      IR-PICC LINE PLACEMENT W/ GUIDANCE > AGE 5   Final Result                  Ultrasound-guided PICC placement performed by qualified nursing staff as    above.          DX-CHEST-PORTABLE (1 VIEW)   Final Result      No acute cardiac or pulmonary abnormalities are identified. Lung volumes are low.      EC-ECHOCARDIOGRAM COMPLETE W/ CONT   Final Result      US-RENAL   Final Result      1.  No hydronephrosis   2.  Atrophic appearing kidneys bilaterally with cortical thinning   3.  Incidentally noted cholelithiasis      US-MIGEL SINGLE LEVEL BILAT   Final Result      CT-HEAD W/O   Final Result      1.  No acute intracranial abnormality is identified.   2.  There are periventricular and subcortical white matter changes present.  This finding is nonspecific and could be from previous small vessel ischemia, demyelination, or gliosis.         CT-CSPINE WITHOUT PLUS RECONS   Final Result      Negative for cervical spine fracture or subluxation            Assessment/Plan  * Lower extremity cellulitis  Assessment & Plan  Patient states that wounds on her legs started as blisters approximately 3 weeks prior to admission  Does not recall a specific injury or exposure to the area  Severe cellulitis, circumferential, involving both legs, resulting in septic shock  Cultures positive for Klebsiell oxytoca/[place] ornithiolytica   Continue ceftriaxone  Wound care  I expect the patient require an extended course of wound care on discharge from the hospital    Relative adrenal insufficiency (HCC)- (present on admission)  Assessment & Plan  Continue hydrocortisone  Critical care recommends that hydrocortisone be tapered by 50% daily starting 1/8/2022  I have written for a decreased dose starting tomorrow, dose will need to be tapered again 1/9/2022 if she remains stable    Atrial flutter ([facility])- (present on admission)  Assessment & Plan  Metoprolol, pradaxa    JULIETA (acute kidney injury) ([facility])- (present on admission)  Assessment & Plan  Likely prerenal due to dehydration  Improved with IV fluids  Avoid nephrotoxins    Severe sepsis with septic shock ([facility])- (present on admission)  Assessment & Plan  Septic shock has resolved    Fall from ground level- (present on admission)  Assessment & Plan  CT imaging at admission negative for significant injury  Fall likely related to infection/orthostasis  PT/OT    Class 3 severe obesity in adult ([facility])- (present on admission)  Assessment & Plan  Body mass index is 50.59 kg/m².    Primary hypertension- (present on admission)  Assessment & Plan  Currently normotensive  May need to restart antihypertensives as patient recovers    Type 2 diabetes mellitus with hyperglycemia, with long-term current use of insulin ([facility])- (present on admission)  Assessment & Plan  Uncontrolled with hyperglycemia, hemoglobin A1c is 9.8  Lantus/insulin sliding scale       VTE prophylaxis: therapeutic anticoagulation with Pradaxa

## 2022-01-07 NOTE — ASSESSMENT & PLAN NOTE
-Cortisol was 10 in the setting of septic shock that had required pressors  -Status post IV hydrocortisone that will change to oral hydrocortisone 20 mg daily on 1/8 and consider further taper based on her blood pressure and clinical condition.   -Will reassess for steroid taper in am once BP is maintained

## 2022-01-08 LAB
GLUCOSE BLD-MCNC: 163 MG/DL (ref 65–99)
GLUCOSE BLD-MCNC: 209 MG/DL (ref 65–99)
GLUCOSE BLD-MCNC: 219 MG/DL (ref 65–99)
GLUCOSE BLD-MCNC: 231 MG/DL (ref 65–99)
GLUCOSE BLD-MCNC: 231 MG/DL (ref 65–99)

## 2022-01-08 PROCEDURE — 700105 HCHG RX REV CODE 258: Performed by: INTERNAL MEDICINE

## 2022-01-08 PROCEDURE — A9270 NON-COVERED ITEM OR SERVICE: HCPCS | Performed by: INTERNAL MEDICINE

## 2022-01-08 PROCEDURE — A9270 NON-COVERED ITEM OR SERVICE: HCPCS | Performed by: STUDENT IN AN ORGANIZED HEALTH CARE EDUCATION/TRAINING PROGRAM

## 2022-01-08 PROCEDURE — 700111 HCHG RX REV CODE 636 W/ 250 OVERRIDE (IP): Performed by: HOSPITALIST

## 2022-01-08 PROCEDURE — 700102 HCHG RX REV CODE 250 W/ 637 OVERRIDE(OP): Performed by: INTERNAL MEDICINE

## 2022-01-08 PROCEDURE — 99233 SBSQ HOSP IP/OBS HIGH 50: CPT | Performed by: HOSPITALIST

## 2022-01-08 PROCEDURE — 770001 HCHG ROOM/CARE - MED/SURG/GYN PRIV*

## 2022-01-08 PROCEDURE — 700102 HCHG RX REV CODE 250 W/ 637 OVERRIDE(OP): Performed by: HOSPITALIST

## 2022-01-08 PROCEDURE — 700111 HCHG RX REV CODE 636 W/ 250 OVERRIDE (IP): Performed by: INTERNAL MEDICINE

## 2022-01-08 PROCEDURE — 82962 GLUCOSE BLOOD TEST: CPT

## 2022-01-08 PROCEDURE — 700102 HCHG RX REV CODE 250 W/ 637 OVERRIDE(OP): Performed by: STUDENT IN AN ORGANIZED HEALTH CARE EDUCATION/TRAINING PROGRAM

## 2022-01-08 PROCEDURE — A9270 NON-COVERED ITEM OR SERVICE: HCPCS | Performed by: HOSPITALIST

## 2022-01-08 RX ORDER — HYDROCORTISONE 5 MG/1
10 TABLET ORAL 2 TIMES DAILY
Status: DISCONTINUED | OUTPATIENT
Start: 2022-01-08 | End: 2022-01-13

## 2022-01-08 RX ADMIN — NYSTATIN: 100000 POWDER TOPICAL at 06:00

## 2022-01-08 RX ADMIN — PREDNISOLONE ACETATE 1 DROP: 10 SUSPENSION/ DROPS OPHTHALMIC at 03:08

## 2022-01-08 RX ADMIN — CIPROFLOXACIN HYDROCHLORIDE 1 DROP: 3 SOLUTION/ DROPS OPHTHALMIC at 13:10

## 2022-01-08 RX ADMIN — NYSTATIN: 100000 POWDER TOPICAL at 17:53

## 2022-01-08 RX ADMIN — HYDROCORTISONE SODIUM SUCCINATE 50 MG: 100 INJECTION, POWDER, FOR SOLUTION INTRAMUSCULAR; INTRAVENOUS at 13:08

## 2022-01-08 RX ADMIN — CEFTRIAXONE SODIUM 2 G: 2 INJECTION, POWDER, FOR SOLUTION INTRAMUSCULAR; INTRAVENOUS at 05:29

## 2022-01-08 RX ADMIN — CIPROFLOXACIN HYDROCHLORIDE 1 DROP: 3 SOLUTION/ DROPS OPHTHALMIC at 17:54

## 2022-01-08 RX ADMIN — PREDNISOLONE ACETATE 1 DROP: 10 SUSPENSION/ DROPS OPHTHALMIC at 20:03

## 2022-01-08 RX ADMIN — CIPROFLOXACIN HYDROCHLORIDE 1 DROP: 3 SOLUTION/ DROPS OPHTHALMIC at 09:13

## 2022-01-08 RX ADMIN — INSULIN HUMAN 6 UNITS: 100 INJECTION, SOLUTION PARENTERAL at 12:59

## 2022-01-08 RX ADMIN — INSULIN HUMAN 6 UNITS: 100 INJECTION, SOLUTION PARENTERAL at 23:19

## 2022-01-08 RX ADMIN — INSULIN HUMAN 6 UNITS: 100 INJECTION, SOLUTION PARENTERAL at 17:52

## 2022-01-08 RX ADMIN — OXYCODONE 10 MG: 5 TABLET ORAL at 09:12

## 2022-01-08 RX ADMIN — PREDNISOLONE ACETATE 1 DROP: 10 SUSPENSION/ DROPS OPHTHALMIC at 13:10

## 2022-01-08 RX ADMIN — HYDROCORTISONE SODIUM SUCCINATE 50 MG: 100 INJECTION, POWDER, FOR SOLUTION INTRAMUSCULAR; INTRAVENOUS at 05:29

## 2022-01-08 RX ADMIN — INSULIN GLARGINE 15 UNITS: 100 INJECTION, SOLUTION SUBCUTANEOUS at 17:52

## 2022-01-08 RX ADMIN — DABIGATRAN ETEXILATE MESYLATE 150 MG: 150 CAPSULE ORAL at 05:30

## 2022-01-08 RX ADMIN — HYDROCORTISONE 10 MG: 10 TABLET ORAL at 17:53

## 2022-01-08 RX ADMIN — ASPIRIN 81 MG: 81 TABLET, CHEWABLE ORAL at 05:29

## 2022-01-08 RX ADMIN — CIPROFLOXACIN HYDROCHLORIDE 1 DROP: 3 SOLUTION/ DROPS OPHTHALMIC at 20:43

## 2022-01-08 RX ADMIN — INSULIN HUMAN 3 UNITS: 100 INJECTION, SOLUTION PARENTERAL at 05:44

## 2022-01-08 RX ADMIN — ATORVASTATIN CALCIUM 40 MG: 40 TABLET, FILM COATED ORAL at 17:55

## 2022-01-08 RX ADMIN — PREDNISOLONE ACETATE 1 DROP: 10 SUSPENSION/ DROPS OPHTHALMIC at 09:13

## 2022-01-08 RX ADMIN — LEVOTHYROXINE SODIUM 100 MCG: 0.1 TABLET ORAL at 05:30

## 2022-01-08 RX ADMIN — DABIGATRAN ETEXILATE MESYLATE 150 MG: 150 CAPSULE ORAL at 17:53

## 2022-01-08 ASSESSMENT — ENCOUNTER SYMPTOMS
CHILLS: 0
COUGH: 0
SHORTNESS OF BREATH: 0
FEVER: 0

## 2022-01-08 NOTE — PROGRESS NOTES
[facility] Medicine Daily Progress Note    Date of Service  1/8/2022    Chief Complaint  Beverley Dejesus is a 71 y.o. female admitted 1/4/2022 with a ground level fall    Hospital Course  Ms Dejesus has past medical history that includes diabetes, atrial flutter, and hypertension patient presents the emergency room on 1/4/2022 with a ground-level fall.  CT scan of the head and C-spine did not reveal any acute pathology.  The patient was assessed to have found to have a significant left wrist cellulitis as well as dehydration with renal failure.  She was admitted to the hospital.  On 1/5/2022 she was noted to have ongoing hypotension despite fluid resuscitation, she was transferred to the ICU for septic shock and required IV pressors.  Patient has been treated with broad-spectrum antibiotics IV Zosyn then de-escalated to IV Rocephin and stress dose steroids.  She was transferred out of the ICU on 1/7/2022      Interval Problem Update  1/7 from Dr. Perkins: Patient seen and examined on the telemetry floor, bedside RN present  Patient is overall feeling better than she was before, had a mild episode of confusion this morning which has completely resolved at this point  No shortness of breath, no chest pain  Complains of pain in her bilateral lower extremities  Dressings at her wound changed, wounds examined, she has severe pain when guaze is removed, she indicates that the sores on her legs were previously blisters, at this point they are all clean appearing open wounds  We discussed plans for ongoing antibiotics and wound care, also discussed that healing of these wounds would take some time.  1/8: Ms. Dejesus was evaluated and examined on the tele floor. Her blood pressure has improved on IV hydrocortisone. Her legs were wrapped earlier by her RN. We had a long discussion about SNF for gaining strength and wound care. She is amenable to this.     I have personally seen and examined the patient at bedside. I discussed the  plan of care with bedside RN.    Consultants/Specialty  Critical care    Code Status  Full Code    Disposition  Patient is not medically cleared for discharge.   Anticipate discharge to to skilled nursing facility.  I have placed the appropriate orders for post-discharge needs.    Review of Systems  Review of Systems   Constitutional: Negative for chills and fever.   Respiratory: Negative for cough and shortness of breath.    Cardiovascular: Negative for chest pain.   Musculoskeletal:        Bilateral shin pain   All other systems reviewed and are negative.       Physical Exam  Temp:  [35.9 °C (96.6 °F)-36.8 °C (98.3 °F)] 36.7 °C (98.1 °F)  Pulse:  [63-97] 63  Resp:  [16-28] 16  BP: (102-132)/(56-88) 102/56  SpO2:  [92 %-99 %] 99 %    Physical Exam  Vitals and nursing note reviewed.   Constitutional:       General: She is not in acute distress.     Appearance: She is obese. She is not ill-appearing or toxic-appearing.   HENT:      Head: Normocephalic and atraumatic.      Mouth/Throat:      Mouth: Mucous membranes are moist.      Pharynx: Oropharynx is clear.   Eyes:      General: No scleral icterus.     Conjunctiva/sclera: Conjunctivae normal.   Cardiovascular:      Rate and Rhythm: Regular rhythm. Tachycardia present.   Abdominal:      General: There is no distension.      Tenderness: There is no abdominal tenderness.   Musculoskeletal:      Cervical back: Normal range of motion and neck supple.      Comments: Right arm PICC  bilat mild edema  bilat shins are covered   Neurological:      General: No focal deficit present.      Mental Status: She is oriented to person, place, and time.   Psychiatric:         Mood and Affect: Mood normal.         Behavior: Behavior normal.         Thought Content: Thought content normal.         Judgment: Judgment normal.         Fluids    Intake/Output Summary (Last 24 hours) at 1/8/2022 0852  Last data filed at 1/7/2022 1723  Gross per 24 hour   Intake 400 ml   Output 200 ml   Net  200 ml       Laboratory  Recent Labs     01/06/22  0430 01/07/22  0545   WBC 7.9 8.3   RBC 3.07* 2.97*   HEMOGLOBIN 8.8* 8.3*   HEMATOCRIT 28.6* 27.2*   MCV 93.2 91.6   MCH 28.7 27.9   MCHC 30.8* 30.5*   RDW 55.5* 53.8*   PLATELETCT 235 212   MPV 10.0 9.9     Recent Labs     01/05/22  2220 01/06/22  0430 01/07/22  0545   SODIUM 142 144 140   POTASSIUM 5.4 5.3 4.7   CHLORIDE 116* 115* 107   CO2 17* 19* 25   GLUCOSE 176* 160* 220*   BUN 68* 57* 37*   CREATININE 1.41* 1.44* 1.29   CALCIUM 7.9* 8.3* 7.8*                   Imaging  DX-CHEST-FOR LINE PLACEMENT Perform procedure in: Patient's Room   Final Result      1.  Interval placement of a PICC line which is satisfactory in position.      2.  Bilateral interstitial infiltrates.      IR-PICC LINE PLACEMENT W/ GUIDANCE > AGE 5   Final Result                  Ultrasound-guided PICC placement performed by qualified nursing staff as    above.          DX-CHEST-PORTABLE (1 VIEW)   Final Result      No acute cardiac or pulmonary abnormalities are identified. Lung volumes are low.      EC-ECHOCARDIOGRAM COMPLETE W/ CONT   Final Result      US-RENAL   Final Result      1.  No hydronephrosis   2.  Atrophic appearing kidneys bilaterally with cortical thinning   3.  Incidentally noted cholelithiasis      US-MIGEL SINGLE LEVEL BILAT   Final Result      CT-HEAD W/O   Final Result      1.  No acute intracranial abnormality is identified.   2.  There are periventricular and subcortical white matter changes present.  This finding is nonspecific and could be from previous small vessel ischemia, demyelination, or gliosis.         CT-CSPINE WITHOUT PLUS RECONS   Final Result      Negative for cervical spine fracture or subluxation           Assessment/Plan  * Lower extremity cellulitis  Assessment & Plan  Patient states that wounds on her legs started as blisters approximately 3 weeks prior to admission  Severe cellulitis, circumferential, involving both legs, resulting in septic  shock  Wound cultures positive for Klebsiell oxytoca/Raoultell ornithiolytica   Continue IV ceftriaxone for total of 10 days of antibiotics  Wound care  SNF referral placed      Relative adrenal insufficiency (HCC)- (present on admission)  Assessment & Plan  Cortisol was 10 in the setting of septic shock that had required pressors  Status post IV hydrocortisone that will change to oral hydrocortisone 20 mg daily on 1/8 and consider further taper based on her blood pressure and clinical condition.       Severe sepsis with septic shock ([facility])- (present on admission)  Assessment & Plan  Septic shock has resolved    Fall from ground level- (present on admission)  Assessment & Plan  CT imaging at admission negative for significant injury  Fall likely related to infection/orthostasis  PT/OT    Atrial flutter ([facility])- (present on admission)  Assessment & Plan  Metoprolol, pradaxa    Class 3 severe obesity in adult ([facility])- (present on admission)  Assessment & Plan  BMI 51    Primary hypertension- (present on admission)  Assessment & Plan  Currently normotensive  May need to restart Lisinopril and Metoprolol as her blood pressure recovers after sepsis    JULIETA (acute kidney injury) ([facility])- (present on admission)  Assessment & Plan  Likely prerenal due to dehydration  Improved with IV fluids      Type 2 diabetes mellitus with hyperglycemia, with long-term current use of insulin ([facility])- (present on admission)  Assessment & Plan  Uncontrolled with hyperglycemia, hemoglobin A1c is 9.8  Glargine 15 units daily and sliding scale  Once she is off steroids, she may not require supplemental insulin and may be able to transition to orals         VTE prophylaxis: Pradaxa    I have performed a physical exam and reviewed and updated ROS and Plan today (1/8/2022). In review of yesterday's note (1/7/2022), there are no changes except as documented above.

## 2022-01-08 NOTE — PROGRESS NOTES
Monitor Summary  Aflutter/ Afib with rare PVCs  62-98 with a non sustaining 160s  -/.10/-

## 2022-01-08 NOTE — CARE PLAN
Problem: Pain - Standard  Goal: Alleviation of pain or a reduction in pain to the patient’s comfort goal  Outcome: Progressing     Problem: Knowledge Deficit - Standard  Goal: Patient and family/care givers will demonstrate understanding of plan of care, disease process/condition, diagnostic tests and medications  Outcome: Progressing     Problem: Skin Integrity  Goal: Skin integrity is maintained or improved  Outcome: Progressing     Problem: Fall Risk  Goal: Patient will remain free from falls  Outcome: Progressing   The patient is Watcher - Medium risk of patient condition declining or worsening    Shift Goals  Clinical Goals: monitor bp  Patient Goals: rest  Family Goals: n/a    Progress made toward(s) clinical / shift goals:  Skin around panus and breasts redness has reduced slightly.     Patient is not progressing towards the following goals:

## 2022-01-08 NOTE — PROGRESS NOTES
Assumed care of patient, bedside report received from GARLAND Castillo. Updated on plan of care, call light within reach and fall precautions are in place and bed lock and in lowest position. Patient instructed to call for assistance before getting out of bed. All questions answered at this time. No other needs. Goal for tonight is rest and monitoring blood pressures.

## 2022-01-09 LAB
25(OH)D3 SERPL-MCNC: 19 NG/ML (ref 30–100)
ANION GAP SERPL CALC-SCNC: 7 MMOL/L (ref 7–16)
BACTERIA BLD CULT: NORMAL
BACTERIA BLD CULT: NORMAL
BASOPHILS # BLD AUTO: 0.1 % (ref 0–1.8)
BASOPHILS # BLD: 0.01 K/UL (ref 0–0.12)
BUN SERPL-MCNC: 35 MG/DL (ref 8–22)
CALCIUM SERPL-MCNC: 8.2 MG/DL (ref 8.5–10.5)
CHLORIDE SERPL-SCNC: 107 MMOL/L (ref 96–112)
CO2 SERPL-SCNC: 27 MMOL/L (ref 20–33)
CREAT SERPL-MCNC: 1.05 MG/DL (ref 0.5–1.4)
EOSINOPHIL # BLD AUTO: 0.12 K/UL (ref 0–0.51)
EOSINOPHIL NFR BLD: 1.3 % (ref 0–6.9)
ERYTHROCYTE [DISTWIDTH] IN BLOOD BY AUTOMATED COUNT: 53.4 FL (ref 35.9–50)
GLUCOSE BLD-MCNC: 152 MG/DL (ref 65–99)
GLUCOSE BLD-MCNC: 159 MG/DL (ref 65–99)
GLUCOSE BLD-MCNC: 160 MG/DL (ref 65–99)
GLUCOSE SERPL-MCNC: 115 MG/DL (ref 65–99)
HCT VFR BLD AUTO: 28.3 % (ref 37–47)
HGB BLD-MCNC: 8.6 G/DL (ref 12–16)
IMM GRANULOCYTES # BLD AUTO: 0.13 K/UL (ref 0–0.11)
IMM GRANULOCYTES NFR BLD AUTO: 1.4 % (ref 0–0.9)
LYMPHOCYTES # BLD AUTO: 2.38 K/UL (ref 1–4.8)
LYMPHOCYTES NFR BLD: 25.5 % (ref 22–41)
MAGNESIUM SERPL-MCNC: 1.9 MG/DL (ref 1.5–2.5)
MCH RBC QN AUTO: 28.5 PG (ref 27–33)
MCHC RBC AUTO-ENTMCNC: 30.4 G/DL (ref 33.6–35)
MCV RBC AUTO: 93.7 FL (ref 81.4–97.8)
MONOCYTES # BLD AUTO: 0.7 K/UL (ref 0–0.85)
MONOCYTES NFR BLD AUTO: 7.5 % (ref 0–13.4)
NEUTROPHILS # BLD AUTO: 5.98 K/UL (ref 2–7.15)
NEUTROPHILS NFR BLD: 64.2 % (ref 44–72)
NRBC # BLD AUTO: 0.02 K/UL
NRBC BLD-RTO: 0.2 /100 WBC
NT-PROBNP SERPL IA-MCNC: 9727 PG/ML (ref 0–125)
PLATELET # BLD AUTO: 213 K/UL (ref 164–446)
PMV BLD AUTO: 9.9 FL (ref 9–12.9)
POTASSIUM SERPL-SCNC: 4 MMOL/L (ref 3.6–5.5)
RBC # BLD AUTO: 3.02 M/UL (ref 4.2–5.4)
SIGNIFICANT IND 70042: NORMAL
SIGNIFICANT IND 70042: NORMAL
SITE SITE: NORMAL
SITE SITE: NORMAL
SODIUM SERPL-SCNC: 141 MMOL/L (ref 135–145)
SOURCE SOURCE: NORMAL
SOURCE SOURCE: NORMAL
VIT B12 SERPL-MCNC: 425 PG/ML (ref 211–911)
WBC # BLD AUTO: 9.3 K/UL (ref 4.8–10.8)

## 2022-01-09 PROCEDURE — 700101 HCHG RX REV CODE 250: Performed by: STUDENT IN AN ORGANIZED HEALTH CARE EDUCATION/TRAINING PROGRAM

## 2022-01-09 PROCEDURE — A9270 NON-COVERED ITEM OR SERVICE: HCPCS | Performed by: STUDENT IN AN ORGANIZED HEALTH CARE EDUCATION/TRAINING PROGRAM

## 2022-01-09 PROCEDURE — 82962 GLUCOSE BLOOD TEST: CPT | Mod: 91

## 2022-01-09 PROCEDURE — 700102 HCHG RX REV CODE 250 W/ 637 OVERRIDE(OP): Performed by: HOSPITALIST

## 2022-01-09 PROCEDURE — 700102 HCHG RX REV CODE 250 W/ 637 OVERRIDE(OP): Performed by: INTERNAL MEDICINE

## 2022-01-09 PROCEDURE — A9270 NON-COVERED ITEM OR SERVICE: HCPCS | Performed by: HOSPITALIST

## 2022-01-09 PROCEDURE — 83735 ASSAY OF MAGNESIUM: CPT

## 2022-01-09 PROCEDURE — A9270 NON-COVERED ITEM OR SERVICE: HCPCS | Performed by: INTERNAL MEDICINE

## 2022-01-09 PROCEDURE — 85025 COMPLETE CBC W/AUTO DIFF WBC: CPT

## 2022-01-09 PROCEDURE — 80048 BASIC METABOLIC PNL TOTAL CA: CPT

## 2022-01-09 PROCEDURE — 700105 HCHG RX REV CODE 258: Performed by: INTERNAL MEDICINE

## 2022-01-09 PROCEDURE — 82607 VITAMIN B-12: CPT

## 2022-01-09 PROCEDURE — 83880 ASSAY OF NATRIURETIC PEPTIDE: CPT

## 2022-01-09 PROCEDURE — 700111 HCHG RX REV CODE 636 W/ 250 OVERRIDE (IP): Performed by: INTERNAL MEDICINE

## 2022-01-09 PROCEDURE — 700102 HCHG RX REV CODE 250 W/ 637 OVERRIDE(OP): Performed by: STUDENT IN AN ORGANIZED HEALTH CARE EDUCATION/TRAINING PROGRAM

## 2022-01-09 PROCEDURE — 82306 VITAMIN D 25 HYDROXY: CPT

## 2022-01-09 PROCEDURE — 770001 HCHG ROOM/CARE - MED/SURG/GYN PRIV*

## 2022-01-09 PROCEDURE — 99232 SBSQ HOSP IP/OBS MODERATE 35: CPT | Performed by: NURSE PRACTITIONER

## 2022-01-09 RX ORDER — PREDNISOLONE ACETATE 10 MG/ML
1 SUSPENSION/ DROPS OPHTHALMIC 4 TIMES DAILY
Status: DISCONTINUED | OUTPATIENT
Start: 2022-01-09 | End: 2022-01-15 | Stop reason: HOSPADM

## 2022-01-09 RX ADMIN — ATORVASTATIN CALCIUM 40 MG: 40 TABLET, FILM COATED ORAL at 16:51

## 2022-01-09 RX ADMIN — NYSTATIN: 100000 POWDER TOPICAL at 16:51

## 2022-01-09 RX ADMIN — INSULIN HUMAN 3 UNITS: 100 INJECTION, SOLUTION PARENTERAL at 23:33

## 2022-01-09 RX ADMIN — CIPROFLOXACIN HYDROCHLORIDE 1 DROP: 3 SOLUTION/ DROPS OPHTHALMIC at 16:52

## 2022-01-09 RX ADMIN — PREDNISOLONE ACETATE 1 DROP: 10 SUSPENSION/ DROPS OPHTHALMIC at 08:36

## 2022-01-09 RX ADMIN — HYDROCORTISONE 10 MG: 10 TABLET ORAL at 16:50

## 2022-01-09 RX ADMIN — LEVOTHYROXINE SODIUM 100 MCG: 0.1 TABLET ORAL at 05:02

## 2022-01-09 RX ADMIN — OXYCODONE 10 MG: 5 TABLET ORAL at 01:39

## 2022-01-09 RX ADMIN — CEFTRIAXONE SODIUM 2 G: 2 INJECTION, POWDER, FOR SOLUTION INTRAMUSCULAR; INTRAVENOUS at 05:03

## 2022-01-09 RX ADMIN — CIPROFLOXACIN HYDROCHLORIDE 1 DROP: 3 SOLUTION/ DROPS OPHTHALMIC at 21:23

## 2022-01-09 RX ADMIN — ASPIRIN 81 MG: 81 TABLET, CHEWABLE ORAL at 05:02

## 2022-01-09 RX ADMIN — INSULIN HUMAN 3 UNITS: 100 INJECTION, SOLUTION PARENTERAL at 05:04

## 2022-01-09 RX ADMIN — DABIGATRAN ETEXILATE MESYLATE 150 MG: 150 CAPSULE ORAL at 05:02

## 2022-01-09 RX ADMIN — INSULIN HUMAN 3 UNITS: 100 INJECTION, SOLUTION PARENTERAL at 12:40

## 2022-01-09 RX ADMIN — INSULIN GLARGINE 15 UNITS: 100 INJECTION, SOLUTION SUBCUTANEOUS at 17:01

## 2022-01-09 RX ADMIN — HYDROCORTISONE 10 MG: 10 TABLET ORAL at 05:03

## 2022-01-09 RX ADMIN — NYSTATIN: 100000 POWDER TOPICAL at 05:02

## 2022-01-09 RX ADMIN — PREDNISOLONE ACETATE 1 DROP: 10 SUSPENSION/ DROPS OPHTHALMIC at 12:36

## 2022-01-09 RX ADMIN — PREDNISOLONE ACETATE 1 DROP: 10 SUSPENSION/ DROPS OPHTHALMIC at 16:51

## 2022-01-09 RX ADMIN — CIPROFLOXACIN HYDROCHLORIDE 1 DROP: 3 SOLUTION/ DROPS OPHTHALMIC at 08:37

## 2022-01-09 RX ADMIN — PREDNISOLONE ACETATE 1 DROP: 10 SUSPENSION/ DROPS OPHTHALMIC at 01:35

## 2022-01-09 RX ADMIN — DABIGATRAN ETEXILATE MESYLATE 150 MG: 150 CAPSULE ORAL at 16:51

## 2022-01-09 RX ADMIN — CIPROFLOXACIN HYDROCHLORIDE 1 DROP: 3 SOLUTION/ DROPS OPHTHALMIC at 12:36

## 2022-01-09 RX ADMIN — PREDNISOLONE ACETATE 1 DROP: 10 SUSPENSION/ DROPS OPHTHALMIC at 21:22

## 2022-01-09 RX ADMIN — INSULIN HUMAN 3 UNITS: 100 INJECTION, SOLUTION PARENTERAL at 17:02

## 2022-01-09 ASSESSMENT — ENCOUNTER SYMPTOMS
MYALGIAS: 1
RESPIRATORY NEGATIVE: 1
DEPRESSION: 1
GASTROINTESTINAL NEGATIVE: 1
EYES NEGATIVE: 1
FEVER: 0
CARDIOVASCULAR NEGATIVE: 1
WEAKNESS: 1
CHILLS: 0
FALLS: 1

## 2022-01-09 NOTE — DISCHARGE PLANNING
Hospital Care Management Discharge Planning      Anticipated Discharge Disposition: SNF     Action/Information: SNF orders in chart. SW reviewed chart and attempted to meet with pt at bedside for SNF choice, however, pt currently with wound care team.      Barriers: medical clearance; SNF choice/acceptance     Plan:  Hospital Care Management Team will continue to collaborate with pt/family, medical care team, and outpatient providers for ongoing discharge planning and collaboration.

## 2022-01-09 NOTE — PROGRESS NOTES
4 Eyes Skin Assessment Completed by Kira RN and GARLAND Figueroa.    Head WDL  Ears WDL  Nose WDL  Mouth WDL  Neck WDL  Breast/Chest Redness, Blanching, Rash and Excoriation  Shoulder Blades WDL  Spine Redness and Blanching  (R) Arm/Elbow/Hand Edema  (L) Arm/Elbow/Hand Redness, Blanching, Bruising, Rash and Edema  Abdomen Redness, Blanching, Rash and Bruising  Groin Redness, Blanching and Excoriation  Scrotum/Coccyx/Buttocks Redness and Blanching  (R) Leg Rash, Scab, Bruising and Edema  (L) Leg Redness, Blanching, Rash, Scab and Bruising  (R) Heel/Foot/Toe Callous, dryness  (L) Heel/Foot/Toe Callous, dryness    Devices In Places Pulse Ox and Central Line    Interventions In Place TAP System, Pillows and Q2 Turns    Possible Skin Injury Yes    Pictures Uploaded Into Epic Yes  Wound Consult Placed Yes  RN Wound Prevention Protocol Ordered Yes     Skin: Bruising to KIRA, cellulitis to L wrist, redness and excoriation to pannus and underneath breasts, cellulitis to bilateral LEs dressings in place.     Devices in place: KIARRA triple lumen PICC, PIV, pulse ox finger probe, purewick, interdry.

## 2022-01-09 NOTE — CARE PLAN
The patient is Stable - Low risk of patient condition declining or worsening    Shift Goals  Clinical Goals: pain management  Patient Goals: rest  Family Goals: N/A    Progress made toward(s) clinical / shift goals:      Problem: Knowledge Deficit - Standard  Goal: Patient and family/care givers will demonstrate understanding of plan of care, disease process/condition, diagnostic tests and medications  Outcome: Progressing   Pt was educated on POC, MAR, shift routine and nursing education R/T Dx. Questions were encouraged and answered. Pt verbalized understanding of all teaching.      Problem: Fall Risk  Goal: Patient will remain free from falls  Outcome: Progressing   Pt was educated on call light use and the need to alert staff to needs and prior to mobilization. Pt demonstrates understanding.        Patient is not progressing towards the following goals:

## 2022-01-09 NOTE — PROGRESS NOTES
AA&Ox4.    SpO2 90% on RA. Denies SOB.    Reporting 4/10 pain. Medicated per MAR.    SKIN Bilateral lower extremity edema and cellulitis dressing in place, CDI. Left wrist cellulitis, under panis and breast is red and excoriated, nystatin applied and interdry in place.     Tolerating Diabetic diet. Denies N/V.    + void.    + BM / LBM 1/8/2022.    Pt ambulates/up with max assist stand pivot.    All needs met at this time. Call light within reach. Pt calls appropriately.

## 2022-01-09 NOTE — PROGRESS NOTES
Assumed care of patient at bedside report from Shameka HAQUE. Updated on POC. Patient currently A & O x 4; on RA; up x2, stand and pivot; without complaints of acute pain. Call light within reach. Whiteboard updated. Fall precautions in place. Bed locked and in lowest position. All questions answered. No other needs indicated at this time.

## 2022-01-09 NOTE — WOUND TEAM
[facility]  Inpatient Services  Initial Wound and Skin Care Evaluation    Admission Date: 1/4/2022     Last order of IP CONSULT TO WOUND CARE was found on 1/7/2022 from Hospital Encounter on 1/4/2022     HPI, PMH, SH: Reviewed    History reviewed. No pertinent surgical history.  Social History     Tobacco Use   • Smoking status: Never Smoker   • Smokeless tobacco: Never Used   Substance Use Topics   • Alcohol use: Not Currently     Chief Complaint   Patient presents with   • T-5000 GLF     hit head -trauma +thinners     Diagnosis: Fall from ground level [W18.30XA]  Severe sepsis with septic shock (CODE) ([facility]) [R65.21]    Unit where seen by Wound Team: T424/02     WOUND CONSULT/FOLLOW UP RELATED TO:  BLE, breast folds, pannus and sacrum      WOUND HISTORY:  Patient states she has had wounds to legs for about 3 weeks.  They were swollen and blistering.  She thinks she slipped on the drainage and fell ultimately brought in by her landlord on 01/04.        past medical history of diabetes mellitus, atrial flutter on Pradaxa, hypertension, neuropathy.  Patient reports that she has been feeling weak lately.      WOUND ASSESSMENT/LDA   Wound 01/04/22 Full Thickness Wound Leg Distal;Inner;Mid;Posterior Left (Active)   Wound Image     01/09/22 1500   Site Assessment Red    Periwound Assessment Clean;Intact;Dry    Margins Attached edges;Defined edges    Closure Adhesive bandage    Drainage Amount Small    Drainage Description Serosanguineous    Treatments Cleansed;Site care;Offloading;Zinc - oxide paste    Wound Cleansing Foam Cleanser/Washcloth    Periwound Protectant Viscopaste    Dressing Cleansing/Solutions Not Applicable    Dressing Options Viscopaste;Absorbent Abdominal Pad;Dry Roll Gauze;Tape    Dressing Changed Changed    Dressing Status Clean;Dry;Intact    Dressing Change/Treatment Frequency Daily, and As Needed    NEXT Dressing Change/Treatment Date 01/10/22    NEXT Weekly Photo (Inpatient Only)  01/16/22    Non-staged Wound Description Full thickness    Shape Circumferential    Wound Odor None    Exposed Structures None        Wound 01/04/22 Full Thickness Wound Leg Distal;Posterior Right (Active)   Wound Image     01/09/22 1500   Site Assessment Red    Periwound Assessment Clean;Dry;Intact    Margins Attached edges;Defined edges    Closure Adhesive bandage    Drainage Amount Small    Drainage Description Serosanguineous    Treatments Cleansed;Pharmaceutical agent;Zinc - oxide paste;Compression    Wound Cleansing Approved Wound Cleanser    Periwound Protectant Viscopaste    Dressing Cleansing/Solutions Not Applicable    Dressing Options Viscopaste;Absorbent Abdominal Pad;Dry Roll Gauze;Tape    Dressing Changed Changed    Dressing Status Clean;Dry;Intact    Dressing Change/Treatment Frequency Daily, and As Needed    NEXT Dressing Change/Treatment Date 01/10/22    NEXT Weekly Photo (Inpatient Only) 01/16/22    Non-staged Wound Description Full thickness    Shape Circumferential    Wound Odor None    Exposed Structures COREY        Wound 01/04/22 Pressure Injury Coccyx (POA) sDTI (Active)   Site Assessment Red    Periwound Assessment Clean;Intact;Dry    Margins Attached edges;Defined edges    Drainage Amount None    Treatments Cleansed;Site care;Offloading    Wound Cleansing Not Applicable    Periwound Protectant Barrier Paste    Dressing Cleansing/Solutions Not Applicable    Dressing Options Viscopaste    Dressing Changed New    Dressing Status Clean;Dry;Intact    Dressing Change/Treatment Frequency Every Shift, and As Needed    NEXT Dressing Change/Treatment Date 01/09/22    NEXT Weekly Photo (Inpatient Only) 01/16/22    Pressure Injury Stage DTPI    Shape Square    Wound Odor None    Exposed Structures COREY    WOUND NURSE ONLY - Time Spent with Patient (mins) 60          Moisture Associated Skin Damage 01/05/22 Axillary;Breast;Buttock;Panus;Sacrum;Perineum (Active)   NEXT Weekly Photo (Inpatient Only) 01/16/22     Drainage Amount None    IAD Cleansing Dimethecone Wipes;Foam Cleanser/Washcloth    Periwound Protectant Skin Protectant Wipes to Periwound;Antifungal Therapy       Vascular:    Palpable pulses     MIGEL:   MIGEL Results, Last 30 Days US-MIGEL SINGLE LEVEL BILAT    Result Date: 1/4/2022  RIGHT      Waveform            Systolic BPs (mmHg)                              105           Brachial   Biphasic                                 Common Femoral   Biphasic                                 Popliteal   Biphasic                                 Posterior Tibial   Biphasic                                 Dorsalis Pedis   Diminished                 109           Digit                                            MIGEL                              1.00          TBI                           LEFT   Waveform        Systolic BPs (mmHg)                              109           Brachial   Biphasic                                 Common Femoral   Biphasic                                 Popliteal   Biphasic                                 Posterior Tibial   Biphasic                                 Dorsalis Pedis   Diminished                 94            Digit                                            MIGEL                              0.86          TBI      Lab Values:    Lab Results   Component Value Date/Time    WBC 9.3 01/09/2022 08:43 AM    RBC 3.02 (L) 01/09/2022 08:43 AM    HEMOGLOBIN 8.6 (L) 01/09/2022 08:43 AM    HEMATOCRIT 28.3 (L) 01/09/2022 08:43 AM    HBA1C 9.8 (H) 01/05/2022 04:30 AM        Culture Results show:  Recent Results (from the past 720 hour(s))   CULTURE WOUND W/ GRAM STAIN    Collection Time: 01/05/22  2:40 PM    Specimen: Left Leg; Wound   Result Value Ref Range    Significant Indicator POS (POS)     Source WND     Site LEFT LEG     Culture Result Moderate growth usual skin laila. (A)     Gram Stain Result       Many WBCs.  Rare epithelial cells.  Moderate Gram positive cocci.  Moderate Gram positive  rods.  Rare Gram negative rods.      Culture Result (A)      Klebsiella oxytoca/Raoultella ornithinolytica  Moderate growth         Susceptibility    Klebsiella oxytoca/raoultella ornithinolytica - ANNIE     Ceftriaxone <=1 Sensitive mcg/mL     Cefazolin 8 Sensitive mcg/mL     Ciprofloxacin <=0.25 Sensitive mcg/mL     Cefepime <=2 Sensitive mcg/mL     Cefuroxime <=4 Sensitive mcg/mL     Ertapenem <=0.5 Sensitive mcg/mL     Gentamicin <=2 Sensitive mcg/mL     Tobramycin <=2 Sensitive mcg/mL     Minocycline <=4 Sensitive mcg/mL     Moxifloxacin <=2 Sensitive mcg/mL     Pip/Tazobactam <=8 Sensitive mcg/mL     Trimeth/Sulfa <=0.5/9.5 Sensitive mcg/mL     Tigecycline <=2 Sensitive mcg/mL     Pain Level/Medicated:  Pain with cleansing, tolerated well.        INTERVENTIONS BY WOUND TEAM:  Chart and images reviewed. Discussed with bedside RN. All areas of concern (based on picture review, LDA review and discussion with bedside RN) have been thoroughly assessed. Documentation of areas based on significant findings. This RN in to assess patient. Performed standard wound care which includes appropriate positioning, dressing removal and non-selective debridement. Pictures and measurements obtained weekly if/when required.  Preparation for Dressing removal:  NA  Non-selectively Debrided with:  warm wash cloth and 4 in 1.   Sharp debridement: NA  Becky wound: Cleansed with wash cloth and 4 in 1 cleanser   Primary Dressing: visco paste   Secondary (Outer) Dressing: ABD and roll gauze, tubi  E.     Interdisciplinary consultation: Patient, Bedside RN (Janice), wound RN (Jose)    EVALUATION / RATIONALE FOR TREATMENT:  Most Recent Date:  01/09/22: patient with what appears to have venous insufficiency.  She states she may have hit her leg on a stair.  Plan to transfer to SNF.  Visco paste to encourage epithelization and non adherent.  Tubi  for mild compression.  MIGEL to left leg 0.86 which is borderline for arterial disease  however she does have palpable pulses.          Goals: Steady decrease in wound area and depth weekly.    WOUND TEAM PLAN OF CARE ([X] for frequency of wound follow up,):   Nursing to follow orders written for wound care. Contact wound team if area fails to progress, deteriorates or with any questions/concerns  Dressing changes by wound team:                   Follow up 3 times weekly:                NPWT change 3 times weekly:     Follow up 1-2 times weekly:    X  Follow up Bi-Monthly:                   Follow up as needed:     Other (explain):     NURSING PLAN OF CARE ORDERS (X):  Dressing changes: See Dressing Care orders: X  Skin care: See Skin Care orders: X  RN Prevention Protocol: X  Rectal tube care: See Rectal Tube Care orders:   Other orders:    RSKIN:   CURRENTLY IN PLACE (X), APPLIED THIS VISIT (A), ORDERED (O):   Q shift Matias:  X  Q shift pressure point assessments:  X    Surface/Positioning   Pressure redistribution mattress          X  Low Airloss          Bariatric foam      Bariatric HELEN     Waffle cushion        Waffle Overlay        O  Reposition q 2 hours    O  TAPs Turning system     Z Hari Pillow     Offloading/Redistribution   Sacral Mepilex (Silicone dressing)     Heel Mepilex (Silicone dressing)       A  Heel float boots (Prevalon boot)             Float Heels off Bed with Pillows       A    Respiratory   Silicone O2 tubing         Gray Foam Ear protectors     Cannula fixation Device (Tender )          High flow offloading Clip    Elastic head band offloading device      Anchorfast                                                         Trach with Optifoam split foam             Containment/Moisture Prevention     Rectal tube or BMS    Purwick/Condom Cath      X  [place] Catheter    Barrier wipes           Barrier paste     X  Antifungal tx      Interdry        Mobilization       Up to chair        Ambulate      PT/OT      Nutrition       Dietician        Diabetes Education      PO  X    TF     TPN     NPO   # days     Other        Anticipated discharge plans:   LTACH:        SNF/Rehab:                X  Home Health Care:           Outpatient Wound Center:            Self/Family Care:        Other:                  Vac Discharge Needs:   Not Applicable Pt not on a wound vac:     X  Regular Vac while inpatient, alternative dressing at DC:        Regular Vac in use and continued at DC:            Reg. Vac w/ Skin Sub/Biologic in use. Will need to be changed 2x wkly:      Veraflo Vac while inpatient, ok to transition to Regular Vac on Discharge:           Veraflo Vac while inpatient, will need to remain on Veraflo Vac upon discharge:

## 2022-01-09 NOTE — PROGRESS NOTES
Patient up to [facility] for restroom. Dressings to BLE changed. Physician recommending to dangle at bedside for meals TID. Patient has no other complaints at this time.

## 2022-01-09 NOTE — PROGRESS NOTES
Pt taken by transport to [facility]. All belongings collected and transported with patient. Report called to GARLAND Malone.

## 2022-01-10 LAB
ANION GAP SERPL CALC-SCNC: 9 MMOL/L (ref 7–16)
BUN SERPL-MCNC: 28 MG/DL (ref 8–22)
CALCIUM SERPL-MCNC: 7.8 MG/DL (ref 8.5–10.5)
CHLORIDE SERPL-SCNC: 107 MMOL/L (ref 96–112)
CO2 SERPL-SCNC: 26 MMOL/L (ref 20–33)
CREAT SERPL-MCNC: 1.05 MG/DL (ref 0.5–1.4)
ERYTHROCYTE [DISTWIDTH] IN BLOOD BY AUTOMATED COUNT: 53.1 FL (ref 35.9–50)
GLUCOSE BLD-MCNC: 150 MG/DL (ref 65–99)
GLUCOSE BLD-MCNC: 163 MG/DL (ref 65–99)
GLUCOSE BLD-MCNC: 184 MG/DL (ref 65–99)
GLUCOSE BLD-MCNC: 188 MG/DL (ref 65–99)
GLUCOSE BLD-MCNC: 96 MG/DL (ref 65–99)
GLUCOSE SERPL-MCNC: 124 MG/DL (ref 65–99)
HCT VFR BLD AUTO: 27.6 % (ref 37–47)
HGB BLD-MCNC: 8.2 G/DL (ref 12–16)
MCH RBC QN AUTO: 27.6 PG (ref 27–33)
MCHC RBC AUTO-ENTMCNC: 29.7 G/DL (ref 33.6–35)
MCV RBC AUTO: 92.9 FL (ref 81.4–97.8)
PLATELET # BLD AUTO: 209 K/UL (ref 164–446)
PMV BLD AUTO: 9.7 FL (ref 9–12.9)
POTASSIUM SERPL-SCNC: 3.9 MMOL/L (ref 3.6–5.5)
RBC # BLD AUTO: 2.97 M/UL (ref 4.2–5.4)
SODIUM SERPL-SCNC: 142 MMOL/L (ref 135–145)
WBC # BLD AUTO: 9 K/UL (ref 4.8–10.8)

## 2022-01-10 PROCEDURE — A9270 NON-COVERED ITEM OR SERVICE: HCPCS | Performed by: HOSPITALIST

## 2022-01-10 PROCEDURE — 85027 COMPLETE CBC AUTOMATED: CPT

## 2022-01-10 PROCEDURE — 700111 HCHG RX REV CODE 636 W/ 250 OVERRIDE (IP): Performed by: NURSE PRACTITIONER

## 2022-01-10 PROCEDURE — 700105 HCHG RX REV CODE 258: Performed by: NURSE PRACTITIONER

## 2022-01-10 PROCEDURE — A9270 NON-COVERED ITEM OR SERVICE: HCPCS | Performed by: STUDENT IN AN ORGANIZED HEALTH CARE EDUCATION/TRAINING PROGRAM

## 2022-01-10 PROCEDURE — 97535 SELF CARE MNGMENT TRAINING: CPT

## 2022-01-10 PROCEDURE — 700102 HCHG RX REV CODE 250 W/ 637 OVERRIDE(OP): Performed by: HOSPITALIST

## 2022-01-10 PROCEDURE — 82962 GLUCOSE BLOOD TEST: CPT | Mod: 91

## 2022-01-10 PROCEDURE — 700102 HCHG RX REV CODE 250 W/ 637 OVERRIDE(OP): Performed by: INTERNAL MEDICINE

## 2022-01-10 PROCEDURE — 80048 BASIC METABOLIC PNL TOTAL CA: CPT

## 2022-01-10 PROCEDURE — A9270 NON-COVERED ITEM OR SERVICE: HCPCS | Performed by: INTERNAL MEDICINE

## 2022-01-10 PROCEDURE — 700102 HCHG RX REV CODE 250 W/ 637 OVERRIDE(OP): Performed by: STUDENT IN AN ORGANIZED HEALTH CARE EDUCATION/TRAINING PROGRAM

## 2022-01-10 PROCEDURE — A9270 NON-COVERED ITEM OR SERVICE: HCPCS | Performed by: NURSE PRACTITIONER

## 2022-01-10 PROCEDURE — 99232 SBSQ HOSP IP/OBS MODERATE 35: CPT | Performed by: NURSE PRACTITIONER

## 2022-01-10 PROCEDURE — 700102 HCHG RX REV CODE 250 W/ 637 OVERRIDE(OP): Performed by: NURSE PRACTITIONER

## 2022-01-10 PROCEDURE — 770001 HCHG ROOM/CARE - MED/SURG/GYN PRIV*

## 2022-01-10 RX ORDER — IBUPROFEN 200 MG
950 CAPSULE ORAL 2 TIMES DAILY
Status: DISCONTINUED | OUTPATIENT
Start: 2022-01-10 | End: 2022-01-15 | Stop reason: HOSPADM

## 2022-01-10 RX ADMIN — DABIGATRAN ETEXILATE MESYLATE 150 MG: 150 CAPSULE ORAL at 17:15

## 2022-01-10 RX ADMIN — Medication 950 MG: at 08:39

## 2022-01-10 RX ADMIN — CIPROFLOXACIN HYDROCHLORIDE 1 DROP: 3 SOLUTION/ DROPS OPHTHALMIC at 20:19

## 2022-01-10 RX ADMIN — CIPROFLOXACIN HYDROCHLORIDE 1 DROP: 3 SOLUTION/ DROPS OPHTHALMIC at 04:36

## 2022-01-10 RX ADMIN — PREDNISOLONE ACETATE 1 DROP: 10 SUSPENSION/ DROPS OPHTHALMIC at 12:29

## 2022-01-10 RX ADMIN — OXYCODONE 10 MG: 5 TABLET ORAL at 23:24

## 2022-01-10 RX ADMIN — CIPROFLOXACIN HYDROCHLORIDE 1 DROP: 3 SOLUTION/ DROPS OPHTHALMIC at 12:30

## 2022-01-10 RX ADMIN — PREDNISOLONE ACETATE 1 DROP: 10 SUSPENSION/ DROPS OPHTHALMIC at 17:14

## 2022-01-10 RX ADMIN — INSULIN HUMAN 3 UNITS: 100 INJECTION, SOLUTION PARENTERAL at 23:33

## 2022-01-10 RX ADMIN — PREDNISOLONE ACETATE 1 DROP: 10 SUSPENSION/ DROPS OPHTHALMIC at 04:35

## 2022-01-10 RX ADMIN — CEFTRIAXONE SODIUM 2 G: 2 INJECTION, POWDER, FOR SOLUTION INTRAMUSCULAR; INTRAVENOUS at 06:19

## 2022-01-10 RX ADMIN — CIPROFLOXACIN HYDROCHLORIDE 1 DROP: 3 SOLUTION/ DROPS OPHTHALMIC at 17:15

## 2022-01-10 RX ADMIN — LEVOTHYROXINE SODIUM 100 MCG: 0.1 TABLET ORAL at 04:36

## 2022-01-10 RX ADMIN — INSULIN HUMAN 3 UNITS: 100 INJECTION, SOLUTION PARENTERAL at 17:23

## 2022-01-10 RX ADMIN — PREDNISOLONE ACETATE 1 DROP: 10 SUSPENSION/ DROPS OPHTHALMIC at 20:19

## 2022-01-10 RX ADMIN — HYDROCORTISONE 10 MG: 10 TABLET ORAL at 17:15

## 2022-01-10 RX ADMIN — DABIGATRAN ETEXILATE MESYLATE 150 MG: 150 CAPSULE ORAL at 06:00

## 2022-01-10 RX ADMIN — ATORVASTATIN CALCIUM 40 MG: 40 TABLET, FILM COATED ORAL at 17:15

## 2022-01-10 RX ADMIN — Medication 950 MG: at 17:15

## 2022-01-10 RX ADMIN — INSULIN GLARGINE 15 UNITS: 100 INJECTION, SOLUTION SUBCUTANEOUS at 17:24

## 2022-01-10 RX ADMIN — HYDROCORTISONE 10 MG: 10 TABLET ORAL at 04:38

## 2022-01-10 RX ADMIN — ASPIRIN 81 MG: 81 TABLET, CHEWABLE ORAL at 04:36

## 2022-01-10 RX ADMIN — NYSTATIN: 100000 POWDER TOPICAL at 17:15

## 2022-01-10 RX ADMIN — NYSTATIN: 100000 POWDER TOPICAL at 04:35

## 2022-01-10 ASSESSMENT — COGNITIVE AND FUNCTIONAL STATUS - GENERAL
DRESSING REGULAR LOWER BODY CLOTHING: A LOT
TOILETING: A LOT
SUGGESTED CMS G CODE MODIFIER DAILY ACTIVITY: CK
DAILY ACTIVITIY SCORE: 17
HELP NEEDED FOR BATHING: A LOT
DRESSING REGULAR UPPER BODY CLOTHING: A LITTLE

## 2022-01-10 ASSESSMENT — ENCOUNTER SYMPTOMS
FEVER: 0
WEAKNESS: 1
EYES NEGATIVE: 1
CHILLS: 0
RESPIRATORY NEGATIVE: 1
DEPRESSION: 1
FALLS: 1
CARDIOVASCULAR NEGATIVE: 1
MYALGIAS: 1
GASTROINTESTINAL NEGATIVE: 1

## 2022-01-10 NOTE — DISCHARGE PLANNING
Received Choice form at 1440  Agency/Facility Name: [facility]  Referral sent per Choice form at 1500

## 2022-01-10 NOTE — DISCHARGE PLANNING
Anticipated Discharge Disposition: SNF    Action: LSW met with patient and issued choice for SNF. The patient signed the choice form. Per patient's request, referrals sent to [facility], [place], [facility], [facility], [place], [place], [place], and [facility], patient will choose from the list of accepting facilities. LSW faxed the SNF choice form to DPA.    Barriers to Discharge: None.    Plan: SNF, pending accepting facility, medical clearance, and transfer arrangements.

## 2022-01-10 NOTE — CARE PLAN
The patient is Stable - Low risk of patient condition declining or worsening    Shift Goals  Clinical Goals: mobility  Patient Goals: mobility  Family Goals: NA    Progress made toward(s) clinical / shift goals:      Problem: Fall Risk  Goal: Patient will remain free from falls  Outcome: Progressing   Pt was educated on call light use and the need to alert staff to needs and prior to mobilization. Pt demonstrates understanding.      Problem: Skin Integrity  Goal: Skin integrity is maintained or improved  Outcome: Progressing   Pt was educated on the need to mobilize, the positive health effects of mobilization and the negative health effects of immobility. This RN offered to medication the Pt per MAR if pain was a barrier to mobility, to provide assistive devices or accompany the pt. Pt verbalized understanding of all teaching.        Patient is not progressing towards the following goals:

## 2022-01-10 NOTE — PROGRESS NOTES
AA&Ox4.     SpO2 97% on RA. Denies SOB.     Reporting 4/10 pain. Medicated per MAR.     SKIN Bilateral lower extremity edema and cellulitis dressing in place, CDI. Left wrist cellulitis, under panis and breast is red and excoriated, nystatin applied and interdry in place.      Tolerating Diabetic diet. Denies N/V.     + void.     + BM / LBM 1/10/2022.     Pt ambulates/up with max assist stand pivot.     All needs met at this time. Call light within reach. Pt calls appropriately.

## 2022-01-10 NOTE — PROGRESS NOTES
[facility] Medicine Daily Progress Note    Date of Service  1/10/2022    Chief Complaint  Beverley Dejesus is a 71 y.o. female admitted 1/4/2022 with ground level fall    Hospital Course  Ms. Beverley Dejesus has a PMHx of DMT2, atrial flutter, and hypertension patient presents the emergency room on 1/4/2022 with a ground-level fall.  CT scan of the head and C-spine did not reveal any acute pathology.  The patient was assessed to have found to have a significant cellulitis as well as dehydration with renal failure.  She was admitted to the hospital for management of these.     On 1/5/2022 she was noted to have ongoing hypotension despite fluid resuscitation, she was transferred to the ICU for septic shock.  Patient has been treated with broad-spectrum antibiotics and stress dose steroids.  Transferred out of the ICU on 1/7/2022 to telemetry.  Patient still noted BLE pain and existing wounds on ankles were examined. Patient's BP improved with steroids. Patient transferred to the medical floor for further management of care and work on placement.       Interval Problem Update  1/9/2022  -Patient seen and examined.  Patient notes bilateral lower extremity/ankles are still fairly sore.  Wounds noted to be improving, no blisters noted.  Discussed with patient management of diabetes at home and lifestyle changes.  Patient became very emotional and reports that she lives in a mobile home with other people, and are only roommates.  No known family in the area except for a niece and a nephew in [place] of which she is estranged to.  Discussed with patient work with physical therapy and update in transfer to a skilled facility.  -Plan of care: Continue to educate patient on management of diabetes; encourage PT/OT; pain management as needed; monitor bilateral lower extremity wounds; continue IV Rocephin.  -Disposition: Referral to skilled placed; pending acceptance  -Lab work: Reviewed; expected; proBNP elevated  -VSS at this  time    1/10/2022  -Patient seen and examined.  Patient lying comfortably in bed.  Discussed recommendations from wound care team when patient got bilateral lower extremity dressing changes.  Reiterated to patient regarding diabetic education again.  Patient updated in plan of care.  -Plan of care: Wound team following patient for BLE wound/cellulitis; encourage PT/OT; continue IV ABX-Rocephin  -Disposition: Referral to SNF placed, pending acceptance  -Lab work: Reviewed; expected; Ca2+ 7.8 -replacement ordered  -VSS at this time  -There are no significant changes from previous R/PE, please enter previous notes for further details.    I have personally seen and examined the patient at bedside. I discussed the plan of care with patient, bedside RN and .    Consultants/Specialty  NONE    Code Status  Full Code    Disposition  Patient is not medically cleared for discharge.   Anticipate discharge to to skilled nursing facility.  I have placed the appropriate orders for post-discharge needs.    Review of Systems  Review of Systems   Constitutional: Positive for malaise/fatigue. Negative for chills and fever.   HENT: Negative.    Eyes: Negative.    Respiratory: Negative.    Cardiovascular: Negative.    Gastrointestinal: Negative.    Genitourinary: Negative.    Musculoskeletal: Positive for falls, joint pain and myalgias.   Skin: Negative.    Neurological: Positive for weakness.   Endo/Heme/Allergies: Negative.    Psychiatric/Behavioral: Positive for depression.        Physical Exam  Temp:  [36.4 °C (97.6 °F)-37.1 °C (98.7 °F)] 36.9 °C (98.5 °F)  Pulse:  [] 86  Resp:  [16-18] 18  BP: (129-134)/(70-76) 134/74  SpO2:  [94 %-97 %] 97 %    Physical Exam  Vitals and nursing note reviewed.   Constitutional:       Appearance: She is obese.   HENT:      Head: Normocephalic.      Nose: Nose normal.      Mouth/Throat:      Mouth: Mucous membranes are moist.      Pharynx: Oropharynx is clear.   Eyes:      Pupils:  Pupils are equal, round, and reactive to light.   Cardiovascular:      Rate and Rhythm: Normal rate. Rhythm irregular.      Pulses: Normal pulses.      Heart sounds: Normal heart sounds.   Pulmonary:      Effort: Pulmonary effort is normal.      Breath sounds: Normal breath sounds.   Abdominal:      General: Bowel sounds are normal.      Palpations: Abdomen is soft.   Musculoskeletal:         General: Tenderness present.      Cervical back: Normal range of motion.      Right lower leg: Edema present.      Left lower leg: Edema present.   Skin:     General: Skin is dry.      Capillary Refill: Capillary refill takes 2 to 3 seconds.   Neurological:      Mental Status: She is alert. Mental status is at baseline.      Motor: Weakness present.         Fluids    Intake/Output Summary (Last 24 hours) at 1/10/2022 0938  Last data filed at 1/10/2022 0800  Gross per 24 hour   Intake 480 ml   Output 1500 ml   Net -1020 ml       Laboratory  Recent Labs     01/09/22  0843 01/10/22  0254   WBC 9.3 9.0   RBC 3.02* 2.97*   HEMOGLOBIN 8.6* 8.2*   HEMATOCRIT 28.3* 27.6*   MCV 93.7 92.9   MCH 28.5 27.6   MCHC 30.4* 29.7*   RDW 53.4* 53.1*   PLATELETCT 213 209   MPV 9.9 9.7     Recent Labs     01/09/22  0843 01/10/22  0254   SODIUM 141 142   POTASSIUM 4.0 3.9   CHLORIDE 107 107   CO2 27 26   GLUCOSE 115* 124*   BUN 35* 28*   CREATININE 1.05 1.05   CALCIUM 8.2* 7.8*                   Imaging  DX-CHEST-FOR LINE PLACEMENT Perform procedure in: Patient's Room   Final Result      1.  Interval placement of a PICC line which is satisfactory in position.      2.  Bilateral interstitial infiltrates.      IR-PICC LINE PLACEMENT W/ GUIDANCE > AGE 5   Final Result                  Ultrasound-guided PICC placement performed by qualified nursing staff as    above.          DX-CHEST-PORTABLE (1 VIEW)   Final Result      No acute cardiac or pulmonary abnormalities are identified. Lung volumes are low.      EC-ECHOCARDIOGRAM COMPLETE W/ CONT   Final  Result      US-RENAL   Final Result      1.  No hydronephrosis   2.  Atrophic appearing kidneys bilaterally with cortical thinning   3.  Incidentally noted cholelithiasis      US-MIGEL SINGLE LEVEL BILAT   Final Result      CT-HEAD W/O   Final Result      1.  No acute intracranial abnormality is identified.   2.  There are periventricular and subcortical white matter changes present.  This finding is nonspecific and could be from previous small vessel ischemia, demyelination, or gliosis.         CT-CSPINE WITHOUT PLUS RECONS   Final Result      Negative for cervical spine fracture or subluxation           Assessment/Plan  * Lower extremity cellulitis  Assessment & Plan  -Patient states that wounds on her legs started as blisters approximately 3 weeks prior to admission  -Severe cellulitis, circumferential, involving both legs, resulting in septic shock  -Wound cultures positive for Klebsiell oxytoca/[place] ornithiolytica   -Continue IV ceftriaxone for total of 10 days of antibiotics  -Wound care - following  -SNF referral placed    LEFT Leg      01/09/22 1500       RIGHT Leg         Relative adrenal insufficiency (HCC)- (present on admission)  Assessment & Plan  -Cortisol was 10 in the setting of septic shock that had required pressors  -Status post IV hydrocortisone that will change to oral hydrocortisone 20 mg daily on 1/8 and consider further taper based on her blood pressure and clinical condition.   -Will reassess for steroid taper in am once BP is maintained      Severe sepsis with septic shock (HCC)- (present on admission)  Assessment & Plan  -Septic shock has resolved    Fall from ground level- (present on admission)  Assessment & Plan  -CT imaging at admission negative for significant injury  -Fall likely related to infection/orthostasis  -PT/OT    Atrial flutter (HCC)- (present on admission)  Assessment & Plan  -Metoprolol, pradaxa    Class 3 severe obesity in adult (HCC)- (present on  admission)  Assessment & Plan  -BMI 51  -Patient counseled regarding controlling weight  -Hx of DMT2 - uncontrolled  -Poor diet    Primary hypertension- (present on admission)  Assessment & Plan  -Currently normotensive  -May need to restart Lisinopril and Metoprolol as her blood pressure recovers after sepsis    JULIETA (acute kidney injury) (HCC)- (present on admission)  Assessment & Plan  -Likely prerenal due to dehydration  -Improved with IV fluids      Type 2 diabetes mellitus with hyperglycemia, with long-term current use of insulin (HCC)- (present on admission)  Assessment & Plan  -Uncontrolled with hyperglycemia, hemoglobin A1c is 9.8  -Glargine 15 units daily and sliding scale  -Once she is off steroids, she may not require supplemental insulin and may be able to transition to orals         VTE prophylaxis: therapeutic anticoagulation with Pradaxa    I have performed a physical exam and reviewed and updated ROS and Plan today (1/10/2022). In review of yesterday's note (1/9/2022), there are no changes except as documented above.    ==============================================================================================================  Please note that this dictation was created using voice recognition software. I have made every reasonable attempt to correct obvious errors, but there may be errors of grammar and possibly content that I did not discover before finalizing the note.    Electronically signed by:  ALEJANDRO Ronquillo, MSN, APRN, FNP-C  Hospitalist Services  [facility]  [phone]  [email or web address]  01/10/22    0966

## 2022-01-11 LAB
ANION GAP SERPL CALC-SCNC: 8 MMOL/L (ref 7–16)
BUN SERPL-MCNC: 23 MG/DL (ref 8–22)
CALCIUM SERPL-MCNC: 7.7 MG/DL (ref 8.5–10.5)
CHLORIDE SERPL-SCNC: 107 MMOL/L (ref 96–112)
CO2 SERPL-SCNC: 26 MMOL/L (ref 20–33)
CREAT SERPL-MCNC: 0.97 MG/DL (ref 0.5–1.4)
GLUCOSE BLD-MCNC: 121 MG/DL (ref 65–99)
GLUCOSE BLD-MCNC: 141 MG/DL (ref 65–99)
GLUCOSE SERPL-MCNC: 183 MG/DL (ref 65–99)
POTASSIUM SERPL-SCNC: 4.1 MMOL/L (ref 3.6–5.5)
SODIUM SERPL-SCNC: 141 MMOL/L (ref 135–145)

## 2022-01-11 PROCEDURE — 700102 HCHG RX REV CODE 250 W/ 637 OVERRIDE(OP): Performed by: STUDENT IN AN ORGANIZED HEALTH CARE EDUCATION/TRAINING PROGRAM

## 2022-01-11 PROCEDURE — 700111 HCHG RX REV CODE 636 W/ 250 OVERRIDE (IP): Performed by: NURSE PRACTITIONER

## 2022-01-11 PROCEDURE — 700102 HCHG RX REV CODE 250 W/ 637 OVERRIDE(OP): Performed by: HOSPITALIST

## 2022-01-11 PROCEDURE — 80048 BASIC METABOLIC PNL TOTAL CA: CPT

## 2022-01-11 PROCEDURE — 99232 SBSQ HOSP IP/OBS MODERATE 35: CPT | Performed by: INTERNAL MEDICINE

## 2022-01-11 PROCEDURE — 97535 SELF CARE MNGMENT TRAINING: CPT

## 2022-01-11 PROCEDURE — A9270 NON-COVERED ITEM OR SERVICE: HCPCS | Performed by: STUDENT IN AN ORGANIZED HEALTH CARE EDUCATION/TRAINING PROGRAM

## 2022-01-11 PROCEDURE — A9270 NON-COVERED ITEM OR SERVICE: HCPCS | Performed by: HOSPITALIST

## 2022-01-11 PROCEDURE — 770001 HCHG ROOM/CARE - MED/SURG/GYN PRIV*

## 2022-01-11 PROCEDURE — 97530 THERAPEUTIC ACTIVITIES: CPT | Mod: CQ

## 2022-01-11 PROCEDURE — A9270 NON-COVERED ITEM OR SERVICE: HCPCS | Performed by: NURSE PRACTITIONER

## 2022-01-11 PROCEDURE — 82962 GLUCOSE BLOOD TEST: CPT

## 2022-01-11 PROCEDURE — 97116 GAIT TRAINING THERAPY: CPT | Mod: CQ

## 2022-01-11 PROCEDURE — 700102 HCHG RX REV CODE 250 W/ 637 OVERRIDE(OP): Performed by: NURSE PRACTITIONER

## 2022-01-11 RX ADMIN — ATORVASTATIN CALCIUM 40 MG: 40 TABLET, FILM COATED ORAL at 17:02

## 2022-01-11 RX ADMIN — INSULIN HUMAN 3 UNITS: 100 INJECTION, SOLUTION PARENTERAL at 11:44

## 2022-01-11 RX ADMIN — CIPROFLOXACIN HYDROCHLORIDE 1 DROP: 3 SOLUTION/ DROPS OPHTHALMIC at 16:45

## 2022-01-11 RX ADMIN — PREDNISOLONE ACETATE 1 DROP: 10 SUSPENSION/ DROPS OPHTHALMIC at 11:41

## 2022-01-11 RX ADMIN — CIPROFLOXACIN HYDROCHLORIDE 1 DROP: 3 SOLUTION/ DROPS OPHTHALMIC at 05:41

## 2022-01-11 RX ADMIN — PREDNISOLONE ACETATE 1 DROP: 10 SUSPENSION/ DROPS OPHTHALMIC at 05:40

## 2022-01-11 RX ADMIN — HYDROCORTISONE 10 MG: 10 TABLET ORAL at 05:40

## 2022-01-11 RX ADMIN — PREDNISOLONE ACETATE 1 DROP: 10 SUSPENSION/ DROPS OPHTHALMIC at 20:05

## 2022-01-11 RX ADMIN — CIPROFLOXACIN HYDROCHLORIDE 1 DROP: 3 SOLUTION/ DROPS OPHTHALMIC at 20:05

## 2022-01-11 RX ADMIN — DABIGATRAN ETEXILATE MESYLATE 150 MG: 150 CAPSULE ORAL at 05:49

## 2022-01-11 RX ADMIN — DABIGATRAN ETEXILATE MESYLATE 150 MG: 150 CAPSULE ORAL at 16:46

## 2022-01-11 RX ADMIN — NYSTATIN: 100000 POWDER TOPICAL at 16:46

## 2022-01-11 RX ADMIN — Medication 950 MG: at 05:40

## 2022-01-11 RX ADMIN — PREDNISOLONE ACETATE 1 DROP: 10 SUSPENSION/ DROPS OPHTHALMIC at 16:45

## 2022-01-11 RX ADMIN — HYDROCORTISONE 10 MG: 10 TABLET ORAL at 17:01

## 2022-01-11 RX ADMIN — Medication 950 MG: at 17:02

## 2022-01-11 RX ADMIN — CIPROFLOXACIN HYDROCHLORIDE 1 DROP: 3 SOLUTION/ DROPS OPHTHALMIC at 11:41

## 2022-01-11 RX ADMIN — ASPIRIN 81 MG: 81 TABLET, CHEWABLE ORAL at 05:40

## 2022-01-11 RX ADMIN — CEFTRIAXONE SODIUM 2 G: 2 INJECTION, POWDER, FOR SOLUTION INTRAMUSCULAR; INTRAVENOUS at 05:38

## 2022-01-11 RX ADMIN — NYSTATIN: 100000 POWDER TOPICAL at 05:40

## 2022-01-11 RX ADMIN — LEVOTHYROXINE SODIUM 100 MCG: 0.1 TABLET ORAL at 05:40

## 2022-01-11 RX ADMIN — INSULIN GLARGINE 15 UNITS: 100 INJECTION, SOLUTION SUBCUTANEOUS at 16:58

## 2022-01-11 ASSESSMENT — COGNITIVE AND FUNCTIONAL STATUS - GENERAL
SUGGESTED CMS G CODE MODIFIER DAILY ACTIVITY: CK
TURNING FROM BACK TO SIDE WHILE IN FLAT BAD: A LITTLE
WALKING IN HOSPITAL ROOM: A LITTLE
MOVING FROM LYING ON BACK TO SITTING ON SIDE OF FLAT BED: A LITTLE
STANDING UP FROM CHAIR USING ARMS: A LITTLE
DAILY ACTIVITIY SCORE: 15
MOVING TO AND FROM BED TO CHAIR: A LITTLE
TOILETING: A LITTLE
PERSONAL GROOMING: A LITTLE
HELP NEEDED FOR BATHING: A LITTLE
DRESSING REGULAR LOWER BODY CLOTHING: A LOT
SUGGESTED CMS G CODE MODIFIER MOBILITY: CK
CLIMB 3 TO 5 STEPS WITH RAILING: A LITTLE
HELP NEEDED FOR BATHING: A LOT
SUGGESTED CMS G CODE MODIFIER MOBILITY: CK
MOBILITY SCORE: 18
SUGGESTED CMS G CODE MODIFIER DAILY ACTIVITY: CK
DRESSING REGULAR UPPER BODY CLOTHING: A LITTLE
EATING MEALS: A LITTLE
PERSONAL GROOMING: A LITTLE
MOBILITY SCORE: 18
DRESSING REGULAR UPPER BODY CLOTHING: A LITTLE
EATING MEALS: A LITTLE
TURNING FROM BACK TO SIDE WHILE IN FLAT BAD: A LITTLE
MOVING TO AND FROM BED TO CHAIR: A LITTLE
TOILETING: A LOT
MOVING FROM LYING ON BACK TO SITTING ON SIDE OF FLAT BED: A LITTLE
STANDING UP FROM CHAIR USING ARMS: A LITTLE
WALKING IN HOSPITAL ROOM: A LITTLE
CLIMB 3 TO 5 STEPS WITH RAILING: A LITTLE
DAILY ACTIVITIY SCORE: 18
DRESSING REGULAR LOWER BODY CLOTHING: A LITTLE

## 2022-01-11 ASSESSMENT — ENCOUNTER SYMPTOMS
ABDOMINAL PAIN: 0
NAUSEA: 0
FEVER: 0
DIZZINESS: 0
CHILLS: 0
FALLS: 1
SHORTNESS OF BREATH: 0
DIARRHEA: 0
WEAKNESS: 1
EYES NEGATIVE: 1
COUGH: 0
PALPITATIONS: 0
HEARTBURN: 0
RESPIRATORY NEGATIVE: 1
GASTROINTESTINAL NEGATIVE: 1
HEADACHES: 0
CARDIOVASCULAR NEGATIVE: 1
MYALGIAS: 1
DEPRESSION: 1
VOMITING: 0

## 2022-01-11 ASSESSMENT — GAIT ASSESSMENTS
DISTANCE (FEET): 45
GAIT LEVEL OF ASSIST: CONTACT GUARD ASSIST
DEVIATION: BRADYKINETIC
ASSISTIVE DEVICE: FRONT WHEEL WALKER

## 2022-01-11 ASSESSMENT — PAIN SCALES - WONG BAKER: WONGBAKER_NUMERICALRESPONSE: HURTS EVEN MORE

## 2022-01-11 NOTE — THERAPY
"Occupational Therapy  Daily Treatment     Patient Name: Beverley Dejeuss  Age:  71 y.o., Sex:  female  Medical Record #: [id number]  Today's Date: 1/11/2022     Precautions  Precautions: Fall Risk  Comments: BLE wounds    Assessment    Making progress, participated in seated/standing ADLs today. Remains limited by weakness, fatigue, impaired balance.    Plan    Continue current treatment plan.    DC Equipment Recommendations: Unable to determine at this time  Discharge Recommendations: Recommend post-acute placement for additional occupational therapy services prior to discharge home    Subjective    \"I didn't know I was getting this treatment today\" referring to sponge bath, hair care, etc.     Objective       01/11/22 1356   Cognition    Cognition / Consciousness WDL   Level of Consciousness Alert   Other Treatments   Other Treatments Provided placed tang BSC in room, encouraged her to use this with nursing staff instead of purewick   Balance   Sitting Balance (Static) Fair +   Sitting Balance (Dynamic) Fair   Standing Balance (Static) Fair -   Standing Balance (Dynamic) Fair -   Weight Shift Sitting Fair   Weight Shift Standing Fair   Skilled Intervention Verbal Cuing;Tactile Cuing   Comments w/ fww   Bed Mobility    Supine to Sit Supervised   Scooting Supervised   Skilled Intervention Verbal Cuing;Sequencing   Comments extra time, use of bed features   Activities of Daily Living   Grooming Supervision;Seated  (wash hair, comb hair)   Bathing Minimal Assist  (seated/standing sponge bath)   Upper Body Dressing Minimal Assist   Lower Body Dressing Maximal Assist   Toileting Maximal Assist   Skilled Intervention Verbal Cuing;Tactile Cuing;Sequencing;Facilitation;Compensatory Strategies   How much help from another person does the patient currently need...   Putting on and taking off regular lower body clothing? 2   Bathing (including washing, rinsing, and drying)? 2   Toileting, which includes using a toilet, bedpan, " or urinal? 2   Putting on and taking off regular upper body clothing? 3   Taking care of personal grooming such as brushing teeth? 3   Eating meals? 3   6 Clicks Daily Activity Score 15   Functional Mobility   Sit to Stand Minimal Assist   Bed, Chair, Wheelchair Transfer Minimal Assist   Transfer Method Stand Step   Mobility EOB>Recliner   Skilled Intervention Verbal Cuing;Tactile Cuing;Sequencing   Comments w/ fww   Patient / Family Goals   Patient / Family Goal #1 home asap   Goal #1 Outcome Goal not met   Short Term Goals   Short Term Goal # 1 pt will demo functional transfers with SPV   Goal Outcome # 1 Progressing as expected   Short Term Goal # 2 pt will complete toileting ADL at SPV level   Goal Outcome # 2 Progressing as expected   Short Term Goal # 3 pt will tolerate >5min standing grooming at SPV level   Goal Outcome # 3 Progressing as expected

## 2022-01-11 NOTE — THERAPY
"Occupational Therapy  Daily Treatment     Patient Name: Beverley Dejesus  Age:  71 y.o., Sex:  female  Medical Record #: [id number]  Today's Date: 1/10/2022    Precautions: Fall Risk  Comments: BLE wounds    Assessment    Pt seen for OT tx.  Pt is making progress & is motivated to regain her independence.  Pt stated she was fearful & anxious about D/C home as she has no family/friends to assist her if needed upon D/C.  Pt was Min A for supine to sit EOB.  Pt stood with Min A & FWW to transfer to bedside chair.  Pt stated she felt dizzy in standing.  Pt encouraged to be OOB for meals.  Pt will need Post Acute OT services prior to D/C home.    Plan    Continue current treatment plan.    DC Equipment Recommendations: Unable to determine at this time  Discharge Recommendations: Recommend post-acute placement for additional occupational therapy services prior to discharge home    Subjective    \"I want to be able to take care of myself again\"     Objective       01/10/22 1621   Cognition    Comments pleasant, co-operative, anxious about how she manage once D/C home   Neuro-Muscular Treatments   Comments pt tends to lean post in standing   Other Treatments   Other Treatments Provided pt encouraged to be OOB for meals   Balance   Sitting Balance (Static) Fair +   Sitting Balance (Dynamic) Fair   Standing Balance (Static) Fair -   Standing Balance (Dynamic) Poor +   Weight Shift Sitting Fair   Weight Shift Standing Fair   Bed Mobility    Supine to Sit Minimal Assist   Sit to Supine   (pt left up in chair after tx)   Scooting Supervised   Rolling Minimal Assist to Rt.   Activities of Daily Living   Eating Modified Independent   Grooming Supervision;Seated   Upper Body Dressing Minimal Assist   Lower Body Dressing Moderate Assist   Toileting Moderate Assist   Comments pt lmited by body habitus   Functional Mobility   Sit to Stand Minimal Assist   Bed, Chair, Wheelchair Transfer Minimal Assist   Patient / Family Goals   Patient / " Family Goal #1 home asap   Goal #1 Outcome Goal not met   Short Term Goals   Short Term Goal # 1 pt will demo functional transfers with SPV   Goal Outcome # 1 Progressing as expected   Short Term Goal # 2 pt will complete toileting ADL at SPV level   Goal Outcome # 2 Goal not met   Short Term Goal # 3 pt will tolerate >5min standing grooming at SPV level   Goal Outcome # 3 Progressing as expected

## 2022-01-11 NOTE — CARE PLAN
The patient is Stable - Low risk of patient condition declining or worsening    Shift Goals  Clinical Goals: Pain control  Patient Goals: Pain control  Family Goals: NA    Progress made toward(s) clinical / shift goals:  Pain controlled per MAR overnight. Pt able to rest comfortably.    Patient is not progressing towards the following goals: NA      Problem: Pain - Standard  Goal: Alleviation of pain or a reduction in pain to the patient’s comfort goal  Outcome: Progressing

## 2022-01-11 NOTE — DISCHARGE PLANNING
Anticipated Discharge Disposition: SNF    Action: Discussed this case during Morning Huddle. LSW informed [facility] and [facility] are able to accept. Per MD, patient will be evaluated this morning for medical clearance, DPA aware.    LSW submitted the PASRR, File # [id number].    Barriers to Discharge: Medical Clearance.    Plan: SNF, pending confirmation of medical clearance.    12:00pm - Per [facility], patient is medically cleared for SNF. LSW requested DPA follows up with [facility] to confirm acceptance and bed.    PLAN: SNF, pending confirmation of acceptance and transfer arrangements.    2:00pm - Per DPA, [facility] is able to accept, pending insurance authorization, LSW notified patient and she agreed with transfer to .    LSW requested DPA clarifies if  can provide transportation via extra wide wheelchair, per RN request.    PLAN: SNF tentatively tomorrow, pending insurance authorization and transfer arrangements.

## 2022-01-11 NOTE — DISCHARGE PLANNING
Spoke To: Rhonda  Agency/Facility Name: [facility]  Plan or Request: accepted, waiting list, bed open next Tuesday.    Agency/Facility Name: [facility]  Plan or Request: DPA left vm for Rhonda.    1300- Spoke To: Laureen  Agency/Facility Name: [facility]  Plan or Request: bed available, insurance auth will start  Natasha lange

## 2022-01-11 NOTE — PROGRESS NOTES
[facility] Medicine Daily Progress Note    Date of Service  1/11/2022    Chief Complaint  Beverley Dejesus is a 71 y.o. female admitted 1/4/2022 with ground level fall    Hospital Course  Ms. Beverley Dejesus has a PMHx of DMT2, atrial flutter, and hypertension patient presents the emergency room on 1/4/2022 with a ground-level fall.  CT scan of the head and C-spine did not reveal any acute pathology.  The patient was assessed to have found to have a significant cellulitis as well as dehydration with renal failure.  She was admitted to the hospital for management of these.     On 1/5/2022 she was noted to have ongoing hypotension despite fluid resuscitation, she was transferred to the ICU for septic shock.  Patient has been treated with broad-spectrum antibiotics and stress dose steroids.  Transferred out of the ICU on 1/7/2022 to telemetry.  Patient still noted BLE pain and existing wounds on ankles were examined. Patient's BP improved with steroids. Patient transferred to the medical floor for further management of care and work on placement.  Wound care continue to manage patient's wounds on the floor.      Interval Problem Update  1/11/2022: Afebrile, vitals stable, on room air.  SBP 110s to 120s- watch closely to reduce steroid dose when able.  Blood sugars 150-180s  past 24 hours.  Patient on day 5/7 course of Rocephin.  [facility] has accepted patient, insurance Auth pending.    I have personally seen and examined the patient at bedside. I discussed the plan of care with patient, bedside RN and . Wound culture growing Klebsiella oxytoca/Raoultella ornithinolytica, on day 5/7 course of ceftriaxone.    Consultants/Specialty  NONE    Code Status  Full Code    Disposition  Patient is not medically cleared for discharge.   Anticipate discharge to to skilled nursing facility.  I have placed the appropriate orders for post-discharge needs.    Review of Systems  Review of Systems   Constitutional: Positive for  malaise/fatigue. Negative for chills and fever.   HENT: Negative.    Eyes: Negative.    Respiratory: Negative.  Negative for cough and shortness of breath.    Cardiovascular: Negative.  Negative for chest pain, palpitations and leg swelling.   Gastrointestinal: Negative.  Negative for abdominal pain, diarrhea, heartburn, nausea and vomiting.   Genitourinary: Negative.  Negative for dysuria, frequency and urgency.   Musculoskeletal: Positive for falls, joint pain and myalgias.   Skin: Negative.    Neurological: Positive for weakness. Negative for dizziness and headaches.   Endo/Heme/Allergies: Negative.    Psychiatric/Behavioral: Positive for depression.   All other systems reviewed and are negative.       Physical Exam  Temp:  [36.1 °C (97 °F)-36.8 °C (98.3 °F)] 36.1 °C (97 °F)  Pulse:  [60-83] 60  Resp:  [17-18] 17  BP: (110-132)/(53-75) 122/60  SpO2:  [94 %-100 %] 100 %    Physical Exam  Vitals and nursing note reviewed.   Constitutional:       Appearance: She is obese.   HENT:      Head: Normocephalic.      Nose: Nose normal.      Mouth/Throat:      Mouth: Mucous membranes are moist.      Pharynx: Oropharynx is clear.   Eyes:      Pupils: Pupils are equal, round, and reactive to light.   Cardiovascular:      Rate and Rhythm: Normal rate. Rhythm irregular.      Pulses: Normal pulses.      Heart sounds: Normal heart sounds.   Pulmonary:      Effort: Pulmonary effort is normal.      Breath sounds: Normal breath sounds.   Abdominal:      General: Bowel sounds are normal.      Palpations: Abdomen is soft.   Musculoskeletal:         General: Tenderness present.      Cervical back: Normal range of motion.      Right lower leg: Edema present.      Left lower leg: Edema present.   Skin:     General: Skin is dry.      Capillary Refill: Capillary refill takes 2 to 3 seconds.   Neurological:      Mental Status: She is alert. Mental status is at baseline.      Motor: Weakness present.         Fluids    Intake/Output Summary  (Last 24 hours) at 1/11/2022 1456  Last data filed at 1/11/2022 0930  Gross per 24 hour   Intake 360 ml   Output --   Net 360 ml       Laboratory  Recent Labs     01/09/22  0843 01/10/22  0254   WBC 9.3 9.0   RBC 3.02* 2.97*   HEMOGLOBIN 8.6* 8.2*   HEMATOCRIT 28.3* 27.6*   MCV 93.7 92.9   MCH 28.5 27.6   MCHC 30.4* 29.7*   RDW 53.4* 53.1*   PLATELETCT 213 209   MPV 9.9 9.7     Recent Labs     01/09/22  0843 01/10/22  0254 01/11/22  0141   SODIUM 141 142 141   POTASSIUM 4.0 3.9 4.1   CHLORIDE 107 107 107   CO2 27 26 26   GLUCOSE 115* 124* 183*   BUN 35* 28* 23*   CREATININE 1.05 1.05 0.97   CALCIUM 8.2* 7.8* 7.7*                   Imaging  DX-CHEST-FOR LINE PLACEMENT Perform procedure in: Patient's Room   Final Result      1.  Interval placement of a PICC line which is satisfactory in position.      2.  Bilateral interstitial infiltrates.      IR-PICC LINE PLACEMENT W/ GUIDANCE > AGE 5   Final Result                  Ultrasound-guided PICC placement performed by qualified nursing staff as    above.          DX-CHEST-PORTABLE (1 VIEW)   Final Result      No acute cardiac or pulmonary abnormalities are identified. Lung volumes are low.      EC-ECHOCARDIOGRAM COMPLETE W/ CONT   Final Result      US-RENAL   Final Result      1.  No hydronephrosis   2.  Atrophic appearing kidneys bilaterally with cortical thinning   3.  Incidentally noted cholelithiasis      US-MIGEL SINGLE LEVEL BILAT   Final Result      CT-HEAD W/O   Final Result      1.  No acute intracranial abnormality is identified.   2.  There are periventricular and subcortical white matter changes present.  This finding is nonspecific and could be from previous small vessel ischemia, demyelination, or gliosis.         CT-CSPINE WITHOUT PLUS RECONS   Final Result      Negative for cervical spine fracture or subluxation           Assessment/Plan  * Lower extremity cellulitis  Assessment & Plan  -Patient states that wounds on her legs started as blisters  approximately 3 weeks prior to admission  -Severe cellulitis, circumferential, involving both legs, resulting in septic shock  -Wound cultures positive for Klebsiell oxytoca/Raoultell ornithiolytica   -Continue IV ceftriaxone for total of 10 days of antibiotics  -Wound care - following  -SNF referral placed    LEFT Leg      01/09/22 1500       RIGHT Leg         Relative adrenal insufficiency (HCC)- (present on admission)  Assessment & Plan  -Cortisol was 10 in the setting of septic shock that had required pressors  -Status post IV hydrocortisone that will change to oral hydrocortisone 20 mg daily on 1/8 and consider further taper based on her blood pressure and clinical condition.   -Will reassess for steroid taper in am once BP is maintained      Type 2 diabetes mellitus with hyperglycemia, with long-term current use of insulin ([facility])- (present on admission)  Assessment & Plan  -Uncontrolled with hyperglycemia, hemoglobin A1c is 9.8  -Glargine 15 units daily and sliding scale  -Once she is off steroids, she may not require supplemental insulin and may be able to transition to orals      Severe sepsis with septic shock ([facility])- (present on admission)  Assessment & Plan  -Septic shock has resolved    Fall from ground level- (present on admission)  Assessment & Plan  -CT imaging at admission negative for significant injury  -Fall likely related to infection/orthostasis  -PT/OT    Atrial flutter ([facility])- (present on admission)  Assessment & Plan  -Metoprolol, pradaxa    Class 3 severe obesity in adult ([facility])- (present on admission)  Assessment & Plan  -BMI 51  -Patient counseled regarding controlling weight  -Hx of DMT2 - uncontrolled  -Poor diet    Primary hypertension- (present on admission)  Assessment & Plan  -Currently normotensive  -May need to restart Lisinopril and Metoprolol as her blood pressure recovers after sepsis    JULIETA (acute kidney injury) ([facility])- (present on admission)  Assessment & Plan  -resolved  -Likely  prerenal due to dehydration  -Improved with IV fluids         VTE prophylaxis: therapeutic anticoagulation with Pradaxa    I have performed a physical exam and reviewed and updated ROS and Plan today (1/11/2022). In review of yesterday's note (1/10/2022), there are no changes except as documented above.    ==============================================================================================================  Please note that this dictation was created using voice recognition software. I have made every reasonable attempt to correct obvious errors, but there may be errors of grammar and possibly content that I did not discover before finalizing the note.    Electronically signed by:  Roger ZEE

## 2022-01-12 LAB
ANION GAP SERPL CALC-SCNC: 8 MMOL/L (ref 7–16)
BUN SERPL-MCNC: 18 MG/DL (ref 8–22)
CALCIUM SERPL-MCNC: 8 MG/DL (ref 8.5–10.5)
CHLORIDE SERPL-SCNC: 108 MMOL/L (ref 96–112)
CO2 SERPL-SCNC: 25 MMOL/L (ref 20–33)
CREAT SERPL-MCNC: 0.88 MG/DL (ref 0.5–1.4)
ERYTHROCYTE [DISTWIDTH] IN BLOOD BY AUTOMATED COUNT: 53.8 FL (ref 35.9–50)
GLUCOSE BLD-MCNC: 110 MG/DL (ref 65–99)
GLUCOSE BLD-MCNC: 130 MG/DL (ref 65–99)
GLUCOSE BLD-MCNC: 149 MG/DL (ref 65–99)
GLUCOSE BLD-MCNC: 202 MG/DL (ref 65–99)
GLUCOSE SERPL-MCNC: 132 MG/DL (ref 65–99)
HCT VFR BLD AUTO: 25.6 % (ref 37–47)
HGB BLD-MCNC: 7.9 G/DL (ref 12–16)
MCH RBC QN AUTO: 29.2 PG (ref 27–33)
MCHC RBC AUTO-ENTMCNC: 30.9 G/DL (ref 33.6–35)
MCV RBC AUTO: 94.5 FL (ref 81.4–97.8)
PLATELET # BLD AUTO: 179 K/UL (ref 164–446)
PMV BLD AUTO: 9.9 FL (ref 9–12.9)
POTASSIUM SERPL-SCNC: 3.8 MMOL/L (ref 3.6–5.5)
RBC # BLD AUTO: 2.71 M/UL (ref 4.2–5.4)
SODIUM SERPL-SCNC: 141 MMOL/L (ref 135–145)
WBC # BLD AUTO: 7.9 K/UL (ref 4.8–10.8)

## 2022-01-12 PROCEDURE — 700101 HCHG RX REV CODE 250: Performed by: NURSE PRACTITIONER

## 2022-01-12 PROCEDURE — 97530 THERAPEUTIC ACTIVITIES: CPT

## 2022-01-12 PROCEDURE — 97116 GAIT TRAINING THERAPY: CPT

## 2022-01-12 PROCEDURE — 700102 HCHG RX REV CODE 250 W/ 637 OVERRIDE(OP): Performed by: INTERNAL MEDICINE

## 2022-01-12 PROCEDURE — 99232 SBSQ HOSP IP/OBS MODERATE 35: CPT | Performed by: INTERNAL MEDICINE

## 2022-01-12 PROCEDURE — 770001 HCHG ROOM/CARE - MED/SURG/GYN PRIV*

## 2022-01-12 PROCEDURE — 85027 COMPLETE CBC AUTOMATED: CPT

## 2022-01-12 PROCEDURE — A9270 NON-COVERED ITEM OR SERVICE: HCPCS | Performed by: INTERNAL MEDICINE

## 2022-01-12 PROCEDURE — A9270 NON-COVERED ITEM OR SERVICE: HCPCS | Performed by: NURSE PRACTITIONER

## 2022-01-12 PROCEDURE — 82962 GLUCOSE BLOOD TEST: CPT | Mod: 91

## 2022-01-12 PROCEDURE — 700102 HCHG RX REV CODE 250 W/ 637 OVERRIDE(OP): Performed by: NURSE PRACTITIONER

## 2022-01-12 PROCEDURE — 80048 BASIC METABOLIC PNL TOTAL CA: CPT

## 2022-01-12 PROCEDURE — A9270 NON-COVERED ITEM OR SERVICE: HCPCS | Performed by: HOSPITALIST

## 2022-01-12 PROCEDURE — 700102 HCHG RX REV CODE 250 W/ 637 OVERRIDE(OP): Performed by: STUDENT IN AN ORGANIZED HEALTH CARE EDUCATION/TRAINING PROGRAM

## 2022-01-12 PROCEDURE — A9270 NON-COVERED ITEM OR SERVICE: HCPCS | Performed by: STUDENT IN AN ORGANIZED HEALTH CARE EDUCATION/TRAINING PROGRAM

## 2022-01-12 PROCEDURE — 700102 HCHG RX REV CODE 250 W/ 637 OVERRIDE(OP): Performed by: HOSPITALIST

## 2022-01-12 PROCEDURE — 700111 HCHG RX REV CODE 636 W/ 250 OVERRIDE (IP): Performed by: NURSE PRACTITIONER

## 2022-01-12 RX ADMIN — Medication 950 MG: at 06:50

## 2022-01-12 RX ADMIN — PREDNISOLONE ACETATE 1 DROP: 10 SUSPENSION/ DROPS OPHTHALMIC at 17:47

## 2022-01-12 RX ADMIN — PREDNISOLONE ACETATE 1 DROP: 10 SUSPENSION/ DROPS OPHTHALMIC at 06:50

## 2022-01-12 RX ADMIN — CIPROFLOXACIN HYDROCHLORIDE 1 DROP: 3 SOLUTION/ DROPS OPHTHALMIC at 20:55

## 2022-01-12 RX ADMIN — OXYCODONE 5 MG: 5 TABLET ORAL at 01:08

## 2022-01-12 RX ADMIN — CIPROFLOXACIN HYDROCHLORIDE 1 DROP: 3 SOLUTION/ DROPS OPHTHALMIC at 17:47

## 2022-01-12 RX ADMIN — CIPROFLOXACIN HYDROCHLORIDE 1 DROP: 3 SOLUTION/ DROPS OPHTHALMIC at 06:50

## 2022-01-12 RX ADMIN — ASPIRIN 81 MG: 81 TABLET, CHEWABLE ORAL at 06:50

## 2022-01-12 RX ADMIN — PREDNISOLONE ACETATE 1 DROP: 10 SUSPENSION/ DROPS OPHTHALMIC at 20:55

## 2022-01-12 RX ADMIN — Medication 950 MG: at 17:47

## 2022-01-12 RX ADMIN — ATORVASTATIN CALCIUM 40 MG: 40 TABLET, FILM COATED ORAL at 17:47

## 2022-01-12 RX ADMIN — LEVOTHYROXINE SODIUM 100 MCG: 0.1 TABLET ORAL at 06:50

## 2022-01-12 RX ADMIN — HYDROCORTISONE 10 MG: 10 TABLET ORAL at 06:51

## 2022-01-12 RX ADMIN — CEFTRIAXONE SODIUM 2 G: 2 INJECTION, POWDER, FOR SOLUTION INTRAMUSCULAR; INTRAVENOUS at 06:50

## 2022-01-12 RX ADMIN — DABIGATRAN ETEXILATE MESYLATE 150 MG: 150 CAPSULE ORAL at 17:47

## 2022-01-12 RX ADMIN — DABIGATRAN ETEXILATE MESYLATE 150 MG: 150 CAPSULE ORAL at 06:50

## 2022-01-12 RX ADMIN — PREDNISOLONE ACETATE 1 DROP: 10 SUSPENSION/ DROPS OPHTHALMIC at 12:25

## 2022-01-12 RX ADMIN — HYDROCORTISONE 10 MG: 10 TABLET ORAL at 17:46

## 2022-01-12 RX ADMIN — INSULIN HUMAN 6 UNITS: 100 INJECTION, SOLUTION PARENTERAL at 12:25

## 2022-01-12 RX ADMIN — CIPROFLOXACIN HYDROCHLORIDE 1 DROP: 3 SOLUTION/ DROPS OPHTHALMIC at 12:25

## 2022-01-12 RX ADMIN — INSULIN GLARGINE 15 UNITS: 100 INJECTION, SOLUTION SUBCUTANEOUS at 17:48

## 2022-01-12 ASSESSMENT — ENCOUNTER SYMPTOMS
DIZZINESS: 0
HEADACHES: 0
VOMITING: 0
PALPITATIONS: 0
WEAKNESS: 1
FEVER: 0
FALLS: 1
MYALGIAS: 1
RESPIRATORY NEGATIVE: 1
DEPRESSION: 1
DIARRHEA: 0
ABDOMINAL PAIN: 0
NAUSEA: 0
COUGH: 0
HEARTBURN: 0
EYES NEGATIVE: 1
CARDIOVASCULAR NEGATIVE: 1
CHILLS: 0
SHORTNESS OF BREATH: 0
GASTROINTESTINAL NEGATIVE: 1

## 2022-01-12 ASSESSMENT — COGNITIVE AND FUNCTIONAL STATUS - GENERAL
TURNING FROM BACK TO SIDE WHILE IN FLAT BAD: UNABLE
MOVING TO AND FROM BED TO CHAIR: UNABLE
MOBILITY SCORE: 18
SUGGESTED CMS G CODE MODIFIER MOBILITY: CK

## 2022-01-12 ASSESSMENT — GAIT ASSESSMENTS
ASSISTIVE DEVICE: FRONT WHEEL WALKER
DISTANCE (FEET): 150
GAIT LEVEL OF ASSIST: SUPERVISED
DEVIATION: BRADYKINETIC

## 2022-01-12 NOTE — THERAPY
Physical Therapy   Daily Treatment     Patient Name: Beverley Dejesus  Age:  71 y.o., Sex:  female  Medical Record #: [id number]  Today's Date: 1/11/2022     Precautions  Precautions: (P) Fall Risk  Comments: (P) bilat LE wounds    Assessment    Pt found seated up in recliner chair, willing to participate w/PT. Pt was functionally indep PTA until she got sick around [place]. Today pt needing only CGA w/her functional transfers w/FWW and her amb efforts, managing 40' today. Pt needing encouragement, fearful of falling. Pt would benefit from post acute therapy before home.     Plan    Continue current treatment plan.    DC Equipment Recommendations: Unable to determine at this time  Discharge Recommendations: Recommend post-acute placement for additional physical therapy services prior to discharge home     Objective       01/11/22 1625   Balance   Sitting Balance (Static) Fair +   Sitting Balance (Dynamic) Fair   Standing Balance (Static) Fair -   Standing Balance (Dynamic) Fair -   Weight Shift Sitting Fair   Weight Shift Standing Fair   Comments standing w/FWW   Gait Analysis   Gait Level Of Assist Contact Guard Assist   Assistive Device Front Wheel Walker   Distance (Feet) 45   # of Times Distance was Traveled 1   Deviation Bradykinetic   Skilled Intervention Verbal Cuing   Comments Improvement w/pts amb efforts from previous PT session. Pt was indep wo/AD PTA, pt lacks confidence w/her amb efforts. She was surprised how well she did today.    Bed Mobility    Comments Did not assess. Pt just up into the chair and returned to the chair for dinner.    Functional Mobility   Sit to Stand Contact Guard Assist   Bed, Chair, Wheelchair Transfer Contact Guard Assist   Comments w/FWW   How much difficulty does the patient currently have...   Turning over in bed (including adjusting bedclothes, sheets and blankets)? 3   Sitting down on and standing up from a chair with arms (e.g., wheelchair, bedside commode, etc.) 3    Moving from lying on back to sitting on the side of the bed? 3   How much help from another person does the patient currently need...   Moving to and from a bed to a chair (including a wheelchair)? 3   Need to walk in a hospital room? 3   Climbing 3-5 steps with a railing? 3   6 clicks Mobility Score 18   Short Term Goals    Short Term Goal # 1 Pt will perform functional transfers with SPV in 6 visits to increase independence   Goal Outcome # 1 goal not met   Short Term Goal # 2 Pt will ambulate 150ft with FWW and SPV in 6 visits to increase independence   Goal Outcome # 2 Goal not met   Short Term Goal # 3 Pt will ascend/descend 5 steps with SPV in 6 visits to enter/exit home   Goal Outcome # 3 Goal not met   Short Term Goal # 4 Pt will perform supine<> sit with min assist in 6 visits to get in/out of bed   Goal Outcome # 4 Goal not met

## 2022-01-12 NOTE — THERAPY
Physical Therapy   Daily Treatment     Patient Name: Beverley Dejesus  Age:  71 y.o., Sex:  female  Medical Record #: [id number]  Today's Date: 1/12/2022     Precautions  Precautions: Fall Risk  Comments: bilat LE wounds    Assessment    Recd pt alert, in bed.  Reports that she sleeps in a recliner, not in a bed.  She needs min assist for bed mobility but is able to stand w/o assist.  Also able to ambulate 150 ft w/ fww w/o loss of balance or need of assist.  Able to move up/down stairs w/ spv only.  PT will no longer actively follow.  PT for d/c needs only.    DC Equipment Recommendations: None  Discharge Recommendations: Recommend home health for continued physical therapy services        Objective       01/12/22 1053   Balance   Sitting Balance (Static) Fair +   Sitting Balance (Dynamic) Fair +   Standing Balance (Static) Fair   Standing Balance (Dynamic) Fair   Weight Shift Sitting Good   Weight Shift Standing Good   Comments w/ fww   Gait Analysis   Gait Level Of Assist Supervised   Assistive Device Front Wheel Walker   Distance (Feet) 150   Deviation Bradykinetic   # of Stairs Climbed 5   Level of Assist with Stairs Supervised   Bed Mobility    Supine to Sit Supervised   Sit to Supine Minimal Assist   Skilled Intervention Verbal Cuing;Tactile Cuing;Facilitation   Functional Mobility   Sit to Stand Supervised   Short Term Goals    Short Term Goal # 1 Pt will perform functional transfers with SPV in 6 visits to increase independence   Goal Outcome # 1 Goal met   Short Term Goal # 2 Pt will ambulate 150ft with FWW and SPV in 6 visits to increase independence   Goal Outcome # 2 Goal met   Short Term Goal # 3 Pt will ascend/descend 5 steps with SPV in 6 visits to enter/exit home   Goal Outcome # 3 Goal met   Short Term Goal # 4 Pt will perform supine<> sit with min assist in 6 visits to get in/out of bed   Goal Outcome # 4   (d/c bed mob goal, pt sleeps in a recliner)   Anticipated Discharge Equipment and  Recommendations   DC Equipment Recommendations None   Discharge Recommendations Recommend home health for continued physical therapy services

## 2022-01-12 NOTE — PROGRESS NOTES
[facility] Medicine Daily Progress Note    Date of Service  1/12/2022    Chief Complaint  Beverley Dejesus is a 71 y.o. female admitted 1/4/2022 with ground level fall    Hospital Course  Ms. Beverley Dejesus has a PMHx of DMT2, atrial flutter, and hypertension patient presents the emergency room on 1/4/2022 with a ground-level fall.  CT scan of the head and C-spine did not reveal any acute pathology.  The patient was assessed to have found to have a significant cellulitis as well as dehydration with renal failure.  She was admitted to the hospital for management of these.     On 1/5/2022 she was noted to have ongoing hypotension despite fluid resuscitation, she was transferred to the ICU for septic shock.  Patient has been treated with broad-spectrum antibiotics and stress dose steroids.  Transferred out of the ICU on 1/7/2022 to telemetry.  Patient still noted BLE pain and existing wounds on ankles were examined. Patient's BP improved with steroids. Patient transferred to the medical floor for further management of care and work on placement.  Wound culture grew Klebsiella oxytoca/Raoultella ornithinolytica, on day 6/7 course of ceftriaxone. Wound care continue to manage patient's wounds on the floor.            Interval Problem Update  1/11/2022: Afebrile, vitals stable, on room air.  SBP 110s to 120s- watch closely to reduce steroid dose when able.  Blood sugars 150-180s  past 24 hours.  Patient on day 5/7 course of Rocephin.  [facility] has accepted patient, insurance Auth pending.  1/12/2022: Afebrile, vitals stable, on room air. Labs stable, sugars stable.  Wound care continues to BLE.  Medically cleared for placement. Accepted at [facility], pending ins auth.    I have personally seen and examined the patient at bedside. I discussed the plan of care with patient, bedside RN and . Wound culture growing Klebsiella oxytoca/Raoultella ornithinolytica, on day 5/7 course of  ceftriaxone.    Consultants/Specialty  NONE    Code Status  Full Code    Disposition  Patient is medically cleared for discharge.   Anticipate discharge to to skilled nursing facility.  I have placed the appropriate orders for post-discharge needs.    Review of Systems  Review of Systems   Constitutional: Positive for malaise/fatigue. Negative for chills and fever.   HENT: Negative.    Eyes: Negative.    Respiratory: Negative.  Negative for cough and shortness of breath.    Cardiovascular: Negative.  Negative for chest pain, palpitations and leg swelling.   Gastrointestinal: Negative.  Negative for abdominal pain, diarrhea, heartburn, nausea and vomiting.   Genitourinary: Negative.  Negative for dysuria, frequency and urgency.   Musculoskeletal: Positive for falls, joint pain and myalgias.   Skin: Negative.    Neurological: Positive for weakness. Negative for dizziness and headaches.   Endo/Heme/Allergies: Negative.    Psychiatric/Behavioral: Positive for depression.   All other systems reviewed and are negative.       Physical Exam  Temp:  [36.2 °C (97.2 °F)-36.7 °C (98.1 °F)] 36.7 °C (98.1 °F)  Pulse:  [57-84] 57  Resp:  [18] 18  BP: ()/(47-56) 110/48  SpO2:  [95 %-100 %] 100 %    Physical Exam  Vitals and nursing note reviewed.   Constitutional:       Appearance: She is obese.   HENT:      Head: Normocephalic.      Nose: Nose normal.      Mouth/Throat:      Mouth: Mucous membranes are moist.      Pharynx: Oropharynx is clear.   Eyes:      Pupils: Pupils are equal, round, and reactive to light.   Cardiovascular:      Rate and Rhythm: Normal rate. Rhythm irregular.      Pulses: Normal pulses.      Heart sounds: Normal heart sounds.   Pulmonary:      Effort: Pulmonary effort is normal.      Breath sounds: Normal breath sounds.   Abdominal:      General: Bowel sounds are normal.      Palpations: Abdomen is soft.   Musculoskeletal:         General: Tenderness present.      Cervical back: Normal range of motion.       Right lower leg: Edema present.      Left lower leg: Edema present.   Skin:     General: Skin is dry.      Capillary Refill: Capillary refill takes 2 to 3 seconds.   Neurological:      Mental Status: She is alert. Mental status is at baseline.      Motor: Weakness present.         Fluids    Intake/Output Summary (Last 24 hours) at 1/12/2022 1027  Last data filed at 1/12/2022 0317  Gross per 24 hour   Intake 810 ml   Output 1100 ml   Net -290 ml       Laboratory  Recent Labs     01/10/22  0254 01/12/22  0418   WBC 9.0 7.9   RBC 2.97* 2.71*   HEMOGLOBIN 8.2* 7.9*   HEMATOCRIT 27.6* 25.6*   MCV 92.9 94.5   MCH 27.6 29.2   MCHC 29.7* 30.9*   RDW 53.1* 53.8*   PLATELETCT 209 179   MPV 9.7 9.9     Recent Labs     01/10/22  0254 01/11/22  0141 01/12/22  0418   SODIUM 142 141 141   POTASSIUM 3.9 4.1 3.8   CHLORIDE 107 107 108   CO2 26 26 25   GLUCOSE 124* 183* 132*   BUN 28* 23* 18   CREATININE 1.05 0.97 0.88   CALCIUM 7.8* 7.7* 8.0*                   Imaging  DX-CHEST-FOR LINE PLACEMENT Perform procedure in: Patient's Room   Final Result      1.  Interval placement of a PICC line which is satisfactory in position.      2.  Bilateral interstitial infiltrates.      IR-PICC LINE PLACEMENT W/ GUIDANCE > AGE 5   Final Result                  Ultrasound-guided PICC placement performed by qualified nursing staff as    above.          DX-CHEST-PORTABLE (1 VIEW)   Final Result      No acute cardiac or pulmonary abnormalities are identified. Lung volumes are low.      EC-ECHOCARDIOGRAM COMPLETE W/ CONT   Final Result      US-RENAL   Final Result      1.  No hydronephrosis   2.  Atrophic appearing kidneys bilaterally with cortical thinning   3.  Incidentally noted cholelithiasis      US-MIGEL SINGLE LEVEL BILAT   Final Result      CT-HEAD W/O   Final Result      1.  No acute intracranial abnormality is identified.   2.  There are periventricular and subcortical white matter changes present.  This finding is nonspecific and could  be from previous small vessel ischemia, demyelination, or gliosis.         CT-CSPINE WITHOUT PLUS RECONS   Final Result      Negative for cervical spine fracture or subluxation           Assessment/Plan  * Lower extremity cellulitis  Assessment & Plan  -Patient states that wounds on her legs started as blisters approximately 3 weeks prior to admission  -Severe cellulitis, circumferential, involving both legs, resulting in septic shock  -Wound cultures positive for Klebsiell oxytoca/Raoultell ornithiolytica   -Continue IV ceftriaxone for total of 10 days of antibiotics  -Wound care - following  -SNF referral placed    LEFT Leg      01/09/22 1500       RIGHT Leg         Relative adrenal insufficiency (HCC)- (present on admission)  Assessment & Plan  -Cortisol was 10 in the setting of septic shock that had required pressors  -Status post IV hydrocortisone that will change to oral hydrocortisone 20 mg daily on 1/8 and consider further taper based on her blood pressure and clinical condition.   -Will reassess for steroid taper in am once BP is maintained      Type 2 diabetes mellitus with hyperglycemia, with long-term current use of insulin ([facility])- (present on admission)  Assessment & Plan  -Uncontrolled with hyperglycemia, hemoglobin A1c is 9.8  -Glargine 15 units daily and sliding scale  -Once she is off steroids, she may not require supplemental insulin and may be able to transition to orals      Severe sepsis with septic shock ([facility])- (present on admission)  Assessment & Plan  -Septic shock has resolved    Fall from ground level- (present on admission)  Assessment & Plan  -CT imaging at admission negative for significant injury  -Fall likely related to infection/orthostasis  -PT/OT    Atrial flutter ([facility])- (present on admission)  Assessment & Plan  -Metoprolol, pradaxa    Class 3 severe obesity in adult ([facility])- (present on admission)  Assessment & Plan  -BMI 51  -Patient counseled regarding controlling weight  -Hx of  DMT2 - uncontrolled  -Poor diet    Primary hypertension- (present on admission)  Assessment & Plan  -Currently normotensive  -May need to restart Lisinopril and Metoprolol as her blood pressure recovers after sepsis    JULIETA (acute kidney injury) (HCC)- (present on admission)  Assessment & Plan  -resolved  -Likely prerenal due to dehydration  -Improved with IV fluids         VTE prophylaxis: therapeutic anticoagulation with Pradaxa    I have performed a physical exam and reviewed and updated ROS and Plan today (1/12/2022). In review of yesterday's note (1/11/2022), there are no changes except as documented above.    ==============================================================================================================  Please note that this dictation was created using voice recognition software. I have made every reasonable attempt to correct obvious errors, but there may be errors of grammar and possibly content that I did not discover before finalizing the note.    Electronically signed by:  Roger ZEE

## 2022-01-12 NOTE — PROGRESS NOTES
Report received.  Assessment completed.  Pt x1-x2 assist with FWW to commode.  Dressings to BLE CDI.  Reports slight pain with movement. Declines interventions at this time.  Denies N/V  +Void, +BM  Bruising noted to abd and lower back.  Interdry and nystatin powder applied to pannus.  X3 lumen PICC to R UA. Blood return noted.  Call light and belongings within reach. POC discussed. All needs met at this time.

## 2022-01-12 NOTE — DISCHARGE PLANNING
Spoke To: Laureen  Agency/Facility Name: [facility]  Plan or Request: ROMAN called to f/u, per Laureen, I will check on insurance auth and call you back. ( DPA informed Laureen about transport wc.)    2880-Spoke To: Laureen  Agency/Facility Name: [facility]  Plan or Request: pending auth

## 2022-01-12 NOTE — CARE PLAN
The patient is Stable - Low risk of patient condition declining or worsening    Shift Goals  Clinical Goals: mobility  Patient Goals: Pain control  Family Goals: NA    Progress made toward(s) clinical / shift goals:    Problem: Knowledge Deficit - Standard  Goal: Patient and family/care givers will demonstrate understanding of plan of care, disease process/condition, diagnostic tests and medications  Outcome: Progressing     Problem: Skin Integrity  Goal: Skin integrity is maintained or improved  Outcome: Progressing     Problem: Fall Risk  Goal: Patient will remain free from falls  Outcome: Progressing       Patient is not progressing towards the following goals:

## 2022-01-12 NOTE — DISCHARGE PLANNING
Anticipated Discharge Disposition: SNF    Action: LSW requested DPA, reaches out to [facility] for an update regarding insurance authorization.    Barriers to Discharge: Insurance Authorization.    Plan: SNF, pending insurance authorization and transfer arrangements.

## 2022-01-13 LAB
ANION GAP SERPL CALC-SCNC: 9 MMOL/L (ref 7–16)
BUN SERPL-MCNC: 16 MG/DL (ref 8–22)
CALCIUM SERPL-MCNC: 8 MG/DL (ref 8.5–10.5)
CHLORIDE SERPL-SCNC: 107 MMOL/L (ref 96–112)
CO2 SERPL-SCNC: 26 MMOL/L (ref 20–33)
CREAT SERPL-MCNC: 0.88 MG/DL (ref 0.5–1.4)
ERYTHROCYTE [DISTWIDTH] IN BLOOD BY AUTOMATED COUNT: 54.3 FL (ref 35.9–50)
GLUCOSE BLD-MCNC: 105 MG/DL (ref 65–99)
GLUCOSE BLD-MCNC: 138 MG/DL (ref 65–99)
GLUCOSE BLD-MCNC: 153 MG/DL (ref 65–99)
GLUCOSE BLD-MCNC: 157 MG/DL (ref 65–99)
GLUCOSE BLD-MCNC: 171 MG/DL (ref 65–99)
GLUCOSE SERPL-MCNC: 116 MG/DL (ref 65–99)
HCT VFR BLD AUTO: 26.5 % (ref 37–47)
HGB BLD-MCNC: 7.9 G/DL (ref 12–16)
MCH RBC QN AUTO: 28.2 PG (ref 27–33)
MCHC RBC AUTO-ENTMCNC: 29.8 G/DL (ref 33.6–35)
MCV RBC AUTO: 94.6 FL (ref 81.4–97.8)
PLATELET # BLD AUTO: 197 K/UL (ref 164–446)
PMV BLD AUTO: 9.9 FL (ref 9–12.9)
POTASSIUM SERPL-SCNC: 3.7 MMOL/L (ref 3.6–5.5)
RBC # BLD AUTO: 2.8 M/UL (ref 4.2–5.4)
SODIUM SERPL-SCNC: 142 MMOL/L (ref 135–145)
WBC # BLD AUTO: 8.2 K/UL (ref 4.8–10.8)

## 2022-01-13 PROCEDURE — 94760 N-INVAS EAR/PLS OXIMETRY 1: CPT

## 2022-01-13 PROCEDURE — A9270 NON-COVERED ITEM OR SERVICE: HCPCS | Performed by: HOSPITALIST

## 2022-01-13 PROCEDURE — 85027 COMPLETE CBC AUTOMATED: CPT

## 2022-01-13 PROCEDURE — 700102 HCHG RX REV CODE 250 W/ 637 OVERRIDE(OP): Performed by: STUDENT IN AN ORGANIZED HEALTH CARE EDUCATION/TRAINING PROGRAM

## 2022-01-13 PROCEDURE — 700102 HCHG RX REV CODE 250 W/ 637 OVERRIDE(OP): Performed by: HOSPITALIST

## 2022-01-13 PROCEDURE — 700102 HCHG RX REV CODE 250 W/ 637 OVERRIDE(OP): Performed by: NURSE PRACTITIONER

## 2022-01-13 PROCEDURE — A9270 NON-COVERED ITEM OR SERVICE: HCPCS | Performed by: STUDENT IN AN ORGANIZED HEALTH CARE EDUCATION/TRAINING PROGRAM

## 2022-01-13 PROCEDURE — A9270 NON-COVERED ITEM OR SERVICE: HCPCS | Performed by: INTERNAL MEDICINE

## 2022-01-13 PROCEDURE — 99232 SBSQ HOSP IP/OBS MODERATE 35: CPT | Performed by: INTERNAL MEDICINE

## 2022-01-13 PROCEDURE — 700102 HCHG RX REV CODE 250 W/ 637 OVERRIDE(OP): Performed by: INTERNAL MEDICINE

## 2022-01-13 PROCEDURE — 770001 HCHG ROOM/CARE - MED/SURG/GYN PRIV*

## 2022-01-13 PROCEDURE — A9270 NON-COVERED ITEM OR SERVICE: HCPCS | Performed by: NURSE PRACTITIONER

## 2022-01-13 PROCEDURE — 80048 BASIC METABOLIC PNL TOTAL CA: CPT

## 2022-01-13 PROCEDURE — 82962 GLUCOSE BLOOD TEST: CPT

## 2022-01-13 PROCEDURE — 700111 HCHG RX REV CODE 636 W/ 250 OVERRIDE (IP): Performed by: NURSE PRACTITIONER

## 2022-01-13 RX ORDER — HYDROCORTISONE 5 MG/1
5 TABLET ORAL 2 TIMES DAILY
Status: DISCONTINUED | OUTPATIENT
Start: 2022-01-13 | End: 2022-01-15 | Stop reason: HOSPADM

## 2022-01-13 RX ADMIN — HYDROCORTISONE 5 MG: 5 TABLET ORAL at 18:52

## 2022-01-13 RX ADMIN — CIPROFLOXACIN HYDROCHLORIDE 1 DROP: 3 SOLUTION/ DROPS OPHTHALMIC at 22:00

## 2022-01-13 RX ADMIN — OXYCODONE 5 MG: 5 TABLET ORAL at 01:16

## 2022-01-13 RX ADMIN — INSULIN HUMAN 3 UNITS: 100 INJECTION, SOLUTION PARENTERAL at 01:16

## 2022-01-13 RX ADMIN — CIPROFLOXACIN HYDROCHLORIDE 1 DROP: 3 SOLUTION/ DROPS OPHTHALMIC at 12:53

## 2022-01-13 RX ADMIN — INSULIN GLARGINE 15 UNITS: 100 INJECTION, SOLUTION SUBCUTANEOUS at 18:52

## 2022-01-13 RX ADMIN — CEFTRIAXONE SODIUM 2 G: 2 INJECTION, POWDER, FOR SOLUTION INTRAMUSCULAR; INTRAVENOUS at 07:17

## 2022-01-13 RX ADMIN — Medication 950 MG: at 07:17

## 2022-01-13 RX ADMIN — CIPROFLOXACIN HYDROCHLORIDE 1 DROP: 3 SOLUTION/ DROPS OPHTHALMIC at 18:52

## 2022-01-13 RX ADMIN — LEVOTHYROXINE SODIUM 100 MCG: 0.1 TABLET ORAL at 07:17

## 2022-01-13 RX ADMIN — INSULIN HUMAN 3 UNITS: 100 INJECTION, SOLUTION PARENTERAL at 12:50

## 2022-01-13 RX ADMIN — PREDNISOLONE ACETATE 1 DROP: 10 SUSPENSION/ DROPS OPHTHALMIC at 12:53

## 2022-01-13 RX ADMIN — OXYCODONE 5 MG: 5 TABLET ORAL at 22:35

## 2022-01-13 RX ADMIN — PREDNISOLONE ACETATE 1 DROP: 10 SUSPENSION/ DROPS OPHTHALMIC at 18:52

## 2022-01-13 RX ADMIN — ATORVASTATIN CALCIUM 40 MG: 40 TABLET, FILM COATED ORAL at 18:52

## 2022-01-13 RX ADMIN — ASPIRIN 81 MG: 81 TABLET, CHEWABLE ORAL at 07:17

## 2022-01-13 RX ADMIN — Medication 950 MG: at 18:52

## 2022-01-13 RX ADMIN — PREDNISOLONE ACETATE 1 DROP: 10 SUSPENSION/ DROPS OPHTHALMIC at 07:17

## 2022-01-13 RX ADMIN — DABIGATRAN ETEXILATE MESYLATE 150 MG: 150 CAPSULE ORAL at 07:17

## 2022-01-13 RX ADMIN — CIPROFLOXACIN HYDROCHLORIDE 1 DROP: 3 SOLUTION/ DROPS OPHTHALMIC at 07:17

## 2022-01-13 RX ADMIN — DABIGATRAN ETEXILATE MESYLATE 150 MG: 150 CAPSULE ORAL at 18:52

## 2022-01-13 RX ADMIN — HYDROCORTISONE 10 MG: 10 TABLET ORAL at 07:17

## 2022-01-13 RX ADMIN — PREDNISOLONE ACETATE 1 DROP: 10 SUSPENSION/ DROPS OPHTHALMIC at 22:00

## 2022-01-13 ASSESSMENT — ENCOUNTER SYMPTOMS
HEADACHES: 0
SHORTNESS OF BREATH: 0
CARDIOVASCULAR NEGATIVE: 1
HEARTBURN: 0
CHILLS: 0
COUGH: 0
DIZZINESS: 0
DEPRESSION: 1
EYES NEGATIVE: 1
FEVER: 0
NAUSEA: 0
PALPITATIONS: 0
VOMITING: 0
WEAKNESS: 1
MYALGIAS: 1
GASTROINTESTINAL NEGATIVE: 1
ABDOMINAL PAIN: 0
RESPIRATORY NEGATIVE: 1
FALLS: 1
DIARRHEA: 0

## 2022-01-13 ASSESSMENT — CHA2DS2 SCORE
DIABETES: YES
HYPERTENSION: YES
AGE 65 TO 74: YES
AGE 75 OR GREATER: NO
SEX: FEMALE
CHF OR LEFT VENTRICULAR DYSFUNCTION: NO
PRIOR STROKE OR TIA OR THROMBOEMBOLISM: NO
CHA2DS2 VASC SCORE: 4
VASCULAR DISEASE: NO

## 2022-01-13 NOTE — DISCHARGE PLANNING
Anticipated Discharge Disposition: SNF    Action: LSW requested DPA follow up with [facility] regarding insurance authorization.    Barriers to Discharge: Insurance Authorization.    Plan: SNF, pending insurance authorization and transfer arrangements.

## 2022-01-13 NOTE — CARE PLAN
Problem: Pain - Standard  Goal: Alleviation of pain or a reduction in pain to the patient’s comfort goal  Outcome: Progressing     Problem: Knowledge Deficit - Standard  Goal: Patient and family/care givers will demonstrate understanding of plan of care, disease process/condition, diagnostic tests and medications  Outcome: Progressing     Problem: Skin Integrity  Goal: Skin integrity is maintained or improved  Outcome: Progressing     Problem: Fall Risk  Goal: Patient will remain free from falls  Outcome: Progressing   The patient is Stable - Low risk of patient condition declining or worsening    Shift Goals  Clinical Goals: mobility, wound care  Patient Goals: Pain control  Family Goals: NA    Progress made toward(s) clinical / shift goals:  pt denies pain. B/L LE dressings changed, previous dressings were saturated with SS drainage. Ambulated hallway with PT today.  Up to chair, but declines to walk further with fatigue at this time.  Pt fearful of falling, but ambulation today helped with encouragement.    Patient is not progressing towards the following goals:

## 2022-01-13 NOTE — PROGRESS NOTES
Report received.  Assessment completed.  Pt x1 assist with FWW  Dressings to BLE CDI.  Reports slight pain with movement. Declines interventions at this time.  Denies N/V  Interdry and nystatin powder applied to pannus.  X3 lumen PICC to R UA. Blood return noted.  Call light and belongings within reach. POC discussed. All needs met at this time.

## 2022-01-13 NOTE — DOCUMENTATION QUERY
[facility]                                                                       Query Response Note      PATIENT:               GAETANO RONQUILLO  ACCT #:                  [id number]  MRN:                     [id number]  :                      1950  ADMIT DATE:       2022 5:15 PM  DISCH DATE:          RESPONDING  PROVIDER #:        [id number]           QUERY TEXT:    Deep tissue pressure injury coccyx POA is documented in the Wound Team Eval.  Can you clarify if pressure injury of coccyx is a relevant diagnosis?     NOTE:  If an appropriate response is not listed below, please respond with a new note.      The patient's Clinical Indicators include:  Per  Wound Note: Pressure Injury Coccyx (POA) sDTI  Per 1/10 Progress Note: assessment, skin: skin is dry,  Cap refill takes 2-3 seconds   Risk Factors: obesity , diabetes, htn   Treatment: pressure redistribution mattress, waffle overlay, reposition q 2 h, barrier paste    Thank you,  Paula Amezcua RN, BSN  Clinical   Options provided:   -- Pressure injury of coccyx POA ruled in   -- Other explanation of clinical findings, please specify   -- Unable to determine      Query created by: Paula Amezcua on 2022 11:31 AM    RESPONSE TEXT:    Pressure injury of coccyx POA ruled in          Electronically signed by:  KESHA XAVIER MD 2022 9:08 AM

## 2022-01-13 NOTE — DISCHARGE PLANNING
Anticipated Discharge Disposition: SNF VS Home with HHC.    Action: Discussed this case during IDT Rounds. LSW reported authorization from insurance for SNF is still pending. Per MD, patient indicates she is willing to discharge home with HHC if insurance authorization is not received by tomorrow. LSW obtained verbal authorization from the patient to refer to all the C agencies, choice form faxed to DPA.    Barriers to Discharge: Medical Clearance.     Plan: SNF VS Home HHC, pending insurance authorization.    2:30pm - Per DPA, [facility] is reviewing and needs the contact information for patient's PCP. Per patient, her PCP is Dr. Farris [phone], DPA aware.    PLAN: Home with HHC, pending accepting HHC agency.

## 2022-01-13 NOTE — PROGRESS NOTES
[facility] Medicine Daily Progress Note    Date of Service  1/13/2022    Chief Complaint  Beverley Dejesus is a 71 y.o. female admitted 1/4/2022 with ground level fall    Hospital Course  Ms. Beverley Dejesus has a PMHx of DMT2, atrial flutter, and hypertension patient presents the emergency room on 1/4/2022 with a ground-level fall.  CT scan of the head and C-spine did not reveal any acute pathology.  The patient was assessed to have found to have a significant cellulitis as well as dehydration with renal failure.  She was admitted to the hospital for management of these.     On 1/5/2022 she was noted to have ongoing hypotension despite fluid resuscitation, she was transferred to the ICU for septic shock.  Patient has been treated with broad-spectrum antibiotics and stress dose steroids.  Transferred out of the ICU on 1/7/2022 to telemetry.  Patient still noted BLE pain and existing wounds on ankles were examined. Patient's BP improved with steroids. Patient transferred to the medical floor for further management of care and work on placement.  Wound culture grew Klebsiella oxytoca/Raoultella ornithinolytica, finished 7 day course of ceftriaxone 1/13/2021. Wound care continue to manage patient's wounds on the floor.            Interval Problem Update  1/11/2022: Afebrile, vitals stable, on room air.  SBP 110s to 120s- watch closely to reduce steroid dose when able.  Blood sugars 150-180s  past 24 hours.  Patient on day 5/7 course of Rocephin.  [facility] has accepted patient, insurance Auth pending.  1/12/2022: Afebrile, vitals stable, on room air. Labs stable, sugars stable.  Wound care continues to BLE.  Medically cleared for placement. Accepted at [facility], pending ins auth.  1/13/2022: Afebrile, vitals stable on room air.  No white count, H&H stable, blood sugars 100s to 200.  Patient anxious to go somewhere, ordered home health pending insurance Auth to [facility].     I have personally seen and examined the patient at  bedside. I discussed the plan of care with patient, bedside RN and . Wound culture growing Klebsiella oxytoca/Raoultella ornithinolytica, on day 5/7 course of ceftriaxone.    Consultants/Specialty  NONE    Code Status  Full Code    Disposition  Patient is medically cleared for discharge.   Anticipate discharge to to skilled nursing facility.  I have placed the appropriate orders for post-discharge needs.    Review of Systems  Review of Systems   Constitutional: Positive for malaise/fatigue. Negative for chills and fever.   HENT: Negative.    Eyes: Negative.    Respiratory: Negative.  Negative for cough and shortness of breath.    Cardiovascular: Negative.  Negative for chest pain, palpitations and leg swelling.   Gastrointestinal: Negative.  Negative for abdominal pain, diarrhea, heartburn, nausea and vomiting.   Genitourinary: Negative.  Negative for dysuria, frequency and urgency.   Musculoskeletal: Positive for falls, joint pain and myalgias.   Skin: Negative.    Neurological: Positive for weakness. Negative for dizziness and headaches.   Endo/Heme/Allergies: Negative.    Psychiatric/Behavioral: Positive for depression.   All other systems reviewed and are negative.       Physical Exam  Temp:  [36.3 °C (97.4 °F)-36.8 °C (98.3 °F)] 36.8 °C (98.3 °F)  Pulse:  [61-67] 67  Resp:  [18] 18  BP: (109-131)/(48-55) 126/50  SpO2:  [93 %-99 %] 99 %    Physical Exam  Vitals and nursing note reviewed.   Constitutional:       Appearance: She is obese.   HENT:      Head: Normocephalic.      Nose: Nose normal.      Mouth/Throat:      Mouth: Mucous membranes are moist.      Pharynx: Oropharynx is clear.   Eyes:      Pupils: Pupils are equal, round, and reactive to light.   Cardiovascular:      Rate and Rhythm: Normal rate. Rhythm irregular.      Pulses: Normal pulses.      Heart sounds: Normal heart sounds.   Pulmonary:      Effort: Pulmonary effort is normal.      Breath sounds: Normal breath sounds.   Abdominal:       General: Bowel sounds are normal.      Palpations: Abdomen is soft.   Musculoskeletal:         General: Tenderness present.      Cervical back: Normal range of motion.      Right lower leg: Edema present.      Left lower leg: Edema present.   Skin:     General: Skin is dry.      Capillary Refill: Capillary refill takes 2 to 3 seconds.   Neurological:      Mental Status: She is alert. Mental status is at baseline.      Motor: Weakness present.         Fluids    Intake/Output Summary (Last 24 hours) at 1/13/2022 0801  Last data filed at 1/13/2022 0745  Gross per 24 hour   Intake 1080 ml   Output 1100 ml   Net -20 ml       Laboratory  Recent Labs     01/12/22  0418 01/13/22  0420   WBC 7.9 8.2   RBC 2.71* 2.80*   HEMOGLOBIN 7.9* 7.9*   HEMATOCRIT 25.6* 26.5*   MCV 94.5 94.6   MCH 29.2 28.2   MCHC 30.9* 29.8*   RDW 53.8* 54.3*   PLATELETCT 179 197   MPV 9.9 9.9     Recent Labs     01/11/22  0141 01/12/22  0418 01/13/22  0420   SODIUM 141 141 142   POTASSIUM 4.1 3.8 3.7   CHLORIDE 107 108 107   CO2 26 25 26   GLUCOSE 183* 132* 116*   BUN 23* 18 16   CREATININE 0.97 0.88 0.88   CALCIUM 7.7* 8.0* 8.0*                   Imaging  DX-CHEST-FOR LINE PLACEMENT Perform procedure in: Patient's Room   Final Result      1.  Interval placement of a PICC line which is satisfactory in position.      2.  Bilateral interstitial infiltrates.      IR-PICC LINE PLACEMENT W/ GUIDANCE > AGE 5   Final Result                  Ultrasound-guided PICC placement performed by qualified nursing staff as    above.          DX-CHEST-PORTABLE (1 VIEW)   Final Result      No acute cardiac or pulmonary abnormalities are identified. Lung volumes are low.      EC-ECHOCARDIOGRAM COMPLETE W/ CONT   Final Result      US-RENAL   Final Result      1.  No hydronephrosis   2.  Atrophic appearing kidneys bilaterally with cortical thinning   3.  Incidentally noted cholelithiasis      US-MIGEL SINGLE LEVEL BILAT   Final Result      CT-HEAD W/O   Final Result      1.   No acute intracranial abnormality is identified.   2.  There are periventricular and subcortical white matter changes present.  This finding is nonspecific and could be from previous small vessel ischemia, demyelination, or gliosis.         CT-CSPINE WITHOUT PLUS RECONS   Final Result      Negative for cervical spine fracture or subluxation           Assessment/Plan  * Lower extremity cellulitis  Assessment & Plan  -Patient states that wounds on her legs started as blisters approximately 3 weeks prior to admission  -Severe cellulitis, circumferential, involving both legs, resulting in septic shock  -Wound cultures positive for Klebsiell oxytoca/Raoultell ornithiolytica   -Continue IV ceftriaxone for total of 10 days of antibiotics  -Wound care - following  -SNF referral placed    LEFT Leg      01/09/22 1500       RIGHT Leg         Relative adrenal insufficiency (HCC)- (present on admission)  Assessment & Plan  -Cortisol was 10 in the setting of septic shock that had required pressors  -Status post IV hydrocortisone that will change to oral hydrocortisone 20 mg daily on 1/8 and consider further taper based on her blood pressure and clinical condition.   -Will reassess for steroid taper in am once BP is maintained      Type 2 diabetes mellitus with hyperglycemia, with long-term current use of insulin (HCC)- (present on admission)  Assessment & Plan  -Uncontrolled with hyperglycemia, hemoglobin A1c is 9.8  -Glargine 15 units daily and sliding scale  -Once she is off steroids, she may not require supplemental insulin and may be able to transition to orals      Severe sepsis with septic shock (HCC)- (present on admission)  Assessment & Plan  -Septic shock has resolved    Fall from ground level- (present on admission)  Assessment & Plan  -CT imaging at admission negative for significant injury  -Fall likely related to infection/orthostasis  -PT/OT    Atrial flutter (HCC)- (present on admission)  Assessment &  Plan  -Metoprolol, pradaxa    Class 3 severe obesity in adult (HCC)- (present on admission)  Assessment & Plan  -BMI 51  -Patient counseled regarding controlling weight  -Hx of DMT2 - uncontrolled  -Poor diet    Primary hypertension- (present on admission)  Assessment & Plan  -Currently normotensive  -May need to restart Lisinopril and Metoprolol as her blood pressure recovers after sepsis    JULIETA (acute kidney injury) (HCC)- (present on admission)  Assessment & Plan  -resolved  -Likely prerenal due to dehydration  -Improved with IV fluids         VTE prophylaxis: therapeutic anticoagulation with Pradaxa    I have performed a physical exam and reviewed and updated ROS and Plan today (1/13/2022). In review of yesterday's note (1/12/2022), there are no changes except as documented above.    ==============================================================================================================  Please note that this dictation was created using voice recognition software. I have made every reasonable attempt to correct obvious errors, but there may be errors of grammar and possibly content that I did not discover before finalizing the note.    Electronically signed by:  Roger ZEE

## 2022-01-13 NOTE — CARE PLAN
The patient is Stable - Low risk of patient condition declining or worsening    Shift Goals  Clinical Goals: rest  Patient Goals: Pain control  Family Goals: NA    Progress made toward(s) clinical / shift goals:    Problem: Pain - Standard  Goal: Alleviation of pain or a reduction in pain to the patient’s comfort goal  Outcome: Progressing     Problem: Knowledge Deficit - Standard  Goal: Patient and family/care givers will demonstrate understanding of plan of care, disease process/condition, diagnostic tests and medications  Outcome: Progressing     Problem: Fall Risk  Goal: Patient will remain free from falls  Outcome: Progressing       Patient is not progressing towards the following goals:

## 2022-01-13 NOTE — FACE TO FACE
Face to Face Supporting Documentation - Home Health    The encounter with this patient was in whole or in part the primary reason for home health admission.    Date of encounter:   Patient:                    MRN:                       YOB: 2023  Beverley Dejesus  [id number]  1950     Home health to see patient for:  Skilled Nursing care for assessment, interventions & education, Wound Care, Physical Therapy evaluation and treatment and Occupational therapy evaluation and treatment    Skilled need for:  Exacerbation of Chronic Disease State BLE wounds    Skilled nursing interventions to include:  Wound Care    Homebound status evidenced by:  Need the aid of supportive devices such as crutches, canes, wheelchairs or walkers or Needs the assistance of another person in order to leave the home. Leaving home requires a considerable and taxing effort. There is a normal inability to leave the home.    Community Physician to provide follow up care: No primary care provider on file.     Optional Interventions? No      I certify the face to face encounter for this home health care referral meets the CMS requirements and the encounter/clinical assessment with the patient was, in whole, or in part, for the medical condition(s) listed above, which is the primary reason for home health care. Based on my clinical findings: the service(s) are medically necessary, support the need for home health care, and the homebound criteria are met.  I certify that this patient has had a face to face encounter by myself.  MONSERRAT Boyle. - NPI: [id number]

## 2022-01-14 LAB
GLUCOSE BLD-MCNC: 124 MG/DL (ref 65–99)
GLUCOSE BLD-MCNC: 147 MG/DL (ref 65–99)
GLUCOSE BLD-MCNC: 163 MG/DL (ref 65–99)
GLUCOSE BLD-MCNC: 172 MG/DL (ref 65–99)
GLUCOSE BLD-MCNC: 185 MG/DL (ref 65–99)

## 2022-01-14 PROCEDURE — 700102 HCHG RX REV CODE 250 W/ 637 OVERRIDE(OP): Performed by: NURSE PRACTITIONER

## 2022-01-14 PROCEDURE — 700102 HCHG RX REV CODE 250 W/ 637 OVERRIDE(OP): Performed by: INTERNAL MEDICINE

## 2022-01-14 PROCEDURE — 770001 HCHG ROOM/CARE - MED/SURG/GYN PRIV*

## 2022-01-14 PROCEDURE — 90662 IIV NO PRSV INCREASED AG IM: CPT

## 2022-01-14 PROCEDURE — A9270 NON-COVERED ITEM OR SERVICE: HCPCS | Performed by: NURSE PRACTITIONER

## 2022-01-14 PROCEDURE — 90471 IMMUNIZATION ADMIN: CPT

## 2022-01-14 PROCEDURE — 700111 HCHG RX REV CODE 636 W/ 250 OVERRIDE (IP)

## 2022-01-14 PROCEDURE — 700102 HCHG RX REV CODE 250 W/ 637 OVERRIDE(OP): Performed by: STUDENT IN AN ORGANIZED HEALTH CARE EDUCATION/TRAINING PROGRAM

## 2022-01-14 PROCEDURE — A9270 NON-COVERED ITEM OR SERVICE: HCPCS | Performed by: INTERNAL MEDICINE

## 2022-01-14 PROCEDURE — 97535 SELF CARE MNGMENT TRAINING: CPT

## 2022-01-14 PROCEDURE — 82962 GLUCOSE BLOOD TEST: CPT | Mod: 91

## 2022-01-14 PROCEDURE — A9270 NON-COVERED ITEM OR SERVICE: HCPCS | Performed by: STUDENT IN AN ORGANIZED HEALTH CARE EDUCATION/TRAINING PROGRAM

## 2022-01-14 PROCEDURE — 3E02340 INTRODUCTION OF INFLUENZA VACCINE INTO MUSCLE, PERCUTANEOUS APPROACH: ICD-10-PCS | Performed by: INTERNAL MEDICINE

## 2022-01-14 PROCEDURE — 99232 SBSQ HOSP IP/OBS MODERATE 35: CPT | Performed by: INTERNAL MEDICINE

## 2022-01-14 RX ADMIN — INSULIN HUMAN 3 UNITS: 100 INJECTION, SOLUTION PARENTERAL at 22:57

## 2022-01-14 RX ADMIN — ATORVASTATIN CALCIUM 40 MG: 40 TABLET, FILM COATED ORAL at 17:49

## 2022-01-14 RX ADMIN — PREDNISOLONE ACETATE 1 DROP: 10 SUSPENSION/ DROPS OPHTHALMIC at 17:49

## 2022-01-14 RX ADMIN — INSULIN HUMAN 3 UNITS: 100 INJECTION, SOLUTION PARENTERAL at 00:39

## 2022-01-14 RX ADMIN — PREDNISOLONE ACETATE 1 DROP: 10 SUSPENSION/ DROPS OPHTHALMIC at 21:00

## 2022-01-14 RX ADMIN — PREDNISOLONE ACETATE 1 DROP: 10 SUSPENSION/ DROPS OPHTHALMIC at 12:47

## 2022-01-14 RX ADMIN — DABIGATRAN ETEXILATE MESYLATE 150 MG: 150 CAPSULE ORAL at 05:29

## 2022-01-14 RX ADMIN — INSULIN GLARGINE 15 UNITS: 100 INJECTION, SOLUTION SUBCUTANEOUS at 17:45

## 2022-01-14 RX ADMIN — CIPROFLOXACIN HYDROCHLORIDE 1 DROP: 3 SOLUTION/ DROPS OPHTHALMIC at 17:49

## 2022-01-14 RX ADMIN — DABIGATRAN ETEXILATE MESYLATE 150 MG: 150 CAPSULE ORAL at 17:49

## 2022-01-14 RX ADMIN — CIPROFLOXACIN HYDROCHLORIDE 1 DROP: 3 SOLUTION/ DROPS OPHTHALMIC at 12:47

## 2022-01-14 RX ADMIN — LEVOTHYROXINE SODIUM 100 MCG: 0.1 TABLET ORAL at 05:29

## 2022-01-14 RX ADMIN — Medication 950 MG: at 17:49

## 2022-01-14 RX ADMIN — PREDNISOLONE ACETATE 1 DROP: 10 SUSPENSION/ DROPS OPHTHALMIC at 05:29

## 2022-01-14 RX ADMIN — HYDROCORTISONE 5 MG: 5 TABLET ORAL at 05:29

## 2022-01-14 RX ADMIN — OXYCODONE 10 MG: 5 TABLET ORAL at 21:01

## 2022-01-14 RX ADMIN — ASPIRIN 81 MG: 81 TABLET, CHEWABLE ORAL at 05:29

## 2022-01-14 RX ADMIN — Medication 950 MG: at 05:29

## 2022-01-14 RX ADMIN — INSULIN HUMAN 3 UNITS: 100 INJECTION, SOLUTION PARENTERAL at 17:44

## 2022-01-14 RX ADMIN — HYDROCORTISONE 5 MG: 5 TABLET ORAL at 17:49

## 2022-01-14 RX ADMIN — CIPROFLOXACIN HYDROCHLORIDE 1 DROP: 3 SOLUTION/ DROPS OPHTHALMIC at 21:00

## 2022-01-14 RX ADMIN — CIPROFLOXACIN HYDROCHLORIDE 1 DROP: 3 SOLUTION/ DROPS OPHTHALMIC at 05:29

## 2022-01-14 RX ADMIN — INFLUENZA A VIRUS A/VICTORIA/2570/2019 IVR-215 (H1N1) ANTIGEN (FORMALDEHYDE INACTIVATED), INFLUENZA A VIRUS A/TASMANIA/503/2020 IVR-221 (H3N2) ANTIGEN (FORMALDEHYDE INACTIVATED), INFLUENZA B VIRUS B/PHUKET/3073/2013 ANTIGEN (FORMALDEHYDE INACTIVATED), AND INFLUENZA B VIRUS B/WASHINGTON/02/2019 ANTIGEN (FORMALDEHYDE INACTIVATED) 0.7 ML: 60; 60; 60; 60 INJECTION, SUSPENSION INTRAMUSCULAR at 22:05

## 2022-01-14 ASSESSMENT — ENCOUNTER SYMPTOMS
WEAKNESS: 1
PALPITATIONS: 0
DEPRESSION: 1
RESPIRATORY NEGATIVE: 1
EYES NEGATIVE: 1
COUGH: 0
HEARTBURN: 0
ABDOMINAL PAIN: 0
CHILLS: 0
MYALGIAS: 1
DIARRHEA: 0
FALLS: 1
GASTROINTESTINAL NEGATIVE: 1
FEVER: 0
CARDIOVASCULAR NEGATIVE: 1
SHORTNESS OF BREATH: 0
HEADACHES: 0
VOMITING: 0
NAUSEA: 0
DIZZINESS: 0

## 2022-01-14 ASSESSMENT — COGNITIVE AND FUNCTIONAL STATUS - GENERAL
DAILY ACTIVITIY SCORE: 21
HELP NEEDED FOR BATHING: A LITTLE
DRESSING REGULAR LOWER BODY CLOTHING: A LITTLE
SUGGESTED CMS G CODE MODIFIER DAILY ACTIVITY: CJ
TOILETING: A LITTLE

## 2022-01-14 NOTE — DISCHARGE PLANNING
Spoke To: Natividad  Agency[facility]Facility Name[facility]  Plan or Request: approved, per Natividad, a nurse will see her Monday. Please send d/c summary.  Natasha lange

## 2022-01-14 NOTE — DISCHARGE PLANNING
Anticipated Discharge Disposition: Home with [facility].    Action: Per ROMAN, [facility] accepted, LSW notified Bedside RN Sheree via [employer].    Barriers to Discharge: None.    Plan: Home with [facility], pending medical clearance.

## 2022-01-14 NOTE — DISCHARGE PLANNING
Spoke To: Laureen  Agency/Facility Name: [facility]  Plan or Request: auth still pending. Per request, DPA cancelled snf referral.  Natasha dash    Received Choice form at 0753  Agency/Facility Name: [facility]  Referral sent per Choice form at 0830       0845- Agency/Facility Name: [facility]  Plan or Request: vm left for Magdaleno.  0915- Magdaleno at [facility] returned call,will look for referral after morning meeting.    1005- Spoke To: Mague  Agency/Facility Name: [facility]  Plan or Request: declined / [employer] Medicare full    1009- Referral sent per choice to [facility]   1014- non contacted insurance    1015- Referral sent per choice to [facility]   1119- Spoke To: Lucila  Agency/Facility Name: [facility]  Plan or Request: low staffing till next week.    1140- Referral sent per choice to [facility]  1310- Spoke To: Natividad  Agency/Facility Name: [facility]  Plan or Request: asking who the PCP is?

## 2022-01-14 NOTE — CARE PLAN
The patient is Stable - Low risk of patient condition declining or worsening    Shift Goals  Clinical Goals: Dressing change, ambulation  Patient Goals: Pain control  Family Goals: NA    Progress made toward(s) clinical / shift goals:    Problem: Pain - Standard  Goal: Alleviation of pain or a reduction in pain to the patient’s comfort goal  Outcome: Progressing     Problem: Knowledge Deficit - Standard  Goal: Patient and family/care givers will demonstrate understanding of plan of care, disease process/condition, diagnostic tests and medications  Outcome: Progressing     Problem: Skin Integrity  Goal: Skin integrity is maintained or improved  Outcome: Progressing       Patient is not progressing towards the following goals:

## 2022-01-14 NOTE — PROGRESS NOTES
Report received.  Assessment completed.  Pt x1 assist with FWW  Dressings to BLE changed.  Medicated for pain per MAR.  Denies N/V. Tolerating diet.  X3 lumen PICC to R UA.   Denies SOB.  Call light and belongings within reach. POC discussed. All needs met at this time.

## 2022-01-14 NOTE — PROGRESS NOTES
[facility] Daily Progress Note    Date of Service  1/14/2022    Chief Complaint  Beverley Dejesus is a 71 y.o. female admitted 1/4/2022 with ground level fall    Hospital Course  Ms. Beverley Dejesus has a PMHx of DMT2, atrial flutter, and hypertension patient presents the emergency room on 1/4/2022 with a ground-level fall.  CT scan of the head and C-spine did not reveal any acute pathology.  The patient was assessed to have found to have a significant cellulitis as well as dehydration with renal failure.  She was admitted to the hospital for management of these.     On 1/5/2022 she was noted to have ongoing hypotension despite fluid resuscitation, she was transferred to the ICU for septic shock.  Patient has been treated with broad-spectrum antibiotics and stress dose steroids.  Transferred out of the ICU on 1/7/2022 to telemetry.  Patient still noted BLE pain and existing wounds on ankles were examined. Patient's BP improved with steroids. Patient transferred to the medical floor for further management of care and work on placement.  Wound culture grew Klebsiella oxytoca/Raoultella ornithinolytica, finished 7 day course of ceftriaxone 1/13/2021. Wound care continue to manage patient's wounds on the floor.            Interval Problem Update  1/11/2022: Afebrile, vitals stable, on room air.  SBP 110s to 120s- watch closely to reduce steroid dose when able.  Blood sugars 150-180s  past 24 hours.  Patient on day 5/7 course of Rocephin.  [facility] has accepted patient, insurance Auth pending.  1/12/2022: Afebrile, vitals stable, on room air. Labs stable, sugars stable.  Wound care continues to BLE.  Medically cleared for placement. Accepted at [facility], pending ins auth.  1/13/2022: Afebrile, vitals stable on room air.  No white count, H&H stable, blood sugars 100s to 200.  Patient anxious to go somewhere, ordered home health pending insurance Auth to SNF.   1/14/2022: Afebrile, vitals stable, on room air. Patient  has finished antibiotic course. Slowly improving with PT/OT. Continue daily dressing changes to bilateral lower extremities. Hopeful to discharge with home health, pending acceptance.    I have personally seen and examined the patient at bedside. I discussed the plan of care with patient, bedside RN and . Wound culture growing Klebsiella oxytoca/Raoultella ornithinolytica, on day 5/7 course of ceftriaxone.    Consultants/Specialty  NONE    Code Status  Full Code    Disposition  Patient is medically cleared for discharge.   Anticipate discharge to to skilled nursing facility.  I have placed the appropriate orders for post-discharge needs.    Review of Systems  Review of Systems   Constitutional: Positive for malaise/fatigue. Negative for chills and fever.   HENT: Negative.    Eyes: Negative.    Respiratory: Negative.  Negative for cough and shortness of breath.    Cardiovascular: Negative.  Negative for chest pain, palpitations and leg swelling.   Gastrointestinal: Negative.  Negative for abdominal pain, diarrhea, heartburn, nausea and vomiting.   Genitourinary: Negative.  Negative for dysuria, frequency and urgency.   Musculoskeletal: Positive for falls, joint pain and myalgias.   Skin: Negative.    Neurological: Positive for weakness. Negative for dizziness and headaches.   Endo/Heme/Allergies: Negative.    Psychiatric/Behavioral: Positive for depression.   All other systems reviewed and are negative.       Physical Exam  Temp:  [36.3 °C (97.3 °F)-37.2 °C (98.9 °F)] 36.3 °C (97.3 °F)  Pulse:  [60-67] 63  Resp:  [17-20] 20  BP: (123-142)/(51-65) 123/52  SpO2:  [94 %-100 %] 100 %    Physical Exam  Vitals and nursing note reviewed.   Constitutional:       Appearance: She is obese.   HENT:      Head: Normocephalic.      Nose: Nose normal.      Mouth/Throat:      Mouth: Mucous membranes are moist.      Pharynx: Oropharynx is clear.   Eyes:      Pupils: Pupils are equal, round, and reactive to light.    Cardiovascular:      Rate and Rhythm: Normal rate. Rhythm irregular.      Pulses: Normal pulses.      Heart sounds: Normal heart sounds.   Pulmonary:      Effort: Pulmonary effort is normal.      Breath sounds: Normal breath sounds.   Abdominal:      General: Bowel sounds are normal.      Palpations: Abdomen is soft.   Musculoskeletal:         General: Tenderness present.      Cervical back: Normal range of motion.      Right lower leg: Edema present.      Left lower leg: Edema present.   Skin:     General: Skin is dry.      Capillary Refill: Capillary refill takes 2 to 3 seconds.   Neurological:      Mental Status: She is alert. Mental status is at baseline.      Motor: Weakness present.         Fluids    Intake/Output Summary (Last 24 hours) at 1/14/2022 1121  Last data filed at 1/14/2022 0500  Gross per 24 hour   Intake 240 ml   Output 300 ml   Net -60 ml       Laboratory  Recent Labs     01/12/22  0418 01/13/22  0420   WBC 7.9 8.2   RBC 2.71* 2.80*   HEMOGLOBIN 7.9* 7.9*   HEMATOCRIT 25.6* 26.5*   MCV 94.5 94.6   MCH 29.2 28.2   MCHC 30.9* 29.8*   RDW 53.8* 54.3*   PLATELETCT 179 197   MPV 9.9 9.9     Recent Labs     01/12/22  0418 01/13/22  0420   SODIUM 141 142   POTASSIUM 3.8 3.7   CHLORIDE 108 107   CO2 25 26   GLUCOSE 132* 116*   BUN 18 16   CREATININE 0.88 0.88   CALCIUM 8.0* 8.0*                   Imaging  DX-CHEST-FOR LINE PLACEMENT Perform procedure in: Patient's Room   Final Result      1.  Interval placement of a PICC line which is satisfactory in position.      2.  Bilateral interstitial infiltrates.      IR-PICC LINE PLACEMENT W/ GUIDANCE > AGE 5   Final Result                  Ultrasound-guided PICC placement performed by qualified nursing staff as    above.          DX-CHEST-PORTABLE (1 VIEW)   Final Result      No acute cardiac or pulmonary abnormalities are identified. Lung volumes are low.      EC-ECHOCARDIOGRAM COMPLETE W/ CONT   Final Result      US-RENAL   Final Result      1.  No  hydronephrosis   2.  Atrophic appearing kidneys bilaterally with cortical thinning   3.  Incidentally noted cholelithiasis      US-MIGEL SINGLE LEVEL BILAT   Final Result      CT-HEAD W/O   Final Result      1.  No acute intracranial abnormality is identified.   2.  There are periventricular and subcortical white matter changes present.  This finding is nonspecific and could be from previous small vessel ischemia, demyelination, or gliosis.         CT-CSPINE WITHOUT PLUS RECONS   Final Result      Negative for cervical spine fracture or subluxation           Assessment/Plan  * Lower extremity cellulitis  Assessment & Plan  -Patient states that wounds on her legs started as blisters approximately 3 weeks prior to admission  -Severe cellulitis, circumferential, involving both legs, resulting in septic shock  -Wound cultures positive for Klebsiell oxytoca/Raoultell ornithiolytica   -Continue IV ceftriaxone for total of 10 days of antibiotics  -Wound care - following  -SNF referral placed- but taking forever to auth, now plan is HH    LEFT Leg      01/09/22 1500       RIGHT Leg         Relative adrenal insufficiency (HCC)- (present on admission)  Assessment & Plan  -Cortisol was 10 in the setting of septic shock that had required pressors  -Status post IV hydrocortisone that will change to oral hydrocortisone 20 mg daily on 1/8 and consider further taper based on her blood pressure and clinical condition.   -Will reassess for steroid taper in am once BP is maintained      Type 2 diabetes mellitus with hyperglycemia, with long-term current use of insulin (HCC)- (present on admission)  Assessment & Plan  -Uncontrolled with hyperglycemia, hemoglobin A1c is 9.8  -Glargine 15 units daily and sliding scale  -Once she is off steroids, she may not require supplemental insulin and may be able to transition to orals      Severe sepsis with septic shock (HCC)- (present on admission)  Assessment & Plan  -Septic shock has  resolved    Fall from ground level- (present on admission)  Assessment & Plan  -CT imaging at admission negative for significant injury  -Fall likely related to infection/orthostasis  -PT/OT    Atrial flutter (HCC)- (present on admission)  Assessment & Plan  -Metoprolol, pradaxa    Class 3 severe obesity in adult (HCC)- (present on admission)  Assessment & Plan  -BMI 51  -Patient counseled regarding controlling weight  -Hx of DMT2 - uncontrolled  -Poor diet    Primary hypertension- (present on admission)  Assessment & Plan  -Currently normotensive  -May need to restart Lisinopril and Metoprolol as her blood pressure recovers after sepsis    JULIETA (acute kidney injury) (HCC)- (present on admission)  Assessment & Plan  -resolved  -Likely prerenal due to dehydration  -Improved with IV fluids         VTE prophylaxis: therapeutic anticoagulation with Pradaxa    I have performed a physical exam and reviewed and updated ROS and Plan today (1/14/2022). In review of yesterday's note (1/13/2022), there are no changes except as documented above.    ==============================================================================================================  Please note that this dictation was created using voice recognition software. I have made every reasonable attempt to correct obvious errors, but there may be errors of grammar and possibly content that I did not discover before finalizing the note.    Electronically signed by:  Roger ZEE

## 2022-01-14 NOTE — THERAPY
Occupational Therapy  Daily Treatment     Patient Name: Beverley Dejesus  Age:  71 y.o., Sex:  female  Medical Record #: [id number]  Today's Date: 1/14/2022     Precautions  Precautions: Fall Risk  Comments: BLE wounds    Assessment    Pt is now at a supv level for basic self care, functional mobility, and functional transfers. Pt edu on compensatory strategies during ADLs as well as appropriate AE/DME to maximize independence and safety.      Plan    Patient will not be actively followed for occupational therapy services at this time, however may be seen if requested by physician for 1 more visit within 30 days to address any discharge or equipment needs.       DC Equipment Recommendations: Front-Wheel Walker,Tub Transfer Bench,Other (Comments),Grab Bar(s) by Toilet (reacher, sock aid, handheld shower)  Discharge Recommendations: Recommend home health for continued occupational therapy services    Subjective       01/14/22 1043   Cognition    Cognition / Consciousness WDL   Level of Consciousness Alert   Active ROM Upper Body   Active ROM Upper Body  WDL   Strength Upper Body   Upper Body Strength  WDL   Other Treatments   Other Treatments Provided discussed AE/DME for home and where to obtain; discussed IADL mgmt; pt reports her landlady has offered to assist with grocery shopping, laundry, even wound care if needed.   Balance   Sitting Balance (Static) Fair +   Sitting Balance (Dynamic) Fair   Standing Balance (Static) Fair   Standing Balance (Dynamic) Fair   Weight Shift Sitting Fair   Weight Shift Standing Fair   Skilled Intervention Verbal Cuing   Comments w/ fww   Bed Mobility    Supine to Sit Supervised   Sit to Supine Minimal Assist   Scooting Supervised   Skilled Intervention Verbal Cuing;Tactile Cuing   Comments pt will be sleeping in recliner at home   Activities of Daily Living   Grooming Supervision;Seated   Upper Body Dressing Supervision   Lower Body Dressing Supervision   Toileting Supervision    Skilled Intervention Verbal Cuing;Sequencing;Compensatory Strategies   Comments reviewed appropriate AE/DME, pt trialed sock aid   How much help from another person does the patient currently need...   Putting on and taking off regular lower body clothing? 3   Bathing (including washing, rinsing, and drying)? 3   Toileting, which includes using a toilet, bedpan, or urinal? 3   Putting on and taking off regular upper body clothing? 4   Taking care of personal grooming such as brushing teeth? 4   Eating meals? 4   6 Clicks Daily Activity Score 21   Functional Mobility   Sit to Stand Supervised   Bed, Chair, Wheelchair Transfer Supervised   Toilet Transfers Supervised   Mobility EOB>BR>BTB   Skilled Intervention Verbal Cuing;Sequencing   Comments w/ fww   Patient / Family Goals   Patient / Family Goal #1 home asap   Goal #1 Outcome Progressing as expected   Short Term Goals   Short Term Goal # 1 pt will demo functional transfers with SPV   Goal Outcome # 1 Goal met   Short Term Goal # 2 pt will complete toileting ADL at SPV level   Goal Outcome # 2 Goal met   Short Term Goal # 3 pt will tolerate >5min standing grooming at SPV level   Goal Outcome # 3 Progressing as expected        Objective       01/14/22 1043   Cognition    Cognition / Consciousness WDL   Level of Consciousness Alert   Active ROM Upper Body   Active ROM Upper Body  WDL   Strength Upper Body   Upper Body Strength  WDL   Other Treatments   Other Treatments Provided discussed AE/DME for home and where to obtain; discussed IADL mgmt; pt reports her landlady has offered to assist with grocery shopping, laundry, even wound care if needed.   Balance   Sitting Balance (Static) Fair +   Sitting Balance (Dynamic) Fair   Standing Balance (Static) Fair   Standing Balance (Dynamic) Fair   Weight Shift Sitting Fair   Weight Shift Standing Fair   Skilled Intervention Verbal Cuing   Comments w/ fww   Bed Mobility    Supine to Sit Supervised   Sit to Supine Minimal  Assist   Scooting Supervised   Skilled Intervention Verbal Cuing;Tactile Cuing   Comments pt will be sleeping in recliner at home   Activities of Daily Living   Grooming Supervision;Seated   Upper Body Dressing Supervision   Lower Body Dressing Supervision   Toileting Supervision   Skilled Intervention Verbal Cuing;Sequencing;Compensatory Strategies   Comments reviewed appropriate AE/DME, pt trialed sock aid   How much help from another person does the patient currently need...   Putting on and taking off regular lower body clothing? 3   Bathing (including washing, rinsing, and drying)? 3   Toileting, which includes using a toilet, bedpan, or urinal? 3   Putting on and taking off regular upper body clothing? 4   Taking care of personal grooming such as brushing teeth? 4   Eating meals? 4   6 Clicks Daily Activity Score 21   Functional Mobility   Sit to Stand Supervised   Bed, Chair, Wheelchair Transfer Supervised   Toilet Transfers Supervised   Mobility EOB>BR>BTB   Skilled Intervention Verbal Cuing;Sequencing   Comments w/ fww   Patient / Family Goals   Patient / Family Goal #1 home asap   Goal #1 Outcome Progressing as expected   Short Term Goals   Short Term Goal # 1 pt will demo functional transfers with SPV   Goal Outcome # 1 Goal met   Short Term Goal # 2 pt will complete toileting ADL at SPV level   Goal Outcome # 2 Goal met   Short Term Goal # 3 pt will tolerate >5min standing grooming at SPV level   Goal Outcome # 3 Progressing as expected

## 2022-01-15 ENCOUNTER — PHARMACY VISIT (OUTPATIENT)
Dept: PHARMACY | Facility: MEDICAL CENTER | Age: 72
End: 2022-01-15
Payer: COMMERCIAL

## 2022-01-15 VITALS
TEMPERATURE: 97.7 F | SYSTOLIC BLOOD PRESSURE: 127 MMHG | BODY MASS INDEX: 50.02 KG/M2 | DIASTOLIC BLOOD PRESSURE: 68 MMHG | HEART RATE: 75 BPM | RESPIRATION RATE: 18 BRPM | WEIGHT: 293 LBS | HEIGHT: 64 IN | OXYGEN SATURATION: 95 %

## 2022-01-15 PROBLEM — A41.9 SEVERE SEPSIS WITH SEPTIC SHOCK (HCC): Status: RESOLVED | Noted: 2022-01-05 | Resolved: 2022-01-15

## 2022-01-15 PROBLEM — N17.9 AKI (ACUTE KIDNEY INJURY) (HCC): Status: RESOLVED | Noted: 2021-07-04 | Resolved: 2022-01-15

## 2022-01-15 PROBLEM — R65.21 SEVERE SEPSIS WITH SEPTIC SHOCK (HCC): Status: RESOLVED | Noted: 2022-01-05 | Resolved: 2022-01-15

## 2022-01-15 LAB — GLUCOSE BLD-MCNC: 105 MG/DL (ref 65–99)

## 2022-01-15 PROCEDURE — 700102 HCHG RX REV CODE 250 W/ 637 OVERRIDE(OP): Performed by: INTERNAL MEDICINE

## 2022-01-15 PROCEDURE — 700102 HCHG RX REV CODE 250 W/ 637 OVERRIDE(OP): Performed by: STUDENT IN AN ORGANIZED HEALTH CARE EDUCATION/TRAINING PROGRAM

## 2022-01-15 PROCEDURE — 700102 HCHG RX REV CODE 250 W/ 637 OVERRIDE(OP): Performed by: NURSE PRACTITIONER

## 2022-01-15 PROCEDURE — A9270 NON-COVERED ITEM OR SERVICE: HCPCS | Performed by: INTERNAL MEDICINE

## 2022-01-15 PROCEDURE — A9270 NON-COVERED ITEM OR SERVICE: HCPCS | Performed by: NURSE PRACTITIONER

## 2022-01-15 PROCEDURE — 82962 GLUCOSE BLOOD TEST: CPT

## 2022-01-15 PROCEDURE — 99239 HOSP IP/OBS DSCHRG MGMT >30: CPT | Performed by: STUDENT IN AN ORGANIZED HEALTH CARE EDUCATION/TRAINING PROGRAM

## 2022-01-15 PROCEDURE — A9270 NON-COVERED ITEM OR SERVICE: HCPCS | Performed by: STUDENT IN AN ORGANIZED HEALTH CARE EDUCATION/TRAINING PROGRAM

## 2022-01-15 PROCEDURE — RXMED WILLOW AMBULATORY MEDICATION CHARGE: Performed by: NURSE PRACTITIONER

## 2022-01-15 RX ORDER — OXYCODONE HYDROCHLORIDE 5 MG/1
5 TABLET ORAL EVERY 12 HOURS PRN
Qty: 6 TABLET | Refills: 0 | Status: SHIPPED | OUTPATIENT
Start: 2022-01-15 | End: 2022-01-18

## 2022-01-15 RX ORDER — IBUPROFEN 200 MG
950 CAPSULE ORAL 2 TIMES DAILY
Qty: 30 TABLET | Refills: 0 | Status: SHIPPED | OUTPATIENT
Start: 2022-01-15 | End: 2022-02-17

## 2022-01-15 RX ORDER — HYDROCORTISONE 5 MG/1
5 TABLET ORAL DAILY
Qty: 3 TABLET | Refills: 0 | Status: SHIPPED | OUTPATIENT
Start: 2022-01-15 | End: 2022-01-18

## 2022-01-15 RX ORDER — OXYCODONE HYDROCHLORIDE 5 MG/1
5-10 TABLET ORAL EVERY 12 HOURS PRN
Qty: 6 TABLET | Refills: 0 | Status: SHIPPED | OUTPATIENT
Start: 2022-01-15 | End: 2022-01-15 | Stop reason: SDUPTHER

## 2022-01-15 RX ADMIN — Medication 950 MG: at 06:35

## 2022-01-15 RX ADMIN — ASPIRIN 81 MG: 81 TABLET, CHEWABLE ORAL at 06:35

## 2022-01-15 RX ADMIN — HYDROCORTISONE 5 MG: 5 TABLET ORAL at 06:35

## 2022-01-15 RX ADMIN — CIPROFLOXACIN HYDROCHLORIDE 1 DROP: 3 SOLUTION/ DROPS OPHTHALMIC at 06:35

## 2022-01-15 RX ADMIN — LEVOTHYROXINE SODIUM 100 MCG: 0.1 TABLET ORAL at 06:35

## 2022-01-15 RX ADMIN — DABIGATRAN ETEXILATE MESYLATE 150 MG: 150 CAPSULE ORAL at 11:19

## 2022-01-15 RX ADMIN — PREDNISOLONE ACETATE 1 DROP: 10 SUSPENSION/ DROPS OPHTHALMIC at 06:35

## 2022-01-15 NOTE — PROGRESS NOTES
Received report from previous shift RN.  Assessment complete.  Patient A&O x 4. Patient calls appropriately.  Patient ambulates with SB assist and FWW.   Patient has 7/10 pain. Pain managed with prescribed medications per MAR and rest/repositioning.  Denies N&V. Tolerating CHO diet.  BLE DIP, wound care provided per order. New dressings CDI.  Interdry to pannus changed, pt declining interdry to breasts at this time.  + void, + flatus, 1/13 BM.  Patient on RA, denies SOB.    Reviewed plan of care with patient. Call light and personal belongings within reach. Hourly rounding in place. All needs met at this time.

## 2022-01-15 NOTE — CARE PLAN
The patient is Stable - Low risk of patient condition declining or worsening    Shift Goals  Clinical Goals: increased mobility  Patient Goals: Pain control  Family Goals: NA    Progress made toward(s) clinical / shift goals:    Pt ambulated to bathroom with FWW and SBA, steady gait, tolerated well. Pt sat up in chair for 2 hours and completed ROM exercises to help with BLE edema. Pt calls appropriately for help prior to ambulating, treaded socks in place. Pain managed with PRN pain medications. Skin care provided per order.  Problem: Pain - Standard  Goal: Alleviation of pain or a reduction in pain to the patient’s comfort goal  Outcome: Progressing     Problem: Knowledge Deficit - Standard  Goal: Patient and family/care givers will demonstrate understanding of plan of care, disease process/condition, diagnostic tests and medications  Outcome: Progressing     Problem: Skin Integrity  Goal: Skin integrity is maintained or improved  Outcome: Progressing     Problem: Fall Risk  Goal: Patient will remain free from falls  Outcome: Progressing       Patient is not progressing towards the following goals:

## 2022-01-15 NOTE — PROGRESS NOTES
Patient A+Ox4, on room air, VSS. Ambulating with walker, steady gait. Voiding without issue. Denies pain. Uses call bell approp, eager for discharge home today

## 2022-01-15 NOTE — PROGRESS NOTES
Patient left at this time for discharge home. Left with all personal belongings including her home medications. All discharge education reviewed with patient at bedside, she signed her paperwork. IV removed. Her friend is here to drive her home. Left via wheelchair with Koko MORAN

## 2022-01-15 NOTE — DISCHARGE SUMMARY
Discharge Summary    CHIEF COMPLAINT ON ADMISSION  Chief Complaint   Patient presents with   • T-5000 GLF     hit head -trauma +thinners       Reason for Admission  TBI     Admission Date  1/4/2022    CODE STATUS  Full Code    HPI & HOSPITAL COURSE  Ms. Beverley Dejesus has a PMHx of DMT2, atrial flutter, and hypertension patient presents the emergency room on 1/4/2022 with a ground-level fall.  CT scan of the head and C-spine did not reveal any acute pathology.  The patient was assessed to have found to have a significant cellulitis as well as dehydration with renal failure.  She was admitted to the hospital for management of these.     On 1/5/2022 she was noted to have ongoing hypotension despite fluid resuscitation, she was transferred to the ICU for septic shock.  Patient has been treated with broad-spectrum antibiotics and stress dose steroids.  Transferred out of the ICU on 1/7/2022 to telemetry.  Patient still noted BLE pain and existing wounds on ankles were examined. Patient's BP improved with steroids. Patient transferred to the medical floor for further management of care and work on placement.  Wound culture grew Klebsiella oxytoca/Raoultella ornithinolytica, finished 7 day course of ceftriaxone 1/13/2021. Wound care continue to manage patient's wounds on the floor. PT/OT recommended [facility] for discharge. She was discharged home once home health acceptance confirmed and would see patient on Monday 1/17.        Therefore, she is discharged in fair and stable condition to home with organized home healthcare and close outpatient follow-up.    The patient met 2-midnight criteria for an inpatient stay at the time of discharge.    Discharge Date  1/15/2022    FOLLOW UP ITEMS POST DISCHARGE  Home health care for PT/OT and dressing changes     DISCHARGE DIAGNOSES  Principal Problem:    Lower extremity cellulitis POA: Unknown  Active Problems:    Type 2 diabetes mellitus with hyperglycemia, with long-term current use of  insulin (HCC) POA: Yes    Primary hypertension POA: Yes    Class 3 severe obesity in adult (HCC) POA: Yes    Atrial flutter (HCC) POA: Yes    Fall from ground level POA: Yes    Relative adrenal insufficiency (HCC) POA: Yes  Resolved Problems:    JULIETA (acute kidney injury) (HCC) POA: Yes    Severe sepsis with septic shock (HCC) POA: Yes      FOLLOW UP  No future appointments.  [facility]  [address] [place] [address] [id number] [phone]          MEDICATIONS ON DISCHARGE     Medication List      START taking these medications      Instructions   calcium citrate 950 (200 Ca) MG Tabs  Commonly known as: CALCITRATE   Take 1 Tablet by mouth 2 times a day.  Dose: 950 mg     hydrocortisone 5 MG Tabs  Commonly known as: CORTEF   Take 1 Tablet by mouth every day for 3 days.  Dose: 5 mg     oxyCODONE immediate-release 5 MG Tabs  Commonly known as: ROXICODONE   Take 1-2 Tablets by mouth every 12 hours as needed for Severe Pain for up to 3 days.  Dose: 5-10 mg        CONTINUE taking these medications      Instructions   Alcohol Swabs   Doctor's comments: Per formulary preference. ICD-10 code: E11.65 Uncontrolled type 2 Diabetes Mellitus  Wipe site with prep pad prior to injection.     aspirin 81 MG Chew chewable tablet  Commonly known as: ASA   Chew 1 tablet every day.  Dose: 81 mg     atorvastatin 40 MG Tabs  Commonly known as: LIPITOR   Take 1 tablet by mouth every evening.  Dose: 40 mg     * Blood Glucose Meter Kit   Doctor's comments: Or per formulary preference. ICD-10 code: E11.65 Uncontrolled type 2 Diabetes Mellitus  Test blood sugar as recommended by provider. Accucheck Guide blood glucose monitoring kit.     * Blood Glucose Test Strips   Doctor's comments: Or per formulary preference. ICD-10 code: E11.65 Uncontrolled type 2 Diabetes Mellitus  Use one Accucheck Guide strip to test blood sugar three times daily.     dabigatran 150 MG Caps capsule  Commonly known as: PRADAXA   Take 1 capsule by  mouth 2 times a day.  Dose: 150 mg     Empagliflozin 10 MG Tabs   Take 10 mg by mouth every day.  Dose: 10 mg     Insulin Pen Needle 32 G x 4 mm   Doctor's comments: Per patient/formulary preference. ICD-10 code: E11.65 Uncontrolled type 2 Diabetes Mellitus  Use one pen needle in pen device to inject insulin once daily.     ketorolac 0.5 % Soln  Commonly known as: ACULAR   Administer 1 Drop into both eyes 4 times a day.  Dose: 1 Drop     Lancets   Doctor's comments: Or per formulary preference. ICD-10 code: E11.65 Uncontrolled type 2 Diabetes Mellitus  Use one Accucheck Guide lancet to test blood sugar three times daily.     levothyroxine 100 MCG Tabs  Commonly known as: SYNTHROID   Take 1 tablet by mouth every morning on an empty stomach.  Dose: 100 mcg     lisinopril 5 MG Tabs  Commonly known as: PRINIVIL   Take 1 tablet by mouth every day.  Dose: 5 mg     ofloxacin 0.3 % Soln  Commonly known as: OCUFLOX   Administer 1 Drop into both eyes 4 times a day.  Dose: 1 Drop     prednisoLONE acetate 1 % Susp  Commonly known as: PRED FORTE   Administer 1 Drop into the left eye 4 times a day.  Dose: 1 Drop         * This list has 2 medication(s) that are the same as other medications prescribed for you. Read the directions carefully, and ask your doctor or other care provider to review them with you.            STOP taking these medications    metoprolol tartrate 25 MG Tabs  Commonly known as: LOPRESSOR            Allergies  No Known Allergies    DIET  Orders Placed This Encounter   Procedures   • Diet Order Diet: Consistent CHO (Diabetic)     Standing Status:   Standing     Number of Occurrences:   1     Order Specific Question:   Diet:     Answer:   Consistent CHO (Diabetic) [4]       ACTIVITY  As tolerated.  Weight bearing as tolerated    CONSULTATIONS  Critical care     PROCEDURES  n/a    LABORATORY  Lab Results   Component Value Date    SODIUM 142 01/13/2022    POTASSIUM 3.7 01/13/2022    CHLORIDE 107 01/13/2022    CO2  26 01/13/2022    GLUCOSE 116 (H) 01/13/2022    BUN 16 01/13/2022    CREATININE 0.88 01/13/2022        Lab Results   Component Value Date    WBC 8.2 01/13/2022    HEMOGLOBIN 7.9 (L) 01/13/2022    HEMATOCRIT 26.5 (L) 01/13/2022    PLATELETCT 197 01/13/2022        Total time of the discharge process exceeds 31 minutes.

## 2022-01-15 NOTE — DISCHARGE INSTRUCTIONS
Cellulitis, Adult    Cellulitis is a skin infection. The infected area is usually warm, red, swollen, and tender. This condition occurs most often in the arms and lower legs. The infection can travel to the muscles, blood, and underlying tissue and become serious. It is very important to get treated for this condition.  What are the causes?  Cellulitis is caused by bacteria. The bacteria enter through a break in the skin, such as a cut, burn, insect bite, open sore, or crack.  What increases the risk?  This condition is more likely to occur in people who:  · Have a weak body defense system (immune system).  · Have open wounds on the skin, such as cuts, burns, bites, and scrapes. Bacteria can enter the body through these open wounds.  · Are older than 60 years of age.  · Have diabetes.  · Have a type of long-lasting (chronic) liver disease (cirrhosis) or kidney disease.  · Are obese.  · Have a skin condition such as:  ? Itchy rash (eczema).  ? Slow movement of blood in the veins (venous stasis).  ? Fluid buildup below the skin (edema).  · Have had radiation therapy.  · Use IV drugs.  What are the signs or symptoms?  Symptoms of this condition include:  · Redness, streaking, or spotting on the skin.  · Swollen area of the skin.  · Tenderness or pain when an area of the skin is touched.  · Warm skin.  · A fever.  · Chills.  · Blisters.  How is this diagnosed?  This condition is diagnosed based on a medical history and physical exam. You may also have tests, including:  · Blood tests.  · Imaging tests.  How is this treated?  Treatment for this condition may include:  · Medicines, such as antibiotic medicines or medicines to treat allergies (antihistamines).  · Supportive care, such as rest and application of cold or warm cloths (compresses) to the skin.  · Hospital care, if the condition is severe.  The infection usually starts to get better within 1-2 days of treatment.  Follow these instructions at  home:    Medicines  · Take over-the-counter and prescription medicines only as told by your health care provider.  · If you were prescribed an antibiotic medicine, take it as told by your health care provider. Do not stop taking the antibiotic even if you start to feel better.  General instructions  · Drink enough fluid to keep your urine pale yellow.  · Do not touch or rub the infected area.  · Raise (elevate) the infected area above the level of your heart while you are sitting or lying down.  · Apply warm or cold compresses to the affected area as told by your health care provider.  · Keep all follow-up visits as told by your health care provider. This is important. These visits let your health care provider make sure a more serious infection is not developing.  Contact a health care provider if:  · You have a fever.  · Your symptoms do not begin to improve within 1-2 days of starting treatment.  · Your bone or joint underneath the infected area becomes painful after the skin has healed.  · Your infection returns in the same area or another area.  · You notice a swollen bump in the infected area.  · You develop new symptoms.  · You have a general ill feeling (malaise) with muscle aches and pains.  Get help right away if:  · Your symptoms get worse.  · You feel very sleepy.  · You develop vomiting or diarrhea that persists.  · You notice red streaks coming from the infected area.  · Your red area gets larger or turns dark in color.  These symptoms may represent a serious problem that is an emergency. Do not wait to see if the symptoms will go away. Get medical help right away. Call your local emergency services (911 in the [place] Do not drive yourself to the hospital.  Summary  · Cellulitis is a skin infection. This condition occurs most often in the arms and lower legs.  · Treatment for this condition may include medicines, such as antibiotic medicines or antihistamines.  · Take over-the-counter and prescription  medicines only as told by your health care provider. If you were prescribed an antibiotic medicine, do not stop taking the antibiotic even if you start to feel better.  · Contact a health care provider if your symptoms do not begin to improve within 1-2 days of starting treatment or your symptoms get worse.  · Keep all follow-up visits as told by your health care provider. This is important. These visits let your health care provider make sure that a more serious infection is not developing.  This information is not intended to replace advice given to you by your health care provider. Make sure you discuss any questions you have with your health care provider.  Document Released: 09/27/2006 Document Revised: 05/09/2019 Document Reviewed: 05/09/2019  [employer] Patient Education © 2020 [employer]    Discharge Instructions    Discharged to home by car with relative. Discharged via wheelchair, hospital escort: Yes.  Special equipment needed: Not Applicable    Be sure to schedule a follow-up appointment with your primary care doctor or any specialists as instructed.     Discharge Plan:   Diet Plan: Discussed  Activity Level: Discussed  Confirmed Follow up Appointment: Patient to Call and Schedule Appointment  Confirmed Symptoms Management: Discussed  Medication Reconciliation Updated: Yes  Influenza Vaccine Indication: Indicated: 65 years and older  Influenza Vaccine Given - only chart on this line when given: Influenza Vaccine Given (See MAR)    I understand that a diet low in cholesterol, fat, and sodium is recommended for good health. Unless I have been given specific instructions below for another diet, I accept this instruction as my diet prescription.   Other diet: as tolerated     Special Instructions: None    · Is patient discharged on Warfarin / Coumadin?   No     Depression / Suicide Risk    As you are discharged from this [facility] facility, it is important to learn how to keep safe from harming  yourself.    Recognize the warning signs:  · Abrupt changes in personality, positive or negative- including increase in energy   · Giving away possessions  · Change in eating patterns- significant weight changes-  positive or negative  · Change in sleeping patterns- unable to sleep or sleeping all the time   · Unwillingness or inability to communicate  · Depression  · Unusual sadness, discouragement and loneliness  · Talk of wanting to die  · Neglect of personal appearance   · Rebelliousness- reckless behavior  · Withdrawal from people/activities they love  · Confusion- inability to concentrate     If you or a loved one observes any of these behaviors or has concerns about self-harm, here's what you can do:  · Talk about it- your feelings and reasons for harming yourself  · Remove any means that you might use to hurt yourself (examples: pills, rope, extension cords, firearm)  · Get professional help from the community (Mental Health, Substance Abuse, psychological counseling)  · Do not be alone:Call your Safe Contact- someone whom you trust who will be there for you.  · Call your local CRISIS HOTLINE 920-0616 or 417-075-9356  · Call your local Children's Mobile Crisis Response Team [place] (587) 837-4916 or www.[employer]  · Call the toll free National Suicide Prevention Hotlines   · National Suicide Prevention Lifeline 145-711-KLAG (0830)  · National Hope Line Network 800-SUICIDE (145-3454)    Oxycodone tablets or capsules  What is this medicine?  OXYCODONE (ox i KOE done) is a pain reliever. It is used to treat moderate to severe pain.  This medicine may be used for other purposes; ask your health care provider or pharmacist if you have questions.  COMMON BRAND NAME(S): Dazidox, Endocodone, Oxaydo, OXECTA, OxyIR, Percolone, Roxicodone, Roxybond  What should I tell my health care provider before I take this medicine?  They need to know if you have any of these conditions:  · Butler's disease  · brain  tumor  · head injury  · heart disease  · history of drug or alcohol abuse problem  · if you often drink alcohol  · kidney disease  · liver disease  · lung or breathing disease, like asthma  · mental illness  · pancreatic disease  · seizures  · thyroid disease  · an unusual or allergic reaction to oxycodone, codeine, hydrocodone, morphine, other medicines, foods, dyes, or preservatives  · pregnant or trying to get pregnant  · breast-feeding  How should I use this medicine?  Take this medicine by mouth with a glass of water. Follow the directions on the prescription label. You can take it with or without food. If it upsets your stomach, take it with food. Take your medicine at regular intervals. Do not take it more often than directed. Do not stop taking except on your doctor's advice.  Some brands of this medicine, like Oxecta, have special instructions. Ask your doctor or pharmacist if these directions are for you: Do not cut, crush or chew this medicine. Swallow only one tablet at a time. Do not wet, soak, or lick the tablet before you take it.  A special MedGuide will be given to you by the pharmacist with each prescription and refill. Be sure to read this information carefully each time.  Talk to your pediatrician regarding the use of this medicine in children. Special care may be needed.  Overdosage: If you think you have taken too much of this medicine contact a poison control center or emergency room at once.  NOTE: This medicine is only for you. Do not share this medicine with others.  What if I miss a dose?  If you miss a dose, take it as soon as you can. If it is almost time for your next dose, take only that dose. Do not take double or extra doses.  What may interact with this medicine?  This medicine may interact with the following medications:  · alcohol  · antihistamines for allergy, cough and cold  · antiviral medicines for HIV or AIDS  · atropine  · certain antibiotics like clarithromycin,  erythromycin, linezolid, rifampin  · certain medicines for anxiety or sleep  · certain medicines for bladder problems like oxybutynin, tolterodine  · certain medicines for depression like amitriptyline, fluoxetine, sertraline  · certain medicines for fungal infections like ketoconazole, itraconazole, voriconazole  · certain medicines for migraine headache like almotriptan, eletriptan, frovatriptan, naratriptan, rizatriptan, sumatriptan, zolmitriptan  · certain medicines for nausea or vomiting like dolasetron, ondansetron, palonosetron  · certain medicines for Parkinson's disease like benztropine, trihexyphenidyl  · certain medicines for seizures like phenobarbital, phenytoin, primidone  · certain medicines for stomach problems like dicyclomine, hyoscyamine  · certain medicines for travel sickness like scopolamine  · diuretics  · general anesthetics like halothane, isoflurane, methoxyflurane, propofol  · ipratropium  · local anesthetics like lidocaine, pramoxine, tetracaine  · MAOIs like Carbex, Eldepryl, Marplan, Nardil, and Parnate  · medicines that relax muscles for surgery  · methylene blue  · nilotinib  · other narcotic medicines for pain or cough  · phenothiazines like chlorpromazine, mesoridazine, prochlorperazine, thioridazine  This list may not describe all possible interactions. Give your health care provider a list of all the medicines, herbs, non-prescription drugs, or dietary supplements you use. Also tell them if you smoke, drink alcohol, or use illegal drugs. Some items may interact with your medicine.  What should I watch for while using this medicine?  Tell your doctor or health care professional if your pain does not go away, if it gets worse, or if you have new or a different type of pain. You may develop tolerance to the medicine. Tolerance means that you will need a higher dose of the medicine for pain relief. Tolerance is normal and is expected if you take this medicine for a long time.  Do not  suddenly stop taking your medicine because you may develop a severe reaction. Your body becomes used to the medicine. This does NOT mean you are addicted. Addiction is a behavior related to getting and using a drug for a non-medical reason. If you have pain, you have a medical reason to take pain medicine. Your doctor will tell you how much medicine to take. If your doctor wants you to stop the medicine, the dose will be slowly lowered over time to avoid any side effects.  There are different types of narcotic medicines (opiates). If you take more than one type at the same time or if you are taking another medicine that also causes drowsiness, you may have more side effects. Give your health care provider a list of all medicines you use. Your doctor will tell you how much medicine to take. Do not take more medicine than directed. Call emergency for help if you have problems breathing or unusual sleepiness.  You may get drowsy or dizzy. Do not drive, use machinery, or do anything that needs mental alertness until you know how the medicine affects you. Do not stand or sit up quickly, especially if you are an older patient. This reduces the risk of dizzy or fainting spells. Alcohol may interfere with the effect of this medicine. Avoid alcoholic drinks.  This medicine will cause constipation. Try to have a bowel movement at least every 2 to 3 days. If you do not have a bowel movement for 3 days, call your doctor or health care professional.  Your mouth may get dry. Chewing sugarless gum or sucking hard candy, and drinking plenty of water may help. Contact your doctor if the problem does not go away or is severe.  What side effects may I notice from receiving this medicine?  Side effects that you should report to your doctor or health care professional as soon as possible:  · allergic reactions like skin rash, itching or hives, swelling of the face, lips, or tongue  · breathing problems  · confusion  · signs and symptoms  of low blood pressure like dizziness; feeling faint or lightheaded, falls; unusually weak or tired  · trouble passing urine or change in the amount of urine  · trouble swallowing  Side effects that usually do not require medical attention (report to your doctor or health care professional if they continue or are bothersome):  · constipation  · dry mouth  · nausea, vomiting  · tiredness  This list may not describe all possible side effects. Call your doctor for medical advice about side effects. You may report side effects to FDA at 4-571-DIU-1012.  Where should I keep my medicine?  Keep out of the reach of children. This medicine can be abused. Keep your medicine in a safe place to protect it from theft. Do not share this medicine with anyone. Selling or giving away this medicine is dangerous and against the law.  Store at room temperature between 15 and 30 degrees C (59 and 86 degrees F). Protect from light. Keep container tightly closed.  This medicine may cause harm and death if it is taken by other adults, children, or pets. Return medicine that has not been used to an official disposal site. Contact the [facility] at 1-298.213.7435 or your [facility] government to find a site. If you cannot return the medicine, flush it down the toilet. Do not use the medicine after the expiration date.  NOTE: This sheet is a summary. It may not cover all possible information. If you have questions about this medicine, talk to your doctor, pharmacist, or health care provider.  © 2020 [employer] (2018-04-24 16:13:10)    Hydrocortisone tablets  What is this medicine?  HYDROCORTISONE (malena droe KOR ti sone) is a corticosteroid. It is commonly used to treat inflammation of the skin, joints, lungs, and other organs. Common conditions treated include asthma, allergies, and arthritis. It is also used for other conditions, like blood disorders and diseases of the adrenal glands.  This medicine may be used for other purposes; ask  your health care provider or pharmacist if you have questions.  COMMON BRAND NAME(S): Cortef, Hydrocortone  What should I tell my health care provider before I take this medicine?  They need to know if you have any of these conditions:  · Cushing's syndrome  · diabetes  · glaucoma  · heart problems or disease  · high blood pressure  · infection like herpes, measles, tuberculosis, or chickenpox  · kidney disease  · liver disease  · mental problems  · myasthenia gravis  · osteoporosis  · previous heart attack  · seizures  · stomach, ulcer or intestine disease including colitis and diverticulitis  · thyroid problem  · an unusual or allergic reaction to hydrocortisone, corticosteroids, other medicines, lactose, foods, dyes, or preservatives  · pregnant or trying to get pregnant  · breast-feeding  How should I use this medicine?  Take this medicine by mouth with a drink of water. Follow the directions on the prescription label. Take it with food or milk to avoid stomach upset. If you are taking this medicine once a day, take it in the morning. Do not take more medicine than you are told to take. Do not suddenly stop taking your medicine because you may develop a severe reaction. Your doctor will tell you how much medicine to take. If your doctor wants you to stop the medicine, the dose may be slowly lowered over time to avoid any side effects.  Talk to your pediatrician regarding the use of this medicine in children. Special care may be needed.  Overdosage: If you think you have taken too much of this medicine contact a poison control center or emergency room at once.  NOTE: This medicine is only for you. Do not share this medicine with others.  What if I miss a dose?  If you miss a dose, take it as soon as you can. If it is almost time for your next dose, take only that dose. Do not take double or extra doses.  What may interact with this medicine?  Do not take this medicine with any of the following  medications:  · mifepristone, RU-486  · vaccines  This medicine may also interact with the following medications:  · antibiotics like clarithromycin, erythromycin, and troleandomycin  · aspirin and aspirin-like drugs  · barbiturates like phenobarbital  · ketoconazole  · phenytoin  · rifampin  · warfarin  This list may not describe all possible interactions. Give your health care provider a list of all the medicines, herbs, non-prescription drugs, or dietary supplements you use. Also tell them if you smoke, drink alcohol, or use illegal drugs. Some items may interact with your medicine.  What should I watch for while using this medicine?  Visit your doctor or health care professional for regular checks on your progress. If you are taking this medicine over a prolonged period, carry an identification card with your name and address, the type and dose of your medicine, and your doctor's name and address.  This medicine may increase your risk of getting an infection. Stay away from people who are sick. Tell your doctor or health care professional if you are around anyone with measles or chickenpox.  If you are going to have surgery, tell your doctor or health care professional that you have taken this medicine within the last twelve months.  Ask your doctor or health care professional about your diet. You may need to lower the amount of salt you eat.  This medicine may increase blood sugar. Ask your healthcare provider if changes in diet or medicines are needed if you have diabetes.  What side effects may I notice from receiving this medicine?  Side effects that you should report to your doctor or health care professional as soon as possible:  · fever, sore throat, sneezing, cough, or other signs of infection, wounds that will not heal  · mental depression, mood swings, mistaken feelings of self importance or of being mistreated  · pain in hips, back, ribs, arms, shoulders, or legs  · severe stomach pain  ·   signs and  symptoms of high blood sugar such as being more thirsty or hungry or having to urinate more than normal. You may also feel very tired or have blurry vision.  · swelling of feet or lower legs  · vomiting  Side effects that usually do not require medical attention (report to your doctor or health care professional if they continue or are bothersome):  · headache  · nausea  · skin problems, acne, thin and shiny skin  This list may not describe all possible side effects. Call your doctor for medical advice about side effects. You may report side effects to FDA at 3-802-YMP-6287.  Where should I keep my medicine?  Keep out of the reach of children.  Store at room temperature between 20 and 25 degrees C (68 and 77 degrees F). Throw away any unused medicine after the expiration date.  NOTE: This sheet is a summary. It may not cover all possible information. If you have questions about this medicine, talk to your doctor, pharmacist, or health care provider.  © 2020 [employer] (2019-09-18 12:14:04)

## 2022-02-17 ENCOUNTER — APPOINTMENT (OUTPATIENT)
Dept: RADIOLOGY | Facility: MEDICAL CENTER | Age: 72
End: 2022-02-17
Attending: STUDENT IN AN ORGANIZED HEALTH CARE EDUCATION/TRAINING PROGRAM
Payer: MEDICARE

## 2022-02-17 ENCOUNTER — APPOINTMENT (OUTPATIENT)
Dept: RADIOLOGY | Facility: MEDICAL CENTER | Age: 72
End: 2022-02-17
Attending: EMERGENCY MEDICINE
Payer: MEDICARE

## 2022-02-17 ENCOUNTER — HOSPITAL ENCOUNTER (OUTPATIENT)
Facility: MEDICAL CENTER | Age: 72
End: 2022-02-25
Attending: EMERGENCY MEDICINE | Admitting: STUDENT IN AN ORGANIZED HEALTH CARE EDUCATION/TRAINING PROGRAM
Payer: MEDICARE

## 2022-02-17 DIAGNOSIS — S81.809A WOUND OF LOWER EXTREMITY, UNSPECIFIED LATERALITY, INITIAL ENCOUNTER: ICD-10-CM

## 2022-02-17 DIAGNOSIS — R26.2 UNABLE TO WALK: ICD-10-CM

## 2022-02-17 DIAGNOSIS — I48.91 ATRIAL FIBRILLATION WITH RAPID VENTRICULAR RESPONSE (HCC): ICD-10-CM

## 2022-02-17 DIAGNOSIS — S81.802A OPEN WOUND OF BOTH LOWER EXTREMITIES, INITIAL ENCOUNTER: ICD-10-CM

## 2022-02-17 DIAGNOSIS — S81.809D WOUND OF LOWER EXTREMITY, UNSPECIFIED LATERALITY, SUBSEQUENT ENCOUNTER: ICD-10-CM

## 2022-02-17 DIAGNOSIS — R53.81 DEBILITY: ICD-10-CM

## 2022-02-17 DIAGNOSIS — M25.552 HIP PAIN, ACUTE, LEFT: ICD-10-CM

## 2022-02-17 DIAGNOSIS — S81.801A OPEN WOUND OF BOTH LOWER EXTREMITIES, INITIAL ENCOUNTER: ICD-10-CM

## 2022-02-17 DIAGNOSIS — M25.552 LEFT HIP PAIN: ICD-10-CM

## 2022-02-17 PROBLEM — I48.92 ATRIAL FLUTTER WITH RAPID VENTRICULAR RESPONSE (HCC): Status: ACTIVE | Noted: 2022-02-17

## 2022-02-17 PROBLEM — Z79.4 TYPE 2 DIABETES MELLITUS WITH HYPERGLYCEMIA, WITH LONG-TERM CURRENT USE OF INSULIN (HCC): Chronic | Status: ACTIVE | Noted: 2021-07-04

## 2022-02-17 PROBLEM — E11.65 TYPE 2 DIABETES MELLITUS WITH HYPERGLYCEMIA, WITH LONG-TERM CURRENT USE OF INSULIN (HCC): Chronic | Status: ACTIVE | Noted: 2021-07-04

## 2022-02-17 LAB
ALBUMIN SERPL BCP-MCNC: 3.8 G/DL (ref 3.2–4.9)
ALBUMIN/GLOB SERPL: 1 G/DL
ALP SERPL-CCNC: 137 U/L (ref 30–99)
ALT SERPL-CCNC: 11 U/L (ref 2–50)
ANION GAP SERPL CALC-SCNC: 12 MMOL/L (ref 7–16)
APTT PPP: 60.7 SEC (ref 24.7–36)
AST SERPL-CCNC: 16 U/L (ref 12–45)
BASOPHILS # BLD AUTO: 0.3 % (ref 0–1.8)
BASOPHILS # BLD: 0.02 K/UL (ref 0–0.12)
BILIRUB SERPL-MCNC: 1 MG/DL (ref 0.1–1.5)
BUN SERPL-MCNC: 26 MG/DL (ref 8–22)
CALCIUM SERPL-MCNC: 9.4 MG/DL (ref 8.5–10.5)
CHLORIDE SERPL-SCNC: 104 MMOL/L (ref 96–112)
CO2 SERPL-SCNC: 22 MMOL/L (ref 20–33)
CREAT SERPL-MCNC: 1.01 MG/DL (ref 0.5–1.4)
EKG IMPRESSION: NORMAL
EOSINOPHIL # BLD AUTO: 0.01 K/UL (ref 0–0.51)
EOSINOPHIL NFR BLD: 0.1 % (ref 0–6.9)
ERYTHROCYTE [DISTWIDTH] IN BLOOD BY AUTOMATED COUNT: 50.6 FL (ref 35.9–50)
GLOBULIN SER CALC-MCNC: 3.8 G/DL (ref 1.9–3.5)
GLUCOSE BLD-MCNC: 152 MG/DL (ref 65–99)
GLUCOSE SERPL-MCNC: 160 MG/DL (ref 65–99)
HCT VFR BLD AUTO: 41.2 % (ref 37–47)
HGB BLD-MCNC: 12.7 G/DL (ref 12–16)
IMM GRANULOCYTES # BLD AUTO: 0.04 K/UL (ref 0–0.11)
IMM GRANULOCYTES NFR BLD AUTO: 0.6 % (ref 0–0.9)
INR PPP: 1.61 (ref 0.87–1.13)
LYMPHOCYTES # BLD AUTO: 0.9 K/UL (ref 1–4.8)
LYMPHOCYTES NFR BLD: 12.8 % (ref 22–41)
MAGNESIUM SERPL-MCNC: 2 MG/DL (ref 1.5–2.5)
MCH RBC QN AUTO: 28 PG (ref 27–33)
MCHC RBC AUTO-ENTMCNC: 30.8 G/DL (ref 33.6–35)
MCV RBC AUTO: 90.9 FL (ref 81.4–97.8)
MONOCYTES # BLD AUTO: 0.25 K/UL (ref 0–0.85)
MONOCYTES NFR BLD AUTO: 3.5 % (ref 0–13.4)
NEUTROPHILS # BLD AUTO: 5.83 K/UL (ref 2–7.15)
NEUTROPHILS NFR BLD: 82.7 % (ref 44–72)
NRBC # BLD AUTO: 0 K/UL
NRBC BLD-RTO: 0 /100 WBC
NT-PROBNP SERPL IA-MCNC: 171 PG/ML (ref 0–125)
PHOSPHATE SERPL-MCNC: 3.2 MG/DL (ref 2.5–4.5)
PLATELET # BLD AUTO: 242 K/UL (ref 164–446)
PMV BLD AUTO: 10 FL (ref 9–12.9)
POTASSIUM SERPL-SCNC: 3.9 MMOL/L (ref 3.6–5.5)
PROT SERPL-MCNC: 7.6 G/DL (ref 6–8.2)
PROTHROMBIN TIME: 18.7 SEC (ref 12–14.6)
RBC # BLD AUTO: 4.53 M/UL (ref 4.2–5.4)
SODIUM SERPL-SCNC: 138 MMOL/L (ref 135–145)
WBC # BLD AUTO: 7.1 K/UL (ref 4.8–10.8)

## 2022-02-17 PROCEDURE — 83880 ASSAY OF NATRIURETIC PEPTIDE: CPT

## 2022-02-17 PROCEDURE — 71045 X-RAY EXAM CHEST 1 VIEW: CPT

## 2022-02-17 PROCEDURE — 80053 COMPREHEN METABOLIC PANEL: CPT

## 2022-02-17 PROCEDURE — A9270 NON-COVERED ITEM OR SERVICE: HCPCS | Performed by: STUDENT IN AN ORGANIZED HEALTH CARE EDUCATION/TRAINING PROGRAM

## 2022-02-17 PROCEDURE — G0378 HOSPITAL OBSERVATION PER HR: HCPCS

## 2022-02-17 PROCEDURE — 96372 THER/PROPH/DIAG INJ SC/IM: CPT

## 2022-02-17 PROCEDURE — 96374 THER/PROPH/DIAG INJ IV PUSH: CPT

## 2022-02-17 PROCEDURE — 93005 ELECTROCARDIOGRAM TRACING: CPT | Performed by: EMERGENCY MEDICINE

## 2022-02-17 PROCEDURE — A9270 NON-COVERED ITEM OR SERVICE: HCPCS | Performed by: EMERGENCY MEDICINE

## 2022-02-17 PROCEDURE — 84100 ASSAY OF PHOSPHORUS: CPT

## 2022-02-17 PROCEDURE — 700102 HCHG RX REV CODE 250 W/ 637 OVERRIDE(OP): Performed by: STUDENT IN AN ORGANIZED HEALTH CARE EDUCATION/TRAINING PROGRAM

## 2022-02-17 PROCEDURE — 99220 PR INITIAL OBSERVATION CARE,LEVL III: CPT | Performed by: STUDENT IN AN ORGANIZED HEALTH CARE EDUCATION/TRAINING PROGRAM

## 2022-02-17 PROCEDURE — 94760 N-INVAS EAR/PLS OXIMETRY 1: CPT

## 2022-02-17 PROCEDURE — 82962 GLUCOSE BLOOD TEST: CPT

## 2022-02-17 PROCEDURE — 96375 TX/PRO/DX INJ NEW DRUG ADDON: CPT

## 2022-02-17 PROCEDURE — 700102 HCHG RX REV CODE 250 W/ 637 OVERRIDE(OP): Performed by: EMERGENCY MEDICINE

## 2022-02-17 PROCEDURE — 73502 X-RAY EXAM HIP UNI 2-3 VIEWS: CPT | Mod: LT

## 2022-02-17 PROCEDURE — 700111 HCHG RX REV CODE 636 W/ 250 OVERRIDE (IP): Performed by: EMERGENCY MEDICINE

## 2022-02-17 PROCEDURE — 85730 THROMBOPLASTIN TIME PARTIAL: CPT

## 2022-02-17 PROCEDURE — 85610 PROTHROMBIN TIME: CPT

## 2022-02-17 PROCEDURE — 83735 ASSAY OF MAGNESIUM: CPT

## 2022-02-17 PROCEDURE — 85025 COMPLETE CBC W/AUTO DIFF WBC: CPT

## 2022-02-17 PROCEDURE — 99285 EMERGENCY DEPT VISIT HI MDM: CPT

## 2022-02-17 RX ORDER — ATORVASTATIN CALCIUM 40 MG/1
40 TABLET, FILM COATED ORAL EVERY EVENING
Status: DISCONTINUED | OUTPATIENT
Start: 2022-02-17 | End: 2022-02-25 | Stop reason: HOSPADM

## 2022-02-17 RX ORDER — GABAPENTIN 300 MG/1
300 CAPSULE ORAL
Status: DISCONTINUED | OUTPATIENT
Start: 2022-02-17 | End: 2022-02-25 | Stop reason: HOSPADM

## 2022-02-17 RX ORDER — AMOXICILLIN 250 MG
2 CAPSULE ORAL 2 TIMES DAILY
Status: DISCONTINUED | OUTPATIENT
Start: 2022-02-17 | End: 2022-02-25 | Stop reason: HOSPADM

## 2022-02-17 RX ORDER — LISINOPRIL 10 MG/1
10 TABLET ORAL DAILY
Status: ON HOLD | COMMUNITY
End: 2022-03-09

## 2022-02-17 RX ORDER — OXYCODONE HYDROCHLORIDE 5 MG/1
5 TABLET ORAL
Status: DISCONTINUED | OUTPATIENT
Start: 2022-02-17 | End: 2022-02-18

## 2022-02-17 RX ORDER — LABETALOL HYDROCHLORIDE 5 MG/ML
10 INJECTION, SOLUTION INTRAVENOUS EVERY 4 HOURS PRN
Status: DISCONTINUED | OUTPATIENT
Start: 2022-02-17 | End: 2022-02-25 | Stop reason: HOSPADM

## 2022-02-17 RX ORDER — INSULIN GLARGINE 100 [IU]/ML
45 INJECTION, SOLUTION SUBCUTANEOUS EVERY EVENING
COMMUNITY

## 2022-02-17 RX ORDER — CELECOXIB 200 MG/1
200 CAPSULE ORAL 2 TIMES DAILY PRN
Status: DISCONTINUED | OUTPATIENT
Start: 2022-02-22 | End: 2022-02-18

## 2022-02-17 RX ORDER — CELECOXIB 200 MG/1
200 CAPSULE ORAL 2 TIMES DAILY
Status: DISCONTINUED | OUTPATIENT
Start: 2022-02-17 | End: 2022-02-18

## 2022-02-17 RX ORDER — ASPIRIN 81 MG/1
81 TABLET, CHEWABLE ORAL DAILY
Status: DISCONTINUED | OUTPATIENT
Start: 2022-02-17 | End: 2022-02-25 | Stop reason: HOSPADM

## 2022-02-17 RX ORDER — ACETAMINOPHEN 500 MG
1000 TABLET ORAL EVERY 6 HOURS PRN
Status: DISCONTINUED | OUTPATIENT
Start: 2022-02-22 | End: 2022-02-18

## 2022-02-17 RX ORDER — POTASSIUM CHLORIDE 20 MEQ/1
20 TABLET, EXTENDED RELEASE ORAL DAILY
Status: ON HOLD | COMMUNITY
End: 2022-03-09

## 2022-02-17 RX ORDER — DABIGATRAN ETEXILATE 150 MG/1
150 CAPSULE ORAL 2 TIMES DAILY
Status: DISCONTINUED | OUTPATIENT
Start: 2022-02-17 | End: 2022-02-25 | Stop reason: HOSPADM

## 2022-02-17 RX ORDER — POLYETHYLENE GLYCOL 3350 17 G/17G
1 POWDER, FOR SOLUTION ORAL
Status: DISCONTINUED | OUTPATIENT
Start: 2022-02-17 | End: 2022-02-25 | Stop reason: HOSPADM

## 2022-02-17 RX ORDER — ACETAMINOPHEN 500 MG
1000 TABLET ORAL EVERY 6 HOURS
Status: DISCONTINUED | OUTPATIENT
Start: 2022-02-17 | End: 2022-02-18

## 2022-02-17 RX ORDER — HYDROMORPHONE HYDROCHLORIDE 1 MG/ML
0.5 INJECTION, SOLUTION INTRAMUSCULAR; INTRAVENOUS; SUBCUTANEOUS
Status: DISCONTINUED | OUTPATIENT
Start: 2022-02-17 | End: 2022-02-18

## 2022-02-17 RX ORDER — LEVOTHYROXINE SODIUM 0.1 MG/1
100 TABLET ORAL
Status: DISCONTINUED | OUTPATIENT
Start: 2022-02-18 | End: 2022-02-25 | Stop reason: HOSPADM

## 2022-02-17 RX ORDER — DILTIAZEM HYDROCHLORIDE 5 MG/ML
25 INJECTION INTRAVENOUS ONCE
Status: COMPLETED | OUTPATIENT
Start: 2022-02-17 | End: 2022-02-17

## 2022-02-17 RX ORDER — FUROSEMIDE 40 MG/1
40 TABLET ORAL DAILY
Status: ON HOLD | COMMUNITY
End: 2022-03-09

## 2022-02-17 RX ORDER — LISINOPRIL 10 MG/1
10 TABLET ORAL DAILY
Status: DISCONTINUED | OUTPATIENT
Start: 2022-02-17 | End: 2022-02-25 | Stop reason: HOSPADM

## 2022-02-17 RX ORDER — OXYCODONE HYDROCHLORIDE 10 MG/1
10 TABLET ORAL
Status: DISCONTINUED | OUTPATIENT
Start: 2022-02-17 | End: 2022-02-18

## 2022-02-17 RX ORDER — GABAPENTIN 100 MG/1
300 CAPSULE ORAL
Status: ON HOLD | COMMUNITY
End: 2022-02-25

## 2022-02-17 RX ORDER — BISACODYL 10 MG
10 SUPPOSITORY, RECTAL RECTAL
Status: DISCONTINUED | OUTPATIENT
Start: 2022-02-17 | End: 2022-02-25 | Stop reason: HOSPADM

## 2022-02-17 RX ORDER — MORPHINE SULFATE 4 MG/ML
4 INJECTION INTRAVENOUS ONCE
Status: COMPLETED | OUTPATIENT
Start: 2022-02-17 | End: 2022-02-17

## 2022-02-17 RX ORDER — FUROSEMIDE 40 MG/1
40 TABLET ORAL
Status: DISCONTINUED | OUTPATIENT
Start: 2022-02-17 | End: 2022-02-25 | Stop reason: HOSPADM

## 2022-02-17 RX ADMIN — ACETAMINOPHEN 1000 MG: 500 TABLET ORAL at 17:28

## 2022-02-17 RX ADMIN — MORPHINE SULFATE 4 MG: 4 INJECTION INTRAVENOUS at 13:06

## 2022-02-17 RX ADMIN — METOPROLOL TARTRATE 12.5 MG: 25 TABLET, FILM COATED ORAL at 17:24

## 2022-02-17 RX ADMIN — ASPIRIN 81 MG: 81 TABLET, CHEWABLE ORAL at 17:23

## 2022-02-17 RX ADMIN — INSULIN GLARGINE 45 UNITS: 100 INJECTION, SOLUTION SUBCUTANEOUS at 19:55

## 2022-02-17 RX ADMIN — OXYCODONE 5 MG: 5 TABLET ORAL at 17:28

## 2022-02-17 RX ADMIN — DILTIAZEM HYDROCHLORIDE 25 MG: 5 INJECTION INTRAVENOUS at 15:47

## 2022-02-17 RX ADMIN — CELECOXIB 200 MG: 200 CAPSULE ORAL at 19:49

## 2022-02-17 RX ADMIN — INSULIN HUMAN 1 UNITS: 100 INJECTION, SOLUTION PARENTERAL at 19:55

## 2022-02-17 RX ADMIN — ACETAMINOPHEN 1000 MG: 500 TABLET ORAL at 23:30

## 2022-02-17 RX ADMIN — LISINOPRIL 10 MG: 10 TABLET ORAL at 17:24

## 2022-02-17 RX ADMIN — GABAPENTIN 300 MG: 300 CAPSULE ORAL at 20:00

## 2022-02-17 RX ADMIN — ATORVASTATIN CALCIUM 40 MG: 40 TABLET, FILM COATED ORAL at 17:25

## 2022-02-17 RX ADMIN — IBUPROFEN 800 MG: 600 TABLET, FILM COATED ORAL at 15:21

## 2022-02-17 RX ADMIN — DABIGATRAN ETEXILATE MESYLATE 150 MG: 150 CAPSULE ORAL at 19:49

## 2022-02-17 ASSESSMENT — COGNITIVE AND FUNCTIONAL STATUS - GENERAL
PERSONAL GROOMING: A LOT
CLIMB 3 TO 5 STEPS WITH RAILING: TOTAL
SUGGESTED CMS G CODE MODIFIER DAILY ACTIVITY: CL
MOVING FROM LYING ON BACK TO SITTING ON SIDE OF FLAT BED: A LOT
WALKING IN HOSPITAL ROOM: A LOT
DRESSING REGULAR UPPER BODY CLOTHING: A LOT
DAILY ACTIVITIY SCORE: 13
SUGGESTED CMS G CODE MODIFIER MOBILITY: CL
MOVING TO AND FROM BED TO CHAIR: A LOT
TURNING FROM BACK TO SIDE WHILE IN FLAT BAD: A LOT
DRESSING REGULAR LOWER BODY CLOTHING: A LOT
MOBILITY SCORE: 11
TOILETING: TOTAL
HELP NEEDED FOR BATHING: A LOT
STANDING UP FROM CHAIR USING ARMS: A LOT

## 2022-02-17 ASSESSMENT — ENCOUNTER SYMPTOMS
NAUSEA: 0
HEADACHES: 0
BLURRED VISION: 0
ABDOMINAL PAIN: 0
DOUBLE VISION: 0
COUGH: 0
SORE THROAT: 0
PALPITATIONS: 0
NERVOUS/ANXIOUS: 1
DIARRHEA: 0
DEPRESSION: 0
FEVER: 0
VOMITING: 0
CHILLS: 0
SHORTNESS OF BREATH: 0
DIZZINESS: 0
SENSORY CHANGE: 0

## 2022-02-17 ASSESSMENT — LIFESTYLE VARIABLES
CONSUMPTION TOTAL: NEGATIVE
EVER FELT BAD OR GUILTY ABOUT YOUR DRINKING: NO
HAVE PEOPLE ANNOYED YOU BY CRITICIZING YOUR DRINKING: NO
HOW MANY TIMES IN THE PAST YEAR HAVE YOU HAD 5 OR MORE DRINKS IN A DAY: 0
ON A TYPICAL DAY WHEN YOU DRINK ALCOHOL HOW MANY DRINKS DO YOU HAVE: 0
ALCOHOL_USE: NO
TOTAL SCORE: 0
EVER HAD A DRINK FIRST THING IN THE MORNING TO STEADY YOUR NERVES TO GET RID OF A HANGOVER: NO
TOTAL SCORE: 0
AVERAGE NUMBER OF DAYS PER WEEK YOU HAVE A DRINK CONTAINING ALCOHOL: 0
HAVE YOU EVER FELT YOU SHOULD CUT DOWN ON YOUR DRINKING: NO
DO YOU DRINK ALCOHOL: NO
SUBSTANCE_ABUSE: 0
TOTAL SCORE: 0

## 2022-02-17 ASSESSMENT — PATIENT HEALTH QUESTIONNAIRE - PHQ9
SUM OF ALL RESPONSES TO PHQ9 QUESTIONS 1 AND 2: 0
2. FEELING DOWN, DEPRESSED, IRRITABLE, OR HOPELESS: NOT AT ALL
1. LITTLE INTEREST OR PLEASURE IN DOING THINGS: NOT AT ALL

## 2022-02-17 ASSESSMENT — PAIN DESCRIPTION - PAIN TYPE
TYPE: ACUTE PAIN
TYPE: ACUTE PAIN

## 2022-02-17 ASSESSMENT — FIBROSIS 4 INDEX
FIB4 SCORE: 1.44
FIB4 SCORE: 1.39

## 2022-02-17 NOTE — ED NOTES
Med Rec completed per patient and home pharmacy ([facility])  Allergies reviewed  No ORAL antibiotics in last 30 days

## 2022-02-17 NOTE — ED TRIAGE NOTES
Brought in by [facility] for left hip pain after getting out of chair to get dressed. No known injury. Palpable left pedal pulse, good sensation.    IV initiated and Fentanyl 100 mcg IV by [facility].    EMS vs: /95, , O2 sats 95% RA GCS 15 with glucose 231.

## 2022-02-17 NOTE — ED PROVIDER NOTES
ED Provider Note    CHIEF COMPLAINT  Left hip pain    [facility]  Beverley Dejesus is a 71 y.o. female who presents with a past medical history significant for a flutter, diabetes, high cholesterol, hypertension, neuropathy, she was discharged 1 month ago after being admitted to the hospital with sepsis, renal failure, and fall, the patient reports that she was getting out of her chair to get dressed and had acute onset of left hip pain. She denies any trauma to the hip. She describes the pain is severe. She denies any numbness or weakness of the lower extremities that is new. She denies any back pain.  Patient denies chest pain or palpitations.      REVIEW OF SYSTEMS  See HPI for further details. All other systems are negative.     PAST MEDICAL HISTORY   has a past medical history of Atrial flutter (HCC), Diabetes (HCC), Hyperlipidemia, Hypertension, and Neuropathy.    SOCIAL HISTORY  Social History     Tobacco Use   • Smoking status: Never Smoker   • Smokeless tobacco: Never Used   Substance and Sexual Activity   • Alcohol use: Not Currently   • Drug use: Never   • Sexual activity: Not on file       SURGICAL HISTORY  patient denies any surgical history    CURRENT MEDICATIONS  Home Medications     Reviewed by Franky Vallejo (Pharmacy Tech) on 02/17/22 at 1529  Med List Status: Complete   Medication Last Dose Status   Alcohol Swabs Continuous Active   aspirin (ASA) 81 MG Chew Tab chewable tablet 2/16/2022 Active   atorvastatin (LIPITOR) 40 MG Tab 2/16/2022 Active   Blood Glucose Meter Kit Continuous Active   Blood Glucose Test Strips Continuous Active   dabigatran (PRADAXA) 150 MG Cap capsule 2/16/2022 Active   Empagliflozin 10 MG Tab 2/16/2022 Active   furosemide (LASIX) 40 MG Tab unk Active   gabapentin (NEURONTIN) 100 MG Cap unk Active   insulin glargine (LANTUS SOLOSTAR) 100 UNIT/ML Solution Pen-injector injection 2/16/2022 Active   Insulin Pen Needle 32 G x 4 mm Continuous Active   Lancets Continuous Active  "  levothyroxine (SYNTHROID) 100 MCG Tab 2/17/2022 Active   lisinopril (PRINIVIL) 10 MG Tab 2/16/2022 Active   potassium chloride SA (KDUR) 20 MEQ Tab CR 2/16/2022 Active                  ALLERGIES  No Known Allergies    FAMILY HISTORY  No pertinent family history    PHYSICAL EXAM  VITAL SIGNS: /95   Pulse (!) 127   Temp 37 °C (98.6 °F) (Temporal)   Resp 18   Ht 1.626 m (5' 4\")   Wt (!) 129 kg (285 lb)   SpO2 91%   BMI 48.92 kg/m²  @ETHAN[[id number]::@   Pulse ox interpretation: I interpret this pulse ox as normal.  Constitutional: Alert.  HENT: No signs of trauma, Bilateral external ears normal, Nose normal.   Eyes: Pupils are equal and reactive, Conjunctiva normal, Non-icteric.   Neck: Normal range of motion, No tenderness, Supple, No stridor.   Lymphatic: No lymphadenopathy noted.   Cardiovascular: Irregularly irregular, tachycardic.  Thorax & Lungs: Normal breath sounds, No respiratory distress, No wheezing, No chest tenderness.   Abdomen: Bowel sounds normal, Soft, No tenderness, No masses, No pulsatile masses. No peritoneal signs.  Skin: Warm, Dry, No erythema, No rash.   Back: No bony tenderness, No CVA tenderness.   Extremities: Intact distal pulses, the patient has ulcers of bilateral lower legs with no evidence of erythema or infection.  Musculoskeletal: Tenderness of the left hip area, pain with range of motion.  Neurologic: Alert , Normal motor function, Normal sensory function, No focal deficits noted.   Psychiatric: Affect normal, Judgment normal, Mood normal.       DIAGNOSTIC STUDIES / PROCEDURES    EKG  This is a 12-lead EKG interpretation by myself.  It is atrial fibrillation with RVR at a rate of 109.  The axis is LAD -35 degrees.  The QTC is prolonged 491.  There are nonspecific ST-T wave changes.  My impression of this EKG, atrial fibrillation with RVR, does not meet STEMI criteria at this time.    LABS  Labs Reviewed   CBC WITH DIFFERENTIAL - Abnormal; Notable for the following components: "       Result Value    MCHC 30.8 (*)     RDW 50.6 (*)     Neutrophils-Polys 82.70 (*)     Lymphocytes 12.80 (*)     Lymphs (Absolute) 0.90 (*)     All other components within normal limits    Narrative:     Indicate which anticoagulants the patient is on:->UNKNOWN   COMP METABOLIC PANEL - Abnormal; Notable for the following components:    Glucose 160 (*)     Bun 26 (*)     Alkaline Phosphatase 137 (*)     Globulin 3.8 (*)     All other components within normal limits    Narrative:     Indicate which anticoagulants the patient is on:->UNKNOWN   PROBRAIN NATRIURETIC PEPTIDE, NT - Abnormal; Notable for the following components:    NT-proBNP 171 (*)     All other components within normal limits    Narrative:     Indicate which anticoagulants the patient is on:->UNKNOWN   ESTIMATED GFR - Abnormal; Notable for the following components:    GFR If Non  54 (*)     All other components within normal limits    Narrative:     Indicate which anticoagulants the patient is on:->UNKNOWN   MAGNESIUM    Narrative:     Indicate which anticoagulants the patient is on:->UNKNOWN   PHOSPHORUS    Narrative:     Indicate which anticoagulants the patient is on:->UNKNOWN   PROTHROMBIN TIME    Narrative:     Indicate which anticoagulants the patient is on:->UNKNOWN   APTT    Narrative:     Indicate which anticoagulants the patient is on:->UNKNOWN         RADIOLOGY  DX-CHEST-PORTABLE (1 VIEW)   Final Result      No acute cardiopulmonary process is seen.      DX-HIP-COMPLETE - UNILATERAL 2+ LEFT   Final Result      1.  No radiographic evidence of acute traumatic injury.              COURSE & MEDICAL DECISION MAKING  Pertinent Labs & Imaging studies reviewed. (See chart for details)    Patient presents with left hip pain while trying to get out of her chair. X-ray was ordered to evaluate for possible hip dislocation or hip fracture.    X-ray of the left hip was negative for fracture.  The patient is on the monitor and is noted to  have a heart rate of 150.  EKG was ordered, she is in A. fib with RVR.  Labs were ordered, she may have electrolyte abnormality.  She may be dehydrated, she may have renal failure.    The patient initially was given 4 mg IV morphine for pain, she is still complaining of pain and is now given 800 mg ibuprofen p.o.    Reviewed the patient's current medications, although she was discharged on metoprolol 1 month ago and her discharge summary, she is not on any rate controlling medications.    For A. fib with RVR I have ordered diltiazem 25 mg IV.  Her pain is continued despite narcotic pain medications x2 and ibuprofen.  Her BUN is slightly elevated indicating likely dehydration.    I spoke with the [facility] hospitalist will assess the patient for hospitalization.  The patient is in fair condition at this time.        FINAL IMPRESSION  1. Atrial fibrillation with rapid ventricular response (HCC)     2. Left hip pain     3. Unable to walk     4. Open wound of both lower extremities, initial encounter                Electronically signed by: Matthew Garcia M.D., 2/17/2022 1:02 PM

## 2022-02-18 ENCOUNTER — HOSPITAL ENCOUNTER (OUTPATIENT)
Dept: RADIOLOGY | Facility: MEDICAL CENTER | Age: 72
End: 2022-02-18
Attending: STUDENT IN AN ORGANIZED HEALTH CARE EDUCATION/TRAINING PROGRAM
Payer: MEDICARE

## 2022-02-18 PROBLEM — E55.9 VITAMIN D DEFICIENCY: Status: ACTIVE | Noted: 2022-02-18

## 2022-02-18 PROBLEM — S81.809A WOUND OF LOWER EXTREMITY: Status: ACTIVE | Noted: 2022-02-18

## 2022-02-18 PROBLEM — E03.9 HYPOTHYROIDISM: Status: ACTIVE | Noted: 2022-02-18

## 2022-02-18 LAB
ANION GAP SERPL CALC-SCNC: 11 MMOL/L (ref 7–16)
BASOPHILS # BLD AUTO: 0.5 % (ref 0–1.8)
BASOPHILS # BLD: 0.03 K/UL (ref 0–0.12)
BUN SERPL-MCNC: 24 MG/DL (ref 8–22)
CALCIUM SERPL-MCNC: 9.3 MG/DL (ref 8.5–10.5)
CHLORIDE SERPL-SCNC: 106 MMOL/L (ref 96–112)
CHOLEST SERPL-MCNC: 125 MG/DL (ref 100–199)
CO2 SERPL-SCNC: 22 MMOL/L (ref 20–33)
COMMENT 1642: NORMAL
CREAT SERPL-MCNC: 0.92 MG/DL (ref 0.5–1.4)
EOSINOPHIL # BLD AUTO: 0.04 K/UL (ref 0–0.51)
EOSINOPHIL NFR BLD: 0.6 % (ref 0–6.9)
ERYTHROCYTE [DISTWIDTH] IN BLOOD BY AUTOMATED COUNT: 52 FL (ref 35.9–50)
GLUCOSE BLD-MCNC: 110 MG/DL (ref 65–99)
GLUCOSE BLD-MCNC: 111 MG/DL (ref 65–99)
GLUCOSE BLD-MCNC: 155 MG/DL (ref 65–99)
GLUCOSE SERPL-MCNC: 141 MG/DL (ref 65–99)
HCT VFR BLD AUTO: 42 % (ref 37–47)
HDLC SERPL-MCNC: 40 MG/DL
HGB BLD-MCNC: 12.5 G/DL (ref 12–16)
IMM GRANULOCYTES # BLD AUTO: 0.03 K/UL (ref 0–0.11)
IMM GRANULOCYTES NFR BLD AUTO: 0.5 % (ref 0–0.9)
LDLC SERPL CALC-MCNC: 63 MG/DL
LYMPHOCYTES # BLD AUTO: 2.23 K/UL (ref 1–4.8)
LYMPHOCYTES NFR BLD: 33.6 % (ref 22–41)
MCH RBC QN AUTO: 27.5 PG (ref 27–33)
MCHC RBC AUTO-ENTMCNC: 29.8 G/DL (ref 33.6–35)
MCV RBC AUTO: 92.3 FL (ref 81.4–97.8)
MONOCYTES # BLD AUTO: 0.66 K/UL (ref 0–0.85)
MONOCYTES NFR BLD AUTO: 9.9 % (ref 0–13.4)
MORPHOLOGY BLD-IMP: NORMAL
NEUTROPHILS # BLD AUTO: 3.65 K/UL (ref 2–7.15)
NEUTROPHILS NFR BLD: 54.9 % (ref 44–72)
NRBC # BLD AUTO: 0 K/UL
NRBC BLD-RTO: 0 /100 WBC
PLATELET # BLD AUTO: 255 K/UL (ref 164–446)
PLATELET BLD QL SMEAR: NORMAL
PMV BLD AUTO: 10.2 FL (ref 9–12.9)
POTASSIUM SERPL-SCNC: 5 MMOL/L (ref 3.6–5.5)
RBC # BLD AUTO: 4.55 M/UL (ref 4.2–5.4)
RBC BLD AUTO: NORMAL
SODIUM SERPL-SCNC: 139 MMOL/L (ref 135–145)
TRIGL SERPL-MCNC: 109 MG/DL (ref 0–149)
WBC # BLD AUTO: 6.6 K/UL (ref 4.8–10.8)

## 2022-02-18 PROCEDURE — G0378 HOSPITAL OBSERVATION PER HR: HCPCS

## 2022-02-18 PROCEDURE — A9270 NON-COVERED ITEM OR SERVICE: HCPCS | Performed by: STUDENT IN AN ORGANIZED HEALTH CARE EDUCATION/TRAINING PROGRAM

## 2022-02-18 PROCEDURE — 73701 CT LOWER EXTREMITY W/DYE: CPT | Mod: LT,MF

## 2022-02-18 PROCEDURE — 96372 THER/PROPH/DIAG INJ SC/IM: CPT | Mod: XU

## 2022-02-18 PROCEDURE — 82962 GLUCOSE BLOOD TEST: CPT

## 2022-02-18 PROCEDURE — 97161 PT EVAL LOW COMPLEX 20 MIN: CPT

## 2022-02-18 PROCEDURE — 700117 HCHG RX CONTRAST REV CODE 255: Performed by: STUDENT IN AN ORGANIZED HEALTH CARE EDUCATION/TRAINING PROGRAM

## 2022-02-18 PROCEDURE — 97165 OT EVAL LOW COMPLEX 30 MIN: CPT

## 2022-02-18 PROCEDURE — 700102 HCHG RX REV CODE 250 W/ 637 OVERRIDE(OP): Performed by: STUDENT IN AN ORGANIZED HEALTH CARE EDUCATION/TRAINING PROGRAM

## 2022-02-18 PROCEDURE — 80061 LIPID PANEL: CPT

## 2022-02-18 PROCEDURE — 80048 BASIC METABOLIC PNL TOTAL CA: CPT

## 2022-02-18 PROCEDURE — 36415 COLL VENOUS BLD VENIPUNCTURE: CPT

## 2022-02-18 PROCEDURE — 85025 COMPLETE CBC W/AUTO DIFF WBC: CPT

## 2022-02-18 PROCEDURE — 99225 PR SUBSEQUENT OBSERVATION CARE,LEVEL II: CPT | Mod: GC | Performed by: INTERNAL MEDICINE

## 2022-02-18 RX ORDER — OXYCODONE HYDROCHLORIDE 10 MG/1
10 TABLET ORAL EVERY 6 HOURS PRN
Status: DISCONTINUED | OUTPATIENT
Start: 2022-02-18 | End: 2022-02-25 | Stop reason: HOSPADM

## 2022-02-18 RX ORDER — ACETAMINOPHEN 500 MG
1000 TABLET ORAL EVERY 8 HOURS
Status: DISCONTINUED | OUTPATIENT
Start: 2022-02-18 | End: 2022-02-18

## 2022-02-18 RX ORDER — VITAMIN B COMPLEX
2000 TABLET ORAL DAILY
Status: DISCONTINUED | OUTPATIENT
Start: 2022-02-18 | End: 2022-02-25 | Stop reason: HOSPADM

## 2022-02-18 RX ORDER — OXYCODONE HYDROCHLORIDE 5 MG/1
5 TABLET ORAL EVERY 4 HOURS PRN
Status: DISCONTINUED | OUTPATIENT
Start: 2022-02-18 | End: 2022-02-25 | Stop reason: HOSPADM

## 2022-02-18 RX ORDER — ACETAMINOPHEN 500 MG
1000 TABLET ORAL EVERY 8 HOURS
Status: DISCONTINUED | OUTPATIENT
Start: 2022-02-18 | End: 2022-02-25 | Stop reason: HOSPADM

## 2022-02-18 RX ADMIN — OXYCODONE 5 MG: 5 TABLET ORAL at 23:24

## 2022-02-18 RX ADMIN — Medication 2000 UNITS: at 08:10

## 2022-02-18 RX ADMIN — ACETAMINOPHEN 1000 MG: 500 TABLET ORAL at 05:05

## 2022-02-18 RX ADMIN — ACETAMINOPHEN 1000 MG: 500 TABLET ORAL at 13:17

## 2022-02-18 RX ADMIN — ATORVASTATIN CALCIUM 40 MG: 40 TABLET, FILM COATED ORAL at 17:07

## 2022-02-18 RX ADMIN — GABAPENTIN 300 MG: 300 CAPSULE ORAL at 20:54

## 2022-02-18 RX ADMIN — LISINOPRIL 10 MG: 10 TABLET ORAL at 05:05

## 2022-02-18 RX ADMIN — OXYCODONE 5 MG: 5 TABLET ORAL at 14:36

## 2022-02-18 RX ADMIN — METOPROLOL TARTRATE 12.5 MG: 25 TABLET, FILM COATED ORAL at 05:06

## 2022-02-18 RX ADMIN — OXYCODONE HYDROCHLORIDE 10 MG: 10 TABLET ORAL at 00:33

## 2022-02-18 RX ADMIN — INSULIN HUMAN 1 UNITS: 100 INJECTION, SOLUTION PARENTERAL at 11:56

## 2022-02-18 RX ADMIN — LEVOTHYROXINE SODIUM 100 MCG: 0.1 TABLET ORAL at 05:06

## 2022-02-18 RX ADMIN — METOPROLOL TARTRATE 12.5 MG: 25 TABLET, FILM COATED ORAL at 17:06

## 2022-02-18 RX ADMIN — ACETAMINOPHEN 1000 MG: 500 TABLET ORAL at 20:54

## 2022-02-18 RX ADMIN — CELECOXIB 200 MG: 200 CAPSULE ORAL at 05:05

## 2022-02-18 RX ADMIN — ASPIRIN 81 MG: 81 TABLET, CHEWABLE ORAL at 05:06

## 2022-02-18 RX ADMIN — DABIGATRAN ETEXILATE MESYLATE 150 MG: 150 CAPSULE ORAL at 05:05

## 2022-02-18 RX ADMIN — IOHEXOL 100 ML: 350 INJECTION, SOLUTION INTRAVENOUS at 02:40

## 2022-02-18 RX ADMIN — DABIGATRAN ETEXILATE MESYLATE 150 MG: 150 CAPSULE ORAL at 17:07

## 2022-02-18 ASSESSMENT — FIBROSIS 4 INDEX: FIB4 SCORE: 1.36

## 2022-02-18 ASSESSMENT — CHA2DS2 SCORE
CHA2DS2 VASC SCORE: 4
AGE 65 TO 74: YES
PRIOR STROKE OR TIA OR THROMBOEMBOLISM: NO
DIABETES: YES
CHF OR LEFT VENTRICULAR DYSFUNCTION: NO
VASCULAR DISEASE: NO
HYPERTENSION: YES
SEX: FEMALE
AGE 75 OR GREATER: NO

## 2022-02-18 ASSESSMENT — PAIN DESCRIPTION - PAIN TYPE
TYPE: ACUTE PAIN

## 2022-02-18 ASSESSMENT — COGNITIVE AND FUNCTIONAL STATUS - GENERAL
DRESSING REGULAR UPPER BODY CLOTHING: A LITTLE
PERSONAL GROOMING: A LITTLE
MOVING FROM LYING ON BACK TO SITTING ON SIDE OF FLAT BED: A LITTLE
MOBILITY SCORE: 18
SUGGESTED CMS G CODE MODIFIER DAILY ACTIVITY: CK
SUGGESTED CMS G CODE MODIFIER MOBILITY: CK
TOILETING: A LOT
MOVING TO AND FROM BED TO CHAIR: A LITTLE
STANDING UP FROM CHAIR USING ARMS: A LITTLE
DRESSING REGULAR LOWER BODY CLOTHING: A LOT
DAILY ACTIVITIY SCORE: 17
HELP NEEDED FOR BATHING: A LITTLE
TURNING FROM BACK TO SIDE WHILE IN FLAT BAD: A LITTLE
WALKING IN HOSPITAL ROOM: A LITTLE
CLIMB 3 TO 5 STEPS WITH RAILING: A LITTLE

## 2022-02-18 ASSESSMENT — ENCOUNTER SYMPTOMS
WEAKNESS: 0
NAUSEA: 0
MYALGIAS: 0
HEADACHES: 0
SHORTNESS OF BREATH: 0
SENSORY CHANGE: 0
COUGH: 0
VOMITING: 0
PALPITATIONS: 0
SPUTUM PRODUCTION: 0
EYES NEGATIVE: 1
DIZZINESS: 0
FOCAL WEAKNESS: 0
ABDOMINAL PAIN: 0
FEVER: 0
CHILLS: 0

## 2022-02-18 ASSESSMENT — ACTIVITIES OF DAILY LIVING (ADL): TOILETING: INDEPENDENT

## 2022-02-18 ASSESSMENT — GAIT ASSESSMENTS
GAIT LEVEL OF ASSIST: CONTACT GUARD ASSIST
ASSISTIVE DEVICE: FRONT WHEEL WALKER
DISTANCE (FEET): 6
DEVIATION: ANTALGIC;STEP TO;BRADYKINETIC;SHUFFLED GAIT

## 2022-02-18 NOTE — PROGRESS NOTES
"Daily Progress Note:     Date of Service: 2/18/2022  Primary Team: [facility] Team   Attending: Cornelio Salgado M.D.   Senior Resident: Dr. Aleman  Intern: Dr. Janna Swift  Contact:  [phone]    Patient ID:  71 year old female with PMH of aflutter on Dabigatran, s/p cardioversion in 07/2021, poorly controlled diabetes (A1C 16.5% in 07/2021, now 9.8% in 01/2022, hypertension, hyperlipidemia, chronic lower extremity wounds presented for hip pain and was admitted on 2/17/22 for acute onset left hip pain and afib with RVR.     Chief Complaint:  Left hip pain    Interval Update/Subjective:   No acute events overnight. Patient given oxycodone 10mg at night prior to her CT scan and she states that helped a lot with her left hip pain. This morning, the pain is nearly resolved, currently 1/10 in severity, \"barely there\" per pt. Patient denies any chest pain, chest palpitations, fever, chills, abdominal pain, nausea, vomiting, lightheadedness, dizziness. Patient states she normally ambulates well on her own and only occasionally uses a cane when she goes out.   On telemetry, pt in afib-aflutter HR 66-94.     Consultants/Specialty:  None    Review of Systems:    Review of Systems   Constitutional: Negative for chills, fever and malaise/fatigue.   HENT: Negative.    Eyes: Negative.    Respiratory: Negative for cough, sputum production and shortness of breath.    Cardiovascular: Negative for chest pain, palpitations and leg swelling.   Gastrointestinal: Negative for abdominal pain, nausea and vomiting.   Genitourinary: Negative.    Musculoskeletal: Positive for joint pain (left hip). Negative for myalgias.   Neurological: Negative for dizziness, sensory change, focal weakness, weakness and headaches.     Objective Data:   Physical Exam:   Vitals:   Temp:  [36.3 °C (97.3 °F)-37 °C (98.6 °F)] 36.3 °C (97.3 °F)  Pulse:  [] 55  Resp:  [12-18] 12  BP: (103-144)/(54-95) 103/54  SpO2:  [90 %-99 %] 99 %    Physical " Exam  Vitals and nursing note reviewed.   Constitutional:       General: She is not in acute distress.     Appearance: She is obese. She is not ill-appearing or toxic-appearing.   HENT:      Head: Normocephalic and atraumatic.      Right Ear: External ear normal.      Left Ear: External ear normal.      Mouth/Throat:      Mouth: Mucous membranes are dry.      Pharynx: Oropharynx is clear.   Eyes:      General: No scleral icterus.     Extraocular Movements: Extraocular movements intact.      Conjunctiva/sclera: Conjunctivae normal.   Cardiovascular:      Rate and Rhythm: Normal rate. Rhythm irregular.      Heart sounds: No murmur heard.    No friction rub. No gallop.   Pulmonary:      Effort: Pulmonary effort is normal.      Breath sounds: No wheezing, rhonchi or rales.   Abdominal:      General: Bowel sounds are normal. There is no distension.      Palpations: Abdomen is soft.      Tenderness: There is no abdominal tenderness. There is no guarding or rebound.   Musculoskeletal:         General: No swelling or tenderness.      Right lower leg: No edema.      Left lower leg: No edema.      Comments: no pain elicited on deep palpation of left trochanter. Pain with passive hip flexion.   Skin:     Comments: Bilateral lower extremity skin wounds do not appear overtly infected. dry and scabbed. Dressing in place   Neurological:      General: No focal deficit present.      Mental Status: She is alert and oriented to person, place, and time.   Psychiatric:         Mood and Affect: Mood normal.         Behavior: Behavior normal.       Labs:   Recent Results (from the past 24 hour(s))   EKG - STAT    Collection Time: 22  2:46 PM   Result Value Ref Range    Report       [facility] Emergency Dept.    Test Date:  2022  Pt Name:    GAETANO RONQUILLO                  Department: ER  MRN:        [id number]                      Room:       [facility]  Gender:     Female                       Technician: [id number]  :         [id number]                   Requested By:CARLI NICOLE  Order #:    [id number]                    Reading MD:    Measurements  Intervals                                Axis  Rate:       109                          P:  NM:                                      QRS:        -35  QRSD:       98                           T:          41  QT:         364  QTc:        491    Interpretive Statements  ATRIAL FIBRILLATION, V-RATE    LEFT AXIS DEVIATION  RSR' IN V1 OR V2, PROBABLY NORMAL VARIANT  CONSIDER ANTERIOR INFARCT  BORDERLINE PROLONGED QT INTERVAL  Compared to ECG 01/06/2022 06:36:36  Left-axis deviation now present  RSR' in V1 or V2 now present  Myocardial infarct finding now present  Atrial flutter no longer present  In complete right bundle-branch block no longer present     CBC w/ Differential    Collection Time: 02/17/22  2:50 PM   Result Value Ref Range    WBC 7.1 4.8 - 10.8 K/uL    RBC 4.53 4.20 - 5.40 M/uL    Hemoglobin 12.7 12.0 - 16.0 g/dL    Hematocrit 41.2 37.0 - 47.0 %    MCV 90.9 81.4 - 97.8 fL    MCH 28.0 27.0 - 33.0 pg    MCHC 30.8 (L) 33.6 - 35.0 g/dL    RDW 50.6 (H) 35.9 - 50.0 fL    Platelet Count 242 164 - 446 K/uL    MPV 10.0 9.0 - 12.9 fL    Neutrophils-Polys 82.70 (H) 44.00 - 72.00 %    Lymphocytes 12.80 (L) 22.00 - 41.00 %    Monocytes 3.50 0.00 - 13.40 %    Eosinophils 0.10 0.00 - 6.90 %    Basophils 0.30 0.00 - 1.80 %    Immature Granulocytes 0.60 0.00 - 0.90 %    Nucleated RBC 0.00 /100 WBC    Neutrophils (Absolute) 5.83 2.00 - 7.15 K/uL    Lymphs (Absolute) 0.90 (L) 1.00 - 4.80 K/uL    Monos (Absolute) 0.25 0.00 - 0.85 K/uL    Eos (Absolute) 0.01 0.00 - 0.51 K/uL    Baso (Absolute) 0.02 0.00 - 0.12 K/uL    Immature Granulocytes (abs) 0.04 0.00 - 0.11 K/uL    NRBC (Absolute) 0.00 K/uL   Complete Metabolic Panel (CMP)    Collection Time: 02/17/22  2:50 PM   Result Value Ref Range    Sodium 138 135 - 145 mmol/L    Potassium 3.9 3.6 - 5.5 mmol/L    Chloride 104 96 - 112 mmol/L     Co2 22 20 - 33 mmol/L    Anion Gap 12.0 7.0 - 16.0    Glucose 160 (H) 65 - 99 mg/dL    Bun 26 (H) 8 - 22 mg/dL    Creatinine 1.01 0.50 - 1.40 mg/dL    Calcium 9.4 8.5 - 10.5 mg/dL    AST(SGOT) 16 12 - 45 U/L    ALT(SGPT) 11 2 - 50 U/L    Alkaline Phosphatase 137 (H) 30 - 99 U/L    Total Bilirubin 1.0 0.1 - 1.5 mg/dL    Albumin 3.8 3.2 - 4.9 g/dL    Total Protein 7.6 6.0 - 8.2 g/dL    Globulin 3.8 (H) 1.9 - 3.5 g/dL    A-G Ratio 1.0 g/dL   proBrain Natriuretic Peptide, NT    Collection Time: 02/17/22  2:50 PM   Result Value Ref Range    NT-proBNP 171 (H) 0 - 125 pg/mL   Magnesium    Collection Time: 02/17/22  2:50 PM   Result Value Ref Range    Magnesium 2.0 1.5 - 2.5 mg/dL   Phosphorus    Collection Time: 02/17/22  2:50 PM   Result Value Ref Range    Phosphorus 3.2 2.5 - 4.5 mg/dL   PT/INR    Collection Time: 02/17/22  2:50 PM   Result Value Ref Range    PT 18.7 (H) 12.0 - 14.6 sec    INR 1.61 (H) 0.87 - 1.13   APTT    Collection Time: 02/17/22  2:50 PM   Result Value Ref Range    APTT 60.7 (H) 24.7 - 36.0 sec   ESTIMATED GFR    Collection Time: 02/17/22  2:50 PM   Result Value Ref Range    GFR If African American >60 >60 mL/min/1.73 m 2    GFR If Non African American 54 (A) >60 mL/min/1.73 m 2   POCT glucose device results    Collection Time: 02/17/22  7:48 PM   Result Value Ref Range    Glucose - Accu-Ck 152 (H) 65 - 99 mg/dL   Basic Metabolic Panel (BMP)    Collection Time: 02/18/22  1:46 AM   Result Value Ref Range    Sodium 139 135 - 145 mmol/L    Potassium 5.0 3.6 - 5.5 mmol/L    Chloride 106 96 - 112 mmol/L    Co2 22 20 - 33 mmol/L    Glucose 141 (H) 65 - 99 mg/dL    Bun 24 (H) 8 - 22 mg/dL    Creatinine 0.92 0.50 - 1.40 mg/dL    Calcium 9.3 8.5 - 10.5 mg/dL    Anion Gap 11.0 7.0 - 16.0   CBC with Differential    Collection Time: 02/18/22  1:46 AM   Result Value Ref Range    WBC 6.6 4.8 - 10.8 K/uL    RBC 4.55 4.20 - 5.40 M/uL    Hemoglobin 12.5 12.0 - 16.0 g/dL    Hematocrit 42.0 37.0 - 47.0 %    MCV 92.3  81.4 - 97.8 fL    MCH 27.5 27.0 - 33.0 pg    MCHC 29.8 (L) 33.6 - 35.0 g/dL    RDW 52.0 (H) 35.9 - 50.0 fL    Platelet Count 255 164 - 446 K/uL    MPV 10.2 9.0 - 12.9 fL    Neutrophils-Polys 54.90 44.00 - 72.00 %    Lymphocytes 33.60 22.00 - 41.00 %    Monocytes 9.90 0.00 - 13.40 %    Eosinophils 0.60 0.00 - 6.90 %    Basophils 0.50 0.00 - 1.80 %    Immature Granulocytes 0.50 0.00 - 0.90 %    Nucleated RBC 0.00 /100 WBC    Neutrophils (Absolute) 3.65 2.00 - 7.15 K/uL    Lymphs (Absolute) 2.23 1.00 - 4.80 K/uL    Monos (Absolute) 0.66 0.00 - 0.85 K/uL    Eos (Absolute) 0.04 0.00 - 0.51 K/uL    Baso (Absolute) 0.03 0.00 - 0.12 K/uL    Immature Granulocytes (abs) 0.03 0.00 - 0.11 K/uL    NRBC (Absolute) 0.00 K/uL   ESTIMATED GFR    Collection Time: 02/18/22  1:46 AM   Result Value Ref Range    GFR If African American >60 >60 mL/min/1.73 m 2    GFR If Non African American 60 >60 mL/min/1.73 m 2   PERIPHERAL SMEAR REVIEW    Collection Time: 02/18/22  1:46 AM   Result Value Ref Range    Peripheral Smear Review see below    PLATELET ESTIMATE    Collection Time: 02/18/22  1:46 AM   Result Value Ref Range    Plt Estimation Normal    MORPHOLOGY    Collection Time: 02/18/22  1:46 AM   Result Value Ref Range    RBC Morphology Normal    DIFFERENTIAL COMMENT    Collection Time: 02/18/22  1:46 AM   Result Value Ref Range    Comments-Diff see below    Lipid Profile    Collection Time: 02/18/22  1:46 AM   Result Value Ref Range    Cholesterol,Tot 125 100 - 199 mg/dL    Triglycerides 109 0 - 149 mg/dL    HDL 40 >=40 mg/dL    LDL 63 <100 mg/dL   POCT glucose device results    Collection Time: 02/18/22  8:08 AM   Result Value Ref Range    Glucose - Accu-Ck 111 (H) 65 - 99 mg/dL   POCT glucose device results    Collection Time: 02/18/22 11:55 AM   Result Value Ref Range    Glucose - Accu-Ck 155 (H) 65 - 99 mg/dL       Imaging:   Independant Imaging Review: Completed  CT-HIP WITH LEFT   Final Result      No CT explanation for sudden  onset right hip pain. If concern exists for occult hip fracture, MRI is more sensitive      Mild bilateral hip osteoarthritis.      Fatty umbilical hernia      Hysterectomy      Severe lumbosacral junction degenerative disc disease      DX-CHEST-PORTABLE (1 VIEW)   Final Result      No acute cardiopulmonary process is seen.      DX-HIP-COMPLETE - UNILATERAL 2+ LEFT   Final Result      1.  No radiographic evidence of acute traumatic injury.          Assessment/Plan:    * Hip pain, acute, left- (present on admission)- improved  Assessment & Plan  Patient presents with a primary problem of acute onset intractable left hip pain after standing from a chair, she denies any trauma or history of chronic hip pain.   X-ray on admission was negative for acute fracture or other pathology  Patient was given NSAIDs as well as IV pain medication without improvement in ED.  - CT hip obtained showing no CT explanation of sudden hip pain, there is severe lumbosacral junction degenerative disc disease.  - pain improved this AM, nearly subsided. Likely musculoskeletal etiology for pain.   - If pain persists/worsen, will consider further imaging with MRI   - Pain control with oxycodone PRN and topical voltaren gel  - PT/OT consulted  - Fall precautions     Atrial flutter with rapid ventricular response (HCC)- (present on admission)  Assessment & Plan  Patient s/p cardioversion in July 2021 where rhythm converted to NSR but was not discharged home on metoprolol. Presented this admission with atrial flutter with rapid ventricular response, given 1 dose of cardizem in ED with improvement in HR.  - Continue Metoprolol 12.5mg BID, adjust as needed  - telemetry  - HR 66-94 overnight.   - continue Dabigatran    Primary hypertension- (present on admission)  Assessment & Plan  BP controlled currently  -Continue home Lisinopril 10mg daily     Type 2 diabetes mellitus with hyperglycemia, with long-term current use of insulin (HCC)- (present on  admission)  Assessment & Plan  History of type 2 diabetes, poorly controlled. Previously A1C 16.5% in 07/2021, now 9.8% in 1/2022.   Lipid panel 2/18/22: , , HDL 40, LDL 63)  - insulin glargine 35U at bedtime with SSI  - hypoglycemia protocol  - holding home empagliflozin as not in formulatory  - diabetic diet  - home gabapentin, atorvastatin, and aspirin.     Vitamin D deficiency  Assessment & Plan  25 hydroxy Vit D 19 on 1/9/2022  - started on vitamin D3 2000U daily    Hypothyroidism  Assessment & Plan  TSH 2.99/T4 0.97 on 1/6/22  - continue levothyroxine 100mcg daily    Chronic lower extremities wound  Assessment & Plan  - does not look infected at this time  - wound care         Janna Swift DO  Internal Medicine, PGY-1

## 2022-02-18 NOTE — DISCHARGE PLANNING
Received Choice form at 1533  Agency/Facility Name: [facility]  Referral sent per Choice form @ [id number]

## 2022-02-18 NOTE — DIETARY
NUTRITION SERVICES: BMI - Pt with BMI >40 (=Body mass index is 46.55 kg/m².), Class III obesity. Weight loss counseling not appropriate in acute care setting. RECOMMEND - If appropriate at DC please refer to outpatient nutrition services for weight management.

## 2022-02-18 NOTE — THERAPY
"Occupational Therapy   Initial Evaluation     Patient Name: Beverley Dejesus  Age:  71 y.o., Sex:  female  Medical Record #: [id number]  Today's Date: 2/18/2022     Precautions  Precautions: (P) Fall Risk    Assessment  Patient is 71 y.o. female who presents to acute w/ L hip pain. Imaging (-) for a fx. Pt primarily limited by pain today, required min A for standing grooming and functional mobility (per pt it was her first time out of bed). Pt required max A for LB dressing due to pain. On this date recommend placement, however, anticipate as pain is more managed pt will be functionally capable to DC home w/ HH. Will continue to follow while in house.     Plan    Recommend Occupational Therapy 3 times per week until therapy goals are met for the following treatments:  Adaptive Equipment, Neuro Re-Education / Balance, Therapeutic Activities, and Therapeutic Exercises.    DC Equipment Recommendations: (P) Tub / Shower Seat  Discharge Recommendations: (P) Recommend post-acute placement for additional occupational therapy services prior to discharge home (anticipate quick progression once pain is managed)     Subjective    \"They tried to send me to rehab last time, it didn't work so I went home w/ HH\"     Objective       02/18/22 1121   Total Time Spent   Total Time Spent (Mins) 30   Charge Group   OT Evaluation OT Evaluation Low   Initial Contact Note    Initial Contact Note Order Received and Verified, Occupational Therapy Evaluation in Progress with Full Report to Follow.   Prior Living Situation   Prior Services Skilled Home Health Services   Housing / Facility Mobile Home   Bathroom Set up Bathtub / Shower Combination;Grab Bars   Equipment Owned Front-Wheel Walker   Lives with - Patient's Self Care Capacity Unrelated Adult   Comments Pt lives w/ 4 tenants, shares common areas, does not want to rely on them for help. Pt reports her land lady assists w/ rides as needed, has not left the house since the 1st of the year "   Prior Level of ADL Function   Self Feeding Independent   Grooming / Hygiene Independent   Bathing Independent   Dressing Independent   Toileting Independent   Prior Level of IADL Function   Medication Management Independent   Laundry Independent   Kitchen Mobility Independent   Finances Independent   Home Management Independent   Shopping Requires Assist   Driving / Transportation Relatives / Others Provide Transportation   Precautions   Precautions Fall Risk   Pain 0 - 10 Group   Therapist Pain Assessment Post Activity Pain Same as Prior to Activity;Nurse Notified;4  (w/ hip)   Cognition    Cognition / Consciousness WDL   Level of Consciousness Alert   Comments pleasent and cooperative   Active ROM Upper Body   Active ROM Upper Body  WDL   Strength Upper Body   Upper Body Strength  WDL   Coordination Upper Body   Coordination WDL   Balance Assessment   Sitting Balance (Static) Fair   Sitting Balance (Dynamic) Fair -   Standing Balance (Static) Fair -   Standing Balance (Dynamic) Poor +   Weight Shift Sitting Fair   Weight Shift Standing Fair   Comments w/ FWW   Bed Mobility    Supine to Sit Supervised  (using bed features)   Sit to Supine   (NT in chair post)   Scooting Supervised   ADL Assessment   Grooming Minimal Assist;Standing  (oral care, brushed hair)   Upper Body Dressing Supervision   Lower Body Dressing Maximal Assist   How much help from another person does the patient currently need...   Putting on and taking off regular lower body clothing? 2   Bathing (including washing, rinsing, and drying)? 3   Toileting, which includes using a toilet, bedpan, or urinal? 2   Putting on and taking off regular upper body clothing? 3   Taking care of personal grooming such as brushing teeth? 3   Eating meals? 4   6 Clicks Daily Activity Score 17   Functional Mobility   Sit to Stand Contact Guard Assist   Bed, Chair, Wheelchair Transfer Minimal Assist   Mobility EOB>Sink>Chair w/ FWW   Activity Tolerance   Sitting in  Chair 10 min+ (up post)   Sitting Edge of Bed 5 min   Standing 5 min   Patient / Family Goals   Patient / Family Goal #1 For her pain to be managed   Short Term Goals   Short Term Goal # 1 Pt will demo LB dressing w/ SPV   Short Term Goal # 2 Pt will demo standing grooming w/ SPV   Short Term Goal # 3 Pt will demo toilet txf w/ SPV   Education Group   Role of Occupational Therapist Patient Response Patient;Acceptance;Explanation;Demonstration;Verbal Demonstration;Action Demonstration   Problem List   Problem List Decreased Active Daily Living Skills;Decreased Homemaking Skills;Decreased Functional Mobility;Decreased Activity Tolerance;Impaired Postural Control / Balance   Anticipated Discharge Equipment and Recommendations   DC Equipment Recommendations Tub / Shower Seat   Discharge Recommendations Recommend post-acute placement for additional occupational therapy services prior to discharge home  (anticipate quick progression once pain is managed)   Interdisciplinary Plan of Care Collaboration   IDT Collaboration with  Nursing   Patient Position at End of Therapy Call Light within Reach;Tray Table within Reach;Phone within Reach;Seated;Chair Alarm On   Collaboration Comments report given   Session Information   Date / Session Number  2/18, 1 (1/3, 2/24)   Priority 2

## 2022-02-18 NOTE — H&P
Hospital Medicine History & Physical Note    Date of Service  2/17/2022    Primary Care Physician  Pcp Pt States None    Consultants  NA    Specialist Names: NA    Code Status  Full Code    Chief Complaint  Chief Complaint   Patient presents with   • Hip Pain       History of Presenting Illness  Beverley Dejesus is a 71 y.o. female with a past medical history of atrial flutter on Pradaxa status post cardioversion January 2022, poorly controlled type 2 diabetes, hypertension, hyperlipidemia and chronic lower extremity wounds who presented 2/17/2022 with acute onset left hip pain.  Patient denies any trauma she states that patient started abruptly after standing from chair pain is intractable worsened with hip extension and flexion.  Patient denies a history of chronic hip pain and states that this is completely new.  She denies any other symptoms including fever, chills, shortness of breath nausea or vomiting.  On arrival patient is afebrile, she is tachycardic with heart rates in the 140s, respiratory rate 18, /91 she is saturating well on room air.  EKG does show atrial flutter with rapid ventricular response, she was treated with 1 dose of IV diltiazem with improvement her heart rate was in the 80s upon my examination.    Labs are relatively unremarkable she has a glucose of 160 a mild elevated BUN of 26 creatinine of 1.01, .  X-ray of the left hip shows no evidence of acute injury.  Patient was treated with IV pain medications as well as NSAIDs without any improvement thus she will be admitted for observation for pain control.    I discussed the plan of care with patient.    Review of Systems  Review of Systems   Constitutional: Negative for chills, fever and malaise/fatigue.   HENT: Negative for congestion and sore throat.    Eyes: Negative for blurred vision and double vision.   Respiratory: Negative for cough and shortness of breath.    Cardiovascular: Positive for leg swelling (Chronic).  Negative for chest pain and palpitations.   Gastrointestinal: Negative for abdominal pain, diarrhea, nausea and vomiting.   Genitourinary: Negative for dysuria, frequency and urgency.   Musculoskeletal: Positive for joint pain (Severe left hip pain).   Neurological: Negative for dizziness, sensory change and headaches.   Psychiatric/Behavioral: Negative for depression and substance abuse. The patient is nervous/anxious.        Past Medical History   has a past medical history of Atrial flutter (HCC), Diabetes (HCC), Hyperlipidemia, Hypertension, and Neuropathy.    Surgical History   has no past surgical history on file.     Family History  family history is not on file.   Family history reviewed with patient. There is no family history that is pertinent to the chief complaint.     Social History   reports that she has never smoked. She has never used smokeless tobacco. She reports previous alcohol use. She reports that she does not use drugs.    Allergies  No Known Allergies    Medications  Prior to Admission Medications   Prescriptions Last Dose Informant Patient Reported? Taking?   Alcohol Swabs Continuous at Supply Patient No No   Sig: Wipe site with prep pad prior to injection.   Blood Glucose Meter Kit Continuous at Supply Patient No No   Sig: Test blood sugar as recommended by provider. Accucheck Guide blood glucose monitoring kit.   Blood Glucose Test Strips Continuous at Supply Patient No No   Sig: Use one Accucheck Guide strip to test blood sugar three times daily.   Empagliflozin 10 MG Tab 2/16/2022 at am Patient No No   Sig: Take 10 mg by mouth every day.   Insulin Pen Needle 32 G x 4 mm Continuous at Supply Patient No No   Sig: Use one pen needle in pen device to inject insulin once daily.   Lancets Continuous at Supply Patient No No   Sig: Use one Accucheck Guide lancet to test blood sugar three times daily.   aspirin (ASA) 81 MG Chew Tab chewable tablet 2/16/2022 at am Patient No No   Sig: Chew 1  tablet every day.   atorvastatin (LIPITOR) 40 MG Tab 2/16/2022 at pm Patient No No   Sig: Take 1 tablet by mouth every evening.   dabigatran (PRADAXA) 150 MG Cap capsule 2/16/2022 at pm Patient No No   Sig: Take 1 capsule by mouth 2 times a day.   furosemide (LASIX) 40 MG Tab unk at [facility] Patient's Home Pharmacy Yes Yes   Sig: Take 40 mg by mouth 1 time a day as needed (Water retension).   gabapentin (NEURONTIN) 100 MG Cap unk at [facility] Patient's Home Pharmacy Yes Yes   Sig: Take 300 mg by mouth at bedtime.   insulin glargine (LANTUS SOLOSTAR) 100 UNIT/ML Solution Pen-injector injection 2/16/2022 at pm Patient Yes Yes   Sig: Inject 45 Units under the skin every evening.   levothyroxine (SYNTHROID) 100 MCG Tab 2/17/2022 at am Patient No No   Sig: Take 1 tablet by mouth every morning on an empty stomach.   lisinopril (PRINIVIL) 10 MG Tab 2/16/2022 at am Patient Yes Yes   Sig: Take 10 mg by mouth every day.   potassium chloride SA (KDUR) 20 MEQ Tab CR 2/16/2022 at am Patient Yes Yes   Sig: Take 20 mEq by mouth every day.      Facility-Administered Medications: None       Physical Exam  Temp:  [37 °C (98.6 °F)] 37 °C (98.6 °F)  Pulse:  [107-145] 127  Resp:  [18] 18  BP: (108-133)/(64-95) 130/95  SpO2:  [91 %-99 %] 91 %  Blood Pressure : 130/95   Temperature: 37 °C (98.6 °F)   Pulse: (!) 127   Respiration: 18   Pulse Oximetry: 91 %       Physical Exam  Vitals and nursing note reviewed.   Constitutional:       General: She is in acute distress (Due to hip pain).      Appearance: She is obese. She is ill-appearing (Chronically ill-appearing). She is not toxic-appearing.   HENT:      Head: Normocephalic and atraumatic.      Mouth/Throat:      Mouth: Mucous membranes are dry.      Pharynx: Oropharynx is clear.   Eyes:      Extraocular Movements: Extraocular movements intact.      Conjunctiva/sclera: Conjunctivae normal.   Cardiovascular:      Rate and Rhythm: Normal rate. Rhythm irregular.      Heart sounds: No murmur  heard.  Pulmonary:      Effort: Pulmonary effort is normal.      Comments: Exam limited by body habitus, diminished throughout  Abdominal:      General: Bowel sounds are normal. There is distension (Abdominal obesity).      Palpations: Abdomen is soft.      Tenderness: There is no abdominal tenderness. There is no left CVA tenderness.   Musculoskeletal:      Cervical back: Neck supple.      Right lower leg: Edema present.      Left lower leg: Edema present.      Comments: Limited range of motion of the left hip due to hip pain, no pain elicited on deep palpation of left trochanter.  Pain with passive hip flexion and extension   Skin:     Findings: Erythema present.      Comments: Bilateral lower extremity skin wounds do not appear overtly infected dry and scabbed   Neurological:      General: No focal deficit present.      Mental Status: She is alert and oriented to person, place, and time.   Psychiatric:         Mood and Affect: Mood normal.      Comments: Patient is anxious very concerned about being in pain         Laboratory:  Recent Labs     02/17/22  1450   WBC 7.1   RBC 4.53   HEMOGLOBIN 12.7   HEMATOCRIT 41.2   MCV 90.9   MCH 28.0   MCHC 30.8*   RDW 50.6*   PLATELETCT 242   MPV 10.0     Recent Labs     02/17/22  1450   SODIUM 138   POTASSIUM 3.9   CHLORIDE 104   CO2 22   GLUCOSE 160*   BUN 26*   CREATININE 1.01   CALCIUM 9.4     Recent Labs     02/17/22  1450   ALTSGPT 11   ASTSGOT 16   ALKPHOSPHAT 137*   TBILIRUBIN 1.0   GLUCOSE 160*         Recent Labs     02/17/22  1450   NTPROBNP 171*         No results for input(s): TROPONINT in the last 72 hours.    Imaging:  DX-CHEST-PORTABLE (1 VIEW)   Final Result      No acute cardiopulmonary process is seen.      DX-HIP-COMPLETE - UNILATERAL 2+ LEFT   Final Result      1.  No radiographic evidence of acute traumatic injury.      CT-HIP WITH LEFT    (Results Pending)       X-Ray:  I have personally reviewed the images and compared with prior images.  EKG:  I have  personally reviewed the images and compared with prior images.    Assessment/Plan:  I anticipate this patient is appropriate for observation status at this time.      * Hip pain, acute, left- (present on admission)  Assessment & Plan  Patient presents with a primary problem of acute onset intractable left hip pain after standing from a chair, she denies any trauma or history of chronic hip pain  X-ray on admission was negative for acute fracture or other pathology  Patient was given NSAIDs as well as IV pain medication without improvement  We will get CT of her left hip for further evaluation  Supportive care with pain control  PT OT ordered  Fall precautions    Atrial flutter with rapid ventricular response (HCC)- (present on admission)  Assessment & Plan  Patient presented with atrial flutter with rapid ventricular response, not on home rate control after she underwent cardioversion in January 2022  -EKG with atrial flutter  -Continue Pradaxa  -Given 1 dose of Cardizem in the ER with improvement in heart rate  -Start on metoprolol 12.5 mg twice daily and adjust as needed  -Telemetry    Primary hypertension- (present on admission)  Assessment & Plan  History of, currently stable  Continue home medications    Type 2 diabetes mellitus with hyperglycemia, with long-term current use of insulin (HCC)- (present on admission)  Assessment & Plan  History of type 2 diabetes, poorly controlled  Most recent hemoglobin A1c is 9.8 which is an improvement from her previous 1 of 16.5%  Continue home Lantus 45 units every evening  Holding home empagliflozin as we do not have this on formulary, resume upon discharge  Insulin sliding scale  Hypoglycemic protocol  Diabetic diet      VTE prophylaxis: therapeutic anticoagulation with Pradaxa

## 2022-02-18 NOTE — THERAPY
"Physical Therapy   Initial Evaluation     Patient Name: Beverley Dejesus  Age:  71 y.o., Sex:  female  Medical Record #: [id number]  Today's Date: 2/18/2022     Precautions  Precautions: Fall Risk    Assessment  Patient is 71 y.o. female admitted with L hip pain, found to be in Afib RVR. PMHx of aflutter, Afib, chronic BLE wounds, cardioversion 1/2022. Pt presented today limited mainly by pain. Pt required SPV for bed mobility and CGA for STS and ambulating only a few ft 2/2 L hip pain. Pt with good strength and balance, reports she has been through IP PT before and has negotiated the stairs with therapy not long ago. Currently, recommend placement as pt not functionally at baseline, however, anticipate quick progression once pain controlled. Will continue to follow.     Plan    Recommend Physical Therapy 3 times per week until therapy goals are met for the following treatments:  Bed Mobility, Equipment, Gait Training, Manual Therapy, Neuro Re-Education / Balance, Self Care/Home Evaluation, Stair Training, Therapeutic Activities, and Therapeutic Exercises    DC Equipment Recommendations: Unable to determine at this time  Discharge Recommendations: Recommend post-acute placement for additional physical therapy services prior to discharge home (Anticipate will progress to home with HH once pain controlled)       Subjective    \"It's just the pain.\"      Objective     02/18/22 1147   Total Time Spent   Total Time Spent (Mins) 20   Charge Group   PT Evaluation PT Evaluation Low   Initial Contact Note    Initial Contact Note Order Received and Verified, Physical Therapy Evaluation in Progress with Full Report to Follow.   Precautions   Precautions Fall Risk   Vitals   O2 Delivery Device None - Room Air   Vitals Comments c/o dizziness 2/2 L hip pain. improved when seated resting   Pain 0 - 10 Group   Therapist Pain Assessment Post Activity Pain Same as Prior to Activity;Nurse Notified;4  (L hip pain)   Prior Living Situation "   Prior Services Skilled Home Health Services   Housing / Facility 1 Story House  (pt has common area she shares with 4 other tennants)   Steps Into Home 5   Rail Both Rail (Steps into Home)   Equipment Owned 4-Wheel Walker;Single Point Cane   Lives with - Patient's Self Care Capacity Unrelated Adult   Comments Pt reports land lord assists with rides as needed   Prior Level of Functional Mobility   Bed Mobility Independent   Transfer Status Independent   Ambulation Independent   Distance Ambulation (Feet)   (minimal community distances)   Assistive Devices Used Single Point Cane   Stairs Independent   History of Falls   History of Falls Yes   Date of Last Fall   (noted from previous admissions)   Cognition    Cognition / Consciousness WDL   Level of Consciousness Alert   Comments Pleasant and cooperative   Passive ROM Lower Body   Passive ROM Lower Body WDL   Active ROM Lower Body    Active ROM Lower Body  WDL   Strength Lower Body   Lower Body Strength  WDL   Strength Upper Body   Upper Body Strength  WDL   Coordination Upper Body   Coordination WDL   Coordination Lower Body    Coordination Lower Body  WDL   Balance Assessment   Sitting Balance (Static) Fair   Sitting Balance (Dynamic) Fair -   Standing Balance (Static) Fair -   Standing Balance (Dynamic) Poor +   Weight Shift Sitting Fair   Weight Shift Standing Fair   Comments w/ FWW   Gait Analysis   Gait Level Of Assist Contact Guard Assist   Assistive Device Front Wheel Walker   Distance (Feet) 6  (3ft x2)   Deviation Antalgic;Step To;Bradykinetic;Shuffled Gait   Weight Bearing Status no restrictions   Comments limited 2/2 LLE pain, suspect able to ambulate much further   Bed Mobility    Supine to Sit Supervised   Sit to Supine   (NT up in chair post)   Scooting Supervised   Comments HOB raised   Functional Mobility   Sit to Stand Contact Guard Assist   Bed, Chair, Wheelchair Transfer Minimal Assist   Mobility w/ FWW bed to chair   How much difficulty does the  patient currently have...   Turning over in bed (including adjusting bedclothes, sheets and blankets)? 3   Sitting down on and standing up from a chair with arms (e.g., wheelchair, bedside commode, etc.) 3   Moving from lying on back to sitting on the side of the bed? 3   How much help from another person does the patient currently need...   Moving to and from a bed to a chair (including a wheelchair)? 3   Need to walk in a hospital room? 3   Climbing 3-5 steps with a railing? 3   6 clicks Mobility Score 18   Activity Tolerance   Sitting in Chair >10 min, up post   Sitting Edge of Bed 5 min   Standing 5 min total   Comments limited 2/2 hip pain   Patient / Family Goals    Patient / Family Goal #1 To have less pain   Short Term Goals    Short Term Goal # 1 Pt will perform STS/functional transfers with FWW and SPV in 6 visits to return to PLOF   Short Term Goal # 2 Pt will ambulate 150ft with FWW and SPV in 6 visits to ambulate household distances   Short Term Goal # 3 Pt will negotiate 5 JEFF with BUE support and SPV in 6 visits to safely enter/exit home   Education Group   Education Provided Role of Physical Therapist   Role of Physical Therapist Patient Response Patient;Acceptance;Explanation;Action Demonstration   Anticipated Discharge Equipment and Recommendations   DC Equipment Recommendations Unable to determine at this time   Discharge Recommendations Recommend post-acute placement for additional physical therapy services prior to discharge home  (Anticipate will progress to home with HH once pain controlled)   Interdisciplinary Plan of Care Collaboration   IDT Collaboration with  Nursing;Occupational Therapist   Patient Position at End of Therapy Seated;Chair Alarm On;Call Light within Reach;Tray Table within Reach;Phone within Reach   Collaboration Comments RN updated   Session Information   Date / Session Number  2/18 1 (1/3, 2/24)   Priority 3

## 2022-02-18 NOTE — CARE PLAN
The patient is Stable - Low risk of patient condition declining or worsening    Shift Goals  Clinical Goals: Control pain, promote sleep  Patient Goals: pain control    Progress made toward(s) clinical / shift goals:    Problem: Pain - Standard  Goal: Alleviation of pain or a reduction in pain to the patient’s comfort goal  Outcome: Progressing     Problem: Knowledge Deficit - Standard  Goal: Patient and family/care givers will demonstrate understanding of plan of care, disease process/condition, diagnostic tests and medications  Outcome: Progressing     Problem: Skin Integrity  Goal: Skin integrity is maintained or improved  Outcome: Progressing     Problem: Fall Risk  Goal: Patient will remain free from falls  Outcome: Progressing       Patient is not progressing towards the following goals:

## 2022-02-18 NOTE — DISCHARGE PLANNING
Anticipated Discharge Disposition: Home vs HH    Action: pt pending medical clearance, pt currently on room air, 6 clicks are 11, may need HH on discharge.    Barriers to Discharge: medical clearance    Plan: f/u with medical team and pt to discuss dc needs and barriers.

## 2022-02-18 NOTE — ED NOTES
Drsg changed to bilateral lower extremities, pt states they started as blisters in Dec. Having twice weekly dressing changes by [facility]. Left leg with only a couple of small open areas noted, dry fllaky skin as well. Right lower ext with multiple open areas, no s/s of infection. Cleansed area with NS, adaptic, 4x4's and ro to bilateral lower extremities.[facility] home care aware pt being admitted to hospital.

## 2022-02-18 NOTE — PROGRESS NOTES
Assumed care of patient, received bedside report from NOC RN. Patient is A&O X 4. Pain 0/10, stated the pain medication she had on NOC shift was working really well. Vital signs stable overnight, on RA satting 93%. On tele monitor, afib/flutter 79. POC discussed with patient and she verbalized understanding. Call light within reach and fall precautions in place. Bed locked and in lowest position.

## 2022-02-18 NOTE — ASSESSMENT & PLAN NOTE
Patient presents with a primary problem of acute onset intractable left hip pain after standing from a chair, she denies any trauma or history of chronic hip pain  X-ray on admission was negative for acute fracture or other pathology  Patient was given NSAIDs as well as IV pain medication without improvement  We will get CT of her left hip for further evaluation  Supportive care with pain control  PT OT ordered  Fall precautions

## 2022-02-18 NOTE — ASSESSMENT & PLAN NOTE
Patient presented with atrial flutter with rapid ventricular response, not on home rate control after she underwent cardioversion in January 2022  -EKG with atrial flutter  -Continue Pradaxa  -Given 1 dose of Cardizem in the ER with improvement in heart rate  -Start on metoprolol 12.5 mg twice daily and adjust as needed  -Telemetry

## 2022-02-18 NOTE — DISCHARGE PLANNING
Anticipated Discharge Disposition: Home with HH    Action: LSW spoke with pt about HHC. Pt reported prior to coming to the hospital she was on service with [facility]. Pt requested an updated referral be sent to [facility].    LSW sent Voalte message to DO Swift to request HHC orders and face to face. LSW faxed choice form to [facility].    Barriers to Discharge: HHC orders.    Plan: Care coordination will follow up with HHC referral pending orders.    Care Transition Team Assessment    LSW met with pt at bedside to complete assessment. Pt A&Ox4 and able to verify the information on the face sheet. Pt's physical address is [address] [place] [address]  [place] [address]. Pt rents a room from a mobile home that has three other tenants who live there. Pt is independent with ADLs and IADLs. Pt manages her own medications and drives herself. Pt has a FWW, cane, and recently ordered a shower chair, however it has not arrived yet. Pt reported her main support is her friend Krystina.    Pt is retired and receives SSI of about $1,200/month. No MH or SA concerns at this time. Pt reported she has an advance directive. Pt stated Krystina or Krystina's dad will be able to provide transportation home for her upon DC.    Information Source  Orientation Level: Oriented X4  Information Given By: Patient  Informant's Name: Beverley Dejesus  Who is responsible for making decisions for patient? : Patient    Readmission Evaluation  Is this a readmission?: No    Elopement Risk  Legal Hold: No  Ambulatory or Self Mobile in Wheelchair: Yes  Disoriented: No  Psychiatric Symptoms: None  History of Wandering: No  Elopement this Admit: No  Vocalizing Wanting to Leave: No  Displays Behaviors, Body Language Wanting to Leave: No-Not at Risk for Elopement  Elopement Risk: Not at Risk for Elopement    Interdisciplinary Discharge Planning  Lives with - Patient's Self Care Capacity: Unrelated Adult  Patient or legal guardian wants to designate a caregiver:  No  Housing / Facility: 1 Story House (pt has common area she shares with 4 other tennants)  Prior Services: Skilled Home Health Services  Durable Medical Equipment: Walker,Other - Specify (cane)    Discharge Preparedness  What is your plan after discharge?: Home health care  What are your discharge supports?: Other (comment) (friend)  Prior Functional Level: Ambulatory,Drives Self,Independent with Activities of Daily Living,Independent with Medication Management,Uses Cane  Difficulity with ADLs: None  Difficulity with IADLs: None    Functional Assesment  Prior Functional Level: Ambulatory,Drives Self,Independent with Activities of Daily Living,Independent with Medication Management,Uses Cane    Finances  Financial Barriers to Discharge: No  Prescription Coverage: Yes    Advance Directive  Advance Directive?: Living Will    Domestic Abuse  Have you ever been the victim of abuse or violence?: No  Physical Abuse or Sexual Abuse: No  Verbal Abuse or Emotional Abuse: No  Possible Abuse/Neglect Reported to:: Not Applicable    Psychological Assessment  History of Substance Abuse: None  History of Psychiatric Problems: No  Non-compliant with Treatment: No  Newly Diagnosed Illness: No    Discharge Risks or Barriers  Discharge risks or barriers?: No  Patient risk factors: Lack of outside supports    Anticipated Discharge Information  Discharge Disposition: Discharged to home/self care (01)

## 2022-02-18 NOTE — PROGRESS NOTES
Bedside report received from GARLAND Chavez. Patient is A & O x4, room air, Afib/flutter on the monitor, complains of left hip pain 5/10. POC discussed. Fall precautions in place. Call light is in reach.

## 2022-02-18 NOTE — PROGRESS NOTES
4 Eyes Skin Assessment Completed by GARLAND Talley and GARLAND Becerra.    Head WDL  Ears WDL  Nose WDL  Mouth WDL  Neck WDL  Breast/Chest Redness  Shoulder Blades WDL  Spine WDL  (R) Arm/Elbow/Hand WDL  (L) Arm/Elbow/Hand WDL  Abdomen Redness and Abrasion  Groin WDL  Scrotum/Coccyx/Buttocks Redness and Blanching  (R) Leg Redness and Scab  (L) Leg Redness  (R) Heel/Foot/Toe Redness, dry  (L) Heel/Foot/Toe Redness, dry          Devices In Places Tele Box, Blood Pressure Cuff and Pulse Ox      Interventions In Place Pillows, Heels Loaded W/Pillows and Pressure Redistribution Mattress, interdry currently unavailable and ordered    Possible Skin Injury Yes    Pictures Uploaded Into Epic Yes  Wound Consult Placed Yes  RN Wound Prevention Protocol Ordered No

## 2022-02-18 NOTE — ASSESSMENT & PLAN NOTE
History of type 2 diabetes, poorly controlled  Most recent hemoglobin A1c is 9.8 which is an improvement from her previous 1 of 16.5%  Continue home Lantus 45 units every evening  Holding home empagliflozin as we do not have this on formulary, resume upon discharge  Insulin sliding scale  Hypoglycemic protocol  Diabetic diet

## 2022-02-18 NOTE — FACE TO FACE
Face to Face Supporting Documentation - Home Health    The encounter with this patient was in whole or in part the primary reason for home health admission.    Date of encounter:   Patient:                    MRN:                       YOB: 2023  Beverley Dejesus  [id number]  1950     Home health to see patient for:  Wound Care, Physical Therapy evaluation and treatment and Occupational therapy evaluation and treatment    Skilled need for:  Comment: chronic lower extremity wound    Skilled nursing interventions to include:  Wound Care      Community Physician to provide follow up care: Michael Farris M.D.     I certify the face to face encounter for this home health care referral meets the CMS requirements and the encounter/clinical assessment with the patient was, in whole, or in part, for the medical condition(s) listed above, which is the primary reason for home health care. Based on my clinical findings: the service(s) are medically necessary, support the need for home health care, and the homebound criteria are met.  I certify that this patient has had a face to face encounter by myself.  Janna Swift D.O. - NPI: [id number]

## 2022-02-18 NOTE — CARE PLAN
The patient is Stable - Low risk of patient condition declining or worsening    Shift Goals  Clinical Goals: Monitor pain level  Patient Goals: Sleep    Progress made toward(s) clinical / shift goals:        Problem: Pain - Standard  Goal: Alleviation of pain or a reduction in pain to the patient’s comfort goal  Outcome: Progressing  Note: Patient states she is finally having some relief from her acute hip pain. Will continue to utilize 0-10 pain scale and medicate as needed.     Problem: Knowledge Deficit - Standard  Goal: Patient and family/care givers will demonstrate understanding of plan of care, disease process/condition, diagnostic tests and medications  Outcome: Progressing  Note: Patient verbally demonstrates understanding of POC and disease process     Problem: Skin Integrity  Goal: Skin integrity is maintained or improved  Outcome: Progressing  Note: Patient turns self in bed and was up in a chair with a waffle cushion for a majority of today.     Problem: Fall Risk  Goal: Patient will remain free from falls  Outcome: Progressing  Note: All fall precautions in place and patient educated to call before trying to ambulate

## 2022-02-19 LAB
ALBUMIN SERPL BCP-MCNC: 3 G/DL (ref 3.2–4.9)
ALBUMIN/GLOB SERPL: 1 G/DL
ALP SERPL-CCNC: 117 U/L (ref 30–99)
ALT SERPL-CCNC: 8 U/L (ref 2–50)
ANION GAP SERPL CALC-SCNC: 9 MMOL/L (ref 7–16)
AST SERPL-CCNC: 18 U/L (ref 12–45)
BASOPHILS # BLD AUTO: 0.5 % (ref 0–1.8)
BASOPHILS # BLD: 0.03 K/UL (ref 0–0.12)
BILIRUB SERPL-MCNC: 0.6 MG/DL (ref 0.1–1.5)
BUN SERPL-MCNC: 23 MG/DL (ref 8–22)
CALCIUM SERPL-MCNC: 8.8 MG/DL (ref 8.5–10.5)
CHLORIDE SERPL-SCNC: 105 MMOL/L (ref 96–112)
CO2 SERPL-SCNC: 23 MMOL/L (ref 20–33)
CREAT SERPL-MCNC: 1.13 MG/DL (ref 0.5–1.4)
EOSINOPHIL # BLD AUTO: 0.15 K/UL (ref 0–0.51)
EOSINOPHIL NFR BLD: 2.5 % (ref 0–6.9)
ERYTHROCYTE [DISTWIDTH] IN BLOOD BY AUTOMATED COUNT: 53.4 FL (ref 35.9–50)
GLOBULIN SER CALC-MCNC: 3.1 G/DL (ref 1.9–3.5)
GLUCOSE BLD-MCNC: 130 MG/DL (ref 65–99)
GLUCOSE BLD-MCNC: 143 MG/DL (ref 65–99)
GLUCOSE BLD-MCNC: 91 MG/DL (ref 65–99)
GLUCOSE SERPL-MCNC: 112 MG/DL (ref 65–99)
HCT VFR BLD AUTO: 43.1 % (ref 37–47)
HGB BLD-MCNC: 13 G/DL (ref 12–16)
IMM GRANULOCYTES # BLD AUTO: 0.05 K/UL (ref 0–0.11)
IMM GRANULOCYTES NFR BLD AUTO: 0.8 % (ref 0–0.9)
LYMPHOCYTES # BLD AUTO: 2.09 K/UL (ref 1–4.8)
LYMPHOCYTES NFR BLD: 34.8 % (ref 22–41)
MAGNESIUM SERPL-MCNC: 2.1 MG/DL (ref 1.5–2.5)
MCH RBC QN AUTO: 27.7 PG (ref 27–33)
MCHC RBC AUTO-ENTMCNC: 30.2 G/DL (ref 33.6–35)
MCV RBC AUTO: 91.7 FL (ref 81.4–97.8)
MONOCYTES # BLD AUTO: 0.62 K/UL (ref 0–0.85)
MONOCYTES NFR BLD AUTO: 10.3 % (ref 0–13.4)
NEUTROPHILS # BLD AUTO: 3.07 K/UL (ref 2–7.15)
NEUTROPHILS NFR BLD: 51.1 % (ref 44–72)
NRBC # BLD AUTO: 0 K/UL
NRBC BLD-RTO: 0 /100 WBC
PHOSPHATE SERPL-MCNC: 3.9 MG/DL (ref 2.5–4.5)
PLATELET # BLD AUTO: 232 K/UL (ref 164–446)
PMV BLD AUTO: 10.3 FL (ref 9–12.9)
POTASSIUM SERPL-SCNC: 4.2 MMOL/L (ref 3.6–5.5)
PROT SERPL-MCNC: 6.1 G/DL (ref 6–8.2)
RBC # BLD AUTO: 4.7 M/UL (ref 4.2–5.4)
SODIUM SERPL-SCNC: 137 MMOL/L (ref 135–145)
WBC # BLD AUTO: 6 K/UL (ref 4.8–10.8)

## 2022-02-19 PROCEDURE — A9270 NON-COVERED ITEM OR SERVICE: HCPCS | Performed by: STUDENT IN AN ORGANIZED HEALTH CARE EDUCATION/TRAINING PROGRAM

## 2022-02-19 PROCEDURE — 700102 HCHG RX REV CODE 250 W/ 637 OVERRIDE(OP): Performed by: STUDENT IN AN ORGANIZED HEALTH CARE EDUCATION/TRAINING PROGRAM

## 2022-02-19 PROCEDURE — 80053 COMPREHEN METABOLIC PANEL: CPT

## 2022-02-19 PROCEDURE — 96372 THER/PROPH/DIAG INJ SC/IM: CPT

## 2022-02-19 PROCEDURE — 700101 HCHG RX REV CODE 250: Performed by: STUDENT IN AN ORGANIZED HEALTH CARE EDUCATION/TRAINING PROGRAM

## 2022-02-19 PROCEDURE — 83735 ASSAY OF MAGNESIUM: CPT

## 2022-02-19 PROCEDURE — 36415 COLL VENOUS BLD VENIPUNCTURE: CPT

## 2022-02-19 PROCEDURE — 85025 COMPLETE CBC W/AUTO DIFF WBC: CPT

## 2022-02-19 PROCEDURE — 99225 PR SUBSEQUENT OBSERVATION CARE,LEVEL II: CPT | Mod: GC | Performed by: INTERNAL MEDICINE

## 2022-02-19 PROCEDURE — 82962 GLUCOSE BLOOD TEST: CPT | Mod: 91

## 2022-02-19 PROCEDURE — 84100 ASSAY OF PHOSPHORUS: CPT

## 2022-02-19 PROCEDURE — G0378 HOSPITAL OBSERVATION PER HR: HCPCS

## 2022-02-19 RX ADMIN — Medication 2000 UNITS: at 04:40

## 2022-02-19 RX ADMIN — DABIGATRAN ETEXILATE MESYLATE 150 MG: 150 CAPSULE ORAL at 04:40

## 2022-02-19 RX ADMIN — DICLOFENAC SODIUM 2 G: 10 GEL TOPICAL at 20:15

## 2022-02-19 RX ADMIN — LEVOTHYROXINE SODIUM 100 MCG: 0.1 TABLET ORAL at 04:39

## 2022-02-19 RX ADMIN — DICLOFENAC SODIUM 2 G: 10 GEL TOPICAL at 11:42

## 2022-02-19 RX ADMIN — ACETAMINOPHEN 1000 MG: 500 TABLET ORAL at 14:06

## 2022-02-19 RX ADMIN — LISINOPRIL 10 MG: 10 TABLET ORAL at 04:39

## 2022-02-19 RX ADMIN — ACETAMINOPHEN 1000 MG: 500 TABLET ORAL at 20:13

## 2022-02-19 RX ADMIN — DICLOFENAC SODIUM 2 G: 10 GEL TOPICAL at 14:07

## 2022-02-19 RX ADMIN — DOCUSATE SODIUM 50 MG AND SENNOSIDES 8.6 MG 2 TABLET: 8.6; 5 TABLET, FILM COATED ORAL at 17:18

## 2022-02-19 RX ADMIN — DABIGATRAN ETEXILATE MESYLATE 150 MG: 150 CAPSULE ORAL at 17:19

## 2022-02-19 RX ADMIN — OXYCODONE 5 MG: 5 TABLET ORAL at 09:46

## 2022-02-19 RX ADMIN — METOPROLOL TARTRATE 12.5 MG: 25 TABLET, FILM COATED ORAL at 17:18

## 2022-02-19 RX ADMIN — OXYCODONE 5 MG: 5 TABLET ORAL at 19:32

## 2022-02-19 RX ADMIN — ATORVASTATIN CALCIUM 40 MG: 40 TABLET, FILM COATED ORAL at 17:18

## 2022-02-19 RX ADMIN — ASPIRIN 81 MG: 81 TABLET, CHEWABLE ORAL at 04:40

## 2022-02-19 RX ADMIN — METOPROLOL TARTRATE 12.5 MG: 25 TABLET, FILM COATED ORAL at 04:40

## 2022-02-19 RX ADMIN — GABAPENTIN 300 MG: 300 CAPSULE ORAL at 20:13

## 2022-02-19 RX ADMIN — ACETAMINOPHEN 1000 MG: 500 TABLET ORAL at 04:39

## 2022-02-19 ASSESSMENT — PAIN DESCRIPTION - PAIN TYPE
TYPE: ACUTE PAIN

## 2022-02-19 ASSESSMENT — ENCOUNTER SYMPTOMS
MYALGIAS: 0
SHORTNESS OF BREATH: 0
ABDOMINAL PAIN: 0
NAUSEA: 0
COUGH: 0
FOCAL WEAKNESS: 0
EYES NEGATIVE: 1
SPUTUM PRODUCTION: 0
HEADACHES: 0
WEAKNESS: 0
CHILLS: 0
FEVER: 0
SENSORY CHANGE: 0
DIZZINESS: 0
PALPITATIONS: 0
VOMITING: 0

## 2022-02-19 ASSESSMENT — FIBROSIS 4 INDEX: FIB4 SCORE: 1.98

## 2022-02-19 NOTE — CARE PLAN
The patient is Stable - Low risk of patient condition declining or worsening    Shift Goals  Clinical Goals: Monitor pain level  Patient Goals: Sleep    Progress made toward(s) clinical / shift goals:    Problem: Pain - Standard  Goal: Alleviation of pain or a reduction in pain to the patient’s comfort goal  Outcome: Progressing     Problem: Knowledge Deficit - Standard  Goal: Patient and family/care givers will demonstrate understanding of plan of care, disease process/condition, diagnostic tests and medications  Outcome: Progressing     Problem: Skin Integrity  Goal: Skin integrity is maintained or improved  Outcome: Progressing     Problem: Fall Risk  Goal: Patient will remain free from falls  Outcome: Progressing       Patient is not progressing towards the following goals:

## 2022-02-19 NOTE — CARE PLAN
The patient is Stable - Low risk of patient condition declining or worsening    Shift Goals  Clinical Goals: Monitor pain level  Patient Goals: Pain relief    Progress made toward(s) clinical / shift goals:        Problem: Pain - Standard  Goal: Alleviation of pain or a reduction in pain to the patient’s comfort goal  Outcome: Progressing  Note: Patient reports left hip pain is getting better and current pain management is providing relief. Utilizing complementary pain relief interventions and 0-10 pain scale.     Problem: Knowledge Deficit - Standard  Goal: Patient and family/care givers will demonstrate understanding of plan of care, disease process/condition, diagnostic tests and medications  Outcome: Progressing  Note: Patient verbally demonstrates understanding of POC and disease process     Problem: Skin Integrity  Goal: Skin integrity is maintained or improved  Outcome: Progressing  Note: Patient turns self in bed and has been up to the chair. Wound protocols in place and Q shift skin check completed.     Problem: Fall Risk  Goal: Patient will remain free from falls  Outcome: Progressing  Note: All fall precautions in place and patient educated to call before trying to ambulate

## 2022-02-19 NOTE — PROGRESS NOTES
Daily Progress Note:     Date of Service: 2/19/2022  Primary Team: [facility] Team   Attending: Cornelio Salgado M.D.   Senior Resident: Dr. Aleman  Intern: Dr. Janna Swift  Contact:  [phone]    Patient ID:  71 year old female with PMH of aflutter on Dabigatran, s/p cardioversion in 07/2021, poorly controlled diabetes (A1C 16.5% in 07/2021, now 9.8% in 01/2022, hypertension, hyperlipidemia, chronic lower extremity wounds presented for hip pain and was admitted on 2/17/22 for acute onset left hip pain and afib with RVR.     Chief Complaint:  Left hip pain     Interval Update/Subjective:   No acute events overnight. This morning patient states left hip pain at 1/10 in severity like yesterday, however does occasionally inc in intensity and the oxycodone has been helping. Pain is worsened when bearing weight on LLE, especially when patient worked with PT and OT yesterday. The pain is still localized to the hip and does not radiate.    Consultants/Specialty:  None    Review of Systems:    Review of Systems   Constitutional: Negative for chills, fever and malaise/fatigue.   HENT: Negative.    Eyes: Negative.    Respiratory: Negative for cough, sputum production and shortness of breath.    Cardiovascular: Negative for chest pain, palpitations and leg swelling.   Gastrointestinal: Negative for abdominal pain, nausea and vomiting.   Genitourinary: Negative.    Musculoskeletal: Positive for joint pain (left hip). Negative for myalgias.   Neurological: Negative for dizziness, sensory change, focal weakness, weakness and headaches.     Objective Data:   Physical Exam:   Vitals:   Temp:  [36.1 °C (97 °F)-37 °C (98.6 °F)] 36.2 °C (97.2 °F)  Pulse:  [55-96] 92  Resp:  [12-18] 18  BP: (103-143)/(54-91) 143/71  SpO2:  [95 %-99 %] 99 %    Physical Exam  Vitals and nursing note reviewed.   Constitutional:       General: She is not in acute distress.     Appearance: She is obese. She is not ill-appearing or toxic-appearing.   HENT:       Head: Normocephalic and atraumatic.      Right Ear: External ear normal.      Left Ear: External ear normal.      Mouth/Throat:      Mouth: Mucous membranes are dry.      Pharynx: Oropharynx is clear.   Eyes:      General: No scleral icterus.     Extraocular Movements: Extraocular movements intact.      Conjunctiva/sclera: Conjunctivae normal.   Cardiovascular:      Rate and Rhythm: Normal rate. Rhythm irregular.      Heart sounds: No murmur heard.    No friction rub. No gallop.   Pulmonary:      Effort: Pulmonary effort is normal.      Breath sounds: No wheezing, rhonchi or rales.   Abdominal:      General: Bowel sounds are normal. There is no distension.      Palpations: Abdomen is soft.      Tenderness: There is no abdominal tenderness. There is no guarding or rebound.   Musculoskeletal:         General: No swelling or tenderness.      Right lower leg: No edema.      Left lower leg: No edema.      Comments: pain elicited on deep palpation of left trochanter just inferior of left iliac crest   Skin:     Comments: Bilateral lower extremity skin wounds do not appear overtly infected. dry and scabbed. Dressing in place   Neurological:      General: No focal deficit present.      Mental Status: She is alert and oriented to person, place, and time.   Psychiatric:         Mood and Affect: Mood normal.         Behavior: Behavior normal.         Labs:   Recent Results (from the past 24 hour(s))   POCT glucose device results    Collection Time: 02/18/22 11:55 AM   Result Value Ref Range    Glucose - Accu-Ck 155 (H) 65 - 99 mg/dL   POCT glucose device results    Collection Time: 02/18/22  4:56 PM   Result Value Ref Range    Glucose - Accu-Ck 110 (H) 65 - 99 mg/dL   CBC WITH DIFFERENTIAL    Collection Time: 02/19/22  3:30 AM   Result Value Ref Range    WBC 6.0 4.8 - 10.8 K/uL    RBC 4.70 4.20 - 5.40 M/uL    Hemoglobin 13.0 12.0 - 16.0 g/dL    Hematocrit 43.1 37.0 - 47.0 %    MCV 91.7 81.4 - 97.8 fL    MCH 27.7 27.0 -  33.0 pg    MCHC 30.2 (L) 33.6 - 35.0 g/dL    RDW 53.4 (H) 35.9 - 50.0 fL    Platelet Count 232 164 - 446 K/uL    MPV 10.3 9.0 - 12.9 fL    Neutrophils-Polys 51.10 44.00 - 72.00 %    Lymphocytes 34.80 22.00 - 41.00 %    Monocytes 10.30 0.00 - 13.40 %    Eosinophils 2.50 0.00 - 6.90 %    Basophils 0.50 0.00 - 1.80 %    Immature Granulocytes 0.80 0.00 - 0.90 %    Nucleated RBC 0.00 /100 WBC    Neutrophils (Absolute) 3.07 2.00 - 7.15 K/uL    Lymphs (Absolute) 2.09 1.00 - 4.80 K/uL    Monos (Absolute) 0.62 0.00 - 0.85 K/uL    Eos (Absolute) 0.15 0.00 - 0.51 K/uL    Baso (Absolute) 0.03 0.00 - 0.12 K/uL    Immature Granulocytes (abs) 0.05 0.00 - 0.11 K/uL    NRBC (Absolute) 0.00 K/uL   Comp Metabolic Panel    Collection Time: 02/19/22  3:30 AM   Result Value Ref Range    Sodium 137 135 - 145 mmol/L    Potassium 4.2 3.6 - 5.5 mmol/L    Chloride 105 96 - 112 mmol/L    Co2 23 20 - 33 mmol/L    Anion Gap 9.0 7.0 - 16.0    Glucose 112 (H) 65 - 99 mg/dL    Bun 23 (H) 8 - 22 mg/dL    Creatinine 1.13 0.50 - 1.40 mg/dL    Calcium 8.8 8.5 - 10.5 mg/dL    AST(SGOT) 18 12 - 45 U/L    ALT(SGPT) 8 2 - 50 U/L    Alkaline Phosphatase 117 (H) 30 - 99 U/L    Total Bilirubin 0.6 0.1 - 1.5 mg/dL    Albumin 3.0 (L) 3.2 - 4.9 g/dL    Total Protein 6.1 6.0 - 8.2 g/dL    Globulin 3.1 1.9 - 3.5 g/dL    A-G Ratio 1.0 g/dL   MAGNESIUM    Collection Time: 02/19/22  3:30 AM   Result Value Ref Range    Magnesium 2.1 1.5 - 2.5 mg/dL   PHOSPHORUS    Collection Time: 02/19/22  3:30 AM   Result Value Ref Range    Phosphorus 3.9 2.5 - 4.5 mg/dL   ESTIMATED GFR    Collection Time: 02/19/22  3:30 AM   Result Value Ref Range    GFR If  57 (A) >60 mL/min/1.73 m 2    GFR If Non  47 (A) >60 mL/min/1.73 m 2   POCT glucose device results    Collection Time: 02/19/22  7:21 AM   Result Value Ref Range    Glucose - Accu-Ck 91 65 - 99 mg/dL       Imaging:   Independant Imaging Review: Completed  CT-HIP WITH LEFT   Final Result      No  CT explanation for sudden onset right hip pain. If concern exists for occult hip fracture, MRI is more sensitive      Mild bilateral hip osteoarthritis.      Fatty umbilical hernia      Hysterectomy      Severe lumbosacral junction degenerative disc disease      DX-CHEST-PORTABLE (1 VIEW)   Final Result      No acute cardiopulmonary process is seen.      DX-HIP-COMPLETE - UNILATERAL 2+ LEFT   Final Result      1.  No radiographic evidence of acute traumatic injury.          Assessment/Plan:  * Hip pain, acute, left- (present on admission)- improving  Assessment & Plan  Patient presents with a primary problem of acute onset intractable left hip pain after standing from a chair, she denies any trauma or history of chronic hip pain.   X-ray on admission was negative for acute fracture or other pathology  Patient was given NSAIDs as well as IV pain medication without improvement in ED.  - CT hip obtained showing no CT explanation of sudden hip pain, there is severe lumbosacral junction degenerative disc disease.  - pain improved this AM, nearly subsided. Likely musculoskeletal etiology for pain vs bursitis.  - will schedule topical voltaren gel and oxycodone PRN for pain control, if not improve, will consider PMR consult for possible steroid infection.  - If pain persists/worsen, will consider further imaging with MRI   - PT/OT consulted, placed SNF referral.  - Fall precautions     Atrial flutter with rapid ventricular response (HCC)- (present on admission)- resolved  Atrial fibrillation  Assessment & Plan  Patient s/p cardioversion in July 2021 where rhythm converted to NSR but was not discharged home on metoprolol. Presented this admission with atrial flutter with rapid ventricular response, given 1 dose of cardizem in ED with improvement in HR.  - telemetry  - Continue Metoprolol 12.5mg BID, adjust as needed  - now in afib with HR   - continue Dabigatran     Primary hypertension- (present on  admission)  Assessment & Plan  BP controlled currently  -Continue home Lisinopril 10mg daily     Type 2 diabetes mellitus with hyperglycemia, with long-term current use of insulin (HCC)- (present on admission)  Assessment & Plan  History of type 2 diabetes, poorly controlled. Previously A1C 16.5% in 07/2021, now 9.8% in 1/2022.   Lipid panel 2/18/22: , , HDL 40, LDL 63)  - dec insulin glargine 35U -> 25U at bedtime with SSI  - hypoglycemia protocol  - holding home empagliflozin as not in formulatory  - diabetic diet  - home gabapentin, atorvastatin, and aspirin.      Vitamin D deficiency  Assessment & Plan  25 hydroxy Vit D 19 on 1/9/2022  - started on vitamin D3 2000U daily     Hypothyroidism  Assessment & Plan  TSH 2.99/T4 0.97 on 1/6/22  - continue levothyroxine 100mcg daily    Chronic lower extremities wound  Assessment & Plan  - does not look infected at this time  - wound care     Janna Swift DO  Internal Medicine, PGY-1

## 2022-02-19 NOTE — PROGRESS NOTES
Bedside report received from GARLAND Figueroa. Patient is alert and oriented x  4, room air, afib/flutter on the monitor. Fall precautions are in place. Call light is in reach.

## 2022-02-19 NOTE — PROGRESS NOTES
Assumed care of patient, received bedside report from NOC RN. Patient is A&O X 4. Pain 1/10, left hip, declines any interventions. Vital signs stable overnight, on RA satting 97%. On tele monitor, Afib/Aflutter 98. POC discussed with patient and she verbalized understanding. Call light within reach and fall precautions in place. Bed locked and in lowest position.

## 2022-02-20 LAB
ALBUMIN SERPL BCP-MCNC: 2.8 G/DL (ref 3.2–4.9)
ALBUMIN/GLOB SERPL: 0.8 G/DL
ALP SERPL-CCNC: 116 U/L (ref 30–99)
ALT SERPL-CCNC: 11 U/L (ref 2–50)
ANION GAP SERPL CALC-SCNC: 9 MMOL/L (ref 7–16)
AST SERPL-CCNC: 18 U/L (ref 12–45)
BILIRUB SERPL-MCNC: 0.5 MG/DL (ref 0.1–1.5)
BUN SERPL-MCNC: 19 MG/DL (ref 8–22)
CALCIUM SERPL-MCNC: 8.7 MG/DL (ref 8.5–10.5)
CHLORIDE SERPL-SCNC: 109 MMOL/L (ref 96–112)
CO2 SERPL-SCNC: 21 MMOL/L (ref 20–33)
CREAT SERPL-MCNC: 0.93 MG/DL (ref 0.5–1.4)
GLOBULIN SER CALC-MCNC: 3.3 G/DL (ref 1.9–3.5)
GLUCOSE BLD-MCNC: 121 MG/DL (ref 65–99)
GLUCOSE BLD-MCNC: 125 MG/DL (ref 65–99)
GLUCOSE BLD-MCNC: 156 MG/DL (ref 65–99)
GLUCOSE SERPL-MCNC: 138 MG/DL (ref 65–99)
MAGNESIUM SERPL-MCNC: 1.9 MG/DL (ref 1.5–2.5)
POTASSIUM SERPL-SCNC: 3.9 MMOL/L (ref 3.6–5.5)
PROT SERPL-MCNC: 6.1 G/DL (ref 6–8.2)
SODIUM SERPL-SCNC: 139 MMOL/L (ref 135–145)

## 2022-02-20 PROCEDURE — 83735 ASSAY OF MAGNESIUM: CPT

## 2022-02-20 PROCEDURE — 302181 SUTURE/DEBRIDEMENT SET: Performed by: INTERNAL MEDICINE

## 2022-02-20 PROCEDURE — 96365 THER/PROPH/DIAG IV INF INIT: CPT

## 2022-02-20 PROCEDURE — 96372 THER/PROPH/DIAG INJ SC/IM: CPT | Mod: XU

## 2022-02-20 PROCEDURE — 700102 HCHG RX REV CODE 250 W/ 637 OVERRIDE(OP): Performed by: STUDENT IN AN ORGANIZED HEALTH CARE EDUCATION/TRAINING PROGRAM

## 2022-02-20 PROCEDURE — 99225 PR SUBSEQUENT OBSERVATION CARE,LEVEL II: CPT | Mod: GC | Performed by: INTERNAL MEDICINE

## 2022-02-20 PROCEDURE — 97602 WOUND(S) CARE NON-SELECTIVE: CPT

## 2022-02-20 PROCEDURE — A9270 NON-COVERED ITEM OR SERVICE: HCPCS | Performed by: STUDENT IN AN ORGANIZED HEALTH CARE EDUCATION/TRAINING PROGRAM

## 2022-02-20 PROCEDURE — 700111 HCHG RX REV CODE 636 W/ 250 OVERRIDE (IP): Performed by: STUDENT IN AN ORGANIZED HEALTH CARE EDUCATION/TRAINING PROGRAM

## 2022-02-20 PROCEDURE — 80053 COMPREHEN METABOLIC PANEL: CPT

## 2022-02-20 PROCEDURE — 96366 THER/PROPH/DIAG IV INF ADDON: CPT

## 2022-02-20 PROCEDURE — G0378 HOSPITAL OBSERVATION PER HR: HCPCS

## 2022-02-20 PROCEDURE — 82962 GLUCOSE BLOOD TEST: CPT

## 2022-02-20 PROCEDURE — 36415 COLL VENOUS BLD VENIPUNCTURE: CPT

## 2022-02-20 RX ORDER — MAGNESIUM SULFATE HEPTAHYDRATE 40 MG/ML
2 INJECTION, SOLUTION INTRAVENOUS ONCE
Status: COMPLETED | OUTPATIENT
Start: 2022-02-20 | End: 2022-02-20

## 2022-02-20 RX ORDER — POTASSIUM CHLORIDE 20 MEQ/1
40 TABLET, EXTENDED RELEASE ORAL ONCE
Status: COMPLETED | OUTPATIENT
Start: 2022-02-20 | End: 2022-02-20

## 2022-02-20 RX ADMIN — DOCUSATE SODIUM 50 MG AND SENNOSIDES 8.6 MG 2 TABLET: 8.6; 5 TABLET, FILM COATED ORAL at 06:28

## 2022-02-20 RX ADMIN — METOPROLOL TARTRATE 12.5 MG: 25 TABLET, FILM COATED ORAL at 06:27

## 2022-02-20 RX ADMIN — METOPROLOL TARTRATE 12.5 MG: 25 TABLET, FILM COATED ORAL at 17:07

## 2022-02-20 RX ADMIN — POTASSIUM CHLORIDE 40 MEQ: 1500 TABLET, EXTENDED RELEASE ORAL at 08:24

## 2022-02-20 RX ADMIN — Medication 2000 UNITS: at 06:28

## 2022-02-20 RX ADMIN — INSULIN HUMAN 1 UNITS: 100 INJECTION, SOLUTION PARENTERAL at 11:19

## 2022-02-20 RX ADMIN — GABAPENTIN 300 MG: 300 CAPSULE ORAL at 21:03

## 2022-02-20 RX ADMIN — DICLOFENAC SODIUM 2 G: 10 GEL TOPICAL at 15:13

## 2022-02-20 RX ADMIN — ASPIRIN 81 MG: 81 TABLET, CHEWABLE ORAL at 06:28

## 2022-02-20 RX ADMIN — OXYCODONE HYDROCHLORIDE 10 MG: 10 TABLET ORAL at 22:23

## 2022-02-20 RX ADMIN — DOCUSATE SODIUM 50 MG AND SENNOSIDES 8.6 MG 2 TABLET: 8.6; 5 TABLET, FILM COATED ORAL at 17:08

## 2022-02-20 RX ADMIN — ACETAMINOPHEN 1000 MG: 500 TABLET ORAL at 21:03

## 2022-02-20 RX ADMIN — DABIGATRAN ETEXILATE MESYLATE 150 MG: 150 CAPSULE ORAL at 17:07

## 2022-02-20 RX ADMIN — ACETAMINOPHEN 1000 MG: 500 TABLET ORAL at 15:13

## 2022-02-20 RX ADMIN — DICLOFENAC SODIUM 2 G: 10 GEL TOPICAL at 21:04

## 2022-02-20 RX ADMIN — DICLOFENAC SODIUM 2 G: 10 GEL TOPICAL at 08:25

## 2022-02-20 RX ADMIN — OXYCODONE HYDROCHLORIDE 10 MG: 10 TABLET ORAL at 17:08

## 2022-02-20 RX ADMIN — ATORVASTATIN CALCIUM 40 MG: 40 TABLET, FILM COATED ORAL at 17:07

## 2022-02-20 RX ADMIN — ACETAMINOPHEN 1000 MG: 500 TABLET ORAL at 06:27

## 2022-02-20 RX ADMIN — LISINOPRIL 10 MG: 10 TABLET ORAL at 06:28

## 2022-02-20 RX ADMIN — DABIGATRAN ETEXILATE MESYLATE 150 MG: 150 CAPSULE ORAL at 06:28

## 2022-02-20 RX ADMIN — OXYCODONE HYDROCHLORIDE 10 MG: 10 TABLET ORAL at 06:27

## 2022-02-20 RX ADMIN — MAGNESIUM SULFATE HEPTAHYDRATE 2 G: 40 INJECTION, SOLUTION INTRAVENOUS at 08:25

## 2022-02-20 RX ADMIN — LEVOTHYROXINE SODIUM 100 MCG: 0.1 TABLET ORAL at 06:28

## 2022-02-20 ASSESSMENT — ENCOUNTER SYMPTOMS
COUGH: 0
WEAKNESS: 0
NAUSEA: 0
EYES NEGATIVE: 1
ABDOMINAL PAIN: 0
DIZZINESS: 0
PALPITATIONS: 0
FEVER: 0
SPUTUM PRODUCTION: 0
SENSORY CHANGE: 0
MYALGIAS: 0
SHORTNESS OF BREATH: 0
VOMITING: 0
HEADACHES: 0
CHILLS: 0
FOCAL WEAKNESS: 0

## 2022-02-20 ASSESSMENT — PAIN DESCRIPTION - PAIN TYPE
TYPE: ACUTE PAIN

## 2022-02-20 ASSESSMENT — FIBROSIS 4 INDEX: FIB4 SCORE: 1.68

## 2022-02-20 NOTE — CARE PLAN
The patient is Stable - Low risk of patient condition declining or worsening    Shift Goals  Clinical Goals: Monitor pain level  Patient Goals: Pain relief    Progress made toward(s) clinical / shift goals:      Problem: Pain - Standard  Goal: Alleviation of pain or a reduction in pain to the patient’s comfort goal  Outcome: Progressing  Note: Patient reports current pain management is relieving her of her pain. Utilizing 0-10 pain scale.     Problem: Knowledge Deficit - Standard  Goal: Patient and family/care givers will demonstrate understanding of plan of care, disease process/condition, diagnostic tests and medications  Outcome: Progressing  Note: Patient verbally demonstrates understanding of POC and disease process.     Problem: Skin Integrity  Goal: Skin integrity is maintained or improved  Outcome: Progressing  Note: Patient reminded to move in bed Q 2 hours. Q shift skin check completed.     Problem: Fall Risk  Goal: Patient will remain free from falls  Outcome: Progressing  Note: All fall precautions in place. Patient educated to call before trying to ambulate.

## 2022-02-20 NOTE — CARE PLAN
Problem: Knowledge Deficit - Standard  Goal: Patient and family/care givers will demonstrate understanding of plan of care, disease process/condition, diagnostic tests and medications  Outcome: Progressing  Note: Discussed POC and medications with patient.  Patient verbalized understanding.     The patient is Stable - Low risk of patient condition declining or worsening    Shift Goals  Clinical Goals: Monitor pain level  Patient Goals: Pain relief

## 2022-02-20 NOTE — PROGRESS NOTES
Monitor Summary:  Rhythm: Aflutter  Rate: 72-92  Ectopy: (R) PVC, (R) Coup  Measurement: -/.09/-

## 2022-02-20 NOTE — PROGRESS NOTES
Assumed care of patient, received bedside report from NOC RN. Patient is A&O X 4. Pain 3/10, left hip, patient repositioned. Vital signs stable overnight, on RA. On tele monitor, Aflutter 92. POC discussed with patient and she verbalized understanding. Call light within reach and fall precautions in place. Bed locked and in lowest position.

## 2022-02-20 NOTE — WOUND TEAM
"[facility]  Inpatient Services  Initial Wound and Skin Care Evaluation    Admission Date: 2/17/2022     Last order of IP CONSULT TO WOUND CARE was found on 2/17/2022 from Hospital Encounter on 2/17/2022     HPI, PMH, SH: Reviewed    History reviewed. No pertinent surgical history.  Social History     Tobacco Use   • Smoking status: Never Smoker   • Smokeless tobacco: Never Used   Substance Use Topics   • Alcohol use: Not Currently     Chief Complaint   Patient presents with   • Hip Pain     Diagnosis: Atrial flutter with rapid ventricular response (HCC) [I48.92]    Unit where seen by Wound Team: T834/01     WOUND CONSULT/FOLLOW UP RELATED TO:  BLE     WOUND HISTORY:  Per H&P \" Beverley Dejesus is a 71 y.o. female with a past medical history of atrial flutter on Pradaxa status post cardioversion January 2022, poorly controlled type 2 diabetes, hypertension, hyperlipidemia and chronic lower extremity wounds who presented 2/17/2022 with acute onset left hip pain.  Patient denies any trauma she states that patient started abruptly after standing from chair pain is intractable worsened with hip extension and flexion.  Patient denies a history of chronic hip pain and states that this is completely new.  She denies any other symptoms including fever, chills, shortness of breath nausea or vomiting.  On arrival patient is afebrile, she is tachycardic with heart rates in the 140s, respiratory rate 18, /91 she is saturating well on room air.  EKG does show atrial flutter with rapid ventricular response, she was treated with 1 dose of IV diltiazem with improvement her heart rate was in the 80s upon my examination.\"  Wounds have improved since admit last month.    WOUND ASSESSMENT/LDA  Wound 01/04/22 Full Thickness Wound Leg Distal;Inner;Mid;Posterior Left (Active)      02/20/22 1400   Site Assessment Dry;Red;Other (Comment)    Periwound Assessment Dry;Red;Pink;Scar tissue    Margins Attached edges  "   Closure Secondary intention    Drainage Amount None    Treatments Cleansed    Wound Cleansing Normal Saline Irrigation    Periwound Protectant Not Applicable    Dressing Cleansing/Solutions Not Applicable    Dressing Options Viscopaste;Dry Roll Gauze    Dressing Changed Changed    Dressing Status Intact    Dressing Change/Treatment Frequency Daily, and As Needed    NEXT Dressing Change/Treatment Date 02/21/22    NEXT Weekly Photo (Inpatient Only) 02/27/22    Non-staged Wound Description Full thickness 02/20/22 1400   Wound Length (cm) 10 cm 02/20/22 1400   Wound Width (cm) 3.5 cm 02/20/22 1400   Wound Surface Area (cm^2) 35 cm^2 02/20/22 1400   Shape irregular    Wound Odor None    Pulses 1+;DP    Exposed Structures None    Number of days: 47       Wound 01/04/22 Full Thickness Wound Leg Distal;Posterior Right (Active)     lateral    medial  02/20/22 1400   Site Assessment Red;Pink    Periwound Assessment Dry;Pink;Intact    Margins Defined edges    Closure Secondary intention    Drainage Amount None    Treatments Cleansed    Wound Cleansing Normal Saline Irrigation    Periwound Protectant Not Applicable    Dressing Cleansing/Solutions Not Applicable    Dressing Options Viscopaste;Absorbent Abdominal Pad;Dry Roll Gauze    Dressing Changed Changed    Dressing Status Intact    Dressing Change/Treatment Frequency Daily, and As Needed    NEXT Dressing Change/Treatment Date 02/21/22    NEXT Weekly Photo (Inpatient Only) 02/27/22    Non-staged Wound Description Full thickness 02/20/22 1400   Wound Length (cm) 6 cm  lateral 7 02/20/22 1400   Wound Width (cm) 6.3 cm  lateral 1.5 02/20/22 1400   Wound Surface Area (cm^2) 37.8 cm^2 02/20/22 1400   Shape irregular    Wound Odor None    Pulses 1+;DP    Exposed Structures None    Number of days: 47     Vascular:    MIGEL:   No results found.    Lab Values:    Lab Results   Component Value Date/Time    WBC 6.0 02/19/2022 03:30 AM    RBC 4.70 02/19/2022 03:30 AM    HEMOGLOBIN 13.0  02/19/2022 03:30 AM    HEMATOCRIT 43.1 02/19/2022 03:30 AM    HBA1C 9.8 (H) 01/05/2022 04:30 AM        Culture Results show:  No results found for this or any previous visit (from the past 720 hour(s)).    Pain Level/Medicated:  No c/o pain    INTERVENTIONS BY WOUND TEAM:  Chart and images reviewed. Discussed with bedside RN. All areas of concern (based on picture review, LDA review and discussion with bedside RN) have been thoroughly assessed. Documentation of areas based on significant findings. This RN in to assess patient. Performed standard wound care which includes appropriate positioning, dressing removal and non-selective debridement. Pictures and measurements obtained weekly if/when required.  Preparation for Dressing removal: Dressing dislodged  Non-selectively Debrided with:  NS and gauze.  Sharp debridement: NA  Becky wound: Cleansed with foaming soap and moist washcloths  Primary Dressing: viscopaste  Secondary (Outer) Dressing: abd pad RLE, BLE dry roll gauze    Interdisciplinary consultation: Patient, Bedside RN    EVALUATION / RATIONALE FOR TREATMENT:  Most Recent Date:  2/2o: Pt has BLE wounds that are resolving ful thickness wounds. There are still some adherent dry scabs present, but there is no open full thickness areas. Continuing  Viscopatch zinc impregnated gauze to encourage re-epithelialization of superficial 100% viable wound bed, and to provide a non-stick wound contact layer.       Goals: Steady decrease in wound area and depth weekly.    WOUND TEAM PLAN OF CARE ([X] for frequency of wound follow up,):   Nursing to follow orders written for wound care. Contact wound team if area fails to progress, deteriorates or with any questions/concerns  Dressing changes by wound team:                   Follow up 3 times weekly:                NPWT change 3 times weekly:     Follow up 1-2 times weekly:      Follow up Bi-Monthly:                   Follow up as needed:     Other (explain):     NURSING  PLAN OF CARE ORDERS (X):  Dressing changes: See Dressing Care orders: x  Skin care: See Skin Care orders:   RN Prevention Protocol:   Rectal tube care: See Rectal Tube Care orders:   Other orders:    RSKIN:   CURRENTLY IN PLACE (X), APPLIED THIS VISIT (A), ORDERED (O):   Q shift Matias:  X  Q shift pressure point assessments:  X    Surface/Positioning   Pressure redistribution mattress  x          Low Airloss          Bariatric foam      Bariatric HELEN     Waffle cushion        Waffle Overlay          Reposition q 2 hours   Remind and assist   TAPs Turning system     Z Hari Pillow     Offloading/Redistribution not assessed  Sacral Mepilex (Silicone dressing)     Heel Mepilex (Silicone dressing)         Heel float boots (Prevalon boot)             Float Heels off Bed with Pillows           Respiratory RA  Silicone O2 tubing         Gray Foam Ear protectors     Cannula fixation Device (Tender )          High flow offloading Clip    Elastic head band offloading device      Anchorfast                                                         Trach with Optifoam split foam             Containment/Moisture Prevention not assessed    Rectal tube or BMS    Purwick/Condom Cath        C[place] Catheter    Barrier wipes           Barrier paste       Antifungal tx      Interdry        Mobilization not assessed      Up to chair        Ambulate      PT/OT      Nutrition       Dietician        Diabetes Education      PO  x   TF     TPN     NPO   # days     Other        Anticipated discharge plans: TBD  LTACH:        SNF/Rehab:                  Home Health Care:           Outpatient Wound Center:            Self/Family Care:        Other:           Vac Discharge Needs:   Not Applicable Pt not on a wound vac:x       Regular Vac while inpatient, alternative dressing at DC:        Regular Vac in use and continued at DC:            Reg. Vac w/ Skin Sub/Biologic in use. Will need to be changed 2x wkly:      Veraflo Vac while inpatient, ok  to transition to Regular Vac on Discharge:           Veraflo Vac while inpatient, will need to remain on Veraflo Vac upon discharge:

## 2022-02-21 LAB
ALBUMIN SERPL BCP-MCNC: 3.1 G/DL (ref 3.2–4.9)
ALBUMIN/GLOB SERPL: 0.9 G/DL
ALP SERPL-CCNC: 119 U/L (ref 30–99)
ALT SERPL-CCNC: 8 U/L (ref 2–50)
ANION GAP SERPL CALC-SCNC: 9 MMOL/L (ref 7–16)
AST SERPL-CCNC: 19 U/L (ref 12–45)
BASOPHILS # BLD AUTO: 0.3 % (ref 0–1.8)
BASOPHILS # BLD: 0.02 K/UL (ref 0–0.12)
BILIRUB SERPL-MCNC: 0.6 MG/DL (ref 0.1–1.5)
BUN SERPL-MCNC: 18 MG/DL (ref 8–22)
CALCIUM SERPL-MCNC: 9 MG/DL (ref 8.5–10.5)
CHLORIDE SERPL-SCNC: 106 MMOL/L (ref 96–112)
CO2 SERPL-SCNC: 21 MMOL/L (ref 20–33)
CREAT SERPL-MCNC: 0.95 MG/DL (ref 0.5–1.4)
EOSINOPHIL # BLD AUTO: 0.14 K/UL (ref 0–0.51)
EOSINOPHIL NFR BLD: 2 % (ref 0–6.9)
ERYTHROCYTE [DISTWIDTH] IN BLOOD BY AUTOMATED COUNT: 52.9 FL (ref 35.9–50)
GLOBULIN SER CALC-MCNC: 3.5 G/DL (ref 1.9–3.5)
GLUCOSE BLD-MCNC: 120 MG/DL (ref 65–99)
GLUCOSE BLD-MCNC: 125 MG/DL (ref 65–99)
GLUCOSE BLD-MCNC: 195 MG/DL (ref 65–99)
GLUCOSE SERPL-MCNC: 144 MG/DL (ref 65–99)
HCT VFR BLD AUTO: 44.2 % (ref 37–47)
HGB BLD-MCNC: 13.5 G/DL (ref 12–16)
IMM GRANULOCYTES # BLD AUTO: 0.04 K/UL (ref 0–0.11)
IMM GRANULOCYTES NFR BLD AUTO: 0.6 % (ref 0–0.9)
LYMPHOCYTES # BLD AUTO: 2.52 K/UL (ref 1–4.8)
LYMPHOCYTES NFR BLD: 36.1 % (ref 22–41)
MAGNESIUM SERPL-MCNC: 2 MG/DL (ref 1.5–2.5)
MCH RBC QN AUTO: 28.1 PG (ref 27–33)
MCHC RBC AUTO-ENTMCNC: 30.5 G/DL (ref 33.6–35)
MCV RBC AUTO: 91.9 FL (ref 81.4–97.8)
MONOCYTES # BLD AUTO: 0.55 K/UL (ref 0–0.85)
MONOCYTES NFR BLD AUTO: 7.9 % (ref 0–13.4)
NEUTROPHILS # BLD AUTO: 3.71 K/UL (ref 2–7.15)
NEUTROPHILS NFR BLD: 53.1 % (ref 44–72)
NRBC # BLD AUTO: 0 K/UL
NRBC BLD-RTO: 0 /100 WBC
PHOSPHATE SERPL-MCNC: 3.7 MG/DL (ref 2.5–4.5)
PLATELET # BLD AUTO: 237 K/UL (ref 164–446)
PMV BLD AUTO: 9.8 FL (ref 9–12.9)
POTASSIUM SERPL-SCNC: 4.4 MMOL/L (ref 3.6–5.5)
PROT SERPL-MCNC: 6.6 G/DL (ref 6–8.2)
RBC # BLD AUTO: 4.81 M/UL (ref 4.2–5.4)
SODIUM SERPL-SCNC: 136 MMOL/L (ref 135–145)
WBC # BLD AUTO: 7 K/UL (ref 4.8–10.8)

## 2022-02-21 PROCEDURE — 99225 PR SUBSEQUENT OBSERVATION CARE,LEVEL II: CPT | Mod: GC | Performed by: HOSPITALIST

## 2022-02-21 PROCEDURE — A9270 NON-COVERED ITEM OR SERVICE: HCPCS | Performed by: STUDENT IN AN ORGANIZED HEALTH CARE EDUCATION/TRAINING PROGRAM

## 2022-02-21 PROCEDURE — 84100 ASSAY OF PHOSPHORUS: CPT

## 2022-02-21 PROCEDURE — 82962 GLUCOSE BLOOD TEST: CPT | Mod: 91

## 2022-02-21 PROCEDURE — 700102 HCHG RX REV CODE 250 W/ 637 OVERRIDE(OP): Performed by: STUDENT IN AN ORGANIZED HEALTH CARE EDUCATION/TRAINING PROGRAM

## 2022-02-21 PROCEDURE — 96372 THER/PROPH/DIAG INJ SC/IM: CPT | Mod: XU

## 2022-02-21 PROCEDURE — 83735 ASSAY OF MAGNESIUM: CPT

## 2022-02-21 PROCEDURE — 80053 COMPREHEN METABOLIC PANEL: CPT

## 2022-02-21 PROCEDURE — 97530 THERAPEUTIC ACTIVITIES: CPT

## 2022-02-21 PROCEDURE — G0378 HOSPITAL OBSERVATION PER HR: HCPCS

## 2022-02-21 PROCEDURE — 97535 SELF CARE MNGMENT TRAINING: CPT

## 2022-02-21 PROCEDURE — 36415 COLL VENOUS BLD VENIPUNCTURE: CPT

## 2022-02-21 PROCEDURE — 85025 COMPLETE CBC W/AUTO DIFF WBC: CPT

## 2022-02-21 RX ADMIN — METOPROLOL TARTRATE 12.5 MG: 25 TABLET, FILM COATED ORAL at 17:11

## 2022-02-21 RX ADMIN — DICLOFENAC SODIUM 2 G: 10 GEL TOPICAL at 21:15

## 2022-02-21 RX ADMIN — ATORVASTATIN CALCIUM 40 MG: 40 TABLET, FILM COATED ORAL at 17:12

## 2022-02-21 RX ADMIN — GABAPENTIN 300 MG: 300 CAPSULE ORAL at 21:15

## 2022-02-21 RX ADMIN — INSULIN HUMAN 1 UNITS: 100 INJECTION, SOLUTION PARENTERAL at 11:44

## 2022-02-21 RX ADMIN — ACETAMINOPHEN 1000 MG: 500 TABLET ORAL at 15:33

## 2022-02-21 RX ADMIN — Medication 2000 UNITS: at 06:06

## 2022-02-21 RX ADMIN — ACETAMINOPHEN 1000 MG: 500 TABLET ORAL at 06:06

## 2022-02-21 RX ADMIN — ACETAMINOPHEN 1000 MG: 500 TABLET ORAL at 21:15

## 2022-02-21 RX ADMIN — LISINOPRIL 10 MG: 10 TABLET ORAL at 06:06

## 2022-02-21 RX ADMIN — LEVOTHYROXINE SODIUM 100 MCG: 0.1 TABLET ORAL at 06:06

## 2022-02-21 RX ADMIN — ASPIRIN 81 MG: 81 TABLET, CHEWABLE ORAL at 06:06

## 2022-02-21 RX ADMIN — DABIGATRAN ETEXILATE MESYLATE 150 MG: 150 CAPSULE ORAL at 17:12

## 2022-02-21 RX ADMIN — OXYCODONE HYDROCHLORIDE 10 MG: 10 TABLET ORAL at 12:30

## 2022-02-21 RX ADMIN — DABIGATRAN ETEXILATE MESYLATE 150 MG: 150 CAPSULE ORAL at 06:06

## 2022-02-21 RX ADMIN — METOPROLOL TARTRATE 12.5 MG: 25 TABLET, FILM COATED ORAL at 06:06

## 2022-02-21 ASSESSMENT — COGNITIVE AND FUNCTIONAL STATUS - GENERAL
TURNING FROM BACK TO SIDE WHILE IN FLAT BAD: A LITTLE
DAILY ACTIVITIY SCORE: 18
WALKING IN HOSPITAL ROOM: A LITTLE
SUGGESTED CMS G CODE MODIFIER MOBILITY: CK
MOVING TO AND FROM BED TO CHAIR: A LITTLE
MOBILITY SCORE: 17
CLIMB 3 TO 5 STEPS WITH RAILING: A LOT
DRESSING REGULAR UPPER BODY CLOTHING: A LITTLE
MOVING FROM LYING ON BACK TO SITTING ON SIDE OF FLAT BED: A LITTLE
STANDING UP FROM CHAIR USING ARMS: A LITTLE
HELP NEEDED FOR BATHING: A LITTLE
TOILETING: A LITTLE
PERSONAL GROOMING: A LITTLE
DRESSING REGULAR LOWER BODY CLOTHING: A LOT
SUGGESTED CMS G CODE MODIFIER DAILY ACTIVITY: CK

## 2022-02-21 ASSESSMENT — ENCOUNTER SYMPTOMS
DIZZINESS: 0
PALPITATIONS: 0
ABDOMINAL PAIN: 0
NAUSEA: 0
FOCAL WEAKNESS: 0
SPUTUM PRODUCTION: 0
SENSORY CHANGE: 0
VOMITING: 0
COUGH: 0
SHORTNESS OF BREATH: 0
MYALGIAS: 0
FEVER: 0
EYES NEGATIVE: 1
CHILLS: 0
WEAKNESS: 0
HEADACHES: 0

## 2022-02-21 ASSESSMENT — FIBROSIS 4 INDEX: FIB4 SCORE: 2.04

## 2022-02-21 ASSESSMENT — PAIN DESCRIPTION - PAIN TYPE: TYPE: ACUTE PAIN

## 2022-02-21 ASSESSMENT — GAIT ASSESSMENTS
DEVIATION: ANTALGIC;STEP TO;BRADYKINETIC;SHUFFLED GAIT
ASSISTIVE DEVICE: FRONT WHEEL WALKER
GAIT LEVEL OF ASSIST: CONTACT GUARD ASSIST
DISTANCE (FEET): 10

## 2022-02-21 NOTE — PROGRESS NOTES
Assumed care of patient at bedside report from NOC RN. Updated on POC. Patient currently A & O x 4; on RA; up x1 with front wheel walker; without complaints of acute pain. Call light within reach. Whiteboard updated. Fall precautions in place. Bed locked and in lowest position. All questions answered. No other needs indicated at this time.

## 2022-02-21 NOTE — DISCHARGE PLANNING
Agency/Facility Name: [facility]  Spoke To: Natividad   Outcome: DPA followed up with referral request and was advised referral was only longterm delivered, DPA requested to email referral to [email or web address], DPA emailed referral @ [phone]  Agency/Facility Name: [facility]   Spoke To: Natividad   Outcome: Pt accepted to agency services, DPA to contact [facility] upon Pt discharging.

## 2022-02-21 NOTE — THERAPY
"Occupational Therapy  Daily Treatment     Patient Name: Beverley Dejesus  Age:  71 y.o., Sex:  female  Medical Record #: [id number]  Today's Date: 2/21/2022     Precautions  Precautions: (P) Fall Risk    Assessment    Pt seen for OT tx session. Pt donned socks in chair w/ SPV, required mod A (x2 people for safety) to get out of chair. Pt performed functional mobility, partial seated bed bath, an toileting w/ min A. Pt demo'd impaired balance, functional mobility, and activity tolerance impacting functional independence. Anticipate that if pain was managed pt would be functionally capable to DC home w/ HH. At this time she would require placement.     Plan    Continue current treatment plan.    DC Equipment Recommendations: (P) Tub / Shower Seat  Discharge Recommendations: (P) Recommend post-acute placement for additional occupational therapy services prior to discharge home    Subjective    \"I sat in the chair too long\"     Objective       02/21/22 1350   Total Time Spent   Total Time Spent (Mins) 25   Treatment Charges   Charges Yes   OT Self Care / ADL 2   Precautions   Precautions Fall Risk   Vitals   O2 (LPM) 0   O2 Delivery Device None - Room Air   Pain 0 - 10 Group   Therapist Pain Assessment Nurse Notified;Post Activity Pain Same as Prior to Activity  (c/o max pain in hip)   Cognition    Cognition / Consciousness WDL   Level of Consciousness Alert   Comments pleasent and cooperative   Active ROM Upper Body   Active ROM Upper Body  WDL   Strength Upper Body   Upper Body Strength  WDL   Balance   Sitting Balance (Static) Fair   Sitting Balance (Dynamic) Fair -   Standing Balance (Static) Fair -   Standing Balance (Dynamic) Poor +   Weight Shift Sitting Fair   Weight Shift Standing Fair   Skilled Intervention Tactile Cuing;Verbal Cuing;Facilitation   Comments w/ FWW   Bed Mobility    Supine to Sit   (NT in chair pre)   Sit to Supine Supervised  (used momentum)   Scooting Supervised   Skilled Intervention Verbal " Cuing;Tactile Cuing;Facilitation   Activities of Daily Living   Bathing Minimal Assist  (seated, washed lower body)   Upper Body Dressing Supervision   Lower Body Dressing Supervision  (donned socks)   Toileting Minimal Assist  (incont of urine)   Skilled Intervention Verbal Cuing;Tactile Cuing;Facilitation   How much help from another person does the patient currently need...   Putting on and taking off regular lower body clothing? 2   Bathing (including washing, rinsing, and drying)? 3   Toileting, which includes using a toilet, bedpan, or urinal? 3   Putting on and taking off regular upper body clothing? 3   Taking care of personal grooming such as brushing teeth? 3   Eating meals? 4   6 Clicks Daily Activity Score 18   Functional Mobility   Sit to Stand Moderate Assist  (from low chair, CGA from bed)   Bed, Chair, Wheelchair Transfer Minimal Assist   Mobility around bed 2x w/ FWW   Skilled Intervention Tactile Cuing;Verbal Cuing;Facilitation   Activity Tolerance   Sitting in Chair ~4 hours (up pre)   Sitting Edge of Bed 10 min   Standing 6 min totla   Patient / Family Goals   Patient / Family Goal #1 For her pain to be managed   Goal #1 Outcome Progressing as expected   Short Term Goals   Short Term Goal # 1 Pt will demo LB dressing w/ SPV   Goal Outcome # 1 Progressing as expected   Short Term Goal # 2 Pt will demo standing grooming w/ SPV   Goal Outcome # 2 Progressing as expected   Short Term Goal # 3 Pt will demo toilet txf w/ SPV   Goal Outcome # 3 Progressing as expected   Education Group   Role of Occupational Therapist Patient Response Patient;Acceptance;Explanation;Demonstration;Verbal Demonstration;Action Demonstration   Anticipated Discharge Equipment and Recommendations   DC Equipment Recommendations Tub / Shower Seat   Discharge Recommendations Recommend post-acute placement for additional occupational therapy services prior to discharge home   Interdisciplinary Plan of Care Collaboration   IDT  Collaboration with  Nursing   Patient Position at End of Therapy In Bed;Bed Alarm On;Call Light within Reach;Tray Table within Reach;Phone within Reach   Collaboration Comments report given   Session Information   Date / Session Number  2/21, 2 (2/3, 2/24)   Priority 2

## 2022-02-21 NOTE — CARE PLAN
The patient is Stable - Low risk of patient condition declining or worsening    Shift Goals  Clinical Goals: (P) Monitor pain  Patient Goals: (P) Pain control; discharge    Progress made toward(s) clinical / shift goals:  Patient declines pain medication at this time. Reinforced use of call light.    Patient is not progressing towards the following goals:      Problem: Pain - Standard  Goal: Alleviation of pain or a reduction in pain to the patient’s comfort goal  Outcome: Progressing     Problem: Knowledge Deficit - Standard  Goal: Patient and family/care givers will demonstrate understanding of plan of care, disease process/condition, diagnostic tests and medications  Outcome: Progressing     Problem: Skin Integrity  Goal: Skin integrity is maintained or improved  Outcome: Progressing     Problem: Fall Risk  Goal: Patient will remain free from falls  Outcome: Progressing

## 2022-02-21 NOTE — DISCHARGE PLANNING
[facility] Transitional Care Coordination    Referral from:  Dr Aleman  Insurance Provider on Facesheet: Liliana  Potential Rehab Diagnosis: Debility    Chart review indicates patient may need on going medical management and may have therapy needs to possibly meet inpatient rehab facility criteria with the goal of returning to community.    D/C support: Will need to verify - lives in single story mobile home with roommates. Friend provides transportation.     Physiatry consultation forwarded per protocol.     Last Covid test:  N/A    Debility - physiatry to consult, TCC will follow.     Thank you for the referral.

## 2022-02-21 NOTE — DISCHARGE PLANNING
Anticipated Discharge Disposition: SNF vs home with [facility]    Action: Discussed pt in IDT rounds. PT/OT notes previously recommended post-acute placement, however will likely progress home with [facility]. [facility] has accepted this pt. Pt is pending updated PT/OT notes.    LSW met with pt at bedside to discuss DC disposition. Pt reported she is agreeable with whatever is recommended and gave consent to send blanket SNF referral.    LSW faxed SNF choice to [facility].    Barriers to Discharge: SNF acceptance vs cleared to go home with [facility].    Plan: Care coordination will follow up with SNF referrals.

## 2022-02-21 NOTE — DISCHARGE PLANNING
Received Choice form at [phone]  Agency/Facility Name: [facility]  Referral sent per Choice form @ [phone]

## 2022-02-22 ENCOUNTER — HOSPITAL ENCOUNTER (INPATIENT)
Facility: REHABILITATION | Age: 72
End: 2022-02-22
Attending: PHYSICAL MEDICINE & REHABILITATION | Admitting: PHYSICAL MEDICINE & REHABILITATION
Payer: MEDICARE

## 2022-02-22 LAB
ALBUMIN SERPL BCP-MCNC: 3.1 G/DL (ref 3.2–4.9)
ALBUMIN/GLOB SERPL: 0.9 G/DL
ALP SERPL-CCNC: 111 U/L (ref 30–99)
ALT SERPL-CCNC: 8 U/L (ref 2–50)
ANION GAP SERPL CALC-SCNC: 10 MMOL/L (ref 7–16)
AST SERPL-CCNC: 17 U/L (ref 12–45)
BASOPHILS # BLD AUTO: 0.5 % (ref 0–1.8)
BASOPHILS # BLD: 0.03 K/UL (ref 0–0.12)
BILIRUB SERPL-MCNC: 0.7 MG/DL (ref 0.1–1.5)
BUN SERPL-MCNC: 19 MG/DL (ref 8–22)
CALCIUM SERPL-MCNC: 9.1 MG/DL (ref 8.5–10.5)
CHLORIDE SERPL-SCNC: 108 MMOL/L (ref 96–112)
CO2 SERPL-SCNC: 22 MMOL/L (ref 20–33)
CREAT SERPL-MCNC: 0.88 MG/DL (ref 0.5–1.4)
EOSINOPHIL # BLD AUTO: 0.16 K/UL (ref 0–0.51)
EOSINOPHIL NFR BLD: 2.8 % (ref 0–6.9)
ERYTHROCYTE [DISTWIDTH] IN BLOOD BY AUTOMATED COUNT: 53.5 FL (ref 35.9–50)
GLOBULIN SER CALC-MCNC: 3.3 G/DL (ref 1.9–3.5)
GLUCOSE BLD STRIP.AUTO-MCNC: 111 MG/DL (ref 65–99)
GLUCOSE BLD STRIP.AUTO-MCNC: 163 MG/DL (ref 65–99)
GLUCOSE BLD-MCNC: 113 MG/DL (ref 65–99)
GLUCOSE SERPL-MCNC: 163 MG/DL (ref 65–99)
HCT VFR BLD AUTO: 42.4 % (ref 37–47)
HGB BLD-MCNC: 12.5 G/DL (ref 12–16)
IMM GRANULOCYTES # BLD AUTO: 0.04 K/UL (ref 0–0.11)
IMM GRANULOCYTES NFR BLD AUTO: 0.7 % (ref 0–0.9)
LYMPHOCYTES # BLD AUTO: 2.08 K/UL (ref 1–4.8)
LYMPHOCYTES NFR BLD: 35.8 % (ref 22–41)
MAGNESIUM SERPL-MCNC: 1.9 MG/DL (ref 1.5–2.5)
MCH RBC QN AUTO: 27.4 PG (ref 27–33)
MCHC RBC AUTO-ENTMCNC: 29.5 G/DL (ref 33.6–35)
MCV RBC AUTO: 92.8 FL (ref 81.4–97.8)
MONOCYTES # BLD AUTO: 0.47 K/UL (ref 0–0.85)
MONOCYTES NFR BLD AUTO: 8.1 % (ref 0–13.4)
NEUTROPHILS # BLD AUTO: 3.03 K/UL (ref 2–7.15)
NEUTROPHILS NFR BLD: 52.1 % (ref 44–72)
NRBC # BLD AUTO: 0 K/UL
NRBC BLD-RTO: 0 /100 WBC
PHOSPHATE SERPL-MCNC: 3.5 MG/DL (ref 2.5–4.5)
PLATELET # BLD AUTO: 218 K/UL (ref 164–446)
PMV BLD AUTO: 9.8 FL (ref 9–12.9)
POTASSIUM SERPL-SCNC: 4.6 MMOL/L (ref 3.6–5.5)
PROT SERPL-MCNC: 6.4 G/DL (ref 6–8.2)
RBC # BLD AUTO: 4.57 M/UL (ref 4.2–5.4)
SODIUM SERPL-SCNC: 140 MMOL/L (ref 135–145)
WBC # BLD AUTO: 5.8 K/UL (ref 4.8–10.8)

## 2022-02-22 PROCEDURE — 700102 HCHG RX REV CODE 250 W/ 637 OVERRIDE(OP): Performed by: STUDENT IN AN ORGANIZED HEALTH CARE EDUCATION/TRAINING PROGRAM

## 2022-02-22 PROCEDURE — 96372 THER/PROPH/DIAG INJ SC/IM: CPT

## 2022-02-22 PROCEDURE — 700101 HCHG RX REV CODE 250

## 2022-02-22 PROCEDURE — 82962 GLUCOSE BLOOD TEST: CPT | Mod: 91

## 2022-02-22 PROCEDURE — A9270 NON-COVERED ITEM OR SERVICE: HCPCS | Performed by: STUDENT IN AN ORGANIZED HEALTH CARE EDUCATION/TRAINING PROGRAM

## 2022-02-22 PROCEDURE — 84100 ASSAY OF PHOSPHORUS: CPT

## 2022-02-22 PROCEDURE — G0378 HOSPITAL OBSERVATION PER HR: HCPCS

## 2022-02-22 PROCEDURE — 80053 COMPREHEN METABOLIC PANEL: CPT

## 2022-02-22 PROCEDURE — 83735 ASSAY OF MAGNESIUM: CPT

## 2022-02-22 PROCEDURE — 36415 COLL VENOUS BLD VENIPUNCTURE: CPT

## 2022-02-22 PROCEDURE — 97116 GAIT TRAINING THERAPY: CPT

## 2022-02-22 PROCEDURE — 97530 THERAPEUTIC ACTIVITIES: CPT

## 2022-02-22 PROCEDURE — 85025 COMPLETE CBC W/AUTO DIFF WBC: CPT

## 2022-02-22 PROCEDURE — 99224 PR SUBSEQUENT OBSERVATION CARE,LEVEL I: CPT | Mod: GC | Performed by: HOSPITALIST

## 2022-02-22 RX ORDER — LIDOCAINE 50 MG/G
1 PATCH TOPICAL EVERY 24 HOURS
Status: DISCONTINUED | OUTPATIENT
Start: 2022-02-22 | End: 2022-02-25 | Stop reason: HOSPADM

## 2022-02-22 RX ADMIN — LISINOPRIL 10 MG: 10 TABLET ORAL at 06:05

## 2022-02-22 RX ADMIN — ACETAMINOPHEN 1000 MG: 500 TABLET ORAL at 06:05

## 2022-02-22 RX ADMIN — LEVOTHYROXINE SODIUM 100 MCG: 0.1 TABLET ORAL at 06:05

## 2022-02-22 RX ADMIN — INSULIN HUMAN 1 UNITS: 100 INJECTION, SOLUTION PARENTERAL at 11:39

## 2022-02-22 RX ADMIN — DABIGATRAN ETEXILATE MESYLATE 150 MG: 150 CAPSULE ORAL at 06:05

## 2022-02-22 RX ADMIN — DICLOFENAC SODIUM 2 G: 10 GEL TOPICAL at 10:17

## 2022-02-22 RX ADMIN — ACETAMINOPHEN 1000 MG: 500 TABLET ORAL at 21:27

## 2022-02-22 RX ADMIN — ATORVASTATIN CALCIUM 40 MG: 40 TABLET, FILM COATED ORAL at 17:38

## 2022-02-22 RX ADMIN — DICLOFENAC SODIUM 2 G: 10 GEL TOPICAL at 21:27

## 2022-02-22 RX ADMIN — METOPROLOL TARTRATE 12.5 MG: 25 TABLET, FILM COATED ORAL at 17:38

## 2022-02-22 RX ADMIN — ASPIRIN 81 MG: 81 TABLET, CHEWABLE ORAL at 06:04

## 2022-02-22 RX ADMIN — ACETAMINOPHEN 1000 MG: 500 TABLET ORAL at 14:45

## 2022-02-22 RX ADMIN — Medication 2000 UNITS: at 06:05

## 2022-02-22 RX ADMIN — DABIGATRAN ETEXILATE MESYLATE 150 MG: 150 CAPSULE ORAL at 17:38

## 2022-02-22 RX ADMIN — GABAPENTIN 300 MG: 300 CAPSULE ORAL at 21:27

## 2022-02-22 RX ADMIN — LIDOCAINE 1 PATCH: 50 PATCH TOPICAL at 11:38

## 2022-02-22 RX ADMIN — METOPROLOL TARTRATE 12.5 MG: 25 TABLET, FILM COATED ORAL at 06:05

## 2022-02-22 ASSESSMENT — ENCOUNTER SYMPTOMS
EYES NEGATIVE: 1
PALPITATIONS: 0
DIZZINESS: 0
CHILLS: 0
SHORTNESS OF BREATH: 0
VOMITING: 0
NAUSEA: 0
SPUTUM PRODUCTION: 0
WEAKNESS: 0
FEVER: 0
MYALGIAS: 0
HEADACHES: 0
COUGH: 0
ABDOMINAL PAIN: 0
FOCAL WEAKNESS: 0
SENSORY CHANGE: 0

## 2022-02-22 ASSESSMENT — GAIT ASSESSMENTS
DEVIATION: ANTALGIC;STEP TO;BRADYKINETIC;SHUFFLED GAIT
DISTANCE (FEET): 20
ASSISTIVE DEVICE: FRONT WHEEL WALKER
GAIT LEVEL OF ASSIST: SUPERVISED

## 2022-02-22 ASSESSMENT — COGNITIVE AND FUNCTIONAL STATUS - GENERAL
STANDING UP FROM CHAIR USING ARMS: A LITTLE
MOVING FROM LYING ON BACK TO SITTING ON SIDE OF FLAT BED: A LITTLE
CLIMB 3 TO 5 STEPS WITH RAILING: A LITTLE
SUGGESTED CMS G CODE MODIFIER MOBILITY: CK
MOBILITY SCORE: 18
TURNING FROM BACK TO SIDE WHILE IN FLAT BAD: A LITTLE
MOVING TO AND FROM BED TO CHAIR: A LITTLE
WALKING IN HOSPITAL ROOM: A LITTLE

## 2022-02-22 ASSESSMENT — PAIN DESCRIPTION - PAIN TYPE: TYPE: ACUTE PAIN

## 2022-02-22 NOTE — THERAPY
"Physical Therapy   Daily Treatment     Patient Name: Beverley Dejesus  Age:  71 y.o., Sex:  female  Medical Record #: [id number]  Today's Date: 2/22/2022     Precautions  Precautions: Fall Risk    Assessment    Pt progressing as expected, continues to be limited by L hip pain, however, suspect pt somewhat self limiting. Performed seated cat cows x2 (spinal flexion/extension) with improved pain, allowing pt to ambulate 20ft x2 with FWW and SPV. Pt requires cues for sequencing of log roll, however, improving. Will continue to follow. Recommend placement, may progress to home with HH.     Plan    Continue current treatment plan.    DC Equipment Recommendations: Unable to determine at this time  Discharge Recommendations: Recommend post-acute placement for additional physical therapy services prior to discharge home (May progress to home with HH once pain controlled)      Subjective    \"I think that was better don't you?\" Regarding ambulation this session vs last session      Objective     02/22/22 1123   Total Time Spent   Total Time Spent (Mins) 23   Charge Group   Charges  Yes   PT Gait Training 1  (10 min)   PT Therapeutic Activities 1  (13 min)   Precautions   Precautions Fall Risk   Vitals   O2 Delivery Device None - Room Air   Pain 0 - 10 Group   Therapist Pain Assessment Post Activity Pain Same as Prior to Activity;Nurse Notified  (LBP not rated, agreeable to mobility)   Cognition    Cognition / Consciousness WDL   Level of Consciousness Alert   Comments Pleasant and cooperative   Other Treatments   Other Treatments Provided Seated cat cows 2x10. Pt reported improved LBP/L hip pain with exercise. Edu to perform 1-2 x/hr, and prior to ambulation   Balance   Sitting Balance (Static) Fair   Sitting Balance (Dynamic) Fair   Standing Balance (Static) Fair   Standing Balance (Dynamic) Fair -   Weight Shift Sitting Fair   Weight Shift Standing Fair   Skilled Intervention Verbal Cuing;Tactile Cuing;Sequencing   Comments " w/ FWW   Gait Analysis   Gait Level Of Assist Supervised   Assistive Device Front Wheel Walker   Distance (Feet) 20  (limited 2/2 L hip pain)   # of Times Distance was Traveled 2   Deviation Antalgic;Step To;Bradykinetic;Shuffled Gait   Weight Bearing Status no restrictions   Skilled Intervention Verbal Cuing   Comments Pt reported improved pain with each ambulation bout.   Bed Mobility    Supine to Sit Minimal Assist   Sit to Supine Minimal Assist   Scooting Supervised   Rolling Minimum Assist to Lt.   Skilled Intervention Verbal Cuing;Tactile Cuing;Sequencing   Comments HOB flat. Cues for sequencing of log roll   Functional Mobility   Sit to Stand Supervised   Bed, Chair, Wheelchair Transfer Supervised   Transfer Method Stand Step   Mobility w/ FWW   Skilled Intervention Verbal Cuing   Comments use of momentum for STS   How much difficulty does the patient currently have...   Turning over in bed (including adjusting bedclothes, sheets and blankets)? 3   Sitting down on and standing up from a chair with arms (e.g., wheelchair, bedside commode, etc.) 3   Moving from lying on back to sitting on the side of the bed? 3   How much help from another person does the patient currently need...   Moving to and from a bed to a chair (including a wheelchair)? 3   Need to walk in a hospital room? 3   Climbing 3-5 steps with a railing? 3   6 clicks Mobility Score 18   Activity Tolerance   Sitting in Chair NT   Sitting Edge of Bed 10 min total   Standing 10 min total   Comments limited 2/2 L hip pain   Patient / Family Goals    Patient / Family Goal #1 To have less pain   Goal #1 Outcome Progressing as expected   Short Term Goals    Short Term Goal # 1 Pt will perform STS/functional transfers with FWW and SPV in 6 visits to return to PLOF   Goal Outcome # 1 Goal met   Short Term Goal # 2 Pt will ambulate 150ft with FWW and SPV in 6 visits to ambulate household distances   Goal Outcome # 2 Goal not met   Short Term Goal # 3 Pt  will negotiate 5 JEFF with BUE support and SPV in 6 visits to safely enter/exit home   Goal Outcome # 3 Goal not met   Education Group   Education Provided Role of Physical Therapist;Exercises - Seated   Role of Physical Therapist Patient Response Patient;Acceptance;Explanation;Action Demonstration   Exercises - Seated Patient Response Patient;Acceptance;Explanation;Demonstration;Action Demonstration   Anticipated Discharge Equipment and Recommendations   DC Equipment Recommendations Unable to determine at this time   Discharge Recommendations Recommend post-acute placement for additional physical therapy services prior to discharge home  (May progress to home with HH once pain controlled)   Interdisciplinary Plan of Care Collaboration   IDT Collaboration with  Nursing   Patient Position at End of Therapy In Bed;Bed Alarm On;Call Light within Reach;Tray Table within Reach;Phone within Reach  (heat pack applied)   Collaboration Comments RN updated   Session Information   Date / Session Number  2/22 3 (3/3, 2/24)

## 2022-02-22 NOTE — DISCHARGE SUMMARY
Discharge Summary    Date of Admission: 2/17/2022  Date of Discharge: 2/25/2022  Discharging Attending: MAUREEN Hou M.D.   Discharging Senior Resident: Dr. Parsons  Discharging Intern: Dr. Hinkle    CHIEF COMPLAINT ON ADMISSION  Chief Complaint   Patient presents with   • Hip Pain       Reason for Admission  Acute hip pain    Admission Date  2/17/2022    CODE STATUS  Full Code    HPI & HOSPITAL COURSE  72-year-old female patient with a medical history of atrial flutter on dabigatran, status post cardioversion in 7/2021, poorly controlled diabetes mellitus (A1c 16.5 in 7/2021, now 9.8% and 1/2022), hypertension, dyslipidemia, chronic lower extremity wound presented to the ER on 2/20/2022 with complaints of acute left-sided hip pain.  On arrival to the ED, she was tachycardic with heart rates in 140s, respiratory rate in 80s, blood pressure is 133/91, saturating well on room air.  EKG showed atrial flutter with rapid ventricular response, was treated with 1 dose of IV diltiazem with improvement heart rate.  Labs were pretty unremarkable.  X-ray of the left hip showed no evidence of acute injury patient was treated with IV pain medication and then admitted for observation for further treatment..  CT hip obtained showed no CT examination of sudden hip pain however did show severe lumbosacral junction degenerative disc disease.  Topical Voltaren gel and oxycodone as needed was given with improvement of the pain.  PT/OT consulted with recommendation for SNF placement initially.  However, the patient's physical status markedly improved on the repeated evaluation and the patient was deemed to be a good candidate for discharge home with home health. . she was continued on low-dose metoprolol (between 12.5 mg twice daily) and she was monitored on telemetry.  She remained in A. fib however rate was controlled.    Therefore, she is discharged in fair and stable condition to skilled nursing facility.    The patient met  2-midnight criteria for an inpatient stay at the time of discharge.    PHYSICAL EXAM ON DISCHARGE  Temp:  [36.1 °C (97 °F)-36.6 °C (97.8 °F)] 36.2 °C (97.2 °F)  Pulse:  [62-80] 66  Resp:  [15-18] 16  BP: (101-129)/(60-71) 101/69  SpO2:  [92 %-97 %] 96 %    Physical Exam  Constitutional:       General: She is not in acute distress.     Appearance: She is obese. She is not ill-appearing.   HENT:      Head: Normocephalic and atraumatic.      Nose: Nose normal.   Eyes:      Pupils: Pupils are equal, round, and reactive to light.   Cardiovascular:      Rate and Rhythm: Normal rate and regular rhythm.      Heart sounds: No murmur heard.    No friction rub. No gallop.   Pulmonary:      Effort: No respiratory distress.      Breath sounds: No stridor. No wheezing or rhonchi.   Abdominal:      General: There is no distension.      Palpations: There is no mass.      Tenderness: There is no abdominal tenderness.      Hernia: No hernia is present.   Musculoskeletal:         General: Normal range of motion.      Cervical back: Normal range of motion.   Neurological:      General: No focal deficit present.      Mental Status: She is alert and oriented to person, place, and time. Mental status is at baseline.      Cranial Nerves: No cranial nerve deficit.      Sensory: No sensory deficit.      Motor: No weakness.      Coordination: Coordination normal.   Psychiatric:         Mood and Affect: Mood normal.         Discharge Date  2/25/2022    FOLLOW UP ITEMS POST DISCHARGE  Follow-up with a primary care physician  Follow-up with the cardiology    DISCHARGE DIAGNOSES  Principal Problem:    Hip pain, acute, left POA: Yes  Active Problems:    Type 2 diabetes mellitus with hyperglycemia, with long-term current use of insulin (HCC) (Chronic) POA: Yes    Primary hypertension POA: Yes    Atrial flutter with rapid ventricular response (HCC) POA: Yes    Hypothyroidism POA: Unknown    Vitamin D deficiency POA: Unknown    Wound of lower  extremities POA: Unknown  Resolved Problems:    * No resolved hospital problems. *      FOLLOW UP  No future appointments.  [facility]  [address]  [place] [address]  [phone]        [facility]  [address] [address]  [phone]          MEDICATIONS ON DISCHARGE     Medication List      START taking these medications      Instructions   Cholecalciferol 2000 UNIT Tabs  Start taking on: February 26, 2022   Take 1 Tablet by mouth every day.  Dose: 2,000 Units     metoprolol tartrate 25 MG Tabs  Commonly known as: LOPRESSOR   Take 0.5 Tablets by mouth 2 times a day.  Dose: 12.5 mg        CHANGE how you take these medications      Instructions   gabapentin 300 MG Caps  What changed: medication strength  Commonly known as: NEURONTIN   Take 1 Capsule by mouth at bedtime.  Dose: 300 mg        CONTINUE taking these medications      Instructions   Alcohol Swabs   Doctor's comments: Per formulary preference. ICD-10 code: E11.65 Uncontrolled type 2 Diabetes Mellitus  Wipe site with prep pad prior to injection.     aspirin 81 MG Chew chewable tablet  Commonly known as: ASA   Chew 1 tablet every day.  Dose: 81 mg     atorvastatin 40 MG Tabs  Commonly known as: LIPITOR   Take 1 tablet by mouth every evening.  Dose: 40 mg     * Blood Glucose Meter Kit   Doctor's comments: Or per formulary preference. ICD-10 code: E11.65 Uncontrolled type 2 Diabetes Mellitus  Test blood sugar as recommended by provider. Accucheck Guide blood glucose monitoring kit.     * Blood Glucose Test Strips   Doctor's comments: Or per formulary preference. ICD-10 code: E11.65 Uncontrolled type 2 Diabetes Mellitus  Use one Accucheck Guide strip to test blood sugar three times daily.     dabigatran 150 MG Caps capsule  Commonly known as: PRADAXA   Take 1 capsule by mouth 2 times a day.  Dose: 150 mg     Empagliflozin 10 MG Tabs   Take 10 mg by mouth every day.  Dose: 10 mg     furosemide  40 MG Tabs  Commonly known as: LASIX   Take 40 mg by mouth 1 time a day as needed (Water retension).  Dose: 40 mg     Insulin Pen Needle 32 G x 4 mm   Doctor's comments: Per patient/formulary preference. ICD-10 code: E11.65 Uncontrolled type 2 Diabetes Mellitus  Use one pen needle in pen device to inject insulin once daily.     Lancets   Doctor's comments: Or per formulary preference. ICD-10 code: E11.65 Uncontrolled type 2 Diabetes Mellitus  Use one Accucheck Guide lancet to test blood sugar three times daily.     Lantus SoloStar 100 UNIT/ML Sopn injection  Generic drug: insulin glargine   Inject 45 Units under the skin every evening.  Dose: 45 Units     levothyroxine 100 MCG Tabs  Commonly known as: SYNTHROID   Take 1 tablet by mouth every morning on an empty stomach.  Dose: 100 mcg     lisinopril 10 MG Tabs  Commonly known as: PRINIVIL   Take 10 mg by mouth every day.  Dose: 10 mg     potassium chloride SA 20 MEQ Tbcr  Commonly known as: Kdur   Take 20 mEq by mouth every day.  Dose: 20 mEq         * This list has 2 medication(s) that are the same as other medications prescribed for you. Read the directions carefully, and ask your doctor or other care provider to review them with you.              CT-HIP WITH LEFT   Final Result      No CT explanation for sudden onset right hip pain. If concern exists for occult hip fracture, MRI is more sensitive      Mild bilateral hip osteoarthritis.      Fatty umbilical hernia      Hysterectomy      Severe lumbosacral junction degenerative disc disease      DX-CHEST-PORTABLE (1 VIEW)   Final Result      No acute cardiopulmonary process is seen.      DX-HIP-COMPLETE - UNILATERAL 2+ LEFT   Final Result      1.  No radiographic evidence of acute traumatic injury.            Allergies  No Known Allergies    DIET  Orders Placed This Encounter   Procedures   • Diet Order Diet: Consistent CHO (Diabetic)     Standing Status:   Standing     Number of Occurrences:   1     Order Specific  Question:   Diet:     Answer:   Consistent CHO (Diabetic) [4]       ACTIVITY  As tolerated and directed by skilled nursing.  Weight bearing as tolerated    CONSULTATIONS  none    PROCEDURES  none      Total time of the discharge process exceeds 45 minutes.

## 2022-02-22 NOTE — PROGRESS NOTES
Assumed care at 1900, bedside report received from day shift RN. Pt is medical. Initial assessment completed, orders reviewed, call light within reach, bed alarm in use, and hourly rounding in place. POC addressed with patient, no additional questions at this time.

## 2022-02-22 NOTE — PROGRESS NOTES
Daily Progress Note:     Date of Service: 2/21/2022  Primary Team: [facility] IM Gray Team   Attending: Mega Hou M.D.   Senior Resident: Dr. Parsons  Intern: Dr. Hinkle  Contact:  [phone]    Patient ID:  71 year old female with PMH of aflutter on Dabigatran, s/p cardioversion in 07/2021, poorly controlled diabetes (A1C 16.5% in 07/2021, now 9.8% in 01/2022, hypertension, hyperlipidemia, chronic lower extremity wounds presented for hip pain and was admitted on 2/17/22 for acute onset left hip pain and afib with RVR.     Chief Complaint:  Left hip pain     Interval Update/Subjective:   - No acute overnight events  - Pt continues to complain of left hip pain  - OT saw patient and recommend shower chair.  Recommend post-acute placement and additional OT services prior to discharge home.    Consultants/Specialty:  - PM&R    Review of Systems:    Review of Systems   Constitutional: Negative for chills, fever and malaise/fatigue.   HENT: Negative.    Eyes: Negative.    Respiratory: Negative for cough, sputum production and shortness of breath.    Cardiovascular: Negative for chest pain, palpitations and leg swelling.   Gastrointestinal: Negative for abdominal pain, nausea and vomiting.   Genitourinary: Negative.    Musculoskeletal: Positive for joint pain (left hip). Negative for myalgias.   Neurological: Negative for dizziness, sensory change, focal weakness, weakness and headaches.     Objective Data:   Physical Exam:   Vitals:   Temp:  [35.7 °C (96.3 °F)-36.9 °C (98.5 °F)] 36.4 °C (97.6 °F)  Pulse:  [] 86  Resp:  [16] 16  BP: (101-125)/(58-92) 107/79  SpO2:  [93 %-96 %] 94 %    Physical Exam  Vitals and nursing note reviewed.   Constitutional:       General: She is not in acute distress.     Appearance: She is obese. She is not ill-appearing or toxic-appearing.   HENT:      Head: Normocephalic and atraumatic.      Right Ear: External ear normal.      Left Ear: External ear normal.      Mouth/Throat:      Mouth:  Mucous membranes are dry.      Pharynx: Oropharynx is clear.   Eyes:      General: No scleral icterus.     Extraocular Movements: Extraocular movements intact.      Conjunctiva/sclera: Conjunctivae normal.   Cardiovascular:      Rate and Rhythm: Normal rate. Rhythm irregular.      Heart sounds: No murmur heard.    No friction rub. No gallop.   Pulmonary:      Effort: Pulmonary effort is normal.      Breath sounds: No wheezing, rhonchi or rales.   Abdominal:      General: Bowel sounds are normal. There is no distension.      Palpations: Abdomen is soft.      Tenderness: There is no abdominal tenderness. There is no guarding or rebound.   Musculoskeletal:         General: No swelling or tenderness.      Right lower leg: No edema.      Left lower leg: No edema.      Comments: pain elicited on deep palpation of left trochanter just inferior of left iliac crest   Skin:     Comments: Bilateral lower extremity skin wounds do not appear overtly infected. dry and scabbed. Dressing in place   Neurological:      General: No focal deficit present.      Mental Status: She is alert and oriented to person, place, and time.   Psychiatric:         Mood and Affect: Mood normal.         Behavior: Behavior normal.         Labs:   Recent Results (from the past 24 hour(s))   CBC WITH DIFFERENTIAL    Collection Time: 02/21/22  4:04 AM   Result Value Ref Range    WBC 7.0 4.8 - 10.8 K/uL    RBC 4.81 4.20 - 5.40 M/uL    Hemoglobin 13.5 12.0 - 16.0 g/dL    Hematocrit 44.2 37.0 - 47.0 %    MCV 91.9 81.4 - 97.8 fL    MCH 28.1 27.0 - 33.0 pg    MCHC 30.5 (L) 33.6 - 35.0 g/dL    RDW 52.9 (H) 35.9 - 50.0 fL    Platelet Count 237 164 - 446 K/uL    MPV 9.8 9.0 - 12.9 fL    Neutrophils-Polys 53.10 44.00 - 72.00 %    Lymphocytes 36.10 22.00 - 41.00 %    Monocytes 7.90 0.00 - 13.40 %    Eosinophils 2.00 0.00 - 6.90 %    Basophils 0.30 0.00 - 1.80 %    Immature Granulocytes 0.60 0.00 - 0.90 %    Nucleated RBC 0.00 /100 WBC    Neutrophils (Absolute) 3.71  2.00 - 7.15 K/uL    Lymphs (Absolute) 2.52 1.00 - 4.80 K/uL    Monos (Absolute) 0.55 0.00 - 0.85 K/uL    Eos (Absolute) 0.14 0.00 - 0.51 K/uL    Baso (Absolute) 0.02 0.00 - 0.12 K/uL    Immature Granulocytes (abs) 0.04 0.00 - 0.11 K/uL    NRBC (Absolute) 0.00 K/uL   Comp Metabolic Panel    Collection Time: 02/21/22  4:04 AM   Result Value Ref Range    Sodium 136 135 - 145 mmol/L    Potassium 4.4 3.6 - 5.5 mmol/L    Chloride 106 96 - 112 mmol/L    Co2 21 20 - 33 mmol/L    Anion Gap 9.0 7.0 - 16.0    Glucose 144 (H) 65 - 99 mg/dL    Bun 18 8 - 22 mg/dL    Creatinine 0.95 0.50 - 1.40 mg/dL    Calcium 9.0 8.5 - 10.5 mg/dL    AST(SGOT) 19 12 - 45 U/L    ALT(SGPT) 8 2 - 50 U/L    Alkaline Phosphatase 119 (H) 30 - 99 U/L    Total Bilirubin 0.6 0.1 - 1.5 mg/dL    Albumin 3.1 (L) 3.2 - 4.9 g/dL    Total Protein 6.6 6.0 - 8.2 g/dL    Globulin 3.5 1.9 - 3.5 g/dL    A-G Ratio 0.9 g/dL   PHOSPHORUS    Collection Time: 02/21/22  4:04 AM   Result Value Ref Range    Phosphorus 3.7 2.5 - 4.5 mg/dL   MAGNESIUM    Collection Time: 02/21/22  4:04 AM   Result Value Ref Range    Magnesium 2.0 1.5 - 2.5 mg/dL   ESTIMATED GFR    Collection Time: 02/21/22  4:04 AM   Result Value Ref Range    GFR If African American >60 >60 mL/min/1.73 m 2    GFR If Non African American 58 (A) >60 mL/min/1.73 m 2   POCT glucose device results    Collection Time: 02/21/22  8:09 AM   Result Value Ref Range    Glucose - Accu-Ck 125 (H) 65 - 99 mg/dL   POCT glucose device results    Collection Time: 02/21/22 11:33 AM   Result Value Ref Range    Glucose - Accu-Ck 195 (H) 65 - 99 mg/dL   POCT glucose device results    Collection Time: 02/21/22  4:44 PM   Result Value Ref Range    Glucose - Accu-Ck 120 (H) 65 - 99 mg/dL       Imaging:   Independant Imaging Review: Completed  CT-HIP WITH LEFT   Final Result      No CT explanation for sudden onset right hip pain. If concern exists for occult hip fracture, MRI is more sensitive      Mild bilateral hip osteoarthritis.       Fatty umbilical hernia      Hysterectomy      Severe lumbosacral junction degenerative disc disease      DX-CHEST-PORTABLE (1 VIEW)   Final Result      No acute cardiopulmonary process is seen.      DX-HIP-COMPLETE - UNILATERAL 2+ LEFT   Final Result      1.  No radiographic evidence of acute traumatic injury.          Assessment/Plan:  Ms. Dejesus 70 yo with a PMHx of aflutter, HTN, HLD, DM with neuropathy admitted on 2/17/2022 for acute left hip pain.  Pt denies any recent falls or injury to left hip.  Left X-ray hip showed no evidence of acute traumatic injury to the left hip. PT/OT recommend SNF placement and shower chair.  Injection is being considered as a possible therapeutic intervention which can be achieved in the outpatient setting.    * Hip pain, acute, left- (present on admission)- improving  Assessment & Plan  *Patient presents with a primary problem of acute onset intractable left hip pain after standing from a chair, she denies any trauma or history of chronic hip pain.   X-ray on admission was negative for acute fracture or other pathology  - CT hip obtained showing no CT explanation of sudden hip pain, there is severe lumbosacral junction degenerative disc disease.  - pain improved this AM, nearly subsided. Likely musculoskeletal etiology for pain vs bursitis.  - will schedule topical voltaren gel and oxycodone PRN for pain control, if not improve, will consider PMR consult for possible steroid infection.  - If pain persists/worsen, will consider further imaging with MRI   - PT/OT consulted, placed SNF referral.  - Fall precautions     Atrial flutter with rapid ventricular response (HCC)- (present on admission)- resolved  Atrial fibrillation  Assessment & Plan  *Patient s/p cardioversion in July 2021 where rhythm converted to NSR but was not discharged home on metoprolol. Presented this admission with atrial flutter with rapid ventricular response, given 1 dose of cardizem in ED with improvement  in HR.  - telemetry  - Continue Metoprolol 12.5mg BID, adjust as needed  - now in afib with HR   - continue Dabigatran     Primary hypertension- (present on admission)  Assessment & Plan      *BP controlled currently  -Continue home Lisinopril 10mg daily     Type 2 diabetes mellitus with hyperglycemia, with long-term current use of insulin (HCC)- (present on admission)  Assessment & Plan  *History of type 2 diabetes, poorly controlled. Previously A1C 16.5% in 07/2021, now 9.8% in 1/2022. Lipid panel 2/18/22: , , HDL 40, LDL 63)  - decreased insulin glargine 35U -> 25U at bedtime with SSI  - hypoglycemia protocol  - held home empagliflozin  - diabetic diet  - home gabapentin, atorvastatin, and aspirin.      Vitamin D deficiency  Assessment & Plan  *25 hydroxy Vit D 19 on 1/9/2022  - started on vitamin D3 2000U daily     Hypothyroidism  Assessment & Plan  *TSH 2.99/T4 0.97 on 1/6/22  - continue levothyroxine 100mcg daily    Chronic lower extremities wound  Assessment & Plan  - does not look infected at this time  - wound care following    Core Measures:  Lines: pIV  Diet: Diabetic  DVT Ppx: dabigatran  GI Ppx: Bowel prep  Disposition: Hosipital day 5

## 2022-02-22 NOTE — DISCHARGE PLANNING
Agency/Facility Name: [facility]  Spoke To: Ria   Outcome: Pt accepted, facility will begin process to obtain auth. Due to payer source process may take 2 - 3 days estimated.    SW notified     1510-  Agency/Facility Name: [facility]   Spoke To: [place]  Outcome: If auth is still pending by 2/24 updated PT/OT notes will be required for admissions to review.

## 2022-02-22 NOTE — THERAPY
"Physical Therapy   Daily Treatment     Patient Name: Beverley Dejesus  Age:  71 y.o., Sex:  female  Medical Record #: [id number]  Today's Date: 2/21/2022     Precautions  Precautions: Fall Risk    Assessment    Pt progressing as expected, continues to be limited mainly 2/2 L hip pain. Pt presenting with s/s of radicular LBP, able to reproduce pain with palpating L quadratus lumborum. Reports back pain for 3-4 months prior to admission; L hip pain radiates to anterior hip with ambulation. Edu pt on etiology of radicular LBP, log roll for comfort, use of voltaren/heat on L QL, self massage, increased ambulation, position changes every 20 min. Pt only able to ambulate 10ft x2 with FWW and CGA 2/2 pain. Currently recommend placement, however, may progress to home with HH once pain controlled. Will continue to follow.     Plan    Continue current treatment plan.    DC Equipment Recommendations: Unable to determine at this time  Discharge Recommendations: Recommend post-acute placement for additional physical therapy services prior to discharge home (Anticipate will progress to home with HH once pain controlled)      Subjective    \"I'm never sitting in that chair again.\"      Objective     02/21/22 1640   Pain 0 - 10 Group   Therapist Pain Assessment Post Activity Pain Same as Prior to Activity;Nurse Notified  (L hip pain not rated, agreeable to mobility)   Cognition    Cognition / Consciousness WDL   Level of Consciousness Alert   Comments pleasant and cooperative   Other Treatments   Other Treatments Provided Pt stating L hip pain more posterior at posterior iliac crest. States its worse in standing, improved with seated rest, and radiates to anterior L hip in weight bearing. Also stating increases in supine at times. Suspect radicular LBP: performed 2-3 min manual therapy on L quadratus lumborum, reproduced where pt's pain originates from. Edu on use of voltaren on L QL, massage, increased ambulation, changing positions " every 20 min, log roll for comfort. Edu on etiology of radicular low back pain and it's manifestation as L hip pain.   Balance   Sitting Balance (Static) Fair   Sitting Balance (Dynamic) Fair -   Standing Balance (Static) Fair -   Standing Balance (Dynamic) Fair -   Weight Shift Sitting Fair   Weight Shift Standing Fair   Skilled Intervention Verbal Cuing;Tactile Cuing;Sequencing   Comments w/ FWW   Gait Analysis   Gait Level Of Assist Contact Guard Assist   Assistive Device Front Wheel Walker   Distance (Feet) 10  (limited 2/2 L hip pain)   # of Times Distance was Traveled 2   Deviation Antalgic;Step To;Bradykinetic;Shuffled Gait   Weight Bearing Status no restrictions   Skilled Intervention Verbal Cuing;Tactile Cuing;Sequencing   Bed Mobility    Supine to Sit Supervised  (increased time and effort with use of bed rails)   Sit to Supine Supervised   Scooting Supervised   Rolling Supervised   Skilled Intervention Verbal Cuing;Tactile Cuing;Sequencing   Comments HOB raised. Edu on log roll for comfort, reinforcement required   Functional Mobility   Sit to Stand Contact Guard Assist   Bed, Chair, Wheelchair Transfer Contact Guard Assist   Transfer Method Stand Step   Mobility w/ FWW   Skilled Intervention Verbal Cuing;Tactile Cuing;Sequencing   Comments use of momentum for STS   How much difficulty does the patient currently have...   Turning over in bed (including adjusting bedclothes, sheets and blankets)? 3   Sitting down on and standing up from a chair with arms (e.g., wheelchair, bedside commode, etc.) 3   Moving from lying on back to sitting on the side of the bed? 3   How much help from another person does the patient currently need...   Moving to and from a bed to a chair (including a wheelchair)? 3   Need to walk in a hospital room? 3   Climbing 3-5 steps with a railing? 2   6 clicks Mobility Score 17   Activity Tolerance   Sitting in Chair NT   Sitting Edge of Bed 10 min total   Standing 6 min total  "  Comments limited 2/2 L hip pain   Short Term Goals    Short Term Goal # 1 Pt will perform STS/functional transfers with FWW and SPV in 6 visits to return to PLOF   Goal Outcome # 1 goal not met   Short Term Goal # 2 Pt will ambulate 150ft with FWW and SPV in 6 visits to ambulate household distances   Goal Outcome # 2 Goal not met   Short Term Goal # 3 Pt will negotiate 5 JEFF with BUE support and SPV in 6 visits to safely enter/exit home   Goal Outcome # 3 Goal not met   Education Group   Education Provided Role of Physical Therapist   Role of Physical Therapist Patient Response Patient;Acceptance;Explanation;Action Demonstration   Additional Comments See further edu in \"other treatments\"   Anticipated Discharge Equipment and Recommendations   DC Equipment Recommendations Unable to determine at this time   Discharge Recommendations Recommend post-acute placement for additional physical therapy services prior to discharge home  (Anticipate will progress to home with HH once pain controlled)   Interdisciplinary Plan of Care Collaboration   IDT Collaboration with  Nursing   Patient Position at End of Therapy In Bed;Bed Alarm On;Call Light within Reach;Tray Table within Reach;Phone within Reach   Collaboration Comments RN updated   Session Information   Date / Session Number  2/21 2 (2/3, 2/24)   Priority 3     "

## 2022-02-23 LAB
GLUCOSE BLD STRIP.AUTO-MCNC: 116 MG/DL (ref 65–99)
GLUCOSE BLD STRIP.AUTO-MCNC: 117 MG/DL (ref 65–99)
GLUCOSE BLD STRIP.AUTO-MCNC: 149 MG/DL (ref 65–99)

## 2022-02-23 PROCEDURE — 99224 PR SUBSEQUENT OBSERVATION CARE,LEVEL I: CPT | Mod: GC | Performed by: HOSPITALIST

## 2022-02-23 PROCEDURE — A9270 NON-COVERED ITEM OR SERVICE: HCPCS | Performed by: STUDENT IN AN ORGANIZED HEALTH CARE EDUCATION/TRAINING PROGRAM

## 2022-02-23 PROCEDURE — 96372 THER/PROPH/DIAG INJ SC/IM: CPT

## 2022-02-23 PROCEDURE — 99204 OFFICE O/P NEW MOD 45 MIN: CPT | Performed by: PHYSICAL MEDICINE & REHABILITATION

## 2022-02-23 PROCEDURE — 700102 HCHG RX REV CODE 250 W/ 637 OVERRIDE(OP): Performed by: STUDENT IN AN ORGANIZED HEALTH CARE EDUCATION/TRAINING PROGRAM

## 2022-02-23 PROCEDURE — 700101 HCHG RX REV CODE 250

## 2022-02-23 PROCEDURE — G0378 HOSPITAL OBSERVATION PER HR: HCPCS

## 2022-02-23 PROCEDURE — 97535 SELF CARE MNGMENT TRAINING: CPT

## 2022-02-23 PROCEDURE — 82962 GLUCOSE BLOOD TEST: CPT | Mod: 91

## 2022-02-23 RX ADMIN — DABIGATRAN ETEXILATE MESYLATE 150 MG: 150 CAPSULE ORAL at 17:10

## 2022-02-23 RX ADMIN — METOPROLOL TARTRATE 12.5 MG: 25 TABLET, FILM COATED ORAL at 05:18

## 2022-02-23 RX ADMIN — ATORVASTATIN CALCIUM 40 MG: 40 TABLET, FILM COATED ORAL at 17:10

## 2022-02-23 RX ADMIN — ACETAMINOPHEN 1000 MG: 500 TABLET ORAL at 05:18

## 2022-02-23 RX ADMIN — LISINOPRIL 10 MG: 10 TABLET ORAL at 05:18

## 2022-02-23 RX ADMIN — LIDOCAINE 1 PATCH: 50 PATCH TOPICAL at 12:06

## 2022-02-23 RX ADMIN — GABAPENTIN 300 MG: 300 CAPSULE ORAL at 19:47

## 2022-02-23 RX ADMIN — LEVOTHYROXINE SODIUM 100 MCG: 0.1 TABLET ORAL at 05:19

## 2022-02-23 RX ADMIN — ACETAMINOPHEN 1000 MG: 500 TABLET ORAL at 12:05

## 2022-02-23 RX ADMIN — Medication 2000 UNITS: at 05:19

## 2022-02-23 RX ADMIN — ACETAMINOPHEN 1000 MG: 500 TABLET ORAL at 22:20

## 2022-02-23 RX ADMIN — DABIGATRAN ETEXILATE MESYLATE 150 MG: 150 CAPSULE ORAL at 05:18

## 2022-02-23 RX ADMIN — DICLOFENAC SODIUM 2 G: 10 GEL TOPICAL at 12:05

## 2022-02-23 RX ADMIN — ASPIRIN 81 MG: 81 TABLET, CHEWABLE ORAL at 05:19

## 2022-02-23 RX ADMIN — DOCUSATE SODIUM 50 MG AND SENNOSIDES 8.6 MG 2 TABLET: 8.6; 5 TABLET, FILM COATED ORAL at 05:18

## 2022-02-23 ASSESSMENT — PAIN DESCRIPTION - PAIN TYPE
TYPE: ACUTE PAIN
TYPE: ACUTE PAIN

## 2022-02-23 ASSESSMENT — COGNITIVE AND FUNCTIONAL STATUS - GENERAL
HELP NEEDED FOR BATHING: A LITTLE
TOILETING: A LITTLE
DRESSING REGULAR LOWER BODY CLOTHING: A LITTLE
DRESSING REGULAR UPPER BODY CLOTHING: A LITTLE
DAILY ACTIVITIY SCORE: 19
PERSONAL GROOMING: A LITTLE
SUGGESTED CMS G CODE MODIFIER DAILY ACTIVITY: CK

## 2022-02-23 ASSESSMENT — ENCOUNTER SYMPTOMS
FEVER: 0
DIZZINESS: 0
COUGH: 0
NAUSEA: 0
CHILLS: 0
MYALGIAS: 0
WEAKNESS: 0
SPUTUM PRODUCTION: 0
EYES NEGATIVE: 1
HEADACHES: 0
SENSORY CHANGE: 0
SHORTNESS OF BREATH: 0
FOCAL WEAKNESS: 0
VOMITING: 0
PALPITATIONS: 0
ABDOMINAL PAIN: 0

## 2022-02-23 NOTE — DISCHARGE PLANNING
Agency/Facility Name: [facility]  Spoke To: [place]  Outcome: Auth is still in process, no updates at this time. [place] to contact this DPA once auth has cleared.

## 2022-02-23 NOTE — THERAPY
"Occupational Therapy  Daily Treatment     Patient Name: Bevelrey Dejesus  Age:  71 y.o., Sex:  female  Medical Record #: [id number]  Today's Date: 2/23/2022     Precautions  Precautions: (P) Fall Risk    Assessment    Pt seen for OT tx session. Pt demo'd good progress towards acute OT goals, donned socks EOB w/ SPV, demo'd toilet txf w/ SBA, toileting w/ SPV, and standing grooming w/ CGA. Pt reports increased pain in hip and fatigue post session. At this time continue to recommend placement as pt has limited activity tolerance and limited social support to assist w/ BADLs. Will continue to follow while in house.     Plan    Continue current treatment plan.    DC Equipment Recommendations: (P) Tub / Shower Seat  Discharge Recommendations: (P) Recommend post-acute placement for additional occupational therapy services prior to discharge home. Pt does not yet have the activity tolerance to care for herself at home.     Subjective    \"I talked myself into doing this last night\"     Objective       02/23/22 1216   Precautions   Precautions Fall Risk   Vitals   O2 (LPM) 0   O2 Delivery Device None - Room Air   Pain 0 - 10 Group   Therapist Pain Assessment Post Activity Pain Same as Prior to Activity;Nurse Notified   Cognition    Cognition / Consciousness WDL   Level of Consciousness Alert   Comments pleasent and cooperative, appeared motivated today   Active ROM Upper Body   Active ROM Upper Body  WDL   Strength Upper Body   Upper Body Strength  WDL   Balance   Sitting Balance (Static) Fair +   Sitting Balance (Dynamic) Fair   Standing Balance (Static) Fair   Standing Balance (Dynamic) Fair -   Weight Shift Sitting Good   Weight Shift Standing Fair   Skilled Intervention Tactile Cuing;Other (See Comments)   Comments w/ FWW, no bailee LOB   Bed Mobility    Supine to Sit Supervised   Sit to Supine   (NT in chair post)   Scooting Supervised   Rolling Supervised   Skilled Intervention Tactile Cuing;Verbal Cuing   Activities of " Daily Living   Grooming Contact Guard Assist;Standing  (combed hair, washed hands)   Upper Body Dressing Supervision   Lower Body Dressing Supervision  (donned socks EOB)   Toileting Supervision  (voided in toilet, would require increased A for pant management)   Skilled Intervention Tactile Cuing;Verbal Cuing;Facilitation   How much help from another person does the patient currently need...   Putting on and taking off regular lower body clothing? 3   Bathing (including washing, rinsing, and drying)? 3   Toileting, which includes using a toilet, bedpan, or urinal? 3   Putting on and taking off regular upper body clothing? 3   Taking care of personal grooming such as brushing teeth? 3   Eating meals? 4   6 Clicks Daily Activity Score 19   Functional Mobility   Sit to Stand Supervised   Bed, Chair, Wheelchair Transfer Standby Assist   Toilet Transfers Standby Assist   Mobility within room and bathroom w/ FWW   Skilled Intervention Tactile Cuing;Verbal Cuing   Activity Tolerance   Sitting in Chair 10+ min (up post)   Sitting Edge of Bed 7 min   Standing 10 min total   Comments improved activity tolerance today   Patient / Family Goals   Patient / Family Goal #1 For her pain to be managed   Goal #1 Outcome Progressing as expected   Short Term Goals   Short Term Goal # 1 Pt will demo LB dressing w/ SPV   Goal Outcome # 1 Goal met   Short Term Goal # 2 Pt will demo standing grooming w/ SPV   Goal Outcome # 2 Progressing as expected   Short Term Goal # 3 Pt will demo toilet txf w/ SPV   Goal Outcome # 3 Progressing as expected   Education Group   Role of Occupational Therapist Patient Response Patient;Acceptance;Explanation;Demonstration;Verbal Demonstration;Action Demonstration   ADL Patient Response Patient;Acceptance;Demonstration;Explanation;Verbal Demonstration;Action Demonstration   Anticipated Discharge Equipment and Recommendations   DC Equipment Recommendations Tub / Shower Seat   Discharge Recommendations  Recommend post-acute placement for additional occupational therapy services prior to discharge home   Interdisciplinary Plan of Care Collaboration   IDT Collaboration with  Nursing   Patient Position at End of Therapy Seated;Chair Alarm On;Tray Table within Reach;Call Light within Reach;Phone within Reach   Collaboration Comments report given   Session Information   Date / Session Number  2/23, 3 (3/3, 2/24)   Priority 2

## 2022-02-23 NOTE — CARE PLAN
Problem: Pain - Standard  Goal: Alleviation of pain or a reduction in pain to the patient’s comfort goal  Outcome: Progressing     Problem: Knowledge Deficit - Standard  Goal: Patient and family/care givers will demonstrate understanding of plan of care, disease process/condition, diagnostic tests and medications  Outcome: Progressing     Problem: Skin Integrity  Goal: Skin integrity is maintained or improved  Outcome: Progressing   The patient is Stable - Low risk of patient condition declining or worsening    Shift Goals  Clinical Goals: pain control, mobilize, dc planning  Patient Goals: pain control, rest    Progress made toward(s) clinical / shift goals:  added lidocaine patch with marginal improvement, patient mobilized with PT    Patient is not progressing towards the following goals:still pending insurance auth for rehab, still self limiting with activity

## 2022-02-23 NOTE — CONSULTS
Physical Medicine and Rehabilitation Consultation              Date of initial consultation: 2/23/2022  Requested by: Audrey Aleman MD  Consulting physician: Satish Nair D.O.  Reason for consultation: assessment of rehabilitation needs  LOS: 0 Day(s)    Chief complaint: left hip pain    This history was prepared after interviewing the patient and reviewing the patient's chart at [facility].    HPI: The patient is a 71 y.o. female with a past medical history of atrial flutter, diabetes mellitus, high cholesterol, hypertension, neuropathy, BMI 45;  who presented on 2/17/2022 12:32 PM with acute onset of left hip pain and found to have atrial fibrillation with RVR with no acute hip pathology. Per documentation she was admitted in the hospital a month prior with sepsis, renal failure, and fall. She was getting out of her chair to get dressed and had acute onset of left hip pain. She denies any trauma to the hip.No new numbness or weakness of the lower extremities.    The patient was found to have the following:  atrial fibrillation with RVR   Left hip x ray negative for acute fracture  CT left hip with no acute process identified    Physiatry was consulted to assess the patient's rehabilitation needs.    Today, patient reports: pain in left leg significantly improved. She reports she has been limited her activity at home since she was first hospitalized in January 2022. She was receiving home health nursing and therapy. She has no assistance at home    Goals for rehabilitation include: improving independence and pain and returning home safely    Social and functional history:  Prior to this hospitalization, patient was independent with ADLS and mobility with an assistive device (single point cane). Patient lives with 4 roommates in a mobile home with 5 stairs to enter. Her hobbies are crafts.    Current function during therapy include:  Restrictions: none  PT: Functional mobility    Cognition    Cognition / Consciousness WDL   Level of Consciousness Alert   Comments Pleasant and cooperative   Other Treatments   Other Treatments Provided Seated cat cows 2x10. Pt reported improved LBP/L hip pain with exercise. Edu to perform 1-2 x/hr, and prior to ambulation   Balance   Sitting Balance (Static) Fair   Sitting Balance (Dynamic) Fair   Standing Balance (Static) Fair   Standing Balance (Dynamic) Fair -   Weight Shift Sitting Fair   Weight Shift Standing Fair   Skilled Intervention Verbal Cuing;Tactile Cuing;Sequencing   Comments w/ FWW   Gait Analysis   Gait Level Of Assist Supervised   Assistive Device Front Wheel Walker   Distance (Feet) 20  (limited 2/2 L hip pain)   # of Times Distance was Traveled 2   Deviation Antalgic;Step To;Bradykinetic;Shuffled Gait   Weight Bearing Status no restrictions   Skilled Intervention Verbal Cuing   Comments Pt reported improved pain with each ambulation bout.   Bed Mobility    Supine to Sit Minimal Assist   Sit to Supine Minimal Assist   Scooting Supervised   Rolling Minimum Assist to Lt.   Skilled Intervention Verbal Cuing;Tactile Cuing;Sequencing   Comments HOB flat. Cues for sequencing of log roll   Functional Mobility   Sit to Stand Supervised   Bed, Chair, Wheelchair Transfer Supervised   Transfer Method Stand Step   Mobility w/ FWW   Skilled Intervention Verbal Cuing   Comments use of momentum for STS   How much difficulty does the patient currently have...   Turning over in bed (including adjusting bedclothes, sheets and blankets)? 3   Sitting down on and standing up from a chair with arms (e.g., wheelchair, bedside commode, etc.) 3   Moving from lying on back to sitting on the side of the bed? 3   How much help from another person does the patient currently need...   Moving to and from a bed to a chair (including a wheelchair)? 3   Need to walk in a hospital room? 3   Climbing 3-5 steps with a railing? 3   6 clicks Mobility Score 18   Activity  Tolerance   Sitting in Chair NT   Sitting Edge of Bed 10 min total   Standing 10 min total   Comments limited 2/2 L hip pain     OT: Activities of daily living  Cognition    Cognition / Consciousness WDL   Level of Consciousness Alert   Comments pleasent and cooperative, appeared motivated today   Active ROM Upper Body   Active ROM Upper Body  WDL   Strength Upper Body   Upper Body Strength  WDL   Balance   Sitting Balance (Static) Fair +   Sitting Balance (Dynamic) Fair   Standing Balance (Static) Fair   Standing Balance (Dynamic) Fair -   Weight Shift Sitting Good   Weight Shift Standing Fair   Skilled Intervention Tactile Cuing;Other (See Comments)   Comments w/ FWW, no bailee LOB   Bed Mobility    Supine to Sit Supervised   Sit to Supine    (NT in chair post)   Scooting Supervised   Rolling Supervised   Skilled Intervention Tactile Cuing;Verbal Cuing   Activities of Daily Living   Grooming Contact Guard Assist;Standing  (combed hair, washed hands)   Upper Body Dressing Supervision   Lower Body Dressing Supervision  (donned socks EOB)   Toileting Supervision  (voided in toilet, would require increased A for pant management)   Skilled Intervention Tactile Cuing;Verbal Cuing;Facilitation   How much help from another person does the patient currently need...   Putting on and taking off regular lower body clothing? 3   Bathing (including washing, rinsing, and drying)? 3   Toileting, which includes using a toilet, bedpan, or urinal? 3   Putting on and taking off regular upper body clothing? 3   Taking care of personal grooming such as brushing teeth? 3   Eating meals? 4   6 Clicks Daily Activity Score 19   Functional Mobility   Sit to Stand Supervised   Bed, Chair, Wheelchair Transfer Standby Assist   Toilet Transfers Standby Assist   Mobility within room and bathroom w/ FWW   Skilled Intervention Tactile Cuing;Verbal Cuing   Activity Tolerance   Sitting in Chair 10+ min (up post)   Sitting Edge of Bed 7 min   Standing  10 min total   Comments improved activity tolerance today     PMH:  Past Medical History:   Diagnosis Date   • Atrial flutter (HCC)    • Diabetes (HCC)    • Hyperlipidemia    • Hypertension    • Neuropathy        PSH:  History reviewed. No pertinent surgical history.    FHX:  History reviewed. No pertinent family history.    Medications:  Current Facility-Administered Medications   Medication Dose   • lidocaine (LIDODERM) 5 % 1 Patch  1 Patch   • insulin GLARGINE (Lantus,Semglee) injection  25 Units   • diclofenac sodium (Voltaren) 1 % gel 2 g  2 g   • insulin regular (HumuLIN R,NovoLIN R) injection  1-6 Units    And   • dextrose 10 % BOLUS 250 mL  250 mL   • vitamin D3 (cholecalciferol) tablet 2,000 Units  2,000 Units   • oxyCODONE immediate release (ROXICODONE) tablet 10 mg  10 mg    Or   • oxyCODONE immediate-release (ROXICODONE) tablet 5 mg  5 mg   • acetaminophen (TYLENOL) tablet 1,000 mg  1,000 mg   • senna-docusate (PERICOLACE or SENOKOT S) 8.6-50 MG per tablet 2 Tablet  2 Tablet    And   • polyethylene glycol/lytes (MIRALAX) PACKET 1 Packet  1 Packet    And   • magnesium hydroxide (MILK OF MAGNESIA) suspension 30 mL  30 mL    And   • bisacodyl (DULCOLAX) suppository 10 mg  10 mg   • labetalol (NORMODYNE/TRANDATE) injection 10 mg  10 mg   • aspirin (ASA) chewable tab 81 mg  81 mg   • atorvastatin (LIPITOR) tablet 40 mg  40 mg   • dabigatran (PRADAXA) capsule 150 mg  150 mg   • furosemide (LASIX) tablet 40 mg  40 mg   • gabapentin (NEURONTIN) capsule 300 mg  300 mg   • levothyroxine (SYNTHROID) tablet 100 mcg  100 mcg   • lisinopril (PRINIVIL) tablet 10 mg  10 mg   • metoprolol tartrate (LOPRESSOR) tablet 12.5 mg  12.5 mg   • Pharmacy Consult Request ...Pain Management Review 1 Each  1 Each       Review of systems:  Constitutional: denies fevers and chills  Eyes: denies change in vision  Ears, nose, mouth, throat: denies sore throat  Cardiovascular: denies palpitations, has history of atrial  "flutter  Respiratory: denies respiratory discomfort  Gastrointestinal: having regular bm  Genitourinary: using pure wick, not incontinent at baseline  Musculoskeletal: admits to pain in left hip that is improving  Integumentary: has chronic leg wounds bilaterally  Neurological: denies spasms, denies burning or shooting pain, denies headaches  Psychiatric: denies change in mood  Endocrine: admits to diabetes mellitus type II, insulin dependent  Hematologic/lymphatic: denies history of blood disorders  Allergic/immunologic: has allergies to medications as listed below    Allergies:  No Known Allergies    Physical Exam:  Vitals: /67   Pulse 69   Temp 36.3 °C (97.4 °F) (Temporal)   Resp 17   Ht 1.626 m (5' 4\")   Wt 120 kg (263 lb 7.2 oz)   SpO2 96%   General: well-groomed in no acute distress, no visitors present  Eyes: no scleral icterus or conjunctival injection  Ears, nose, mouth and throat: moist oral mucosa  Cardiovascular: good peripheral perfusion, no palor  Respiratory: breathing comfortably without use of accessory muscles  Gastrointestinal: soft, nontender, nondistended, increased abdominal adiposity  Genitourinary: no indwelling cardoza, +pure wick  Musculoskeletal: good symmetry in bilateral shoulders, bilateral hip exam performed - tight IT band bilaterally- +[place] test on the left, + tender to palpation of lateral knee  Skin: no wounds seen on exposed skin    Neurologic:  Mental status:  A&Ox4 (person, place, date, situation) answers questions appropriately follows commands  Speech: fluent, no aphasia or dysarthria  Motor:    Upper Extremity  Myotome R L   Shoulder flexion C5 5/5 5/5   Elbow flexion C5 5/5 5/5   Wrist extension C6 5/5 5/5   Elbow extension C7 5/5 5/5   Finger flexion C8 5/5 5/5   Finger abduction T1 5/5 5/5     Lower Extremity Myotome R L   Hip flexion L2 5/5 5/5   Knee extension L3 5/5 5/5   Ankle dorsiflexion L4 5/5 5/5   Toe extension L5 5/5 5/5   Ankle plantarflexion S1 5/5 " 5/5     Sensory:   intact to light touch through out  No clonus at bilateral ankles  Tone: no spasticity noted  Psychiatric: appropriate affect  Hematologic/lymphatic/immunologic: ++IV access, no bruises seen on exposed skin    Labs: Reviewed and significant for   Recent Labs     02/21/22  0404 02/22/22  0902   RBC 4.81 4.57   HEMOGLOBIN 13.5 12.5   HEMATOCRIT 44.2 42.4   PLATELETCT 237 218     Recent Labs     02/21/22  0404 02/22/22  0902   SODIUM 136 140   POTASSIUM 4.4 4.6   CHLORIDE 106 108   CO2 21 22   GLUCOSE 144* 163*   BUN 18 19   CREATININE 0.95 0.88   CALCIUM 9.0 9.1     Recent Results (from the past 24 hour(s))   POCT glucose device results    Collection Time: 02/22/22  5:08 PM   Result Value Ref Range    POC Glucose, Blood 111 (H) 65 - 99 mg/dL         ASSESSMENT:  IMPRESSION:  The patient is a 71 y.o. female with a past medical history of atrial flutter, diabetes mellitus, high cholesterol, hypertension, neuropathy, BMI 42;  who presented on 2/17/2022 12:32 PM with acute onset of left hip pain and found to have atrial fibrillation with RVR with no acute hip pathology    [facility] Code: 0016 - Debility (Non-Cardiac, Non-Pulmonary)  -With acute secondary complications of: impaired ADLs and mobility, tight iliotibial band and tensor fascia salinas (worse on left), left hip pain  -with chronic conditions of:  atrial flutter, diabetes mellitus type II, insulin dependent, high cholesterol, hypertension, neuropathy, obesity class III (BMI 45, 162cm, 120kg)  -and:     Medical Complexity:  Atrial fibrillation with RVR  Hyperglycemia    Data points:  Reviewed results of radiology tests  Reviewed clinical lab tests  Reviewed old records    RECOMMENDATIONS:    ##MSK  #Impaired ADLs and mobility: Agree with continuing OT/PT/ while admitted here.    Agree with discharge to skilled nursing facility.    Will continue to reassess postacute rehabilitation needs    #Musculoskeletal pain  #Bilateral iliotibial band and tensor  fascia salinas tightness (worse on left)  +pelon on left  Educated patient on long extensor stretching  Educated patient on increasing general activity including up to bathroom and chair  Recommend removal of purewick during the day so patient can go to bathroom and chair  Agree with lidocaine patch 5% daily  Recommend heat packs for pain control    ##NEURO  Monitor    ##GI  Last BM: daily, continent  Goal of one continent BM daily    ##  Purewick in place  Recommend removal of purewick during the day so patient can go to bathroom and chair    ##SKIN  #Chronic leg wounds bilateral  Routine wound care    DVT chemoprophlaxis: dabigatran 150mg BID (afib/flutter)  Code: full resuscitation    Thank you for allowing us to participate in the care of this patient. Physiatry will continue to follow and provide recommendations, as needed.    Patient was seen for 56 minutes on unit/floor of which > 50% of time was spent on counseling and coordination of care regarding the above, including prognosis, risk reduction, benefits of treatment, and options for next stage of care.    Satish Nair D.O.   Physical Medicine and Rehabilitation     I have performed a physical exam and reviewed and updated history and plan today (2/23/2022).     Please note that this dictation was created using voice recognition software. I have made every reasonable attempt to correct obvious errors, but there may be errors of grammar and possibly content that I did not discover before finalizing the note.

## 2022-02-23 NOTE — PROGRESS NOTES
Assumed care of patient at bedside report from day RN. Updated on POC. Patient currently A & O x 4; on room air; up with assistance of one person with FWW with complaints of acute pain. Assessment  Completed at this time.  Call light within reach.  Fall precautions in place. Bed locked and in lowest position. All questions answered. No other needs indicated at this time.

## 2022-02-23 NOTE — PROGRESS NOTES
Daily Progress Note:     Date of Service: 2/22/2022  Primary Team: [facility] Team   Attending: Mega Hou M.D.   Senior Resident: Dr. Parsons  Intern: Dr. Hinkle  Contact:  [phone]    Patient ID:  71 year old female with PMH of aflutter on Dabigatran, s/p cardioversion in 07/2021, poorly controlled diabetes (A1C 16.5% in 07/2021, now 9.8% in 01/2022, hypertension, hyperlipidemia, chronic lower extremity wounds presented for hip pain and was admitted on 2/17/22 for acute onset left hip pain and afib with RVR.     Chief Complaint:  Left hip pain     Interval Update/Subjective:   - No acute overnight events  - Pt continues to complain of left hip pain.   - Ordered lidocaine patch  - pending placement    Consultants/Specialty:  - PM&R    Review of Systems:    Review of Systems   Constitutional: Negative for chills, fever and malaise/fatigue.   HENT: Negative.    Eyes: Negative.    Respiratory: Negative for cough, sputum production and shortness of breath.    Cardiovascular: Negative for chest pain, palpitations and leg swelling.   Gastrointestinal: Negative for abdominal pain, nausea and vomiting.   Genitourinary: Negative.    Musculoskeletal: Positive for joint pain (left hip). Negative for myalgias.   Neurological: Negative for dizziness, sensory change, focal weakness, weakness and headaches.     Objective Data:   Physical Exam:   Vitals:   Temp:  [36.1 °C (97 °F)-36.6 °C (97.9 °F)] 36.4 °C (97.6 °F)  Pulse:  [60-81] 65  Resp:  [16] 16  BP: (105-121)/(53-75) 109/66  SpO2:  [93 %-97 %] 93 %    Physical Exam  Vitals and nursing note reviewed.   Constitutional:       General: She is not in acute distress.     Appearance: She is obese. She is not ill-appearing or toxic-appearing.   HENT:      Head: Normocephalic and atraumatic.      Right Ear: External ear normal.      Left Ear: External ear normal.      Mouth/Throat:      Mouth: Mucous membranes are dry.      Pharynx: Oropharynx is clear.   Eyes:      General:  No scleral icterus.     Extraocular Movements: Extraocular movements intact.      Conjunctiva/sclera: Conjunctivae normal.   Cardiovascular:      Rate and Rhythm: Normal rate. Rhythm irregular.      Heart sounds: No murmur heard.    No friction rub. No gallop.   Pulmonary:      Effort: Pulmonary effort is normal.      Breath sounds: No wheezing, rhonchi or rales.   Abdominal:      General: Bowel sounds are normal. There is no distension.      Palpations: Abdomen is soft.      Tenderness: There is no abdominal tenderness. There is no guarding or rebound.   Musculoskeletal:         General: No swelling or tenderness.      Right lower leg: No edema.      Left lower leg: No edema.      Comments: pain elicited on deep palpation of left trochanter just inferior of left iliac crest   Skin:     Comments: Bilateral lower extremity skin wounds do not appear overtly infected. dry and scabbed. Dressing in place   Neurological:      General: No focal deficit present.      Mental Status: She is alert and oriented to person, place, and time.   Psychiatric:         Mood and Affect: Mood normal.         Behavior: Behavior normal.         Labs:   Recent Results (from the past 24 hour(s))   POCT glucose device results    Collection Time: 02/22/22  7:53 AM   Result Value Ref Range    Glucose - Accu-Ck 113 (H) 65 - 99 mg/dL   CBC WITH DIFFERENTIAL    Collection Time: 02/22/22  9:02 AM   Result Value Ref Range    WBC 5.8 4.8 - 10.8 K/uL    RBC 4.57 4.20 - 5.40 M/uL    Hemoglobin 12.5 12.0 - 16.0 g/dL    Hematocrit 42.4 37.0 - 47.0 %    MCV 92.8 81.4 - 97.8 fL    MCH 27.4 27.0 - 33.0 pg    MCHC 29.5 (L) 33.6 - 35.0 g/dL    RDW 53.5 (H) 35.9 - 50.0 fL    Platelet Count 218 164 - 446 K/uL    MPV 9.8 9.0 - 12.9 fL    Neutrophils-Polys 52.10 44.00 - 72.00 %    Lymphocytes 35.80 22.00 - 41.00 %    Monocytes 8.10 0.00 - 13.40 %    Eosinophils 2.80 0.00 - 6.90 %    Basophils 0.50 0.00 - 1.80 %    Immature Granulocytes 0.70 0.00 - 0.90 %     Nucleated RBC 0.00 /100 WBC    Neutrophils (Absolute) 3.03 2.00 - 7.15 K/uL    Lymphs (Absolute) 2.08 1.00 - 4.80 K/uL    Monos (Absolute) 0.47 0.00 - 0.85 K/uL    Eos (Absolute) 0.16 0.00 - 0.51 K/uL    Baso (Absolute) 0.03 0.00 - 0.12 K/uL    Immature Granulocytes (abs) 0.04 0.00 - 0.11 K/uL    NRBC (Absolute) 0.00 K/uL   Comp Metabolic Panel    Collection Time: 02/22/22  9:02 AM   Result Value Ref Range    Sodium 140 135 - 145 mmol/L    Potassium 4.6 3.6 - 5.5 mmol/L    Chloride 108 96 - 112 mmol/L    Co2 22 20 - 33 mmol/L    Anion Gap 10.0 7.0 - 16.0    Glucose 163 (H) 65 - 99 mg/dL    Bun 19 8 - 22 mg/dL    Creatinine 0.88 0.50 - 1.40 mg/dL    Calcium 9.1 8.5 - 10.5 mg/dL    AST(SGOT) 17 12 - 45 U/L    ALT(SGPT) 8 2 - 50 U/L    Alkaline Phosphatase 111 (H) 30 - 99 U/L    Total Bilirubin 0.7 0.1 - 1.5 mg/dL    Albumin 3.1 (L) 3.2 - 4.9 g/dL    Total Protein 6.4 6.0 - 8.2 g/dL    Globulin 3.3 1.9 - 3.5 g/dL    A-G Ratio 0.9 g/dL   MAGNESIUM    Collection Time: 02/22/22  9:02 AM   Result Value Ref Range    Magnesium 1.9 1.5 - 2.5 mg/dL   PHOSPHORUS    Collection Time: 02/22/22  9:02 AM   Result Value Ref Range    Phosphorus 3.5 2.5 - 4.5 mg/dL   ESTIMATED GFR    Collection Time: 02/22/22  9:02 AM   Result Value Ref Range    GFR If African American >60 >60 mL/min/1.73 m 2    GFR If Non African American >60 >60 mL/min/1.73 m 2   POCT glucose device results    Collection Time: 02/22/22 11:38 AM   Result Value Ref Range    POC Glucose, Blood 163 (H) 65 - 99 mg/dL       Imaging:   Independant Imaging Review: Completed  CT-HIP WITH LEFT   Final Result      No CT explanation for sudden onset right hip pain. If concern exists for occult hip fracture, MRI is more sensitive      Mild bilateral hip osteoarthritis.      Fatty umbilical hernia      Hysterectomy      Severe lumbosacral junction degenerative disc disease      DX-CHEST-PORTABLE (1 VIEW)   Final Result      No acute cardiopulmonary process is seen.       DX-HIP-COMPLETE - UNILATERAL 2+ LEFT   Final Result      1.  No radiographic evidence of acute traumatic injury.          Assessment/Plan:  Ms. Dejesus 72 yo with a PMHx of aflutter, HTN, HLD, DM with neuropathy admitted on 2/17/2022 for acute left hip pain.  Pt denies any recent falls or injury to left hip.  Left X-ray hip showed no evidence of acute traumatic injury to the left hip. PT/OT recommend SNF placement and shower chair.  Injection is being considered as a possible therapeutic intervention which can be achieved in the outpatient setting.    * Hip pain, acute, left- (present on admission)- improving  Assessment & Plan  *Patient presents with a primary problem of acute onset intractable left hip pain after standing from a chair, she denies any trauma or history of chronic hip pain.   X-ray on admission was negative for acute fracture or other pathology  - CT hip obtained showing no CT explanation of sudden hip pain, there is severe lumbosacral junction degenerative disc disease.  - pain improved this AM, nearly subsided. Likely musculoskeletal etiology for pain vs bursitis.  - will schedule topical voltaren gel and oxycodone PRN for pain control, if not improve, will consider PMR consult for possible steroid infection.  - If pain persists/worsen, will consider further imaging with MRI   - PT/OT consulted, placed SNF referral.  - Fall precautions     Atrial flutter with rapid ventricular response (HCC)- (present on admission)- resolved  Atrial fibrillation  Assessment & Plan  *Patient s/p cardioversion in July 2021 where rhythm converted to NSR but was not discharged home on metoprolol. Presented this admission with atrial flutter with rapid ventricular response, given 1 dose of cardizem in ED with improvement in HR.  - telemetry  - Continue Metoprolol 12.5mg BID, adjust as needed  - now in afib with HR   - continue Dabigatran     Primary hypertension- (present on admission)  Assessment & Plan      *BP  controlled currently  -Continue home Lisinopril 10mg daily     Type 2 diabetes mellitus with hyperglycemia, with long-term current use of insulin (HCC)- (present on admission)  Assessment & Plan  *History of type 2 diabetes, poorly controlled. Previously A1C 16.5% in 07/2021, now 9.8% in 1/2022. Lipid panel 2/18/22: , , HDL 40, LDL 63)  - decreased insulin glargine 35U -> 25U at bedtime with SSI  - hypoglycemia protocol  - held home empagliflozin  - diabetic diet  - home gabapentin, atorvastatin, and aspirin.      Vitamin D deficiency  Assessment & Plan  *25 hydroxy Vit D 19 on 1/9/2022  - started on vitamin D3 2000U daily     Hypothyroidism  Assessment & Plan  *TSH 2.99/T4 0.97 on 1/6/22  - continue levothyroxine 100mcg daily    Chronic lower extremities wound  Assessment & Plan  - does not look infected at this time  - wound care following    Core Measures:  Lines: pIV  Diet: Diabetic  DVT Ppx: dabigatran  GI Ppx: Bowel prep  Disposition: Hosipital day 5

## 2022-02-23 NOTE — DISCHARGE PLANNING
0912: Pending physiatry consult. Per CM notes, patient has been accepted by [facility], [place], pending insurance auth. [facility] will follow.    1517: Physiatry recommending SNF placement. [facility] will no longer follow, please call with any questions [phone].

## 2022-02-23 NOTE — CARE PLAN
The patient is Stable - Low risk of patient condition declining or worsening    Shift Goals  Clinical Goals: manage pain, rest  Patient Goals: sleep  Family Goals: NA    Progress made toward(s) clinical / shift goals:    Problem: Pain - Standard  Goal: Alleviation of pain or a reduction in pain to the patient’s comfort goal  Outcome: Progressing     Problem: Knowledge Deficit - Standard  Goal: Patient and family/care givers will demonstrate understanding of plan of care, disease process/condition, diagnostic tests and medications  Outcome: Progressing     Problem: Skin Integrity  Goal: Skin integrity is maintained or improved  Outcome: Progressing     Problem: Fall Risk  Goal: Patient will remain free from falls  Outcome: Progressing     Pt updated on plan of care. Pt encouraged to ask questions and questions were answered. Call light within reach. Pt reminded to use call light whenever needing assistance.  Daily wound care completed. Pt medicated for pain per MAR. Will continue to monitor pain and medicate as needed.

## 2022-02-23 NOTE — THERAPY
"Occupational Therapy Contact Note:     02/23/22 1132   Interdisciplinary Plan of Care Collaboration   Collaboration Comments Attempted to see pt for OT tx session, extensive time ed/training pt on pathology of bedrest and importance of mobility in regards to ADL participation. Pt refusing to get OOOB/EOB, stated she has to pee, refused commode, reports she \"wants to die\" and \"wants to be sedated\" RN notified.       Emily Don, OTR/L  "

## 2022-02-24 LAB
ANION GAP SERPL CALC-SCNC: 11 MMOL/L (ref 7–16)
ANISOCYTOSIS BLD QL SMEAR: ABNORMAL
BASOPHILS # BLD AUTO: 0.3 % (ref 0–1.8)
BASOPHILS # BLD: 0.02 K/UL (ref 0–0.12)
BUN SERPL-MCNC: 17 MG/DL (ref 8–22)
CALCIUM SERPL-MCNC: 8.8 MG/DL (ref 8.5–10.5)
CHLORIDE SERPL-SCNC: 107 MMOL/L (ref 96–112)
CO2 SERPL-SCNC: 21 MMOL/L (ref 20–33)
COMMENT 1642: NORMAL
CREAT SERPL-MCNC: 0.88 MG/DL (ref 0.5–1.4)
EOSINOPHIL # BLD AUTO: 0.2 K/UL (ref 0–0.51)
EOSINOPHIL NFR BLD: 3.4 % (ref 0–6.9)
ERYTHROCYTE [DISTWIDTH] IN BLOOD BY AUTOMATED COUNT: 51.9 FL (ref 35.9–50)
GLUCOSE BLD STRIP.AUTO-MCNC: 110 MG/DL (ref 65–99)
GLUCOSE BLD STRIP.AUTO-MCNC: 138 MG/DL (ref 65–99)
GLUCOSE BLD STRIP.AUTO-MCNC: 141 MG/DL (ref 65–99)
GLUCOSE BLD STRIP.AUTO-MCNC: 97 MG/DL (ref 65–99)
GLUCOSE SERPL-MCNC: 118 MG/DL (ref 65–99)
HCT VFR BLD AUTO: 40.4 % (ref 37–47)
HGB BLD-MCNC: 12 G/DL (ref 12–16)
IMM GRANULOCYTES # BLD AUTO: 0.05 K/UL (ref 0–0.11)
IMM GRANULOCYTES NFR BLD AUTO: 0.8 % (ref 0–0.9)
LYMPHOCYTES # BLD AUTO: 2.61 K/UL (ref 1–4.8)
LYMPHOCYTES NFR BLD: 44.3 % (ref 22–41)
MACROCYTES BLD QL SMEAR: ABNORMAL
MAGNESIUM SERPL-MCNC: 2 MG/DL (ref 1.5–2.5)
MCH RBC QN AUTO: 27.1 PG (ref 27–33)
MCHC RBC AUTO-ENTMCNC: 29.7 G/DL (ref 33.6–35)
MCV RBC AUTO: 91.2 FL (ref 81.4–97.8)
MICROCYTES BLD QL SMEAR: ABNORMAL
MONOCYTES # BLD AUTO: 0.44 K/UL (ref 0–0.85)
MONOCYTES NFR BLD AUTO: 7.5 % (ref 0–13.4)
MORPHOLOGY BLD-IMP: NORMAL
NEUTROPHILS # BLD AUTO: 2.57 K/UL (ref 2–7.15)
NEUTROPHILS NFR BLD: 43.7 % (ref 44–72)
NRBC # BLD AUTO: 0 K/UL
NRBC BLD-RTO: 0 /100 WBC
OVALOCYTES BLD QL SMEAR: NORMAL
PLATELET # BLD AUTO: 228 K/UL (ref 164–446)
PLATELET BLD QL SMEAR: NORMAL
PMV BLD AUTO: 10.1 FL (ref 9–12.9)
POIKILOCYTOSIS BLD QL SMEAR: NORMAL
POTASSIUM SERPL-SCNC: 4.1 MMOL/L (ref 3.6–5.5)
RBC # BLD AUTO: 4.43 M/UL (ref 4.2–5.4)
RBC BLD AUTO: PRESENT
SODIUM SERPL-SCNC: 139 MMOL/L (ref 135–145)
WBC # BLD AUTO: 5.9 K/UL (ref 4.8–10.8)

## 2022-02-24 PROCEDURE — 80048 BASIC METABOLIC PNL TOTAL CA: CPT

## 2022-02-24 PROCEDURE — G0378 HOSPITAL OBSERVATION PER HR: HCPCS

## 2022-02-24 PROCEDURE — A9270 NON-COVERED ITEM OR SERVICE: HCPCS | Performed by: STUDENT IN AN ORGANIZED HEALTH CARE EDUCATION/TRAINING PROGRAM

## 2022-02-24 PROCEDURE — 99224 PR SUBSEQUENT OBSERVATION CARE,LEVEL I: CPT | Mod: GC | Performed by: HOSPITALIST

## 2022-02-24 PROCEDURE — 700101 HCHG RX REV CODE 250

## 2022-02-24 PROCEDURE — 85025 COMPLETE CBC W/AUTO DIFF WBC: CPT

## 2022-02-24 PROCEDURE — 700102 HCHG RX REV CODE 250 W/ 637 OVERRIDE(OP): Performed by: STUDENT IN AN ORGANIZED HEALTH CARE EDUCATION/TRAINING PROGRAM

## 2022-02-24 PROCEDURE — 96372 THER/PROPH/DIAG INJ SC/IM: CPT

## 2022-02-24 PROCEDURE — 82962 GLUCOSE BLOOD TEST: CPT | Mod: 91

## 2022-02-24 PROCEDURE — 36415 COLL VENOUS BLD VENIPUNCTURE: CPT

## 2022-02-24 PROCEDURE — 83735 ASSAY OF MAGNESIUM: CPT

## 2022-02-24 RX ADMIN — DICLOFENAC SODIUM 2 G: 10 GEL TOPICAL at 20:08

## 2022-02-24 RX ADMIN — DICLOFENAC SODIUM 2 G: 10 GEL TOPICAL at 07:53

## 2022-02-24 RX ADMIN — ASPIRIN 81 MG: 81 TABLET, CHEWABLE ORAL at 04:06

## 2022-02-24 RX ADMIN — DABIGATRAN ETEXILATE MESYLATE 150 MG: 150 CAPSULE ORAL at 04:06

## 2022-02-24 RX ADMIN — DICLOFENAC SODIUM 2 G: 10 GEL TOPICAL at 00:33

## 2022-02-24 RX ADMIN — LISINOPRIL 10 MG: 10 TABLET ORAL at 04:07

## 2022-02-24 RX ADMIN — LIDOCAINE 1 PATCH: 50 PATCH TOPICAL at 11:29

## 2022-02-24 RX ADMIN — METOPROLOL TARTRATE 12.5 MG: 25 TABLET, FILM COATED ORAL at 16:56

## 2022-02-24 RX ADMIN — ACETAMINOPHEN 1000 MG: 500 TABLET ORAL at 14:25

## 2022-02-24 RX ADMIN — Medication 2000 UNITS: at 04:08

## 2022-02-24 RX ADMIN — DICLOFENAC SODIUM 2 G: 10 GEL TOPICAL at 14:25

## 2022-02-24 RX ADMIN — LEVOTHYROXINE SODIUM 100 MCG: 0.1 TABLET ORAL at 04:07

## 2022-02-24 RX ADMIN — ACETAMINOPHEN 1000 MG: 500 TABLET ORAL at 20:08

## 2022-02-24 RX ADMIN — ACETAMINOPHEN 1000 MG: 500 TABLET ORAL at 05:47

## 2022-02-24 RX ADMIN — DOCUSATE SODIUM 50 MG AND SENNOSIDES 8.6 MG 2 TABLET: 8.6; 5 TABLET, FILM COATED ORAL at 16:56

## 2022-02-24 RX ADMIN — GABAPENTIN 300 MG: 300 CAPSULE ORAL at 20:08

## 2022-02-24 RX ADMIN — ATORVASTATIN CALCIUM 40 MG: 40 TABLET, FILM COATED ORAL at 16:56

## 2022-02-24 RX ADMIN — METOPROLOL TARTRATE 12.5 MG: 25 TABLET, FILM COATED ORAL at 04:08

## 2022-02-24 RX ADMIN — DABIGATRAN ETEXILATE MESYLATE 150 MG: 150 CAPSULE ORAL at 16:56

## 2022-02-24 ASSESSMENT — ENCOUNTER SYMPTOMS
COUGH: 0
DIZZINESS: 0
PALPITATIONS: 0
VOMITING: 0
SPUTUM PRODUCTION: 0
ABDOMINAL PAIN: 0
CHILLS: 0
HEADACHES: 0
FOCAL WEAKNESS: 0
MYALGIAS: 0
FEVER: 0
WEAKNESS: 0
EYES NEGATIVE: 1
NAUSEA: 0
SENSORY CHANGE: 0
SHORTNESS OF BREATH: 0

## 2022-02-24 ASSESSMENT — PAIN DESCRIPTION - PAIN TYPE
TYPE: ACUTE PAIN
TYPE: ACUTE PAIN

## 2022-02-24 ASSESSMENT — FIBROSIS 4 INDEX: FIB4 SCORE: 1.9

## 2022-02-24 NOTE — CARE PLAN
The patient is Stable - Low risk of patient condition declining or worsening    Shift Goals  Clinical Goals: Pain control; ambulation  Patient Goals: Rest  Family Goals: NA    Progress made toward(s) clinical / shift goals:      Problem: Pain - Standard  Goal: Alleviation of pain or a reduction in pain to the patient’s comfort goal  Outcome: Progressing  Note: Patient reports left hip pain of 1/10 and got up to the chair with minimal assistance. Utilizing 0-10 scale.     Problem: Knowledge Deficit - Standard  Goal: Patient and family/care givers will demonstrate understanding of plan of care, disease process/condition, diagnostic tests and medications  Outcome: Progressing  Note: Patient verbally demonstrates understanding of POC and disease process     Problem: Skin Integrity  Goal: Skin integrity is maintained or improved  Outcome: Progressing  Note: Patient turns self in bed and is up multiple time a day mobilizing to the restroom and up to the chair for all meals     Problem: Fall Risk  Goal: Patient will remain free from falls  Outcome: Progressing  Note: All fall precautions in place and patient educated to call before ambulating

## 2022-02-24 NOTE — PROGRESS NOTES
Daily Progress Note:     Date of Service: 2/24/2022  Primary Team: [facility] Team   Attending: Mega Hou M.D.   Senior Resident: Dr. Parsons  Intern: Dr. Hinkle  Contact:  [phone]    Patient ID:  71 year old female with PMH of aflutter on Dabigatran, s/p cardioversion in 07/2021, poorly controlled diabetes (A1C 16.5% in 07/2021, now 9.8% in 01/2022, hypertension, hyperlipidemia, chronic lower extremity wounds presented for hip pain and was admitted on 2/17/22 for acute onset left hip pain and afib with RVR.     Chief Complaint:  Left hip pain     Subjective:   No acute overnight events.  No acute complaints.  Has been ambulating to and from the bathroom with no problem.  Has been performing extensor stretches as instructed by PM&R with no issues.    Interval Update:  Pending SNF placement.    Consultants/Specialty:  PM&R    Review of Systems:    Review of Systems   Constitutional: Negative for chills, fever and malaise/fatigue.   HENT: Negative.    Eyes: Negative.    Respiratory: Negative for cough, sputum production and shortness of breath.    Cardiovascular: Negative for chest pain, palpitations and leg swelling.   Gastrointestinal: Negative for abdominal pain, nausea and vomiting.   Genitourinary: Negative.    Musculoskeletal: Positive for joint pain (left hip). Negative for myalgias.   Neurological: Negative for dizziness, sensory change, focal weakness, weakness and headaches.     Objective Data:   Physical Exam:   Vitals:   Temp:  [36.2 °C (97.2 °F)-36.9 °C (98.4 °F)] 36.4 °C (97.6 °F)  Pulse:  [] 59  Resp:  [18] 18  BP: ()/(59-64) 112/61  SpO2:  [93 %-97 %] 97 %    Physical Exam  Vitals and nursing note reviewed.   Constitutional:       General: She is not in acute distress.     Appearance: She is obese. She is not ill-appearing or toxic-appearing.   HENT:      Head: Normocephalic and atraumatic.      Right Ear: External ear normal.      Left Ear: External ear normal.      Mouth/Throat:       Mouth: Mucous membranes are dry.      Pharynx: Oropharynx is clear.   Eyes:      General: No scleral icterus.     Extraocular Movements: Extraocular movements intact.      Conjunctiva/sclera: Conjunctivae normal.   Cardiovascular:      Rate and Rhythm: Normal rate. Rhythm irregular.      Heart sounds: No murmur heard.    No friction rub. No gallop.   Pulmonary:      Effort: Pulmonary effort is normal.      Breath sounds: No wheezing, rhonchi or rales.   Abdominal:      General: Bowel sounds are normal. There is no distension.      Palpations: Abdomen is soft.      Tenderness: There is no abdominal tenderness. There is no guarding or rebound.   Musculoskeletal:         General: No swelling or tenderness.      Right lower leg: No edema.      Left lower leg: No edema.      Comments: pain elicited on deep palpation of left trochanter just inferior of left iliac crest   Skin:     Comments: Bilateral lower extremity skin wounds do not appear overtly infected. dry and scabbed. Dressing in place   Neurological:      General: No focal deficit present.      Mental Status: She is alert and oriented to person, place, and time.   Psychiatric:         Mood and Affect: Mood normal.         Behavior: Behavior normal.         Assessment/Plan:  Ms. Dejesus 72 yo with a PMHx of aflutter, HTN, HLD, DM with neuropathy admitted on 2/17/2022 for acute left hip pain.  Pt denies any recent falls or injury to left hip.  Left X-ray hip showed no evidence of acute traumatic injury to the left hip. PT/OT recommend SNF placement with additional PT/OT services. OT recommends tub/shower seat. shower chair.  Injection is being considered as a possible therapeutic intervention which can be achieved in the outpatient setting.  PM&R saw patient and recommend SNF placement with PT/OT. Pending placement at [facility].    * Hip pain, acute, left- (present on admission)- improving  Assessment & Plan  *Patient presents with a primary problem of  acute onset intractable left hip pain after standing from a chair, she denies any trauma or history of chronic hip pain.   X-ray on admission was negative for acute fracture or other pathology. PM&R saw patient.  - CT hip obtained showing no CT explanation of sudden hip pain, there is severe lumbosacral junction degenerative disc disease.  - pain improved this AM, nearly subsided. Likely musculoskeletal etiology for pain vs bursitis.  - will schedule topical voltaren gel and oxycodone PRN for pain control, if not improve, will consider PMR consult for possible steroid infection.  - If pain persists/worsen, will consider further imaging with MRI   - PT/OT consulted, placed SNF referral.  - Fall precautions  - PM&R recommend lidocaine patch for pain control, increase activity level from bed to chair as tolerated, long extensor stretching and d/c to SNF.     Atrial flutter with rapid ventricular response (HCC)- (present on admission)- resolved  Atrial fibrillation  Assessment & Plan  *Patient s/p cardioversion in July 2021 where rhythm converted to NSR but was not discharged home on metoprolol. Presented this admission with atrial flutter with rapid ventricular response, given 1 dose of cardizem in ED with improvement in HR.  - telemetry  - Continue Metoprolol 12.5mg BID, adjust as needed  - now in afib with HR   - continue Dabigatran     Primary hypertension- (present on admission)  Assessment & Plan      *BP controlled currently  -Continue home Lisinopril 10mg daily     Type 2 diabetes mellitus with hyperglycemia, with long-term current use of insulin (HCC)- (present on admission)  Assessment & Plan  *History of type 2 diabetes, poorly controlled. Previously A1C 16.5% in 07/2021, now 9.8% in 1/2022. Lipid panel 2/18/22: , , HDL 40, LDL 63)  - decreased insulin glargine 35U -> 25U at bedtime with SSI  - hypoglycemia protocol  - held home empagliflozin  - diabetic diet  - home gabapentin,  atorvastatin, and aspirin.      Vitamin D deficiency  Assessment & Plan  *25 hydroxy Vit D 19 on 1/9/2022  - started on vitamin D3 2000U daily     Hypothyroidism  Assessment & Plan  *TSH 2.99/T4 0.97 on 1/6/22  - continue levothyroxine 100mcg daily    Chronic lower extremities wound  Assessment & Plan  - does not look infected at this time  - wound care following    Core Measures:  Lines: pIV  Diet: Diabetic  DVT Ppx: dabigatran  GI Ppx: Bowel prep  Disposition: Hosipital day 5

## 2022-02-24 NOTE — PROGRESS NOTES
Assumed care of patient, received bedside report from NOC RN. Patient is A&O X 4. Pain 1/10, left hip, patient up to chair. Vital signs stable overnight, on RA satting 97%. Patient is medical. POC discussed with patient and she verbalized understanding. Call light within reach and fall precautions in place. Bed locked and in lowest position.

## 2022-02-24 NOTE — CARE PLAN
The patient is Stable - Low risk of patient condition declining or worsening    Shift Goals  Clinical Goals: Pain control  Patient Goals: sleep  Family Goals: NA    Progress made toward(s) clinical / shift goals:  Patient updated on POC, all questions answered. Patient has remained free from falls. All fall precautions in place: non-slip socks, bed locked and in lowest position, bed alarm on, call light within reach. Pt has no complaints of pain.      Problem: Pain - Standard  Goal: Alleviation of pain or a reduction in pain to the patient’s comfort goal  Outcome: Progressing     Problem: Knowledge Deficit - Standard  Goal: Patient and family/care givers will demonstrate understanding of plan of care, disease process/condition, diagnostic tests and medications  Outcome: Progressing     Problem: Skin Integrity  Goal: Skin integrity is maintained or improved  Outcome: Progressing     Problem: Fall Risk  Goal: Patient will remain free from falls  Outcome: Progressing

## 2022-02-24 NOTE — CARE PLAN
Problem: Pain - Standard  Goal: Alleviation of pain or a reduction in pain to the patient’s comfort goal  Outcome: Progressing     Problem: Knowledge Deficit - Standard  Goal: Patient and family/care givers will demonstrate understanding of plan of care, disease process/condition, diagnostic tests and medications  Outcome: Progressing

## 2022-02-24 NOTE — DISCHARGE PLANNING
Agency/Facility Name: [facility]  Outcome: DPA left v-mail regarding auth status with request for callback.     0930-  Agency/Facility Name: [facility]  Outcome: DPA left v-mail for [facility] on alternate line with request for callback.

## 2022-02-24 NOTE — PROGRESS NOTES
Daily Progress Note:     Date of Service: 2/23/2022  Primary Team: [facility] Team   Attending: Mega Huo M.D.   Senior Resident: Dr. Parsons  Intern: Dr. Hinkle  Contact:  [phone]    Patient ID:  71 year old female with PMH of aflutter on Dabigatran, s/p cardioversion in 07/2021, poorly controlled diabetes (A1C 16.5% in 07/2021, now 9.8% in 01/2022, hypertension, hyperlipidemia, chronic lower extremity wounds presented for hip pain and was admitted on 2/17/22 for acute onset left hip pain and afib with RVR.     Chief Complaint:  Left hip pain     Subjective:   No acute overnight events.  Patient states lidocaine patch improved her L hip pain.    Interval Update:  PM&R saw patient.  PM&R recommends discharge to SNF and will reassess postacute rehabilitation needs.  Recommend extensor stretching and increasing activity to bathroom to chair. Pain control with lidocaine patch.  OT recommends tub/shower seat. shower chair.    Consultants/Specialty:  - PM&R    Review of Systems:    Review of Systems   Constitutional: Negative for chills, fever and malaise/fatigue.   HENT: Negative.    Eyes: Negative.    Respiratory: Negative for cough, sputum production and shortness of breath.    Cardiovascular: Negative for chest pain, palpitations and leg swelling.   Gastrointestinal: Negative for abdominal pain, nausea and vomiting.   Genitourinary: Negative.    Musculoskeletal: Positive for joint pain (left hip). Negative for myalgias.   Neurological: Negative for dizziness, sensory change, focal weakness, weakness and headaches.     Objective Data:   Physical Exam:   Vitals:   Temp:  [36.2 °C (97.2 °F)-36.8 °C (98.2 °F)] 36.2 °C (97.2 °F)  Pulse:  [] 110  Resp:  [16-18] 18  BP: ()/(64-84) 97/64  SpO2:  [96 %] 96 %    Physical Exam  Vitals and nursing note reviewed.   Constitutional:       General: She is not in acute distress.     Appearance: She is obese. She is not ill-appearing or toxic-appearing.   HENT:       Head: Normocephalic and atraumatic.      Right Ear: External ear normal.      Left Ear: External ear normal.      Mouth/Throat:      Mouth: Mucous membranes are dry.      Pharynx: Oropharynx is clear.   Eyes:      General: No scleral icterus.     Extraocular Movements: Extraocular movements intact.      Conjunctiva/sclera: Conjunctivae normal.   Cardiovascular:      Rate and Rhythm: Normal rate. Rhythm irregular.      Heart sounds: No murmur heard.    No friction rub. No gallop.   Pulmonary:      Effort: Pulmonary effort is normal.      Breath sounds: No wheezing, rhonchi or rales.   Abdominal:      General: Bowel sounds are normal. There is no distension.      Palpations: Abdomen is soft.      Tenderness: There is no abdominal tenderness. There is no guarding or rebound.   Musculoskeletal:         General: No swelling or tenderness.      Right lower leg: No edema.      Left lower leg: No edema.      Comments: pain elicited on deep palpation of left trochanter just inferior of left iliac crest   Skin:     Comments: Bilateral lower extremity skin wounds do not appear overtly infected. dry and scabbed. Dressing in place   Neurological:      General: No focal deficit present.      Mental Status: She is alert and oriented to person, place, and time.   Psychiatric:         Mood and Affect: Mood normal.         Behavior: Behavior normal.         Assessment/Plan:  Ms. Dejesus 72 yo with a PMHx of aflutter, HTN, HLD, DM with neuropathy admitted on 2/17/2022 for acute left hip pain.  Pt denies any recent falls or injury to left hip.  Left X-ray hip showed no evidence of acute traumatic injury to the left hip. PT/OT recommend SNF placement with additional PT/OT services. OT recommends tub/shower seat. shower chair.  Injection is being considered as a possible therapeutic intervention which can be achieved in the outpatient setting.  PM&R saw patient and recommend SNF placement with PT/OT. Pending placement at [facility].    * Hip pain, acute, left- (present on admission)- improving  Assessment & Plan  *Patient presents with a primary problem of acute onset intractable left hip pain after standing from a chair, she denies any trauma or history of chronic hip pain.   X-ray on admission was negative for acute fracture or other pathology. PM&R saw patient.  - CT hip obtained showing no CT explanation of sudden hip pain, there is severe lumbosacral junction degenerative disc disease.  - pain improved this AM, nearly subsided. Likely musculoskeletal etiology for pain vs bursitis.  - will schedule topical voltaren gel and oxycodone PRN for pain control, if not improve, will consider PMR consult for possible steroid infection.  - If pain persists/worsen, will consider further imaging with MRI   - PT/OT consulted, placed SNF referral.  - Fall precautions  - PM&R recommend lidocaine patch for pain control, increase activity level from bed to chair as tolerated, long extensor stretching and d/c to SNF.     Atrial flutter with rapid ventricular response (HCC)- (present on admission)- resolved  Atrial fibrillation  Assessment & Plan  *Patient s/p cardioversion in July 2021 where rhythm converted to NSR but was not discharged home on metoprolol. Presented this admission with atrial flutter with rapid ventricular response, given 1 dose of cardizem in ED with improvement in HR.  - telemetry  - Continue Metoprolol 12.5mg BID, adjust as needed  - now in afib with HR   - continue Dabigatran     Primary hypertension- (present on admission)  Assessment & Plan      *BP controlled currently  -Continue home Lisinopril 10mg daily     Type 2 diabetes mellitus with hyperglycemia, with long-term current use of insulin (HCC)- (present on admission)  Assessment & Plan  *History of type 2 diabetes, poorly controlled. Previously A1C 16.5% in 07/2021, now 9.8% in 1/2022. Lipid panel 2/18/22: , , HDL 40, LDL 63)  - decreased insulin glargine  35U -> 25U at bedtime with SSI  - hypoglycemia protocol  - held home empagliflozin  - diabetic diet  - home gabapentin, atorvastatin, and aspirin.      Vitamin D deficiency  Assessment & Plan  *25 hydroxy Vit D 19 on 1/9/2022  - started on vitamin D3 2000U daily     Hypothyroidism  Assessment & Plan  *TSH 2.99/T4 0.97 on 1/6/22  - continue levothyroxine 100mcg daily    Chronic lower extremities wound  Assessment & Plan  - does not look infected at this time  - wound care following    Core Measures:  Lines: pIV  Diet: Diabetic  DVT Ppx: dabigatran  GI Ppx: Bowel prep  Disposition: Hosipital day 5

## 2022-02-24 NOTE — PROGRESS NOTES
Assumed care of patient at bedside report from Tra HAQUE. Updated on POC. Call light within reach. Whiteboard updated. Fall precautions in place. Bed locked and in lowest position. All questions answered. No other needs indicated at this time.

## 2022-02-25 ENCOUNTER — PATIENT OUTREACH (OUTPATIENT)
Dept: HEALTH INFORMATION MANAGEMENT | Facility: OTHER | Age: 72
End: 2022-02-25
Payer: MEDICARE

## 2022-02-25 VITALS
OXYGEN SATURATION: 96 % | SYSTOLIC BLOOD PRESSURE: 101 MMHG | DIASTOLIC BLOOD PRESSURE: 69 MMHG | WEIGHT: 264.77 LBS | HEIGHT: 64 IN | TEMPERATURE: 97.2 F | BODY MASS INDEX: 45.2 KG/M2 | HEART RATE: 66 BPM | RESPIRATION RATE: 16 BRPM

## 2022-02-25 LAB
GLUCOSE BLD STRIP.AUTO-MCNC: 109 MG/DL (ref 65–99)
GLUCOSE BLD STRIP.AUTO-MCNC: 124 MG/DL (ref 65–99)

## 2022-02-25 PROCEDURE — 700102 HCHG RX REV CODE 250 W/ 637 OVERRIDE(OP): Performed by: STUDENT IN AN ORGANIZED HEALTH CARE EDUCATION/TRAINING PROGRAM

## 2022-02-25 PROCEDURE — 99217 PR OBSERVATION CARE DISCHARGE: CPT | Mod: GC | Performed by: HOSPITALIST

## 2022-02-25 PROCEDURE — 700101 HCHG RX REV CODE 250

## 2022-02-25 PROCEDURE — 97116 GAIT TRAINING THERAPY: CPT

## 2022-02-25 PROCEDURE — 97535 SELF CARE MNGMENT TRAINING: CPT

## 2022-02-25 PROCEDURE — A9270 NON-COVERED ITEM OR SERVICE: HCPCS | Performed by: STUDENT IN AN ORGANIZED HEALTH CARE EDUCATION/TRAINING PROGRAM

## 2022-02-25 PROCEDURE — G0378 HOSPITAL OBSERVATION PER HR: HCPCS

## 2022-02-25 PROCEDURE — 82962 GLUCOSE BLOOD TEST: CPT

## 2022-02-25 RX ORDER — GABAPENTIN 300 MG/1
300 CAPSULE ORAL
Qty: 90 CAPSULE | Refills: 0 | Status: ON HOLD | OUTPATIENT
Start: 2022-02-25 | End: 2022-03-09

## 2022-02-25 RX ADMIN — ACETAMINOPHEN 1000 MG: 500 TABLET ORAL at 05:27

## 2022-02-25 RX ADMIN — DABIGATRAN ETEXILATE MESYLATE 150 MG: 150 CAPSULE ORAL at 05:27

## 2022-02-25 RX ADMIN — DICLOFENAC SODIUM 2 G: 10 GEL TOPICAL at 07:47

## 2022-02-25 RX ADMIN — DOCUSATE SODIUM 50 MG AND SENNOSIDES 8.6 MG 2 TABLET: 8.6; 5 TABLET, FILM COATED ORAL at 05:26

## 2022-02-25 RX ADMIN — LISINOPRIL 10 MG: 10 TABLET ORAL at 05:27

## 2022-02-25 RX ADMIN — LIDOCAINE 1 PATCH: 50 PATCH TOPICAL at 12:12

## 2022-02-25 RX ADMIN — ASPIRIN 81 MG: 81 TABLET, CHEWABLE ORAL at 05:26

## 2022-02-25 RX ADMIN — LEVOTHYROXINE SODIUM 100 MCG: 0.1 TABLET ORAL at 05:27

## 2022-02-25 RX ADMIN — Medication 2000 UNITS: at 05:27

## 2022-02-25 RX ADMIN — METOPROLOL TARTRATE 12.5 MG: 25 TABLET, FILM COATED ORAL at 05:27

## 2022-02-25 ASSESSMENT — COGNITIVE AND FUNCTIONAL STATUS - GENERAL
CLIMB 3 TO 5 STEPS WITH RAILING: A LITTLE
DAILY ACTIVITIY SCORE: 23
MOVING TO AND FROM BED TO CHAIR: A LITTLE
STANDING UP FROM CHAIR USING ARMS: A LITTLE
MOVING FROM LYING ON BACK TO SITTING ON SIDE OF FLAT BED: A LITTLE
MOBILITY SCORE: 18
SUGGESTED CMS G CODE MODIFIER DAILY ACTIVITY: CI
MOVING TO AND FROM BED TO CHAIR: A LITTLE
CLIMB 3 TO 5 STEPS WITH RAILING: A LITTLE
MOVING FROM LYING ON BACK TO SITTING ON SIDE OF FLAT BED: A LITTLE
SUGGESTED CMS G CODE MODIFIER MOBILITY: CJ
HELP NEEDED FOR BATHING: A LITTLE
MOBILITY SCORE: 21
WALKING IN HOSPITAL ROOM: A LITTLE
SUGGESTED CMS G CODE MODIFIER MOBILITY: CK
TURNING FROM BACK TO SIDE WHILE IN FLAT BAD: A LITTLE

## 2022-02-25 ASSESSMENT — GAIT ASSESSMENTS
GAIT LEVEL OF ASSIST: SUPERVISED
ASSISTIVE DEVICE: FRONT WHEEL WALKER
DISTANCE (FEET): 50
DEVIATION: ANTALGIC;BRADYKINETIC;SHUFFLED GAIT

## 2022-02-25 ASSESSMENT — PAIN DESCRIPTION - PAIN TYPE: TYPE: ACUTE PAIN

## 2022-02-25 NOTE — PROGRESS NOTES
Assumed care of patient at bedside report from Carolina HAQUE. Updated on POC. Patient currently A & O x 4; on RA; up x1, FWW; with complaints of acute leg pain. Call light within reach. Whiteboard updated. Fall precautions in place. Bed locked and in lowest position. All questions answered. No other needs indicated at this time.

## 2022-02-25 NOTE — THERAPY
"Occupational Therapy  Daily Treatment     Patient Name: Beverley Dejesus  Age:  71 y.o., Sex:  female  Medical Record #: [id number]  Today's Date: 2/25/2022     Precautions  Precautions: (P) Fall Risk    Assessment    Pt seen for OT tx session. Pt demo'd BADLs, toilet txf and functional mobility at SPV level. Pt has met acute OT goals. No further needs. Recommend DC home w/ HH to ensure safe transition to natural environment.     Plan    DC OT, goals met.     DC Equipment Recommendations: (P) Tub / Shower Seat  Discharge Recommendations: (P) Recommend home health for continued occupational therapy services    Subjective    \"I could do more if you want\"     Objective       02/25/22 1215   Total Time Spent   Total Time Spent (Mins) 20   Treatment Charges   Charges Yes   OT Self Care / ADL 1   Precautions   Precautions Fall Risk   Pain 0 - 10 Group   Therapist Pain Assessment Post Activity;Nurse Notified  (c/o mild pain at hip)   Cognition    Cognition / Consciousness WDL   Level of Consciousness Alert   Comments pleasent, cooperative, and motivated   Active ROM Upper Body   Active ROM Upper Body  WDL   Strength Upper Body   Upper Body Strength  WDL   Balance   Sitting Balance (Static) Good   Sitting Balance (Dynamic) Fair +   Standing Balance (Static) Fair   Standing Balance (Dynamic) Fair   Weight Shift Sitting Good   Weight Shift Standing Fair   Skilled Intervention Verbal Cuing   Comments w/ FWW, no bailee LOB   Bed Mobility    Supine to Sit Supervised   Sit to Supine   (NT in chair post)   Scooting Supervised   Rolling Supervised   Activities of Daily Living   Grooming Supervision;Standing  (combed hair, washed hands)   Upper Body Dressing Supervision   Lower Body Dressing Supervision   Toileting Supervision   Skilled Intervention Verbal Cuing   How much help from another person does the patient currently need...   Putting on and taking off regular lower body clothing? 4   Bathing (including washing, rinsing, and " drying)? 3   Toileting, which includes using a toilet, bedpan, or urinal? 4   Putting on and taking off regular upper body clothing? 4   Taking care of personal grooming such as brushing teeth? 4   Eating meals? 4   6 Clicks Daily Activity Score 23   Functional Mobility   Sit to Stand Supervised   Bed, Chair, Wheelchair Transfer Supervised   Toilet Transfers Supervised   Transfer Method Stand Step   Mobility within room and bathroom w/ FWW   Skilled Intervention Tactile Cuing;Verbal Cuing   Activity Tolerance   Sitting in Chair 10+ min (up post)   Sitting Edge of Bed 5 min   Standing 10 min total   Patient / Family Goals   Patient / Family Goal #1 For her pain to be managed   Goal #1 Outcome Progressing as expected   Short Term Goals   Short Term Goal # 1 Pt will demo LB dressing w/ SPV   Goal Outcome # 1 Goal met   Short Term Goal # 2 Pt will demo standing grooming w/ SPV   Goal Outcome # 2 Goal met   Short Term Goal # 3 Pt will demo toilet txf w/ SPV   Goal Outcome # 3 Goal met   Education Group   Role of Occupational Therapist Patient Response Patient;Acceptance;Explanation;Demonstration;Verbal Demonstration;Action Demonstration   ADL Patient Response Patient;Acceptance;Demonstration;Explanation;Verbal Demonstration;Action Demonstration   Anticipated Discharge Equipment and Recommendations   DC Equipment Recommendations Tub / Shower Seat   Discharge Recommendations Recommend home health for continued occupational therapy services   Interdisciplinary Plan of Care Collaboration   Patient Position at End of Therapy Seated;Chair Alarm On;Call Light within Reach;Tray Table within Reach;Phone within Reach   Collaboration Comments report given   Session Information   Date / Session Number  2/25, 4 DC OT, goals met   Priority 0

## 2022-02-25 NOTE — DISCHARGE PLANNING
Anticipated Discharge Disposition: Home w/ [facility]    Action: Auth for SNF remains pending and per SNF may not come back until Monday.   PT/OT re evaluated pt and cleared pt for home w/ HH.   Met with pt at bedside. Pt states she was on service with [facility] and would like to resume services.   Choice form completed and faxed     Barriers to Discharge: HH approval     Plan: F/U with [facility]

## 2022-02-25 NOTE — PROGRESS NOTES
Assumed care of patient, received bedside report from NOC RN. Patient is A&O X 4. Pain 1/10, in the left hip, patient up to chair. Vital signs stable overnight, on RA satting 96%. Patient is medical. POC discussed with patient and she verbalized understanding. Call light within reach and fall precautions in place. Bed locked and in lowest position.

## 2022-02-25 NOTE — CARE PLAN
The patient is Stable - Low risk of patient condition declining or worsening    Shift Goals  Clinical Goals: Pain management  Patient Goals: Rest  Family Goals: NA    Progress made toward(s) clinical / shift goals:  Pain managed with scheduled medications.    Problem: Pain - Standard  Goal: Alleviation of pain or a reduction in pain to the patient’s comfort goal  Outcome: Progressing     Problem: Knowledge Deficit - Standard  Goal: Patient and family/care givers will demonstrate understanding of plan of care, disease process/condition, diagnostic tests and medications  Outcome: Progressing     Problem: Skin Integrity  Goal: Skin integrity is maintained or improved  Outcome: Progressing     Problem: Fall Risk  Goal: Patient will remain free from falls  Outcome: Progressing       Patient is not progressing towards the following goals: N/A

## 2022-02-25 NOTE — DISCHARGE INSTRUCTIONS
Discharge Instructions    Discharged to home by car with relative. Discharged via wheelchair, hospital escort: Yes.  Special equipment needed: Not Applicable    Be sure to schedule a follow-up appointment with your primary care doctor or any specialists as instructed.     Discharge Plan:   Diet Plan: Discussed  Activity Level: Discussed  Influenza Vaccine Indication: Not indicated: Previously immunized this influenza season and > 8 years of age    I understand that a diet low in cholesterol, fat, and sodium is recommended for good health. Unless I have been given specific instructions below for another diet, I accept this instruction as my diet prescription.   Other diet: Diabetic    Special Instructions: None    · Is patient discharged on Warfarin / Coumadin?   No     Depression / Suicide Risk    As you are discharged from this [facility] facility, it is important to learn how to keep safe from harming yourself.    Recognize the warning signs:  · Abrupt changes in personality, positive or negative- including increase in energy   · Giving away possessions  · Change in eating patterns- significant weight changes-  positive or negative  · Change in sleeping patterns- unable to sleep or sleeping all the time   · Unwillingness or inability to communicate  · Depression  · Unusual sadness, discouragement and loneliness  · Talk of wanting to die  · Neglect of personal appearance   · Rebelliousness- reckless behavior  · Withdrawal from people/activities they love  · Confusion- inability to concentrate     If you or a loved one observes any of these behaviors or has concerns about self-harm, here's what you can do:  · Talk about it- your feelings and reasons for harming yourself  · Remove any means that you might use to hurt yourself (examples: pills, rope, extension cords, firearm)  · Get professional help from the community (Mental Health, Substance Abuse, psychological counseling)  · Do not be alone:Call your Safe  Contact- someone whom you trust who will be there for you.  · Call your local CRISIS HOTLINE 024-8171 or 419-802-0210  · Call your local Children's Mobile Crisis Response Team [place] (538) 616-5926 or www.[employer]  · Call the toll free National Suicide Prevention Hotlines   · National Suicide Prevention Lifeline 490-573-BRGB (8735)  · National MDCapsule Line Network 800-SUICIDE (904-4026)

## 2022-02-25 NOTE — CARE PLAN
The patient is Stable - Low risk of patient condition declining or worsening    Shift Goals  Clinical Goals: Pain management  Patient Goals: Rest  Family Goals: NA    Progress made toward(s) clinical / shift goals:        Problem: Pain - Standard  Goal: Alleviation of pain or a reduction in pain to the patient’s comfort goal  Outcome: Progressing  Note: Patient reports current pain management is working well. Utilizing 0-10 scale.     Problem: Knowledge Deficit - Standard  Goal: Patient and family/care givers will demonstrate understanding of plan of care, disease process/condition, diagnostic tests and medications  Outcome: Progressing  Note: Patient verbally demonstrates understanding of POC and disease process     Problem: Skin Integrity  Goal: Skin integrity is maintained or improved  Outcome: Progressing  Note: Patient turns self in bed and is up to a chair for all meals. Ambulates several times a day. Q shift skin check completed.     Problem: Fall Risk  Goal: Patient will remain free from falls  Outcome: Progressing  Note: All fall precautions in place and patient educated to call before ambulating

## 2022-02-25 NOTE — DISCHARGE PLANNING
Agency/Facility Name: [facility]  Spoke To: Ria  Outcome: Auth is still pending, ETA for obtaining auth is Monday however if obtained sooner Ria with admissions will contact this DPA with updates.     RN CM notified     1445-  Received Choice form at 1430  Agency/Facility Name: [facility]  Referral sent per Choice form @ 1445     1530-  Agency/Facility Name: [facility]  Spoke To: Tiffany  Outcome: DPA requesting updated status of referral, DPA resent referral to admissions email [email or web address]    8079-  Agency/Facility Name: [facility]  Spoke To: Tiffany   Outcome: Updated referral was received, Pt accepted to resumption of care.

## 2022-02-25 NOTE — FACE TO FACE
Face to Face Supporting Documentation - Home Health    The encounter with this patient was in whole or in part the primary reason for home health admission.    Date of encounter:   Patient:                    MRN:                       YOB: 2023  Beverley Dejesus  [id number]  1950     Home health to see patient for:  Physical Therapy evaluation and treatment and Occupational therapy evaluation and treatment    Skilled need for:  Exacerbation of Chronic Disease State debility    Skilled nursing interventions to include:  Comment: n/a    Homebound status evidenced by:  Needs the assistance of another person in order to leave the home. Leaving home requires a considerable and taxing effort. There is a normal inability to leave the home.    Community Physician to provide follow up care: Michael Farris M.D.     Optional Interventions? No      I certify the face to face encounter for this home health care referral meets the CMS requirements and the encounter/clinical assessment with the patient was, in whole, or in part, for the medical condition(s) listed above, which is the primary reason for home health care. Based on my clinical findings: the service(s) are medically necessary, support the need for home health care, and the homebound criteria are met.  I certify that this patient has had a face to face encounter by myself.  Ruben Parsons M.D. - NPI: [id number]

## 2022-02-25 NOTE — THERAPY
Physical Therapy   Daily Treatment     Patient Name: Beverley Dejesus  Age:  71 y.o., Sex:  female  Medical Record #: [id number]  Today's Date: 2/25/2022     Precautions  Precautions: Fall Risk    Assessment    Pt seen for PT session. Pt demonstrating the ability to ambulate with FWW for functional home distances. Recommend continued HH services.    Plan    Continue current treatment plan.    DC Equipment Recommendations: None  Discharge Recommendations: Recommend home health for continued physical therapy services (pt established with [facility])         Objective     02/25/22 1238   Cognition    Level of Consciousness Alert   Comments pleasant cooperative   Balance   Sitting Balance (Static) Good   Sitting Balance (Dynamic) Fair +   Standing Balance (Static) Fair   Standing Balance (Dynamic) Fair -   Weight Shift Sitting Good   Weight Shift Standing Fair   Skilled Intervention Tactile Cuing   Comments w/FWW   Gait Analysis   Gait Level Of Assist Supervised   Assistive Device Front Wheel Walker   Distance (Feet) 50   # of Times Distance was Traveled 1   Deviation Antalgic;Bradykinetic;Shuffled Gait   Weight Bearing Status no restrictions.   Skilled Intervention Verbal Cuing   Comments no LOB, slow but steady.   Bed Mobility    Supine to Sit Supervised   Sit to Supine Minimal Assist   Scooting Supervised   Rolling Supervised   Functional Mobility   Sit to Stand Supervised   Bed, Chair, Wheelchair Transfer Supervised   Toilet Transfers Supervised   How much difficulty does the patient currently have...   Turning over in bed (including adjusting bedclothes, sheets and blankets)? 3   Sitting down on and standing up from a chair with arms (e.g., wheelchair, bedside commode, etc.) 3   Moving from lying on back to sitting on the side of the bed? 3   How much help from another person does the patient currently need...   Moving to and from a bed to a chair (including a wheelchair)? 3   Need to walk in a hospital  room? 3   Climbing 3-5 steps with a railing? 3   6 clicks Mobility Score 18   Short Term Goals    Short Term Goal # 1 Pt will perform STS/functional transfers with FWW and SPV in 6 visits to return to PLOF   Goal Outcome # 1 Goal met   Short Term Goal # 2 Pt will ambulate 150ft with FWW and SPV in 6 visits to ambulate household distances   Goal Outcome # 2 Progressing as expected   Short Term Goal # 3 Pt will negotiate 5 JEFF with BUE support and SPV in 6 visits to safely enter/exit home   Goal Outcome # 3 Goal not met   Education Group   Role of Physical Therapist Patient Response Patient;Acceptance;Explanation;Action Demonstration

## 2022-02-26 NOTE — PROGRESS NOTES
Patient discharged home with home health. Taken home by a friend. Discharge instructions gone over. Tele box and IV removed. Patient wheeled downstairs by Tra HAQUE. All belongings with the patient.

## 2022-03-01 ENCOUNTER — OFFICE VISIT (OUTPATIENT)
Dept: URGENT CARE | Facility: PHYSICIAN GROUP | Age: 72
End: 2022-03-01
Payer: MEDICARE

## 2022-03-01 VITALS
WEIGHT: 269 LBS | TEMPERATURE: 97.6 F | BODY MASS INDEX: 45.93 KG/M2 | HEART RATE: 142 BPM | HEIGHT: 64 IN | DIASTOLIC BLOOD PRESSURE: 80 MMHG | SYSTOLIC BLOOD PRESSURE: 126 MMHG | OXYGEN SATURATION: 98 %

## 2022-03-01 DIAGNOSIS — Z79.4 TYPE 2 DIABETES MELLITUS WITH HYPERGLYCEMIA, WITH LONG-TERM CURRENT USE OF INSULIN (HCC): ICD-10-CM

## 2022-03-01 DIAGNOSIS — M51.37 DDD (DEGENERATIVE DISC DISEASE), LUMBOSACRAL: ICD-10-CM

## 2022-03-01 DIAGNOSIS — E11.65 TYPE 2 DIABETES MELLITUS WITH HYPERGLYCEMIA, WITH LONG-TERM CURRENT USE OF INSULIN (HCC): ICD-10-CM

## 2022-03-01 DIAGNOSIS — I48.92 ATRIAL FLUTTER WITH RAPID VENTRICULAR RESPONSE (HCC): ICD-10-CM

## 2022-03-01 PROCEDURE — 99204 OFFICE O/P NEW MOD 45 MIN: CPT | Mod: 25 | Performed by: PHYSICIAN ASSISTANT

## 2022-03-01 PROCEDURE — 93000 ELECTROCARDIOGRAM COMPLETE: CPT | Performed by: PHYSICIAN ASSISTANT

## 2022-03-01 ASSESSMENT — ENCOUNTER SYMPTOMS
BLURRED VISION: 0
WEAKNESS: 0
PALPITATIONS: 0
NAUSEA: 0
DIZZINESS: 0
FALLS: 0
CHILLS: 0
CLAUDICATION: 0
ABDOMINAL PAIN: 0
TINGLING: 0
BACK PAIN: 1
SHORTNESS OF BREATH: 0
COUGH: 0
DOUBLE VISION: 0
ORTHOPNEA: 0
HEADACHES: 0
VOMITING: 0
FEVER: 0

## 2022-03-01 ASSESSMENT — FIBROSIS 4 INDEX: FIB4 SCORE: 1.9

## 2022-03-01 NOTE — PROGRESS NOTES
Subjective:   Beverley Dejesus is a 71 y.o. female who presents for Hip Pain (Was seen at the ER for it and states she was told to come here because she is having elevated heart rate and high blood pressure)      HPI:  This is a very pleasant 71-year-old female with a past medical history significant for atrial flutter on dabigatran, status post cardioversion 7/21, poorly controlled diabetes with recent A1c at 9.8%, hypertension and hyperlipidemia.  She is presenting today for evaluation after being referred from her home health nurse for tachycardia.  Heart rate in the 140s while at home.  She denies any associated chest pain or pressure.  Has had no changes in blood pressure.  No nausea or vomiting.  No diaphoresis.  Patient would also like to discuss her recurrent left hip pain.  Her prior imaging studies were reviewed in clinic with the patient today.  No evidence of bony fracture or abnormality.  CT scan did demonstrate severe lumbosacral degenerative disc disease.  She has been taking Tylenol with no improvement.  Has not started physical therapy for the hip/low back pain at this time.    Review of Systems   Constitutional: Negative for chills, fever and malaise/fatigue.   Eyes: Negative for blurred vision and double vision.   Respiratory: Negative for cough and shortness of breath.    Cardiovascular: Negative for chest pain, palpitations, orthopnea, claudication and leg swelling.   Gastrointestinal: Negative for abdominal pain, nausea and vomiting.   Musculoskeletal: Positive for back pain and joint pain. Negative for falls.   Skin: Negative for rash.   Neurological: Negative for dizziness, tingling, weakness and headaches.       Medications:    • Alcohol Swabs  • aspirin Chew  • atorvastatin Tabs  • Blood Glucose Meter Kit  • Blood Glucose Test Strips  • Cholecalciferol Tabs  • dabigatran Caps  • Empagliflozin Tabs  • furosemide Tabs  • gabapentin Caps  • Insulin Pen Needle 32 G x 4 mm  • Lancets  • Lantus  "SoloStar Sopn  • levothyroxine Tabs  • lisinopril Tabs  • metoprolol tartrate Tabs  • potassium chloride SA Tbcr    Allergies: Patient has no known allergies.    Problem List: Beverley Dejesus does not have any pertinent problems on file.    Surgical History:  No past surgical history on file.    Past Social Hx: Beverley Dejesus  reports that she has never smoked. She has never used smokeless tobacco. She reports previous alcohol use. She reports that she does not use drugs.     Past Family Hx:  Beverley Dejesus family history is not on file.     Problem list, medications, and allergies reviewed by myself today in Epic.     Objective:     /80 (BP Location: Right arm, Patient Position: Sitting, BP Cuff Size: Large adult)   Pulse (!) 142   Temp 36.4 °C (97.6 °F) (Temporal)   Ht 1.626 m (5' 4\")   Wt 122 kg (269 lb)   SpO2 98%   BMI 46.17 kg/m²     Physical Exam  Constitutional:       General: She is not in acute distress.     Appearance: Normal appearance. She is not ill-appearing, toxic-appearing or diaphoretic.   HENT:      Head: Normocephalic and atraumatic.      Right Ear: Tympanic membrane, ear canal and external ear normal.      Left Ear: Tympanic membrane, ear canal and external ear normal.      Nose: Nose normal. No congestion or rhinorrhea.      Mouth/Throat:      Mouth: Mucous membranes are moist.      Pharynx: No oropharyngeal exudate or posterior oropharyngeal erythema.   Eyes:      Conjunctiva/sclera: Conjunctivae normal.   Cardiovascular:      Rate and Rhythm: Regular rhythm. Tachycardia present.      Pulses: Normal pulses.      Heart sounds: No murmur heard.  Pulmonary:      Effort: Pulmonary effort is normal.      Breath sounds: Normal breath sounds. No wheezing.   Musculoskeletal:      Cervical back: Normal range of motion. No muscular tenderness.      Comments: Lumbar exam: No midline tenderness to palpation.  No obvious deformities or step-offs.  Patient has limited range of motion in " all directions at this time due to pain.  No tenderness over the left greater trochanter.  No IT band tenderness.   Lymphadenopathy:      Cervical: No cervical adenopathy.   Skin:     General: Skin is warm and dry.      Capillary Refill: Capillary refill takes less than 2 seconds.   Neurological:      Mental Status: She is alert.   Psychiatric:         Mood and Affect: Mood normal.         Thought Content: Thought content normal.         12- Lead EKG; interpreted by myself    Normal axis.  No widening of QRS complex   Good R wave progression   No diagnostic Q waves.   Compared to previous EKG from 2/17/2022.  A flutter with variable block still present. incomplete RBBB still present.  Clinical Impression: A flutter with variable block.  Incomplete right bundle branch block      Assessment/Plan:     Comments/MDM:     • Patient presents today after being referred from her home health nurse for tachycardia.  Past medical history is significant for atrial flutter.  EKG in clinic demonstrates a flutter with a rate of 93 bpm.  Patient is currently asymptomatic not experiencing any chest pain, pressure, shortness of breath, dizziness or lightheadedness.  Previous visits were reviewed.  Does have a history of cardioversion in July 2021.  She was subsequently scheduled to follow-up for ablation however this never happened.  Currently is not under the care of cardiologist.  We will place a referral at this time.  Strict ER precautions were discussed for any chest pain, palpitations, shortness of breath, fatigue or lightheadedness.  Patient and daughter verbalized understanding of this.  • She is also been dealing with persistent left hip pain.  Upon evaluation I believe the pain is likely coming from her lumbosacral area.  Previous CT scan demonstrates severe degenerative disc disease in this region.  Currently has a physical therapist coming to her house however is yet to notice any improvement.  We will send the patient to  be evaluated by a spine specialist.     Diagnosis and associated orders:     1. Atrial flutter with rapid ventricular response (HCC)  - EKG - Clinic Performed  - REFERRAL TO CARDIOLOGY    2. DDD (degenerative disc disease), lumbosacral  - Referral to Spine Surgery  - diclofenac sodium (VOLTAREN) 1 % Gel; Apply 2 g topically 4 times a day as needed.  Dispense: 100 g; Refill: 0    3. Type 2 diabetes mellitus with hyperglycemia, with long-term current use of insulin (HCC)           Differential diagnosis, natural history, supportive care, and indications for immediate follow-up discussed.    I personally reviewed prior external notes and test results pertinent to today's visit.     Advised the patient to follow-up with the primary care physician for recheck, reevaluation, and consideration of further management.    Please note that this dictation was created using voice recognition software. I have made reasonable attempt to correct obvious errors, but I expect that there are errors of grammar and possibly content that I did not discover before finalizing the note.    This note was electronically signed by CHILANGO Dennison PA-C

## 2022-03-04 ENCOUNTER — OFFICE VISIT (OUTPATIENT)
Dept: CARDIOLOGY | Facility: MEDICAL CENTER | Age: 72
End: 2022-03-04
Attending: PHYSICIAN ASSISTANT
Payer: MEDICARE

## 2022-03-04 ENCOUNTER — TELEPHONE (OUTPATIENT)
Dept: CARDIOLOGY | Facility: MEDICAL CENTER | Age: 72
End: 2022-03-04

## 2022-03-04 VITALS
HEART RATE: 144 BPM | WEIGHT: 274 LBS | BODY MASS INDEX: 46.78 KG/M2 | OXYGEN SATURATION: 97 % | HEIGHT: 64 IN | RESPIRATION RATE: 16 BRPM | SYSTOLIC BLOOD PRESSURE: 118 MMHG | DIASTOLIC BLOOD PRESSURE: 66 MMHG

## 2022-03-04 DIAGNOSIS — I48.92 ATRIAL FLUTTER WITH RAPID VENTRICULAR RESPONSE (HCC): ICD-10-CM

## 2022-03-04 PROCEDURE — 93000 ELECTROCARDIOGRAM COMPLETE: CPT | Performed by: INTERNAL MEDICINE

## 2022-03-04 PROCEDURE — 99204 OFFICE O/P NEW MOD 45 MIN: CPT | Mod: 25 | Performed by: INTERNAL MEDICINE

## 2022-03-04 RX ORDER — METOPROLOL TARTRATE 50 MG/1
50 TABLET, FILM COATED ORAL 2 TIMES DAILY
Qty: 60 TABLET | Refills: 11 | Status: SHIPPED | OUTPATIENT
Start: 2022-03-04

## 2022-03-04 ASSESSMENT — FIBROSIS 4 INDEX: FIB4 SCORE: 1.9

## 2022-03-04 NOTE — TELEPHONE ENCOUNTER
----- Message from Filiberto Lozano M.D. sent at 3/4/2022  2:01 PM PST -----  Please schedule flutter ablation with RUDI, no meds to hold, thanks

## 2022-03-04 NOTE — TELEPHONE ENCOUNTER
Called and LM on patient's voicemail requesting a call back to discuss her options. [facility] is out of network with her insurance. She will have to go to [facility] to use her in network benefits.

## 2022-03-04 NOTE — PROGRESS NOTES
Arrhythmia Clinic Note (New patient)     DOS: 3/4/2022    Referring physician: Dr Doan    Chief complaint/Reason for consult: Atrial flutter    HPI: 70 y/o F with h/o neuropathy and frequent falls, obesity, HTN, HLD, DM. She was diagnosed with atrial flutter incidentally in July 2021, started on Pradaxa and s/p RUDI/DCCV. Was supposed to see EP as outpatient but never made it. Home health nurse reported elevated  bpm and sent her to urgent care, she was told to increase metoprolol and see EP as outpatient. She denies palpitations, syncope, or presyncope.    ROS (+ highlighted in bold):  Constitutional: Fevers/chills/fatigue/weightloss  HEENT: Blurry vision/eye pain/sore throat/hearing loss  Respiratory: Shortness of breath/cough  Cardiovascular: Chest pain/palpitations/edema/orthopnea/syncope  GI: Nausea/vomitting/diarrhea  MSK: Arthralgias/myagias/muscle weakness  Skin: Rash/sores  Neurological: Numbness/tremors/vertigo  Endocrine: Excessive thirst/polyuria/cold intolerance/heat intolerance  Psych: Depression/anxiety    Past Medical History:   Diagnosis Date   • Atrial flutter (HCC)    • Diabetes (HCC)    • Hyperlipidemia    • Hypertension    • Neuropathy        History reviewed. No pertinent surgical history.    Social History     Socioeconomic History   • Marital status: Single     Spouse name: Not on file   • Number of children: Not on file   • Years of education: Not on file   • Highest education level: Not on file   Occupational History   • Not on file   Tobacco Use   • Smoking status: Never Smoker   • Smokeless tobacco: Never Used   Substance and Sexual Activity   • Alcohol use: Not Currently   • Drug use: Never   • Sexual activity: Not on file   Other Topics Concern   • Not on file   Social History Narrative   • Not on file     Social Determinants of Health     Financial Resource Strain: Not on file   Food Insecurity: Not on file   Transportation Needs: Not on file   Physical Activity: Not on file    Stress: Not on file   Social Connections: Not on file   Intimate Partner Violence: Not on file   Housing Stability: Not on file       History reviewed. No pertinent family history.   Denies family history of premature CAD    No Known Allergies    Current Outpatient Medications   Medication Sig Dispense Refill   • OXYCODONE HCL ER PO Take 10 mg by mouth every four hours as needed.     • metoprolol tartrate (LOPRESSOR) 50 MG Tab Take 1 Tablet by mouth 2 times a day. 60 Tablet 11   • diclofenac sodium (VOLTAREN) 1 % Gel Apply 2 g topically 4 times a day as needed. 100 g 0   • gabapentin (NEURONTIN) 300 MG Cap Take 1 Capsule by mouth at bedtime. 90 Capsule 0   • vitamin D3 2000 UNIT Tab Take 1 Tablet by mouth every day. 60 Tablet 0   • lisinopril (PRINIVIL) 10 MG Tab Take 10 mg by mouth every day.     • insulin glargine (LANTUS SOLOSTAR) 100 UNIT/ML Solution Pen-injector injection Inject 45 Units under the skin every evening.     • potassium chloride SA (KDUR) 20 MEQ Tab CR Take 20 mEq by mouth every day.     • furosemide (LASIX) 40 MG Tab Take 40 mg by mouth every day.     • aspirin (ASA) 81 MG Chew Tab chewable tablet Chew 1 tablet every day. 100 tablet 0   • levothyroxine (SYNTHROID) 100 MCG Tab Take 1 tablet by mouth every morning on an empty stomach. 30 tablet 0   • dabigatran (PRADAXA) 150 MG Cap capsule Take 1 capsule by mouth 2 times a day. 60 capsule 0   • atorvastatin (LIPITOR) 40 MG Tab Take 1 tablet by mouth every evening. 30 tablet 0   • Empagliflozin 10 MG Tab Take 10 mg by mouth every day. 90 tablet 0   • Blood Glucose Meter Kit Test blood sugar as recommended by provider. Accucheck Guide blood glucose monitoring kit. 1 Kit 0   • Blood Glucose Test Strips Use one Accucheck Guide strip to test blood sugar three times daily. 100 Strip 0   • Lancets Use one Accucheck Guide lancet to test blood sugar three times daily. 100 Each 0   • Alcohol Swabs Wipe site with prep pad prior to injection. 100 Each 0   •  "Insulin Pen Needle 32 G x 4 mm Use one pen needle in pen device to inject insulin once daily. 100 Each 0     No current facility-administered medications for this visit.       Physical Exam:  Vitals:    03/04/22 1312   BP: 118/66   BP Location: Left arm   Patient Position: Sitting   BP Cuff Size: Adult   Pulse: (!) 144   Resp: 16   SpO2: 97%   Weight: 124 kg (274 lb)   Height: 1.626 m (5' 4\")     General appearance: NAD, conversant   Eyes: anicteric sclerae, moist conjunctivae; no lid-lag; PERRLA  HENT: Atraumatic; oropharynx clear with moist mucous membranes and no mucosal ulcerations; normal hard and soft palate  Neck: Trachea midline; FROM, supple, no thyromegaly or lymphadenopathy  Lungs: CTA, with normal respiratory effort and no intercostal retractions  CV: Tachy and regular, no MRGs, no JVD   Abdomen: Soft, non-tender; no masses or HSM  Extremities: No peripheral edema or extremity lymphadenopathy  Skin: Normal temperature, turgor and texture; no rash, ulcers or subcutaneous nodules  Psych: Appropriate affect, alert and oriented to person, place and time    Data:  Lipids:   Lab Results   Component Value Date/Time    CHOLSTRLTOT 125 02/18/2022 01:46 AM    TRIGLYCERIDE 109 02/18/2022 01:46 AM    HDL 40 02/18/2022 01:46 AM    LDL 63 02/18/2022 01:46 AM        BMP:  Lab Results   Component Value Date/Time    SODIUM 139 02/24/2022 0246    POTASSIUM 4.1 02/24/2022 0246    CHLORIDE 107 02/24/2022 0246    CO2 21 02/24/2022 0246    GLUCOSE 118 (H) 02/24/2022 0246    BUN 17 02/24/2022 0246    CREATININE 0.88 02/24/2022 0246    CALCIUM 8.8 02/24/2022 0246    ANION 11.0 02/24/2022 0246        TSH:   Lab Results   Component Value Date/Time    TSHULTRASEN 2.990 01/06/2022 0430        THYROXINE (T4):   No results found for: FREEDIR     CBC:   Lab Results   Component Value Date/Time    WBC 5.9 02/24/2022 02:46 AM    RBC 4.43 02/24/2022 02:46 AM    HEMOGLOBIN 12.0 02/24/2022 02:46 AM    HEMATOCRIT 40.4 02/24/2022 02:46 AM    " MCV 91.2 02/24/2022 02:46 AM    MCH 27.1 02/24/2022 02:46 AM    MCHC 29.7 (L) 02/24/2022 02:46 AM    RDW 51.9 (H) 02/24/2022 02:46 AM    PLATELETCT 228 02/24/2022 02:46 AM    MPV 10.1 02/24/2022 02:46 AM    NEUTSPOLYS 43.70 (L) 02/24/2022 02:46 AM    LYMPHOCYTES 44.30 (H) 02/24/2022 02:46 AM    MONOCYTES 7.50 02/24/2022 02:46 AM    EOSINOPHILS 3.40 02/24/2022 02:46 AM    BASOPHILS 0.30 02/24/2022 02:46 AM    IMMGRAN 0.80 02/24/2022 02:46 AM    NRBC 0.00 02/24/2022 02:46 AM    NEUTS 2.57 02/24/2022 02:46 AM    LYMPHS 2.61 02/24/2022 02:46 AM    MONOS 0.44 02/24/2022 02:46 AM    EOS 0.20 02/24/2022 02:46 AM    BASO 0.02 02/24/2022 02:46 AM    IMMGRANAB 0.05 02/24/2022 02:46 AM    NRBCAB 0.00 02/24/2022 02:46 AM        CBC w/o DIFF  Lab Results   Component Value Date/Time    WBC 5.9 02/24/2022 02:46 AM    RBC 4.43 02/24/2022 02:46 AM    HEMOGLOBIN 12.0 02/24/2022 02:46 AM    MCV 91.2 02/24/2022 02:46 AM    MCH 27.1 02/24/2022 02:46 AM    MCHC 29.7 (L) 02/24/2022 02:46 AM    RDW 51.9 (H) 02/24/2022 02:46 AM    MPV 10.1 02/24/2022 02:46 AM       Prior echo/stress results reviewed: Normal EF    EKG interpreted by me: Atrial flutter with RVR    Impression/Plan:  1. Atrial flutter with rapid ventricular response (HCC)  EKG     1. Atrial flutter with RVR  2. Anticoagulation management    - I discussed treatment options for atrial flutter with RVR. High risk diagnosis. Recurrent s/p DCCV.  - I recommend catheter ablation. We discussed pulmonary vein isolation for therapeutic management and continued rhythm control.  We discussed the risks and benefits of this procedure.  Risks include 1-3% risk of major cardiovascular event including stroke, myocardial infarction, phrenic nerve damage, esophageal injury and/or fistula formation, cardiac perforation, pericardial effusion, tamponade, major bleeding, or death.  I quoted a 70 to 80% chance free of atrial fibrillation at 12 months.  We discussed that he may also need a second  procedure.  - She is interested in proceeding, all questions answered.  - Continue Pradaxa, plan to stop 1 month after ablation    Plan typical flutter ablation, RUDI    Filiberto Lozano MD  Cardiac Electrophysiology

## 2022-03-07 ENCOUNTER — APPOINTMENT (OUTPATIENT)
Dept: RADIOLOGY | Facility: MEDICAL CENTER | Age: 72
DRG: 682 | End: 2022-03-07
Attending: EMERGENCY MEDICINE
Payer: MEDICARE

## 2022-03-07 ENCOUNTER — HOSPITAL ENCOUNTER (INPATIENT)
Facility: MEDICAL CENTER | Age: 72
LOS: 2 days | DRG: 682 | End: 2022-03-09
Attending: EMERGENCY MEDICINE | Admitting: INTERNAL MEDICINE
Payer: MEDICARE

## 2022-03-07 DIAGNOSIS — R53.1 GENERALIZED WEAKNESS: ICD-10-CM

## 2022-03-07 DIAGNOSIS — E66.01 CLASS 3 SEVERE OBESITY DUE TO EXCESS CALORIES WITH SERIOUS COMORBIDITY AND BODY MASS INDEX (BMI) OF 45.0 TO 49.9 IN ADULT (HCC): ICD-10-CM

## 2022-03-07 DIAGNOSIS — N17.9 ACUTE RENAL FAILURE, UNSPECIFIED ACUTE RENAL FAILURE TYPE (HCC): ICD-10-CM
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
## 2022-03-07 DIAGNOSIS — D68.9 COAGULOPATHY (HCC): ICD-10-CM

## 2022-03-07 DIAGNOSIS — M79.652 LEFT THIGH PAIN: ICD-10-CM

## 2022-03-07 DIAGNOSIS — S81.809D WOUND OF LOWER EXTREMITY, UNSPECIFIED LATERALITY, SUBSEQUENT ENCOUNTER: ICD-10-CM

## 2022-03-07 DIAGNOSIS — R00.0 TACHYCARDIA: ICD-10-CM

## 2022-03-07 PROBLEM — M79.89 LEG SWELLING: Status: ACTIVE | Noted: 2022-03-07

## 2022-03-07 PROBLEM — I95.9 HYPOTENSION: Status: ACTIVE | Noted: 2022-03-07

## 2022-03-07 PROBLEM — U07.1 COVID-19: Status: ACTIVE | Noted: 2022-03-07

## 2022-03-07 PROBLEM — R79.1 ELEVATED INR: Status: ACTIVE | Noted: 2022-03-07

## 2022-03-07 LAB
ALBUMIN SERPL BCP-MCNC: 3.3 G/DL (ref 3.2–4.9)
ALBUMIN/GLOB SERPL: 1 G/DL
ALP SERPL-CCNC: 115 U/L (ref 30–99)
ALT SERPL-CCNC: 9 U/L (ref 2–50)
ANION GAP SERPL CALC-SCNC: 16 MMOL/L (ref 7–16)
ANISOCYTOSIS BLD QL SMEAR: ABNORMAL
APPEARANCE UR: ABNORMAL
APTT PPP: 105.4 SEC (ref 24.7–36)
APTT PPP: 76.3 SEC (ref 24.7–36)
AST SERPL-CCNC: 8 U/L (ref 12–45)
BACTERIA #/AREA URNS HPF: ABNORMAL /HPF
BASOPHILS # BLD AUTO: 0.6 % (ref 0–1.8)
BASOPHILS # BLD: 0.06 K/UL (ref 0–0.12)
BILIRUB SERPL-MCNC: 1.4 MG/DL (ref 0.1–1.5)
BILIRUB UR QL STRIP.AUTO: ABNORMAL
BLOOD CULTURE HOLD CXBCH: NORMAL
BUN SERPL-MCNC: 46 MG/DL (ref 8–22)
BURR CELLS BLD QL SMEAR: NORMAL
CALCIUM SERPL-MCNC: 9 MG/DL (ref 8.5–10.5)
CFT BLD TEG: 11.2 MIN (ref 4.6–9.1)
CFT P HPASE BLD TEG: 11.1 MIN (ref 4.3–8.3)
CHLORIDE SERPL-SCNC: 103 MMOL/L (ref 96–112)
CK SERPL-CCNC: 126 U/L (ref 0–154)
CLOT ANGLE BLD TEG: 59.5 DEGREES (ref 63–78)
CLOT LYSIS 30M P MA LENFR BLD TEG: 0 % (ref 0–2.6)
CO2 SERPL-SCNC: 18 MMOL/L (ref 20–33)
COLOR UR: YELLOW
COMMENT 1642: NORMAL
CREAT SERPL-MCNC: 3.37 MG/DL (ref 0.5–1.4)
CREAT UR-MCNC: 274.29 MG/DL
CRP SERPL HS-MCNC: 1.06 MG/DL (ref 0–0.75)
CT.EXTRINSIC BLD ROTEM: 2.8 MIN (ref 0.8–2.1)
EKG IMPRESSION: NORMAL
EOSINOPHIL # BLD AUTO: 0.15 K/UL (ref 0–0.51)
EOSINOPHIL NFR BLD: 1.4 % (ref 0–6.9)
EPI CELLS #/AREA URNS HPF: ABNORMAL /HPF
ERYTHROCYTE [DISTWIDTH] IN BLOOD BY AUTOMATED COUNT: 57.2 FL (ref 35.9–50)
FLUAV RNA SPEC QL NAA+PROBE: NEGATIVE
FLUBV RNA SPEC QL NAA+PROBE: NEGATIVE
GLOBULIN SER CALC-MCNC: 3.4 G/DL (ref 1.9–3.5)
GLUCOSE BLD STRIP.AUTO-MCNC: 138 MG/DL (ref 65–99)
GLUCOSE BLD STRIP.AUTO-MCNC: 138 MG/DL (ref 65–99)
GLUCOSE BLD STRIP.AUTO-MCNC: 68 MG/DL (ref 65–99)
GLUCOSE BLD STRIP.AUTO-MCNC: 91 MG/DL (ref 65–99)
GLUCOSE SERPL-MCNC: 103 MG/DL (ref 65–99)
GLUCOSE UR STRIP.AUTO-MCNC: 100 MG/DL
HCT VFR BLD AUTO: 37.8 % (ref 37–47)
HCT VFR BLD AUTO: 41.4 % (ref 37–47)
HGB BLD-MCNC: 11 G/DL (ref 12–16)
HGB BLD-MCNC: 12.1 G/DL (ref 12–16)
HYALINE CASTS #/AREA URNS LPF: ABNORMAL /LPF
IMM GRANULOCYTES # BLD AUTO: 0.06 K/UL (ref 0–0.11)
IMM GRANULOCYTES NFR BLD AUTO: 0.6 % (ref 0–0.9)
INR PPP: 6.82 (ref 0.87–1.13)
KETONES UR STRIP.AUTO-MCNC: 15 MG/DL
LACTATE BLD-SCNC: 1 MMOL/L (ref 0.5–2)
LACTATE BLD-SCNC: 1.1 MMOL/L (ref 0.5–2)
LEUKOCYTE ESTERASE UR QL STRIP.AUTO: NEGATIVE
LYMPHOCYTES # BLD AUTO: 2.53 K/UL (ref 1–4.8)
LYMPHOCYTES NFR BLD: 24.4 % (ref 22–41)
MAGNESIUM SERPL-MCNC: 2 MG/DL (ref 1.5–2.5)
MCF BLD TEG: 69.4 MM (ref 52–69)
MCF.PLATELET INHIB BLD ROTEM: 25.5 MM (ref 15–32)
MCH RBC QN AUTO: 27.8 PG (ref 27–33)
MCHC RBC AUTO-ENTMCNC: 29.2 G/DL (ref 33.6–35)
MCV RBC AUTO: 95.2 FL (ref 81.4–97.8)
MICRO URNS: ABNORMAL
MICROCYTES BLD QL SMEAR: ABNORMAL
MONOCYTES # BLD AUTO: 0.54 K/UL (ref 0–0.85)
MONOCYTES NFR BLD AUTO: 5.2 % (ref 0–13.4)
MORPHOLOGY BLD-IMP: NORMAL
NEUTROPHILS # BLD AUTO: 7.03 K/UL (ref 2–7.15)
NEUTROPHILS NFR BLD: 67.8 % (ref 44–72)
NITRITE UR QL STRIP.AUTO: NEGATIVE
NRBC # BLD AUTO: 0 K/UL
NRBC BLD-RTO: 0 /100 WBC
OVALOCYTES BLD QL SMEAR: NORMAL
PA AA BLD-ACNC: 10.5 % (ref 0–11)
PA ADP BLD-ACNC: 3.6 % (ref 0–17)
PH UR STRIP.AUTO: 5 [PH] (ref 5–8)
PLATELET # BLD AUTO: 258 K/UL (ref 164–446)
PLATELET BLD QL SMEAR: NORMAL
PMV BLD AUTO: 11.1 FL (ref 9–12.9)
POIKILOCYTOSIS BLD QL SMEAR: NORMAL
POTASSIUM SERPL-SCNC: 5.2 MMOL/L (ref 3.6–5.5)
PROT SERPL-MCNC: 6.7 G/DL (ref 6–8.2)
PROT UR QL STRIP: NEGATIVE MG/DL
PROTHROMBIN TIME: 57.1 SEC (ref 12–14.6)
RBC # BLD AUTO: 4.35 M/UL (ref 4.2–5.4)
RBC # URNS HPF: ABNORMAL /HPF
RBC BLD AUTO: PRESENT
RBC UR QL AUTO: ABNORMAL
RSV RNA SPEC QL NAA+PROBE: NEGATIVE
SARS-COV-2 RNA RESP QL NAA+PROBE: DETECTED
SODIUM SERPL-SCNC: 137 MMOL/L (ref 135–145)
SODIUM UR-SCNC: 32 MMOL/L
SP GR UR STRIP.AUTO: >=1.03
SPECIMEN SOURCE: ABNORMAL
TEG ALGORITHM TGALG: ABNORMAL
TROPONIN T SERPL-MCNC: 38 NG/L (ref 6–19)
UROBILINOGEN UR STRIP.AUTO-MCNC: 0.2 MG/DL
UUN UR-MCNC: 174 MG/DL
WBC # BLD AUTO: 10.4 K/UL (ref 4.8–10.8)
WBC #/AREA URNS HPF: ABNORMAL /HPF

## 2022-03-07 PROCEDURE — 80053 COMPREHEN METABOLIC PANEL: CPT

## 2022-03-07 PROCEDURE — 82550 ASSAY OF CK (CPK): CPT

## 2022-03-07 PROCEDURE — C9803 HOPD COVID-19 SPEC COLLECT: HCPCS | Performed by: EMERGENCY MEDICINE

## 2022-03-07 PROCEDURE — 36415 COLL VENOUS BLD VENIPUNCTURE: CPT

## 2022-03-07 PROCEDURE — 700105 HCHG RX REV CODE 258: Performed by: EMERGENCY MEDICINE

## 2022-03-07 PROCEDURE — 700105 HCHG RX REV CODE 258

## 2022-03-07 PROCEDURE — 99285 EMERGENCY DEPT VISIT HI MDM: CPT

## 2022-03-07 PROCEDURE — 94760 N-INVAS EAR/PLS OXIMETRY 1: CPT

## 2022-03-07 PROCEDURE — 85347 COAGULATION TIME ACTIVATED: CPT

## 2022-03-07 PROCEDURE — 86140 C-REACTIVE PROTEIN: CPT

## 2022-03-07 PROCEDURE — 84540 ASSAY OF URINE/UREA-N: CPT

## 2022-03-07 PROCEDURE — 0241U HCHG SARS-COV-2 COVID-19 NFCT DS RESP RNA 4 TRGT MIC: CPT

## 2022-03-07 PROCEDURE — 85014 HEMATOCRIT: CPT

## 2022-03-07 PROCEDURE — A9270 NON-COVERED ITEM OR SERVICE: HCPCS | Performed by: STUDENT IN AN ORGANIZED HEALTH CARE EDUCATION/TRAINING PROGRAM

## 2022-03-07 PROCEDURE — 85730 THROMBOPLASTIN TIME PARTIAL: CPT

## 2022-03-07 PROCEDURE — 85025 COMPLETE CBC W/AUTO DIFF WBC: CPT

## 2022-03-07 PROCEDURE — 96372 THER/PROPH/DIAG INJ SC/IM: CPT

## 2022-03-07 PROCEDURE — 83735 ASSAY OF MAGNESIUM: CPT

## 2022-03-07 PROCEDURE — 87040 BLOOD CULTURE FOR BACTERIA: CPT

## 2022-03-07 PROCEDURE — 303105 HCHG CATHETER EXTRA

## 2022-03-07 PROCEDURE — 82962 GLUCOSE BLOOD TEST: CPT

## 2022-03-07 PROCEDURE — 70450 CT HEAD/BRAIN W/O DYE: CPT | Mod: MG

## 2022-03-07 PROCEDURE — 71045 X-RAY EXAM CHEST 1 VIEW: CPT

## 2022-03-07 PROCEDURE — 85018 HEMOGLOBIN: CPT

## 2022-03-07 PROCEDURE — 51702 INSERT TEMP BLADDER CATH: CPT

## 2022-03-07 PROCEDURE — 85384 FIBRINOGEN ACTIVITY: CPT

## 2022-03-07 PROCEDURE — 85576 BLOOD PLATELET AGGREGATION: CPT | Mod: 91

## 2022-03-07 PROCEDURE — 85610 PROTHROMBIN TIME: CPT

## 2022-03-07 PROCEDURE — 83605 ASSAY OF LACTIC ACID: CPT

## 2022-03-07 PROCEDURE — 770020 HCHG ROOM/CARE - TELE (206)

## 2022-03-07 PROCEDURE — 700102 HCHG RX REV CODE 250 W/ 637 OVERRIDE(OP): Performed by: STUDENT IN AN ORGANIZED HEALTH CARE EDUCATION/TRAINING PROGRAM

## 2022-03-07 PROCEDURE — 700105 HCHG RX REV CODE 258: Performed by: STUDENT IN AN ORGANIZED HEALTH CARE EDUCATION/TRAINING PROGRAM

## 2022-03-07 PROCEDURE — 99223 1ST HOSP IP/OBS HIGH 75: CPT | Mod: AI | Performed by: INTERNAL MEDICINE

## 2022-03-07 PROCEDURE — 93005 ELECTROCARDIOGRAM TRACING: CPT | Performed by: EMERGENCY MEDICINE

## 2022-03-07 PROCEDURE — 82570 ASSAY OF URINE CREATININE: CPT

## 2022-03-07 PROCEDURE — 81001 URINALYSIS AUTO W/SCOPE: CPT

## 2022-03-07 PROCEDURE — 84300 ASSAY OF URINE SODIUM: CPT

## 2022-03-07 PROCEDURE — 84484 ASSAY OF TROPONIN QUANT: CPT

## 2022-03-07 RX ORDER — INSULIN LISPRO 100 [IU]/ML
1-6 INJECTION, SOLUTION INTRAVENOUS; SUBCUTANEOUS EVERY 6 HOURS
Status: DISCONTINUED | OUTPATIENT
Start: 2022-03-07 | End: 2022-03-08

## 2022-03-07 RX ORDER — ATORVASTATIN CALCIUM 40 MG/1
40 TABLET, FILM COATED ORAL EVERY EVENING
Status: DISCONTINUED | OUTPATIENT
Start: 2022-03-07 | End: 2022-03-09 | Stop reason: HOSPADM

## 2022-03-07 RX ORDER — SODIUM CHLORIDE 9 MG/ML
INJECTION, SOLUTION INTRAVENOUS CONTINUOUS
Status: DISCONTINUED | OUTPATIENT
Start: 2022-03-07 | End: 2022-03-09

## 2022-03-07 RX ORDER — GABAPENTIN 300 MG/1
300 CAPSULE ORAL
Status: DISCONTINUED | OUTPATIENT
Start: 2022-03-07 | End: 2022-03-09

## 2022-03-07 RX ORDER — SODIUM CHLORIDE 9 MG/ML
500 INJECTION, SOLUTION INTRAVENOUS ONCE
Status: COMPLETED | OUTPATIENT
Start: 2022-03-07 | End: 2022-03-07

## 2022-03-07 RX ORDER — POLYETHYLENE GLYCOL 3350 17 G/17G
1 POWDER, FOR SOLUTION ORAL
Status: DISCONTINUED | OUTPATIENT
Start: 2022-03-07 | End: 2022-03-09 | Stop reason: HOSPADM

## 2022-03-07 RX ORDER — OMEPRAZOLE 20 MG/1
40 CAPSULE, DELAYED RELEASE ORAL DAILY
Status: DISCONTINUED | OUTPATIENT
Start: 2022-03-07 | End: 2022-03-09 | Stop reason: HOSPADM

## 2022-03-07 RX ORDER — LEVOTHYROXINE SODIUM 0.1 MG/1
100 TABLET ORAL
Status: DISCONTINUED | OUTPATIENT
Start: 2022-03-08 | End: 2022-03-09 | Stop reason: HOSPADM

## 2022-03-07 RX ORDER — ACETAMINOPHEN 325 MG/1
650 TABLET ORAL EVERY 6 HOURS PRN
Status: DISCONTINUED | OUTPATIENT
Start: 2022-03-07 | End: 2022-03-09 | Stop reason: HOSPADM

## 2022-03-07 RX ORDER — AMOXICILLIN 250 MG
2 CAPSULE ORAL 2 TIMES DAILY
Status: DISCONTINUED | OUTPATIENT
Start: 2022-03-07 | End: 2022-03-09 | Stop reason: HOSPADM

## 2022-03-07 RX ORDER — BISACODYL 10 MG
10 SUPPOSITORY, RECTAL RECTAL
Status: DISCONTINUED | OUTPATIENT
Start: 2022-03-07 | End: 2022-03-09 | Stop reason: HOSPADM

## 2022-03-07 RX ORDER — ASPIRIN 81 MG/1
81 TABLET, CHEWABLE ORAL DAILY
Status: DISCONTINUED | OUTPATIENT
Start: 2022-03-08 | End: 2022-03-09 | Stop reason: HOSPADM

## 2022-03-07 RX ORDER — DEXAMETHASONE 4 MG/1
6 TABLET ORAL DAILY
Status: DISCONTINUED | OUTPATIENT
Start: 2022-03-07 | End: 2022-03-08

## 2022-03-07 RX ORDER — SODIUM CHLORIDE 9 MG/ML
INJECTION, SOLUTION INTRAVENOUS CONTINUOUS
Status: DISCONTINUED | OUTPATIENT
Start: 2022-03-07 | End: 2022-03-07

## 2022-03-07 RX ADMIN — OMEPRAZOLE 40 MG: 20 CAPSULE, DELAYED RELEASE ORAL at 13:34

## 2022-03-07 RX ADMIN — ATORVASTATIN CALCIUM 40 MG: 40 TABLET, FILM COATED ORAL at 18:57

## 2022-03-07 RX ADMIN — SODIUM CHLORIDE: 9 INJECTION, SOLUTION INTRAVENOUS at 10:18

## 2022-03-07 RX ADMIN — SODIUM CHLORIDE 500 ML: 9 INJECTION, SOLUTION INTRAVENOUS at 09:30

## 2022-03-07 RX ADMIN — INSULIN GLARGINE 24 UNITS: 100 INJECTION, SOLUTION SUBCUTANEOUS at 19:03

## 2022-03-07 RX ADMIN — SODIUM CHLORIDE: 9 INJECTION, SOLUTION INTRAVENOUS at 11:27

## 2022-03-07 RX ADMIN — GABAPENTIN 300 MG: 300 CAPSULE ORAL at 22:08

## 2022-03-07 RX ADMIN — DEXAMETHASONE 6 MG: 4 TABLET ORAL at 13:34

## 2022-03-07 RX ADMIN — SODIUM CHLORIDE 500 ML: 9 INJECTION, SOLUTION INTRAVENOUS at 08:10

## 2022-03-07 ASSESSMENT — ENCOUNTER SYMPTOMS
NAUSEA: 0
WEAKNESS: 1
MYALGIAS: 0
SPEECH CHANGE: 0
FEVER: 0
VOMITING: 0
HEADACHES: 0
HEARTBURN: 0
ABDOMINAL PAIN: 0
DOUBLE VISION: 0
PHOTOPHOBIA: 0
DEPRESSION: 0
DIZZINESS: 0
SENSORY CHANGE: 0
ORTHOPNEA: 0
INSOMNIA: 0
EYE PAIN: 0
SHORTNESS OF BREATH: 0
COUGH: 0
PALPITATIONS: 0
SPUTUM PRODUCTION: 0
BLURRED VISION: 0
CHILLS: 0
BLOOD IN STOOL: 0
SINUS PAIN: 0

## 2022-03-07 ASSESSMENT — PAIN DESCRIPTION - PAIN TYPE: TYPE: ACUTE PAIN

## 2022-03-07 ASSESSMENT — FIBROSIS 4 INDEX: FIB4 SCORE: 1.9

## 2022-03-07 NOTE — H&P
History & Physical Note    Date of Admission: 3/7/2022  Admission Status: Inpatient  [facility] Team: [facility] admission team  Attending: Radha Byers MD   Senior Resident: Dr. Bazan  Contact Number: [phone]    Chief Complaint:   GLF    History of Present Illness (HPI):   Beverley is a 71 y.o. female who presented 3/7/2022 with PMHx of afib/atrial flutter s/p RUDI/DCCV in 07/2021 (planned for ablation)- on dabigatran, poorly controlled IDDM II (A1c 16.5 in 7/2021, now 9.8% and 1/2022), hypertension, dyslipidemia, chronic bilateral leg swelling and wounds, limited mobility, who presented to the ED today after mechanical ground-level fall at home.  This morning, patient flipped her leg and had a ground-level fall.  She did not have dizziness or loss of consciousness or seizure-like activity.  She was brought to the emergency department for further evaluation.  She was afebrile, did not report any fever/chills, shortness of breath, cough, chest pain, palpitation or gastrointestinal urinary/ symptoms.    She was found to be tachycardic with W[place] in 120s and hypotensive with SBP in 80s-90s.  She was completely alert and oriented, denied any pain or tenderness.  Blood pressure improved to SBP 100s with 2 LR bolus in the ED.  EKG showed sinus tachycardia, regular rhythm.  CBC did not show leukocytosis or anemia.  CHEM panel showed elevated creatinine 3.37, BUN 46, , acidosis with bicarb 18, normal anion gap.  Troponin T 38,  lactic acid WNL.  UA showed some bacteria and WBC but negative for nitrite/leukocyte esterase.  CXR and CT scan of the head did not show any acute abnormalities.    Review of Systems:   Review of Systems   Constitutional: Positive for malaise/fatigue. Negative for chills and fever.   HENT: Negative for congestion, nosebleeds and sinus pain.    Eyes: Negative for blurred vision, double vision, photophobia and pain.   Respiratory: Negative for cough, sputum production and shortness of breath.     Cardiovascular: Positive for leg swelling. Negative for chest pain, palpitations and orthopnea.   Gastrointestinal: Negative for abdominal pain, blood in stool, heartburn, nausea and vomiting.   Genitourinary: Negative for dysuria and hematuria.   Musculoskeletal: Negative for joint pain and myalgias.   Skin: Negative for itching and rash.   Neurological: Positive for weakness. Negative for dizziness, sensory change, speech change and headaches.   Psychiatric/Behavioral: Negative for depression. The patient does not have insomnia.      Past Medical History:   Past Medical History was reviewed with patient.   has a past medical history of Atrial flutter (HCC), Diabetes (HCC), Hyperlipidemia, Hypertension, and Neuropathy.    Past Surgical History: Past Surgical History was reviewed with patient.   has no past surgical history on file.    Medications: Medications have been reviewed with patient.  Prior to Admission Medications   Prescriptions Last Dose Informant Patient Reported? Taking?   Alcohol Swabs  Patient No No   Sig: Wipe site with prep pad prior to injection.   Blood Glucose Meter Kit  Patient No No   Sig: Test blood sugar as recommended by provider. Accucheck Guide blood glucose monitoring kit.   Blood Glucose Test Strips  Patient No No   Sig: Use one Accucheck Guide strip to test blood sugar three times daily.   Empagliflozin 10 MG Tab 3/6/2022 at am Patient No No   Sig: Take 10 mg by mouth every day.   Insulin Pen Needle 32 G x 4 mm  Patient No No   Sig: Use one pen needle in pen device to inject insulin once daily.   Lancets  Patient No No   Sig: Use one Accucheck Guide lancet to test blood sugar three times daily.   aspirin (ASA) 81 MG Chew Tab chewable tablet 3/6/2022 at am Patient No No   Sig: Chew 1 tablet every day.   atorvastatin (LIPITOR) 40 MG Tab 3/6/2022 at 1900 Patient No No   Sig: Take 1 tablet by mouth every evening.   dabigatran (PRADAXA) 150 MG Cap capsule 3/6/2022 at 1900 Patient No No    Sig: Take 1 capsule by mouth 2 times a day.   diclofenac sodium (VOLTAREN) 1 % Gel unknown at Unknown time  No No   Sig: Apply 2 g topically 4 times a day as needed.   furosemide (LASIX) 40 MG Tab 3/6/2022 at am Patient's Home Pharmacy Yes No   Sig: Take 40 mg by mouth every day.   gabapentin (NEURONTIN) 300 MG Cap 3/6/2022 at 1900  No No   Sig: Take 1 Capsule by mouth at bedtime.   insulin glargine (LANTUS SOLOSTAR) 100 UNIT/ML Solution Pen-injector injection 3/6/2022 at 1900 Patient Yes No   Sig: Inject 45 Units under the skin every evening.   levothyroxine (SYNTHROID) 100 MCG Tab 3/6/2022 at am Patient No No   Sig: Take 1 tablet by mouth every morning on an empty stomach.   lisinopril (PRINIVIL) 10 MG Tab 3/6/2022 at am Patient Yes No   Sig: Take 10 mg by mouth every day.   metoprolol tartrate (LOPRESSOR) 50 MG Tab 3/6/2022 at 1900  No No   Sig: Take 1 Tablet by mouth 2 times a day.   potassium chloride SA (KDUR) 20 MEQ Tab CR 3/6/2022 at am Patient Yes No   Sig: Take 20 mEq by mouth every day.   vitamin D3 2000 UNIT Tab 3/6/2022 at am  No No   Sig: Take 1 Tablet by mouth every day.      Facility-Administered Medications: None        Allergies: Allergies have been reviewed with patient.  No Known Allergies    Family History:   Family history noncontributory.     Social History:   Tobacco: None  Alcohol:  None  Recreational drugs (illegal and prescription):  None  Employment: None  Activity Level: Sedentary  Living situation:  In mobile house with other females  Recent travel:  None  Primary Care Provider: reviewed Michael Farris M.D.  Other (stressors, spirituality, exposures):  None    Physical Exam:   Vitals:  Temp:  [36.3 °C (97.3 °F)-36.7 °C (98.1 °F)] 36.3 °C (97.3 °F)  Pulse:  [] 96  Resp:  [11-18] 14  BP: ()/(48-77) 94/60  SpO2:  [93 %-100 %] 98 %    Physical Exam  Constitutional:       Appearance: Normal appearance. She is obese. She is not ill-appearing.   HENT:      Head: Normocephalic  and atraumatic.      Nose: Nose normal. No congestion.      Mouth/Throat:      Mouth: Mucous membranes are dry.      Pharynx: Oropharynx is clear. No oropharyngeal exudate.   Eyes:      Conjunctiva/sclera: Conjunctivae normal.      Pupils: Pupils are equal, round, and reactive to light.   Cardiovascular:      Rate and Rhythm: Regular rhythm. Tachycardia present.      Pulses: Normal pulses.      Heart sounds: Normal heart sounds. No murmur heard.  Pulmonary:      Effort: Pulmonary effort is normal. No respiratory distress.      Breath sounds: Normal breath sounds. No wheezing or rales.   Abdominal:      General: Abdomen is flat. Bowel sounds are normal. There is no distension.      Palpations: Abdomen is soft.      Tenderness: There is no abdominal tenderness. There is no guarding.   Musculoskeletal:         General: Swelling present. No tenderness.      Cervical back: No rigidity or tenderness.      Right lower leg: Edema present.      Left lower leg: Edema present.   Skin:     General: Skin is dry.      Coloration: Skin is not jaundiced.      Findings: No bruising.   Neurological:      General: No focal deficit present.      Mental Status: She is alert and oriented to person, place, and time.   Psychiatric:         Mood and Affect: Mood normal.         Behavior: Behavior normal.       Labs:     Results for orders placed or performed during the hospital encounter of 03/07/22   CBC w/ Differential   Result Value Ref Range    WBC 10.4 4.8 - 10.8 K/uL    RBC 4.35 4.20 - 5.40 M/uL    Hemoglobin 12.1 12.0 - 16.0 g/dL    Hematocrit 41.4 37.0 - 47.0 %    MCV 95.2 81.4 - 97.8 fL    MCH 27.8 27.0 - 33.0 pg    MCHC 29.2 (L) 33.6 - 35.0 g/dL    RDW 57.2 (H) 35.9 - 50.0 fL    Platelet Count 258 164 - 446 K/uL    MPV 11.1 9.0 - 12.9 fL    Neutrophils-Polys 67.80 44.00 - 72.00 %    Lymphocytes 24.40 22.00 - 41.00 %    Monocytes 5.20 0.00 - 13.40 %    Eosinophils 1.40 0.00 - 6.90 %    Basophils 0.60 0.00 - 1.80 %    Immature  Granulocytes 0.60 0.00 - 0.90 %    Nucleated RBC 0.00 /100 WBC    Neutrophils (Absolute) 7.03 2.00 - 7.15 K/uL    Lymphs (Absolute) 2.53 1.00 - 4.80 K/uL    Monos (Absolute) 0.54 0.00 - 0.85 K/uL    Eos (Absolute) 0.15 0.00 - 0.51 K/uL    Baso (Absolute) 0.06 0.00 - 0.12 K/uL    Immature Granulocytes (abs) 0.06 0.00 - 0.11 K/uL    NRBC (Absolute) 0.00 K/uL    Anisocytosis 1+     Microcytosis 1+    Complete Metabolic Panel (CMP)   Result Value Ref Range    Sodium 137 135 - 145 mmol/L    Potassium 5.2 3.6 - 5.5 mmol/L    Chloride 103 96 - 112 mmol/L    Co2 18 (L) 20 - 33 mmol/L    Anion Gap 16.0 7.0 - 16.0    Glucose 103 (H) 65 - 99 mg/dL    Bun 46 (H) 8 - 22 mg/dL    Creatinine 3.37 (H) 0.50 - 1.40 mg/dL    Calcium 9.0 8.5 - 10.5 mg/dL    AST(SGOT) 8 (L) 12 - 45 U/L    ALT(SGPT) 9 2 - 50 U/L    Alkaline Phosphatase 115 (H) 30 - 99 U/L    Total Bilirubin 1.4 0.1 - 1.5 mg/dL    Albumin 3.3 3.2 - 4.9 g/dL    Total Protein 6.7 6.0 - 8.2 g/dL    Globulin 3.4 1.9 - 3.5 g/dL    A-G Ratio 1.0 g/dL   Troponin STAT   Result Value Ref Range    Troponin T 38 (H) 6 - 19 ng/L   APTT   Result Value Ref Range    APTT 105.4 (HH) 24.7 - 36.0 sec   PT/INR   Result Value Ref Range    PT 57.1 (HH) 12.0 - 14.6 sec    INR 6.82 (HH) 0.87 - 1.13   MAGNESIUM   Result Value Ref Range    Magnesium 2.0 1.5 - 2.5 mg/dL   URINALYSIS    Specimen: Urine   Result Value Ref Range    Color Yellow     Character Hazy (A)     Specific Gravity >=1.030 <1.035    Ph 5.0 5.0 - 8.0    Glucose 100 (A) Negative mg/dL    Ketones 15 (A) Negative mg/dL    Protein Negative Negative mg/dL    Bilirubin Small (A) Negative    Urobilinogen, Urine 0.2 Negative    Nitrite Negative Negative    Leukocyte Esterase Negative Negative    Occult Blood Trace (A) Negative    Micro Urine Req Microscopic    ESTIMATED GFR   Result Value Ref Range    GFR If  16 (A) >60 mL/min/1.73 m 2    GFR If Non  13 (A) >60 mL/min/1.73 m 2   URINE MICROSCOPIC (W/UA)    Result Value Ref Range    WBC 10-20 (A) /hpf    RBC 5-10 (A) /hpf    Bacteria Few (A) None /hpf    Epithelial Cells Moderate (A) /hpf    Hyaline Cast 0-2 /lpf   PERIPHERAL SMEAR REVIEW   Result Value Ref Range    Peripheral Smear Review see below    PLATELET ESTIMATE   Result Value Ref Range    Plt Estimation Normal    MORPHOLOGY   Result Value Ref Range    RBC Morphology Present     Poikilocytosis 2+     Ovalocytes 1+     Echinocytes 2+    DIFFERENTIAL COMMENT   Result Value Ref Range    Comments-Diff see below    Blood Culture,Hold   Result Value Ref Range    Blood Culture Hold Collected    Lactic Acid   Result Value Ref Range    Lactic Acid 1.1 0.5 - 2.0 mmol/L   CoV-2, FLU A/B, and RSV by PCR (2-4 Hours CEPHEID) : Collect NP swab in VTM    Specimen: Respirate   Result Value Ref Range    Influenza virus A RNA Negative Negative    Influenza virus B, PCR Negative Negative    RSV, PCR Negative Negative    SARS-CoV-2 by PCR DETECTED (AA)     SARS-CoV-2 Source NP Swab    URINE SODIUM RANDOM   Result Value Ref Range    Sodium, Urine -per volume 32 mmol/L   URINE CREATININE RANDOM   Result Value Ref Range    Creatinine, Random Urine 274.29 mg/dL   U UREA NITROGEN RAN   Result Value Ref Range    Urea Nitrogen, Random Urine 174 mg/dL   CREATINE KINASE   Result Value Ref Range    CPK Total 126 0 - 154 U/L   CRP QUANTITIVE (NON-CARDIAC)   Result Value Ref Range    Stat C-Reactive Protein 1.06 (H) 0.00 - 0.75 mg/dL   EKG   Result Value Ref Range    Report       [facility] Emergency Dept.    Test Date:  2022  Pt Name:    GAETANO RONQUILLO                  Department: ER  MRN:        [id number]                      Room:        18  Gender:     Female                       Technician: [id number]  :        [id number]                   Requested By:MADALYN NOEL  Order #:    [id number]                    Reading MD: MADALYN NOEL, DO    Measurements  Intervals                                Axis  Rate:        121                          P:  AK:                                      QRS:        267  QRSD:       110                          T:          -9  QT:         334  QTc:        475    Interpretive Statements  Atrial flutter with predominant 2:1 AV block  Consider right ventricular hypertrophy  Inferior infarct, age indeterminate  Consider anterior infarct  Nonspecific T abnormalities, lateral leads  Compared to ECG 03/04/2022 13:19:14  2:1 AV block now present  T-wave abnormality now present  Sinus tachycardia no longer p resent  Left posterior fascicular block no longer present  Incomplete right bundle-branch block no longer present  Myocardial infarct finding still present  Electronically Signed On 3-7-2022 10:27:55 PST by MADALYN NOEL DO       Imaging:     [id number]-[email or web address] (1 VIEW)   Final Result      No acute cardiac or pulmonary abnormalities are identified. Lung volumes are low.      CT-HEAD W/O   Final Result      1. Cerebral atrophy.   2. White matter lucencies most consistent with small vessel ischemic change versus demyelination or gliosis.   3. Otherwise, Head CT without contrast with no acute findings. No evidence of acute cerebral infarction, hemorrhage or mass lesion.         US-RENAL    (Results Pending)         Previous Data Review: reviewed    Assessment and Plan:    * Acute renal failure (ARF) (HCC)- (present on admission)  Assessment & Plan  Serum creatinine 3.37, BUN 46 on admission.  Acute kidney injury likely prerenal secondary to dehydration/poor oral intake.  S/p 2L of bolus IVF in the ED.  Carrasquillo catheter was placed in the ED, no evidence of urinary retention.  Follow renal ultrasound.  Continue IVF.  Trend and monitor creatinine.  Follow urine electrolytes results.  Avoid NSAIDs and nephrotoxins.    Elevated INR- (present on admission)  Assessment & Plan  PT 57.1/INR 6.82, APTT 105.4  Secondary to dabigatran intake and acute renal failure.  Monitor for signs of bleeding.  Currently  hemodynamically stable.  Hold dabigatran for now  Follow PT/INR/APTT.  Consider Praxbind (idarucizumab) for signs of bleeding with hemodynamic instability.      Atrial flutter (HCC)- (present on admission)  Assessment & Plan  s/p RUDI/DCCV in 07/2021 (planned for ablation)- on dabigatran  Being followed by cardiology, plan for ablation.  Currently in sinus rhythm, hold metoprolol.  Consider resuming metoprolol after BP stabilizes.  Hold dabigatran for now given elevated APTT/INR secondary to acute renal failure.      Hypotension- (present on admission)  Assessment & Plan  Likely due to poor oral intake  Improved s/p 2 L IVF  Continue IVF  Hold home antihypertensive medications    COVID-19- (present on admission)  Assessment & Plan  Patient is Covid positive, minimally hypoxic requiring 1 to 2 L of oxygen.  Denies cough, fever/chills.  Covid vaccinated, no booster dose.  CXR is not showing infiltrates.  Started on dexamethasone 6 mg p.o.  CRP pending, continue to monitor.    Leg swelling- (present on admission)  Assessment & Plan  Has chronic bilateral leg edema  Hold Lasix for now given soft blood pressure.    Hypothyroidism- (present on admission)  Assessment & Plan  Continue home levothyroxine.    Fall from ground level- (present on admission)  Assessment & Plan  Presented after ground-level fall at home  Looks like mechanical fall from the history.  Denies any pain/tenderness, loss of consciousness, trauma.   Continue to monitor.      Class 3 severe obesity in adult ([facility])- (present on admission)  Assessment & Plan  BMI 45, chronic.    Type 2 diabetes mellitus with hyperglycemia, with long-term current use of insulin ([facility])- (present on admission)  Assessment & Plan  Uncontrolled, IDDM type II.  On Lantus 45 and empagliflozin at home.  Started on diabetic diet.  Continue Lantus, SSI with Accu-Cheks and hypoglycemia protocol.     DVT ppx - SCDs  Code status - Full code

## 2022-03-07 NOTE — ED TRIAGE NOTES
"BIB [facility] for a GLF. Pt fell down after sitting in her chair. Reports she tried to get up and reach for her cane and \"fell on her face\". Pt laid on the ground for approx 30 mins before contacting a neighbor for help. Pt endorses history of DM type 2 and atrial flutter pending an ablation. + blood thinners, pradaxa. Pt changed into gown and placed on cardiac monitor. Blood drawn and sent to lab. Chart up for ERP.   "

## 2022-03-07 NOTE — ASSESSMENT & PLAN NOTE
Has chronic bilateral leg edema  Hold Lasix for now given soft blood pressure    -Consider compression stockings on discharge

## 2022-03-07 NOTE — ASSESSMENT & PLAN NOTE
Uncontrolled, IDDM type II  A1c 16.5 in 7/2021, now 9.8% and 1/2022  On Lantus 45u and empagliflozin 10 mg at home.    -Continue Lantus, SSI with Accu-Cheks and hypoglycemia protocol  -Consistent carb diet

## 2022-03-07 NOTE — ASSESSMENT & PLAN NOTE
Presented after ground-level fall at home  Looks like mechanical fall from the history.  Denies any pain/tenderness, loss of consciousness, trauma.   Likely 2/2 to deconditioning vs hypotension vs dehydration    -PT/OT consulted

## 2022-03-07 NOTE — ASSESSMENT & PLAN NOTE
PT 57.1/INR 6.82, APTT 105.4  Secondary to dabigatran intake and acute renal failure.  Monitor for signs of bleeding.  Currently hemodynamically stable.    -Held dabigatran for now  -INR (03/08/22): Downtrending from 6.82-3.91  -Consider Praxbind (idarucizumab) for signs of bleeding with hemodynamic instability  -Consider decreasing Dabigatran 150 mg BID to 110 mg BID on discharge

## 2022-03-07 NOTE — ASSESSMENT & PLAN NOTE
CMP (03/07/22): BUN/creatinine 46/3.37   Baseline CMP (2/24/22): BUN/creatinine 17/0.88  Acute kidney injury likely prerenal secondary to dehydration/poor oral intake.  S/p 2L of bolus IVF in the ED, Carrasquillo catheter was placed in the ED, no evidence of urinary retention    -BUN/creatinine improving from 46/3.37-41/1.84  -Urine electrolytes (03/07/22): Sodium 32, creatine 274, Urea nitrogen 0.2, urobilinogen: 0.2  -Fena (03/07/22): 0.3%, pre-renal  -Renal ultrasound (03/08/22): No evidence of hydronephrosis, renal cortical thinning bilaterally, likely right nephrolith   -Continue NS at 125 mL/h  -Will add NS 1L bolus  -Bladder ultrasound to rule out urinary retention  -Avoid NSAIDs and nephrotoxins.

## 2022-03-07 NOTE — ED NOTES
Second NS bolus initiated per verbal order from ERP. Pt HR remains in 120s. SBP 80-90s. Pt sleeping comfortably on gurney at this time.

## 2022-03-07 NOTE — ED NOTES
"ERP to bedside, pt upgraded to TBI. While ERP bedside pt HR into the 30s, eyes rolled back in her head. Lasted approx 30 seconds. EKG obtained. Pt reports feeling \"fine\". Pt to CT with this RN and GARLAND Slater on Zoll monitor and O2. Pt hypotensive in CT. BP improved when back in room to 98/51. Pending second PIV placement.   "

## 2022-03-07 NOTE — ASSESSMENT & PLAN NOTE
s/p RUDI/DCCV in 07/2021 (planned for ablation) - on dabigatran  Being followed by cardiology, plan for ablation.  Currently in sinus rhythm, hold metoprolol.    -Consider resuming metoprolol after BP stabilizes.  -Hold dabigatran for now given elevated APTT/INR secondary to acute renal failure.  -Consider down titrating Dabigatran 150 mg to 110 mg twice daily once INR stable

## 2022-03-07 NOTE — ED PROVIDER NOTES
"ED Provider Note    CHIEF COMPLAINT  Chief Complaint   Patient presents with   • T-5000 GLF     Fell down this morning while trying to get up out of her chair. Denies LOC. Denies syncope.       HPI  Beverley Dejesus is a 71 y.o. female who presents to the emergency department by ambulance from home after mechanical fall.  Patient states she stood up from her chair this morning, and she almost immediately fell to the ground \"onto my face.\"  Patient denies lightheadedness, dizziness, syncope.  Denies chest pain, palpitations or shortness of breath.  Denies back pain or abdominal pain.  She states that she has had some generalized weakness since being discharged from the hospital last month.  She had some discomfort or a \"knot\" in her left thigh and thinks that made her fall.    She takes Pradaxa for atrial flutter.  Compliant with home medications.    REVIEW OF SYSTEMS  See HPI for further details. All other systems are negative.     PAST MEDICAL HISTORY   has a past medical history of Atrial flutter (HCC), Diabetes (HCC), Hyperlipidemia, Hypertension, and Neuropathy.    SOCIAL HISTORY  Social History     Tobacco Use   • Smoking status: Never Smoker   • Smokeless tobacco: Never Used   Substance and Sexual Activity   • Alcohol use: Not Currently   • Drug use: Never   • Sexual activity: Not on file       SURGICAL HISTORY  patient denies any surgical history    CURRENT MEDICATIONS  Home Medications     Reviewed by Franky Holly (Pharmacy Tech) on 03/07/22 at 0943  Med List Status: Complete   Medication Last Dose Status   Alcohol Swabs  Active   aspirin (ASA) 81 MG Chew Tab chewable tablet 3/6/2022 Active   atorvastatin (LIPITOR) 40 MG Tab 3/6/2022 Active   Blood Glucose Meter Kit  Active   Blood Glucose Test Strips  Active   dabigatran (PRADAXA) 150 MG Cap capsule 3/6/2022 Active   diclofenac sodium (VOLTAREN) 1 % Gel unknown Active   Empagliflozin 10 MG Tab 3/6/2022 Active   furosemide (LASIX) 40 MG Tab 3/6/2022 " "Active   gabapentin (NEURONTIN) 300 MG Cap 3/6/2022 Active   insulin glargine (LANTUS SOLOSTAR) 100 UNIT/ML Solution Pen-injector injection 3/6/2022 Active   Insulin Pen Needle 32 G x 4 mm  Active   Lancets  Active   levothyroxine (SYNTHROID) 100 MCG Tab 3/6/2022 Active   lisinopril (PRINIVIL) 10 MG Tab 3/6/2022 Active   metoprolol tartrate (LOPRESSOR) 50 MG Tab 3/6/2022 Active   potassium chloride SA (KDUR) 20 MEQ Tab CR 3/6/2022 Active   vitamin D3 2000 UNIT Tab 3/6/2022 Active                ALLERGIES  No Known Allergies    PHYSICAL EXAM  VITAL SIGNS: BP (!) 83/49   Pulse (!) 125   Temp 36.6 °C (97.9 °F) (Temporal)   Resp 12   Ht 1.626 m (5' 4\")   Wt 120 kg (265 lb)   SpO2 99%   BMI 45.49 kg/m²   Pulse ox interpretation: I interpret this pulse ox as normal.  Constitutional: Alert in no apparent distress.  Morbidly obese.  HENT: Normocephalic, atraumatic, no cephalohematoma.. Bilateral external ears normal, Nose normal.  Dry mucous membranes.  No oral trauma.  Eyes: Pupils are equal and reactive, Conjunctiva normal.   Neck: No midline tenderness palpation, no step-offs.  Full range of motion without pain or resistance.  Lymphatic: No lymphadenopathy noted.   Cardiovascular: Tachycardic otherwise regular rate and rhythm, no murmurs. Distal pulses intact.  No peripheral edema.  Thorax & Lungs: Diminished bilaterally, poor inspiratory effort, otherwise no wheezes, rales or rhonchi.  No increased work of breathing or retractions.  No chest wall tenderness, crepitus, abrasion or hematoma.  Abdomen: obese, soft, nondistended.  Nontender to palpation.  No palpable pulsatile mass.  No abdominal wall trauma, abrasion or hematoma..  Skin: Warm, Dry, poor turgor.  No erythema, No rash.   Musculoskeletal: Pelvis stable.  4 extremities are really unremarkable without crepitus or deformity nor swelling and pain.  No lower extremity shortening or rotation.  Range of motion intact without resistance or discomfort.  No left " thigh cutaneous changes, swelling, crepitus or discomfort.  Neurologic: Alert and orient x4.  Speech clear and cohesive.  5 out of 5 strength in 4 extremity despite generalized weakness.  Psychiatric: Affect normal, Judgment normal, Mood normal.       DIAGNOSTIC STUDIES / PROCEDURES    LABS  Results for orders placed or performed during the hospital encounter of 03/07/22   CBC w/ Differential   Result Value Ref Range    WBC 10.4 4.8 - 10.8 K/uL    RBC 4.35 4.20 - 5.40 M/uL    Hemoglobin 12.1 12.0 - 16.0 g/dL    Hematocrit 41.4 37.0 - 47.0 %    MCV 95.2 81.4 - 97.8 fL    MCH 27.8 27.0 - 33.0 pg    MCHC 29.2 (L) 33.6 - 35.0 g/dL    RDW 57.2 (H) 35.9 - 50.0 fL    Platelet Count 258 164 - 446 K/uL    MPV 11.1 9.0 - 12.9 fL    Neutrophils-Polys 67.80 44.00 - 72.00 %    Lymphocytes 24.40 22.00 - 41.00 %    Monocytes 5.20 0.00 - 13.40 %    Eosinophils 1.40 0.00 - 6.90 %    Basophils 0.60 0.00 - 1.80 %    Immature Granulocytes 0.60 0.00 - 0.90 %    Nucleated RBC 0.00 /100 WBC    Neutrophils (Absolute) 7.03 2.00 - 7.15 K/uL    Lymphs (Absolute) 2.53 1.00 - 4.80 K/uL    Monos (Absolute) 0.54 0.00 - 0.85 K/uL    Eos (Absolute) 0.15 0.00 - 0.51 K/uL    Baso (Absolute) 0.06 0.00 - 0.12 K/uL    Immature Granulocytes (abs) 0.06 0.00 - 0.11 K/uL    NRBC (Absolute) 0.00 K/uL    Anisocytosis 1+     Microcytosis 1+    Complete Metabolic Panel (CMP)   Result Value Ref Range    Sodium 137 135 - 145 mmol/L    Potassium 5.2 3.6 - 5.5 mmol/L    Chloride 103 96 - 112 mmol/L    Co2 18 (L) 20 - 33 mmol/L    Anion Gap 16.0 7.0 - 16.0    Glucose 103 (H) 65 - 99 mg/dL    Bun 46 (H) 8 - 22 mg/dL    Creatinine 3.37 (H) 0.50 - 1.40 mg/dL    Calcium 9.0 8.5 - 10.5 mg/dL    AST(SGOT) 8 (L) 12 - 45 U/L    ALT(SGPT) 9 2 - 50 U/L    Alkaline Phosphatase 115 (H) 30 - 99 U/L    Total Bilirubin 1.4 0.1 - 1.5 mg/dL    Albumin 3.3 3.2 - 4.9 g/dL    Total Protein 6.7 6.0 - 8.2 g/dL    Globulin 3.4 1.9 - 3.5 g/dL    A-G Ratio 1.0 g/dL   Troponin STAT   Result  Value Ref Range    Troponin T 38 (H) 6 - 19 ng/L   APTT   Result Value Ref Range    APTT 105.4 (HH) 24.7 - 36.0 sec   PT/INR   Result Value Ref Range    PT 57.1 (HH) 12.0 - 14.6 sec    INR 6.82 (HH) 0.87 - 1.13   MAGNESIUM   Result Value Ref Range    Magnesium 2.0 1.5 - 2.5 mg/dL   URINALYSIS    Specimen: Urine   Result Value Ref Range    Color Yellow     Character Hazy (A)     Specific Gravity >=1.030 <1.035    Ph 5.0 5.0 - 8.0    Glucose 100 (A) Negative mg/dL    Ketones 15 (A) Negative mg/dL    Protein Negative Negative mg/dL    Bilirubin Small (A) Negative    Urobilinogen, Urine 0.2 Negative    Nitrite Negative Negative    Leukocyte Esterase Negative Negative    Occult Blood Trace (A) Negative    Micro Urine Req Microscopic    ESTIMATED GFR   Result Value Ref Range    GFR If  16 (A) >60 mL/min/1.73 m 2    GFR If Non  13 (A) >60 mL/min/1.73 m 2   URINE MICROSCOPIC (W/UA)   Result Value Ref Range    WBC 10-20 (A) /hpf    RBC 5-10 (A) /hpf    Bacteria Few (A) None /hpf    Epithelial Cells Moderate (A) /hpf    Hyaline Cast 0-2 /lpf   PERIPHERAL SMEAR REVIEW   Result Value Ref Range    Peripheral Smear Review see below    PLATELET ESTIMATE   Result Value Ref Range    Plt Estimation Normal    MORPHOLOGY   Result Value Ref Range    RBC Morphology Present     Poikilocytosis 2+     Ovalocytes 1+     Echinocytes 2+    DIFFERENTIAL COMMENT   Result Value Ref Range    Comments-Diff see below    Blood Culture,Hold   Result Value Ref Range    Blood Culture Hold Collected    Lactic Acid   Result Value Ref Range    Lactic Acid 1.1 0.5 - 2.0 mmol/L   CoV-2, FLU A/B, and RSV by PCR (2-4 Hours CEPHEID) : Collect NP swab in VTM    Specimen: Respirate   Result Value Ref Range    SARS-CoV-2 Source NP Swab    EKG   Result Value Ref Range    Report       [facility] Emergency Dept.    Test Date:  2022-03-07  Pt Name:    GAETANO RONQUILLO                  Department: ER  MRN:        [id number]    "                   Room:        18  Gender:     Female                       Technician: [id number]  :        [id number]                   Requested By:MADALYN NOEL  Order #:    [id number]                    Reading MD: MADALYN NOEL DO    Measurements  Intervals                                Axis  Rate:       121                          P:  MT:                                      QRS:        267  QRSD:       110                          T:          -9  QT:         334  QTc:        475    Interpretive Statements  Sinus tachycardia  Consider right ventricular hypertrophy  Inferior infarct, age indeterminate  Consider anterior infarct  Nonspecific T abnormalities, lateral leads  Compared to ECG 2022 13:19:14  2:1 AV block now present  T-wave abnormality now present    Left posterior fascicular block no longer present  Incomplete right bundle-branch block no longer present  Myocardial infarct finding still present  Electronically Signed On 3-7-2022 10:27:55 PST by MADALYN NOEL DO         RADIOLOGY  DX-CHEST-PORTABLE (1 VIEW)   Final Result      No acute cardiac or pulmonary abnormalities are identified. Lung volumes are low.      CT-HEAD W/O   Final Result      1. Cerebral atrophy.   2. White matter lucencies most consistent with small vessel ischemic change versus demyelination or gliosis.   3. Otherwise, Head CT without contrast with no acute findings. No evidence of acute cerebral infarction, hemorrhage or mass lesion.             COURSE & MEDICAL DECISION MAKING  Nursing notes and vital signs were reviewed. (See chart for details)  The patients records were reviewed, history was obtained from the patient;     Patient seen evaluated at bedside.  Tachycardic but regular, consistent with sinus tachycardia.  Hypotensive.  Clinically dehydrated with dry mucous membranes and poor skin turgor.  Add small 500 cc fluid bolus.  Add head CT for history of fall \"flat on my face\" while on Pradaxa.  Although " she is neurologically intact and nonfocal.  No physical clinical evidence for trauma.  Add labs, chest x-ray.    Labs with evidence for acute renal failure, creatinine 3.37 (previous admission 0.88), BUN 46.  CO2 18.  No other electrolyte derangement, normal sodium and chloride.  We will continue fluid bolus, place Carrasquillo catheter for strict I&O.  No leukocytosis, left shift or bandemia.    CT head is negative for acute trauma.    Persistent hypotension after 500 cc fluid bolus, add 1 more liter.  Carrasquillo catheter placed.    Nonspecific coagulopathy, patient does take Pradaxa.  No bleeding diatheses.  No ecchymosis, petechiae.  Denies black or bloody stools.  Hemoglobin stable.  Abdominal exam is benign.    Hypotension now improving after 1.5 L fluid bolus.  Persistent tachycardia although suspect this may be secondary to patient's hypovolemia and renal failure.  She is now with urine output 200 cc.  She will be hospitalized for further evaluation and treatment.    1104 - [facility]  is aware the patient agreeable to consultation.    Evaluation for generalized weakness and a fall may be multifactorial, but most suggestive of hypovolemia, clinical dehydration and acute renal failure.  Patient is really asymptomatic at rest in the exam room.  No physical clinical evidence for trauma.  She is neurologically intact and nonfocal.  Abdominal exam is benign.  Blood pressure improving with IV fluid, no urinating, anticipate the heart rate will respond as well.  Have held any antiarrhythmic medications due to suspected hypovolemia.  No clinical evidence for sepsis.  Nonspecific coagulopathy with INR 6.82, she is on Pradaxa but has no bleeding diatheses, bruising or petechiae.  She will be hospitalized for further evaluation and treatment.    FINAL IMPRESSION  (N17.9) Acute renal failure, unspecified acute renal failure type (HCC)  (R53.1) Generalized weakness  (W19.XXXA) Fall, initial encounter  (D68.9) Coagulopathy (HCC)  (R00.0)  Tachycardia      Electronically signed by: Jeni Samson D.O., 3/7/2022 10:32 AM      This dictation was created using voice recognition software. The accuracy of the dictation is limited to the abilities of the software. I expect there may be some errors of grammar and possibly content. The nursing notes were reviewed and certain aspects of this information were incorporated into this note.

## 2022-03-07 NOTE — ASSESSMENT & PLAN NOTE
Patient is Covid positive, minimally hypoxic requiring 1 to 2 L of oxygen.  Denies cough, fever/chills.  Covid vaccinated, no booster dose.  CXR (3/7/22): Not showing infiltrates    -CRP (3/7/22): 1.06  -97% on RA, will discontinue dexamethasone (3/7/22-3/8/22)  -We will continue to monitor

## 2022-03-07 NOTE — ASSESSMENT & PLAN NOTE
BP (03/08/22): ()/(48-77) 98/53  Likely due to poor oral intake  Improved s/p 2 L IVF    -Continue NS at 125 mL/h  -Will add NS 1L bolus  -Hold home antihypertensive medications including metoprolol tartrate 50 mg twice daily, lisinopril 10 mg daily, furosemide 40 mg daily, and Empagliflozin 10 mg daily

## 2022-03-08 ENCOUNTER — APPOINTMENT (OUTPATIENT)
Dept: RADIOLOGY | Facility: MEDICAL CENTER | Age: 72
DRG: 682 | End: 2022-03-08
Attending: STUDENT IN AN ORGANIZED HEALTH CARE EDUCATION/TRAINING PROGRAM
Payer: MEDICARE

## 2022-03-08 PROBLEM — M79.652 LEFT THIGH PAIN: Status: ACTIVE | Noted: 2022-03-08

## 2022-03-08 PROBLEM — E87.5 HYPERKALEMIA: Status: ACTIVE | Noted: 2022-03-08

## 2022-03-08 LAB
ALBUMIN SERPL BCP-MCNC: 3.2 G/DL (ref 3.2–4.9)
ALBUMIN/GLOB SERPL: 1.1 G/DL
ALP SERPL-CCNC: 98 U/L (ref 30–99)
ALT SERPL-CCNC: 8 U/L (ref 2–50)
ANION GAP SERPL CALC-SCNC: 9 MMOL/L (ref 7–16)
APTT PPP: 90.1 SEC (ref 24.7–36)
AST SERPL-CCNC: 10 U/L (ref 12–45)
BASOPHILS # BLD AUTO: 0.2 % (ref 0–1.8)
BASOPHILS # BLD: 0.01 K/UL (ref 0–0.12)
BILIRUB SERPL-MCNC: 1.1 MG/DL (ref 0.1–1.5)
BUN SERPL-MCNC: 41 MG/DL (ref 8–22)
CALCIUM SERPL-MCNC: 8.9 MG/DL (ref 8.5–10.5)
CHLORIDE SERPL-SCNC: 108 MMOL/L (ref 96–112)
CO2 SERPL-SCNC: 18 MMOL/L (ref 20–33)
CREAT SERPL-MCNC: 1.84 MG/DL (ref 0.5–1.4)
CRP SERPL HS-MCNC: 1.79 MG/DL (ref 0–0.75)
EOSINOPHIL # BLD AUTO: 0 K/UL (ref 0–0.51)
EOSINOPHIL NFR BLD: 0 % (ref 0–6.9)
ERYTHROCYTE [DISTWIDTH] IN BLOOD BY AUTOMATED COUNT: 51.5 FL (ref 35.9–50)
ERYTHROCYTE [SEDIMENTATION RATE] IN BLOOD BY WESTERGREN METHOD: 12 MM/HOUR (ref 0–25)
FERRITIN SERPL-MCNC: 69.2 NG/ML (ref 10–291)
GLOBULIN SER CALC-MCNC: 2.8 G/DL (ref 1.9–3.5)
GLUCOSE BLD STRIP.AUTO-MCNC: 146 MG/DL (ref 65–99)
GLUCOSE BLD STRIP.AUTO-MCNC: 160 MG/DL (ref 65–99)
GLUCOSE BLD STRIP.AUTO-MCNC: 184 MG/DL (ref 65–99)
GLUCOSE BLD STRIP.AUTO-MCNC: 188 MG/DL (ref 65–99)
GLUCOSE BLD STRIP.AUTO-MCNC: 208 MG/DL (ref 65–99)
GLUCOSE BLD STRIP.AUTO-MCNC: 244 MG/DL (ref 65–99)
GLUCOSE SERPL-MCNC: 226 MG/DL (ref 65–99)
HCT VFR BLD AUTO: 35.5 % (ref 37–47)
HGB BLD-MCNC: 10.9 G/DL (ref 12–16)
IMM GRANULOCYTES # BLD AUTO: 0.02 K/UL (ref 0–0.11)
IMM GRANULOCYTES NFR BLD AUTO: 0.4 % (ref 0–0.9)
INR PPP: 3.91 (ref 0.87–1.13)
LYMPHOCYTES # BLD AUTO: 0.74 K/UL (ref 1–4.8)
LYMPHOCYTES NFR BLD: 14.5 % (ref 22–41)
MCH RBC QN AUTO: 28 PG (ref 27–33)
MCHC RBC AUTO-ENTMCNC: 30.7 G/DL (ref 33.6–35)
MCV RBC AUTO: 91.3 FL (ref 81.4–97.8)
MONOCYTES # BLD AUTO: 0.09 K/UL (ref 0–0.85)
MONOCYTES NFR BLD AUTO: 1.8 % (ref 0–13.4)
NEUTROPHILS # BLD AUTO: 4.25 K/UL (ref 2–7.15)
NEUTROPHILS NFR BLD: 83.1 % (ref 44–72)
NRBC # BLD AUTO: 0 K/UL
NRBC BLD-RTO: 0 /100 WBC
PLATELET # BLD AUTO: 222 K/UL (ref 164–446)
PMV BLD AUTO: 10.9 FL (ref 9–12.9)
POTASSIUM SERPL-SCNC: 5.6 MMOL/L (ref 3.6–5.5)
POTASSIUM SERPL-SCNC: 5.9 MMOL/L (ref 3.6–5.5)
PROT SERPL-MCNC: 6 G/DL (ref 6–8.2)
PROTHROMBIN TIME: 37.2 SEC (ref 12–14.6)
RBC # BLD AUTO: 3.89 M/UL (ref 4.2–5.4)
SODIUM SERPL-SCNC: 135 MMOL/L (ref 135–145)
TSH SERPL DL<=0.005 MIU/L-ACNC: 1.39 UIU/ML (ref 0.38–5.33)
WBC # BLD AUTO: 5.1 K/UL (ref 4.8–10.8)

## 2022-03-08 PROCEDURE — 85730 THROMBOPLASTIN TIME PARTIAL: CPT

## 2022-03-08 PROCEDURE — 99233 SBSQ HOSP IP/OBS HIGH 50: CPT | Mod: GC | Performed by: INTERNAL MEDICINE

## 2022-03-08 PROCEDURE — 770020 HCHG ROOM/CARE - TELE (206)

## 2022-03-08 PROCEDURE — 84443 ASSAY THYROID STIM HORMONE: CPT

## 2022-03-08 PROCEDURE — 80053 COMPREHEN METABOLIC PANEL: CPT

## 2022-03-08 PROCEDURE — A9270 NON-COVERED ITEM OR SERVICE: HCPCS | Performed by: STUDENT IN AN ORGANIZED HEALTH CARE EDUCATION/TRAINING PROGRAM

## 2022-03-08 PROCEDURE — 85652 RBC SED RATE AUTOMATED: CPT

## 2022-03-08 PROCEDURE — 93971 EXTREMITY STUDY: CPT | Mod: LT

## 2022-03-08 PROCEDURE — 82962 GLUCOSE BLOOD TEST: CPT

## 2022-03-08 PROCEDURE — 700111 HCHG RX REV CODE 636 W/ 250 OVERRIDE (IP): Performed by: STUDENT IN AN ORGANIZED HEALTH CARE EDUCATION/TRAINING PROGRAM

## 2022-03-08 PROCEDURE — 85025 COMPLETE CBC W/AUTO DIFF WBC: CPT

## 2022-03-08 PROCEDURE — 700102 HCHG RX REV CODE 250 W/ 637 OVERRIDE(OP): Performed by: STUDENT IN AN ORGANIZED HEALTH CARE EDUCATION/TRAINING PROGRAM

## 2022-03-08 PROCEDURE — 700105 HCHG RX REV CODE 258: Performed by: STUDENT IN AN ORGANIZED HEALTH CARE EDUCATION/TRAINING PROGRAM

## 2022-03-08 PROCEDURE — 84132 ASSAY OF SERUM POTASSIUM: CPT

## 2022-03-08 PROCEDURE — 36415 COLL VENOUS BLD VENIPUNCTURE: CPT

## 2022-03-08 PROCEDURE — 93971 EXTREMITY STUDY: CPT | Mod: 26,LT | Performed by: INTERNAL MEDICINE

## 2022-03-08 PROCEDURE — 82728 ASSAY OF FERRITIN: CPT

## 2022-03-08 PROCEDURE — 76775 US EXAM ABDO BACK WALL LIM: CPT

## 2022-03-08 PROCEDURE — 85610 PROTHROMBIN TIME: CPT

## 2022-03-08 PROCEDURE — 51798 US URINE CAPACITY MEASURE: CPT

## 2022-03-08 PROCEDURE — 86140 C-REACTIVE PROTEIN: CPT

## 2022-03-08 RX ORDER — HYDROMORPHONE HYDROCHLORIDE 1 MG/ML
1 INJECTION, SOLUTION INTRAMUSCULAR; INTRAVENOUS; SUBCUTANEOUS ONCE
Status: DISCONTINUED | OUTPATIENT
Start: 2022-03-08 | End: 2022-03-09 | Stop reason: ALTCHOICE

## 2022-03-08 RX ORDER — SODIUM CHLORIDE 9 MG/ML
1000 INJECTION, SOLUTION INTRAVENOUS ONCE
Status: COMPLETED | OUTPATIENT
Start: 2022-03-08 | End: 2022-03-09

## 2022-03-08 RX ORDER — OXYCODONE HYDROCHLORIDE 5 MG/1
5 TABLET ORAL EVERY 4 HOURS PRN
Status: DISCONTINUED | OUTPATIENT
Start: 2022-03-08 | End: 2022-03-09 | Stop reason: HOSPADM

## 2022-03-08 RX ORDER — INSULIN LISPRO 100 [IU]/ML
1-6 INJECTION, SOLUTION INTRAVENOUS; SUBCUTANEOUS
Status: DISCONTINUED | OUTPATIENT
Start: 2022-03-08 | End: 2022-03-09 | Stop reason: HOSPADM

## 2022-03-08 RX ORDER — CALCIUM GLUCONATE 20 MG/ML
2 INJECTION, SOLUTION INTRAVENOUS ONCE
Status: COMPLETED | OUTPATIENT
Start: 2022-03-08 | End: 2022-03-08

## 2022-03-08 RX ADMIN — ASPIRIN 81 MG: 81 TABLET, CHEWABLE ORAL at 06:13

## 2022-03-08 RX ADMIN — ACETAMINOPHEN 650 MG: 325 TABLET, FILM COATED ORAL at 14:24

## 2022-03-08 RX ADMIN — INSULIN LISPRO 1 UNITS: 100 INJECTION, SOLUTION INTRAVENOUS; SUBCUTANEOUS at 16:28

## 2022-03-08 RX ADMIN — INSULIN LISPRO 2 UNITS: 100 INJECTION, SOLUTION INTRAVENOUS; SUBCUTANEOUS at 10:35

## 2022-03-08 RX ADMIN — ATORVASTATIN CALCIUM 40 MG: 40 TABLET, FILM COATED ORAL at 16:34

## 2022-03-08 RX ADMIN — INSULIN HUMAN 6 UNITS: 100 INJECTION, SOLUTION PARENTERAL at 08:01

## 2022-03-08 RX ADMIN — INSULIN LISPRO 2 UNITS: 100 INJECTION, SOLUTION INTRAVENOUS; SUBCUTANEOUS at 00:59

## 2022-03-08 RX ADMIN — OMEPRAZOLE 40 MG: 20 CAPSULE, DELAYED RELEASE ORAL at 06:13

## 2022-03-08 RX ADMIN — DEXAMETHASONE 6 MG: 4 TABLET ORAL at 06:13

## 2022-03-08 RX ADMIN — OXYCODONE 5 MG: 5 TABLET ORAL at 16:57

## 2022-03-08 RX ADMIN — DEXTROSE MONOHYDRATE 25 G: 100 INJECTION, SOLUTION INTRAVENOUS at 08:17

## 2022-03-08 RX ADMIN — LEVOTHYROXINE SODIUM 100 MCG: 0.1 TABLET ORAL at 06:13

## 2022-03-08 RX ADMIN — SODIUM CHLORIDE 1000 ML: 9 INJECTION, SOLUTION INTRAVENOUS at 10:34

## 2022-03-08 RX ADMIN — SODIUM CHLORIDE: 9 INJECTION, SOLUTION INTRAVENOUS at 17:04

## 2022-03-08 RX ADMIN — CALCIUM GLUCONATE 2 G: 20 INJECTION, SOLUTION INTRAVENOUS at 08:44

## 2022-03-08 RX ADMIN — GABAPENTIN 300 MG: 300 CAPSULE ORAL at 21:22

## 2022-03-08 ASSESSMENT — PAIN DESCRIPTION - PAIN TYPE: TYPE: ACUTE PAIN

## 2022-03-08 ASSESSMENT — ENCOUNTER SYMPTOMS
HEADACHES: 0
BLURRED VISION: 0
SPEECH CHANGE: 0
ORTHOPNEA: 0
HEARTBURN: 0
SENSORY CHANGE: 0
NAUSEA: 0
DEPRESSION: 0
FEVER: 0
SHORTNESS OF BREATH: 0
INSOMNIA: 0
PHOTOPHOBIA: 0
VOMITING: 0
EYE PAIN: 0
SINUS PAIN: 0
DIZZINESS: 0
WEAKNESS: 1
COUGH: 0
ABDOMINAL PAIN: 0
SPUTUM PRODUCTION: 0
CHILLS: 0
PALPITATIONS: 0
BLOOD IN STOOL: 0
MYALGIAS: 0
DOUBLE VISION: 0

## 2022-03-08 NOTE — ASSESSMENT & PLAN NOTE
CBC (03/08/22): Hgb of 10.9, MCV 91.3  Likely secondary to anemia of chronic disease    -We will continue to monitor

## 2022-03-08 NOTE — ASSESSMENT & PLAN NOTE
CMP (03/08/22): K uptrending from 5.2 - 5.9  Likely secondary to outpatient potassium supplementation of 20 mEq/day, JULIETA, and Dexamethasone    -Given IV calcium gluconate  -Given dextrose 10% with IV insulin 6 units  -We will continue monitor

## 2022-03-08 NOTE — CARE PLAN
The patient is Stable - Low risk of patient condition declining or worsening         Progress made toward(s) clinical / shift goals:    Problem: Knowledge Deficit - Standard  Goal: Patient and family/care givers will demonstrate understanding of plan of care, disease process/condition, diagnostic tests and medications  Outcome: Progressing     Problem: Skin Integrity  Goal: Skin integrity is maintained or improved  Outcome: Progressing     Problem: Fall Risk  Goal: Patient will remain free from falls  Outcome: Progressing       Patient is not progressing towards the following goals:

## 2022-03-08 NOTE — PROGRESS NOTES
Date of Service: 3/8/2022  Primary Team: [facility] Blue Team   Attending: Cornelio Salgado M.D.   Senior Resident: Cindy Sevilla M.D.  Intern: Ankur Lazcano D.O.  Contact:  [phone]    Chief Complaint   Patient presents with   • T-5000 GLF     Fell down this morning while trying to get up out of her chair. Denies LOC. Denies syncope.       ID:  Beverley is a 71 y.o. female with PMHx of afib/atrial flutter s/p RUDI/DCCV in 07/2021 (planned for ablation)- on dabigatran, poorly controlled IDDM II (A1c 16.5 in 7/2021, now 9.8% and 1/2022), hypertension, dyslipidemia, chronic bilateral leg swelling and wounds, limited mobility, who presented to the ED on 3/7/22 after mechanical ground-level fall at home.    Interval update:    -No acute events overnight, patient states that she fell without any precipitating factors when she was getting up from her chair to use her walker  -States there is some stiffness, tenderness, pain from her left medial thigh with decreased range of motion  -Continue with maintenance IV fluids, NS 1 L bolus given  -Ordered IV calcium gluconate and 10% dextrose with insulin for hyperkalemia  -Ultrasound of left medial thigh, ESR, CRP, ferritin ordered for inflammatory markers  -Bladder scan ordered  -PT/OT consulted      Consultants/Specialty:  None    Review of Systems:    Review of Systems   Constitutional: Positive for malaise/fatigue. Negative for chills and fever.   HENT: Negative for congestion, nosebleeds and sinus pain.    Eyes: Negative for blurred vision, double vision, photophobia and pain.   Respiratory: Negative for cough, sputum production and shortness of breath.    Cardiovascular: Positive for leg swelling. Negative for chest pain, palpitations and orthopnea.   Gastrointestinal: Negative for abdominal pain, blood in stool, heartburn, nausea and vomiting.   Genitourinary: Negative for dysuria and hematuria.   Musculoskeletal: Negative for joint pain and myalgias.        Left medial  thigh swelling and pain   Skin: Negative for itching and rash.   Neurological: Positive for weakness. Negative for dizziness, sensory change, speech change and headaches.   Psychiatric/Behavioral: Negative for depression. The patient does not have insomnia.        Objective Data:   Physical Exam:   Vitals:   Temp:  [36.3 °C (97.3 °F)-36.7 °C (98.1 °F)] 36.6 °C (97.9 °F)  Pulse:  [] 92  Resp:  [12-20] 18  BP: ()/(49-61) 110/60  SpO2:  [90 %-99 %] 98 %     Physical Exam  Constitutional:       Appearance: Normal appearance. She is obese. She is not ill-appearing.   HENT:      Head: Normocephalic and atraumatic.      Nose: Nose normal. No congestion.      Mouth/Throat:      Mouth: Mucous membranes are moist.      Pharynx: Oropharynx is clear. No oropharyngeal exudate.   Eyes:      Conjunctiva/sclera: Conjunctivae normal.      Pupils: Pupils are equal, round, and reactive to light.   Cardiovascular:      Rate and Rhythm: Normal rate and regular rhythm.      Pulses: Normal pulses.      Heart sounds: Normal heart sounds. No murmur heard.  Pulmonary:      Effort: Pulmonary effort is normal. No respiratory distress.      Breath sounds: Normal breath sounds. No wheezing or rales.   Abdominal:      General: Bowel sounds are normal. There is no distension.      Palpations: Abdomen is soft.      Tenderness: There is no abdominal tenderness. There is no guarding.   Musculoskeletal:         General: No swelling or tenderness.      Cervical back: No rigidity or tenderness.      Right lower leg: Edema present.      Left lower leg: Edema present.      Comments: Left medial thigh with erythema, swelling, and warmth. ROM restricted    Wounds located in bilateral shins followed by wound care, compression stockings in place   Skin:     General: Skin is dry.      Coloration: Skin is not jaundiced.      Findings: No bruising.   Neurological:      General: No focal deficit present.      Mental Status: She is alert and oriented  to person, place, and time.   Psychiatric:         Mood and Affect: Mood normal.         Behavior: Behavior normal.           Labs:   Recent Labs     03/07/22  0730 03/07/22  1609 03/08/22  0050   WBC 10.4  --  5.1   RBC 4.35  --  3.89*   HEMOGLOBIN 12.1 11.0* 10.9*   HEMATOCRIT 41.4 37.8 35.5*   MCV 95.2  --  91.3   MCH 27.8  --  28.0   RDW 57.2*  --  51.5*   PLATELETCT 258  --  222   MPV 11.1  --  10.9   NEUTSPOLYS 67.80  --  83.10*   LYMPHOCYTES 24.40  --  14.50*   MONOCYTES 5.20  --  1.80   EOSINOPHILS 1.40  --  0.00   BASOPHILS 0.60  --  0.20   RBCMORPHOLO Present  --   --      Recent Labs     03/07/22  0730 03/07/22  1035 03/08/22  0050 03/08/22  0746   SODIUM 137  --  135  --    POTASSIUM 5.2  --  5.9* 5.6*   CHLORIDE 103  --  108  --    CO2 18*  --  18*  --    GLUCOSE 103*  --  226*  --    BUN 46*  --  41*  --    CPKTOTAL  --  126  --   --       Recent Labs     03/07/22 0730 03/08/22  0050   ALBUMIN 3.3 3.2   TBILIRUBIN 1.4 1.1   ALKPHOSPHAT 115* 98   TOTPROTEIN 6.7 6.0   ALTSGPT 9 8   ASTSGOT 8* 10*   CREATININE 3.37* 1.84*        Imaging:   US-RENAL   Final Result         1.  No evidence of hydronephrosis.      2.  Renal cortical thinning bilaterally.      3.  Likely right nephrolith.      DX-CHEST-PORTABLE (1 VIEW)   Final Result      No acute cardiac or pulmonary abnormalities are identified. Lung volumes are low.      CT-HEAD W/O   Final Result      1. Cerebral atrophy.   2. White matter lucencies most consistent with small vessel ischemic change versus demyelination or gliosis.   3. Otherwise, Head CT without contrast with no acute findings. No evidence of acute cerebral infarction, hemorrhage or mass lesion.         US-EXTREMITY NON VASCULAR UNILATERAL LEFT    (Results Pending)        Assessment and Plan:  Beverley is a 71 y.o. female with PMHx of afib/atrial flutter s/p RUDI/DCCV in 07/2021 (planned for ablation)- on dabigatran, poorly controlled IDDM II (A1c 16.5 in 7/2021, now 9.8% and 1/2022),  hypertension, dyslipidemia, chronic bilateral leg swelling and wounds, limited mobility, who presented to the ED on 3/7/22 after mechanical ground-level fall at home and is admitted for renal failure.    Problem Representation:     * Acute renal failure (ARF) (HCC)- (present on admission)  Assessment & Plan  CMP (03/07/22): BUN/creatinine 46/3.37   Baseline CMP (2/24/22): BUN/creatinine 17/0.88  Acute kidney injury likely prerenal secondary to dehydration/poor oral intake.  S/p 2L of bolus IVF in the ED, Carrasquillo catheter was placed in the ED, no evidence of urinary retention    -BUN/creatinine improving from 46/3.37-41/1.84  -Urine electrolytes (03/07/22): Sodium 32, creatine 274, Urea nitrogen 0.2, urobilinogen: 0.2  -Fena (03/07/22): 0.3%, pre-renal  -Renal ultrasound (03/08/22): No evidence of hydronephrosis, renal cortical thinning bilaterally, likely right nephrolith   -Continue NS at 125 mL/h  -Will add NS 1L bolus  -Bladder ultrasound to rule out urinary retention  -Avoid NSAIDs and nephrotoxins.    Hyperkalemia  Assessment & Plan  CMP (03/08/22): K uptrending from 5.2 - 5.9  Likely secondary to outpatient potassium supplementation of 20 mEq/day, JULIETA, and Dexamethasone    -Given IV calcium gluconate  -Given dextrose 10% with IV insulin 6 units  -We will continue monitor    Left thigh pain  Assessment & Plan  PE: (3/8/22): Left medial thigh with erythema, swelling, and warmth. ROM restricted  Differential diagnosis include hematoma versus cellulitis versus DVT    -Ultrasound of left medial thigh  -ESR, CRP, ferritin ordered for inflammatory markers      Hypotension- (present on admission)  Assessment & Plan  BP (03/08/22): ()/(48-77) 98/53  Likely due to poor oral intake  Improved s/p 2 L IVF    -Continue NS at 125 mL/h  -Will add NS 1L bolus  -Hold home antihypertensive medications including metoprolol tartrate 50 mg twice daily, lisinopril 10 mg daily, furosemide 40 mg daily, and Empagliflozin 10 mg  daily    COVID-19- (present on admission)  Assessment & Plan  Patient is Covid positive, minimally hypoxic requiring 1 to 2 L of oxygen.  Denies cough, fever/chills.  Covid vaccinated, no booster dose.  CXR (3/7/22): Not showing infiltrates    -CRP (3/7/22): 1.06  -97% on RA, will discontinue dexamethasone (3/7/22-3/8/22)  -We will continue to monitor          Elevated INR- (present on admission)  Assessment & Plan  PT 57.1/INR 6.82, APTT 105.4  Secondary to dabigatran intake and acute renal failure.  Monitor for signs of bleeding.  Currently hemodynamically stable.    -Held dabigatran for now  -INR (03/08/22): Downtrending from 6.82-3.91  -Consider Praxbind (idarucizumab) for signs of bleeding with hemodynamic instability  -Consider decreasing Dabigatran 150 mg BID to 110 mg BID on discharge    Fall from ground level- (present on admission)  Assessment & Plan  Presented after ground-level fall at home  Looks like mechanical fall from the history.  Denies any pain/tenderness, loss of consciousness, trauma.   Likely 2/2 to deconditioning vs hypotension vs dehydration    -PT/OT consulted      Atrial flutter (HCC)- (present on admission)  Assessment & Plan  s/p RUDI/DCCV in 07/2021 (planned for ablation) - on dabigatran  Being followed by cardiology, plan for ablation.  Currently in sinus rhythm, hold metoprolol.    -Consider resuming metoprolol after BP stabilizes.  -Hold dabigatran for now given elevated APTT/INR secondary to acute renal failure.  -Consider down titrating Dabigatran 150 mg to 110 mg twice daily once INR stable    Normocytic anemia  Assessment & Plan  CBC (03/08/22): Hgb of 10.9, MCV 91.3  Likely secondary to anemia of chronic disease    -We will continue to monitor    Leg swelling- (present on admission)  Assessment & Plan  Has chronic bilateral leg edema  Hold Lasix for now given soft blood pressure    -Consider compression stockings on discharge    Hypothyroidism- (present on admission)  Assessment  & Plan  TSH (1/6/22): 2.990    -Continue home levothyroxine 100 mcg    Class 3 severe obesity in adult (HCC)- (present on admission)  Assessment & Plan  BMI 45, chronic    -Dietary consult outpatient  -Bariatric consult outpatient    Type 2 diabetes mellitus with hyperglycemia, with long-term current use of insulin (HCC)- (present on admission)  Assessment & Plan  Uncontrolled, IDDM type II  A1c 16.5 in 7/2021, now 9.8% and 1/2022  On Lantus 45u and empagliflozin 10 mg at home.    -Continue Lantus, SSI with Accu-Cheks and hypoglycemia protocol  -Consistent carb diet      Core Measures:    Code Status: Full Code  Diet: Consistent carb diet  IVF: None  Carrasquillo Catheter: None  IV Lines: pIV  Antibiotics:None  GI Prophylaxis: PEG + Bisacodyl PRN  DVT Prophylaxis: SCDs  Disposition: Inpatient    Please note that this dictation was created using voice recognition software. I have made every reasonable attempt to correct obvious errors, but there may be errors of grammar and possibly content that I did not discover before finalizing the note. If the error changes the accuracy of the document, I would appreciate it being brought to my attention.

## 2022-03-08 NOTE — CARE PLAN
Problem: Urinary Elimination  Goal: Establish and maintain regular urinary output  Outcome: Not Progressing  Note: Pt cardoza pulled, pending urine void      Problem: Skin Integrity  Goal: Skin integrity is maintained or improved  Outcome: Progressing     Problem: Fall Risk  Goal: Patient will remain free from falls  Outcome: Progressing     Problem: Respiratory  Goal: Patient will achieve/maintain optimum respiratory ventilation and gas exchange  Outcome: Progressing  Note: Pt weaned to 1L O2    The patient is Stable - Low risk of patient condition declining or worsening      Patient is not progressing towards the following goals:      Problem: Urinary Elimination  Goal: Establish and maintain regular urinary output  Outcome: Not Progressing  Note: Pt cardoza pulled, pending urine void

## 2022-03-08 NOTE — TELEPHONE ENCOUNTER
LM on patient's voicemail informing her that [facility] is out of network for her insurance. I let her know that [facility] is in network with her insurance company and they do have an EP. I requested a call back if she would still like to schedule this procedure at [facility] using her out of network benefits as it will be more expensive for her. Otherwise, I advised her to call [facility].

## 2022-03-08 NOTE — PROGRESS NOTES
Pt brought up to floor via horacio coreas/ ROBERTO RN. Received bedside report from RN, pt care assumed, VSS, pt assessment complete. Pt AAOx4, with no c/o of pain at this time. No signs of acute distress noted at this time. Plan of care discussed with pt and verbalizes no questions. Pt denies any additional needs at this time. Bed locked/in lowest position, bed alarm on, pt educated on fall risk and verbalized understanding, call light within reach, hourly rounding initiated.

## 2022-03-08 NOTE — ED NOTES
Pt moved to a floor bed for comfort.  Has admit orders, awaiting a bed assignment.  Pt updated.  Call light in reach, states no other needs at this time.

## 2022-03-08 NOTE — DIETARY
NUTRITION SERVICES: BMI - Pt with BMI >40 (=Body mass index is 45.49 kg/m².), Class III obesity. Weight loss counseling not appropriate in acute care setting. RECOMMEND - If appropriate at DC please refer to outpatient nutrition services for weight management.

## 2022-03-08 NOTE — PROGRESS NOTES
4 Eyes Skin Assessment Completed by Yonas CALVERT RN and GARLAND Medrano.    Head WDL  Ears Redness and Blanching  Nose WDL  Mouth WDL  Neck WDL  Breast/Chest Redness, Blanching, Excoriation and Shiny, under bilateral breast  Shoulder Blades Redness and Blanching  Spine Redness and Blanching  (R) Arm/Elbow/Hand Bruising, Swelling and Edema  (L) Arm/Elbow/Hand Blanching, Bruising, Swelling and Edema  Abdomen Redness and Blanching, moisture associated excoriation under pannus   Groin Redness, blanching   Scrotum/Coccyx/Buttocks Redness and Blanching, sacrum and buttocks slow to joaquín   (R) Leg Redness, Blanching, Swelling, Weeping and Edema, open wound on shin  (L) Leg Redness, Blanching, Swelling, Abrasion, Weeping and Edema open wound on shin, blister on posterior lower extremity, redness on inner thigh  (R) Heel/Foot/Toe Redness, Blanching and Scab and Edema, dry calloused   (L) Heel/Foot/Toe Redness, Blanching, Scab and Edema, dry calloused           Devices In Places Tele Box, Blood Pressure Cuff, Pulse Ox, Carrasquillo, SCD's and Nasal Cannula      Interventions In Place Gray Ear Foams, NC W/Ear Foams, InterDry, TAP System, Pillows, Q2 Turns, Barrier Cream, Heels Loaded W/Pillows and Pressure Redistribution Mattress    Possible Skin Injury Yes    Pictures Uploaded Into Epic Yes  Wound Consult Placed Yes  RN Wound Prevention Protocol Ordered Yes

## 2022-03-08 NOTE — ASSESSMENT & PLAN NOTE
PE: (3/8/22): Left medial thigh with erythema, swelling, and warmth. ROM restricted  Differential diagnosis include hematoma versus cellulitis versus DVT    -Ultrasound of left medial thigh  -ESR, CRP, ferritin ordered for inflammatory markers

## 2022-03-09 VITALS
OXYGEN SATURATION: 99 % | HEIGHT: 64 IN | BODY MASS INDEX: 49.12 KG/M2 | DIASTOLIC BLOOD PRESSURE: 62 MMHG | SYSTOLIC BLOOD PRESSURE: 110 MMHG | RESPIRATION RATE: 18 BRPM | WEIGHT: 287.7 LBS | TEMPERATURE: 96.5 F | HEART RATE: 99 BPM

## 2022-03-09 LAB
ALBUMIN SERPL BCP-MCNC: 2.9 G/DL (ref 3.2–4.9)
ALBUMIN/GLOB SERPL: 1 G/DL
ALP SERPL-CCNC: 82 U/L (ref 30–99)
ALT SERPL-CCNC: 6 U/L (ref 2–50)
ANION GAP SERPL CALC-SCNC: 9 MMOL/L (ref 7–16)
APTT PPP: 49.5 SEC (ref 24.7–36)
AST SERPL-CCNC: 7 U/L (ref 12–45)
BASOPHILS # BLD AUTO: 0.2 % (ref 0–1.8)
BASOPHILS # BLD: 0.02 K/UL (ref 0–0.12)
BILIRUB SERPL-MCNC: 0.7 MG/DL (ref 0.1–1.5)
BUN SERPL-MCNC: 34 MG/DL (ref 8–22)
CALCIUM SERPL-MCNC: 8.9 MG/DL (ref 8.5–10.5)
CHLORIDE SERPL-SCNC: 109 MMOL/L (ref 96–112)
CO2 SERPL-SCNC: 17 MMOL/L (ref 20–33)
CREAT SERPL-MCNC: 1.26 MG/DL (ref 0.5–1.4)
EOSINOPHIL # BLD AUTO: 0.01 K/UL (ref 0–0.51)
EOSINOPHIL NFR BLD: 0.1 % (ref 0–6.9)
ERYTHROCYTE [DISTWIDTH] IN BLOOD BY AUTOMATED COUNT: 50.2 FL (ref 35.9–50)
GLOBULIN SER CALC-MCNC: 2.9 G/DL (ref 1.9–3.5)
GLUCOSE BLD STRIP.AUTO-MCNC: 135 MG/DL (ref 65–99)
GLUCOSE BLD STRIP.AUTO-MCNC: 170 MG/DL (ref 65–99)
GLUCOSE SERPL-MCNC: 194 MG/DL (ref 65–99)
HCT VFR BLD AUTO: 32.8 % (ref 37–47)
HGB BLD-MCNC: 10 G/DL (ref 12–16)
IMM GRANULOCYTES # BLD AUTO: 0.08 K/UL (ref 0–0.11)
IMM GRANULOCYTES NFR BLD AUTO: 0.8 % (ref 0–0.9)
INR PPP: 1.63 (ref 0.87–1.13)
LYMPHOCYTES # BLD AUTO: 1.37 K/UL (ref 1–4.8)
LYMPHOCYTES NFR BLD: 13.8 % (ref 22–41)
MAGNESIUM SERPL-MCNC: 1.8 MG/DL (ref 1.5–2.5)
MCH RBC QN AUTO: 27.5 PG (ref 27–33)
MCHC RBC AUTO-ENTMCNC: 30.5 G/DL (ref 33.6–35)
MCV RBC AUTO: 90.4 FL (ref 81.4–97.8)
MONOCYTES # BLD AUTO: 0.51 K/UL (ref 0–0.85)
MONOCYTES NFR BLD AUTO: 5.2 % (ref 0–13.4)
NEUTROPHILS # BLD AUTO: 7.91 K/UL (ref 2–7.15)
NEUTROPHILS NFR BLD: 79.9 % (ref 44–72)
NRBC # BLD AUTO: 0 K/UL
NRBC BLD-RTO: 0 /100 WBC
PHOSPHATE SERPL-MCNC: 3 MG/DL (ref 2.5–4.5)
PLATELET # BLD AUTO: 229 K/UL (ref 164–446)
PMV BLD AUTO: 11.4 FL (ref 9–12.9)
POTASSIUM SERPL-SCNC: 4.9 MMOL/L (ref 3.6–5.5)
PROT SERPL-MCNC: 5.8 G/DL (ref 6–8.2)
PROTHROMBIN TIME: 18.9 SEC (ref 12–14.6)
RBC # BLD AUTO: 3.63 M/UL (ref 4.2–5.4)
SODIUM SERPL-SCNC: 135 MMOL/L (ref 135–145)
WBC # BLD AUTO: 9.9 K/UL (ref 4.8–10.8)

## 2022-03-09 PROCEDURE — 99239 HOSP IP/OBS DSCHRG MGMT >30: CPT | Mod: GC | Performed by: INTERNAL MEDICINE

## 2022-03-09 PROCEDURE — 700102 HCHG RX REV CODE 250 W/ 637 OVERRIDE(OP): Performed by: STUDENT IN AN ORGANIZED HEALTH CARE EDUCATION/TRAINING PROGRAM

## 2022-03-09 PROCEDURE — 85730 THROMBOPLASTIN TIME PARTIAL: CPT

## 2022-03-09 PROCEDURE — 700111 HCHG RX REV CODE 636 W/ 250 OVERRIDE (IP): Performed by: STUDENT IN AN ORGANIZED HEALTH CARE EDUCATION/TRAINING PROGRAM

## 2022-03-09 PROCEDURE — 80053 COMPREHEN METABOLIC PANEL: CPT

## 2022-03-09 PROCEDURE — A9270 NON-COVERED ITEM OR SERVICE: HCPCS | Performed by: STUDENT IN AN ORGANIZED HEALTH CARE EDUCATION/TRAINING PROGRAM

## 2022-03-09 PROCEDURE — 84100 ASSAY OF PHOSPHORUS: CPT

## 2022-03-09 PROCEDURE — 97535 SELF CARE MNGMENT TRAINING: CPT

## 2022-03-09 PROCEDURE — 97162 PT EVAL MOD COMPLEX 30 MIN: CPT

## 2022-03-09 PROCEDURE — 82962 GLUCOSE BLOOD TEST: CPT | Mod: 91

## 2022-03-09 PROCEDURE — 83735 ASSAY OF MAGNESIUM: CPT

## 2022-03-09 PROCEDURE — 36415 COLL VENOUS BLD VENIPUNCTURE: CPT

## 2022-03-09 PROCEDURE — 700105 HCHG RX REV CODE 258: Performed by: STUDENT IN AN ORGANIZED HEALTH CARE EDUCATION/TRAINING PROGRAM

## 2022-03-09 PROCEDURE — A9270 NON-COVERED ITEM OR SERVICE: HCPCS | Performed by: INTERNAL MEDICINE

## 2022-03-09 PROCEDURE — 700102 HCHG RX REV CODE 250 W/ 637 OVERRIDE(OP): Performed by: INTERNAL MEDICINE

## 2022-03-09 PROCEDURE — 85025 COMPLETE CBC W/AUTO DIFF WBC: CPT

## 2022-03-09 PROCEDURE — 97165 OT EVAL LOW COMPLEX 30 MIN: CPT

## 2022-03-09 PROCEDURE — 85610 PROTHROMBIN TIME: CPT

## 2022-03-09 PROCEDURE — 700101 HCHG RX REV CODE 250: Performed by: STUDENT IN AN ORGANIZED HEALTH CARE EDUCATION/TRAINING PROGRAM

## 2022-03-09 RX ORDER — LIDOCAINE 50 MG/G
1 PATCH TOPICAL EVERY 24 HOURS
Qty: 30 PATCH | Refills: 0 | Status: SHIPPED | OUTPATIENT
Start: 2022-03-09

## 2022-03-09 RX ORDER — SODIUM CHLORIDE, SODIUM LACTATE, POTASSIUM CHLORIDE, CALCIUM CHLORIDE 600; 310; 30; 20 MG/100ML; MG/100ML; MG/100ML; MG/100ML
INJECTION, SOLUTION INTRAVENOUS CONTINUOUS
Status: DISCONTINUED | OUTPATIENT
Start: 2022-03-09 | End: 2022-03-09 | Stop reason: HOSPADM

## 2022-03-09 RX ORDER — SODIUM CHLORIDE, SODIUM LACTATE, POTASSIUM CHLORIDE, AND CALCIUM CHLORIDE .6; .31; .03; .02 G/100ML; G/100ML; G/100ML; G/100ML
500 INJECTION, SOLUTION INTRAVENOUS ONCE
Status: COMPLETED | OUTPATIENT
Start: 2022-03-09 | End: 2022-03-09

## 2022-03-09 RX ORDER — GABAPENTIN 300 MG/1
300 CAPSULE ORAL 2 TIMES DAILY
Status: DISCONTINUED | OUTPATIENT
Start: 2022-03-09 | End: 2022-03-09 | Stop reason: HOSPADM

## 2022-03-09 RX ORDER — LIDOCAINE 50 MG/G
1 PATCH TOPICAL EVERY 24 HOURS
Status: DISCONTINUED | OUTPATIENT
Start: 2022-03-09 | End: 2022-03-09 | Stop reason: HOSPADM

## 2022-03-09 RX ORDER — CEPHALEXIN 500 MG/1
500 CAPSULE ORAL 4 TIMES DAILY
Status: DISCONTINUED | OUTPATIENT
Start: 2022-03-09 | End: 2022-03-09 | Stop reason: HOSPADM

## 2022-03-09 RX ORDER — METOPROLOL TARTRATE 50 MG/1
50 TABLET, FILM COATED ORAL 2 TIMES DAILY
Status: DISCONTINUED | OUTPATIENT
Start: 2022-03-09 | End: 2022-03-09 | Stop reason: HOSPADM

## 2022-03-09 RX ORDER — MAGNESIUM SULFATE HEPTAHYDRATE 40 MG/ML
2 INJECTION, SOLUTION INTRAVENOUS ONCE
Status: COMPLETED | OUTPATIENT
Start: 2022-03-09 | End: 2022-03-09

## 2022-03-09 RX ORDER — OMEPRAZOLE 40 MG/1
40 CAPSULE, DELAYED RELEASE ORAL DAILY
Qty: 30 CAPSULE | Refills: 0 | Status: SHIPPED | OUTPATIENT
Start: 2022-03-10

## 2022-03-09 RX ORDER — CEPHALEXIN 500 MG/1
500 CAPSULE ORAL 4 TIMES DAILY
Qty: 28 CAPSULE | Refills: 0 | Status: SHIPPED | OUTPATIENT
Start: 2022-03-09

## 2022-03-09 RX ORDER — GABAPENTIN 300 MG/1
300 CAPSULE ORAL 2 TIMES DAILY
Qty: 60 CAPSULE | Refills: 0 | Status: SHIPPED | OUTPATIENT
Start: 2022-03-09

## 2022-03-09 RX ORDER — NYSTATIN 100000 [USP'U]/G
POWDER TOPICAL 3 TIMES DAILY
Status: DISCONTINUED | OUTPATIENT
Start: 2022-03-09 | End: 2022-03-09 | Stop reason: HOSPADM

## 2022-03-09 RX ADMIN — LIDOCAINE 1 PATCH: 50 PATCH TOPICAL at 12:54

## 2022-03-09 RX ADMIN — OMEPRAZOLE 40 MG: 20 CAPSULE, DELAYED RELEASE ORAL at 06:36

## 2022-03-09 RX ADMIN — SODIUM CHLORIDE: 9 INJECTION, SOLUTION INTRAVENOUS at 01:00

## 2022-03-09 RX ADMIN — SENNOSIDES AND DOCUSATE SODIUM 2 TABLET: 50; 8.6 TABLET ORAL at 06:35

## 2022-03-09 RX ADMIN — METOPROLOL TARTRATE 50 MG: 50 TABLET, FILM COATED ORAL at 11:30

## 2022-03-09 RX ADMIN — INSULIN LISPRO 1 UNITS: 100 INJECTION, SOLUTION INTRAVENOUS; SUBCUTANEOUS at 11:00

## 2022-03-09 RX ADMIN — METOPROLOL TARTRATE 50 MG: 50 TABLET, FILM COATED ORAL at 16:08

## 2022-03-09 RX ADMIN — CEPHALEXIN 500 MG: 500 CAPSULE ORAL at 12:53

## 2022-03-09 RX ADMIN — ASPIRIN 81 MG: 81 TABLET, CHEWABLE ORAL at 06:36

## 2022-03-09 RX ADMIN — ATORVASTATIN CALCIUM 40 MG: 40 TABLET, FILM COATED ORAL at 16:08

## 2022-03-09 RX ADMIN — GABAPENTIN 300 MG: 300 CAPSULE ORAL at 12:54

## 2022-03-09 RX ADMIN — CEPHALEXIN 500 MG: 500 CAPSULE ORAL at 16:18

## 2022-03-09 RX ADMIN — LEVOTHYROXINE SODIUM 100 MCG: 0.1 TABLET ORAL at 06:35

## 2022-03-09 RX ADMIN — NYSTATIN: 100000 POWDER TOPICAL at 11:30

## 2022-03-09 RX ADMIN — SODIUM CHLORIDE, POTASSIUM CHLORIDE, SODIUM LACTATE AND CALCIUM CHLORIDE 500 ML: 600; 310; 30; 20 INJECTION, SOLUTION INTRAVENOUS at 09:37

## 2022-03-09 RX ADMIN — SODIUM CHLORIDE, POTASSIUM CHLORIDE, SODIUM LACTATE AND CALCIUM CHLORIDE: 600; 310; 30; 20 INJECTION, SOLUTION INTRAVENOUS at 09:41

## 2022-03-09 RX ADMIN — MAGNESIUM SULFATE 2 G: 2 INJECTION INTRAVENOUS at 09:37

## 2022-03-09 ASSESSMENT — ENCOUNTER SYMPTOMS
BLURRED VISION: 0
DEPRESSION: 0
SORE THROAT: 0
DIZZINESS: 0
VOMITING: 0
ABDOMINAL PAIN: 0
HEADACHES: 0
COUGH: 0
NAUSEA: 0
LOSS OF CONSCIOUSNESS: 0
SHORTNESS OF BREATH: 0
CHILLS: 0
DIARRHEA: 0
DOUBLE VISION: 0
FEVER: 0
FALLS: 0

## 2022-03-09 ASSESSMENT — COGNITIVE AND FUNCTIONAL STATUS - GENERAL
MOVING FROM LYING ON BACK TO SITTING ON SIDE OF FLAT BED: A LITTLE
SUGGESTED CMS G CODE MODIFIER MOBILITY: CJ
MOVING TO AND FROM BED TO CHAIR: A LITTLE
TURNING FROM BACK TO SIDE WHILE IN FLAT BAD: A LITTLE
DAILY ACTIVITIY SCORE: 23
MOBILITY SCORE: 20
HELP NEEDED FOR BATHING: A LITTLE
CLIMB 3 TO 5 STEPS WITH RAILING: A LITTLE
SUGGESTED CMS G CODE MODIFIER DAILY ACTIVITY: CI

## 2022-03-09 ASSESSMENT — GAIT ASSESSMENTS
GAIT LEVEL OF ASSIST: SUPERVISED
DISTANCE (FEET): 80
ASSISTIVE DEVICE: FRONT WHEEL WALKER

## 2022-03-09 ASSESSMENT — FIBROSIS 4 INDEX: FIB4 SCORE: 0.9

## 2022-03-09 ASSESSMENT — ACTIVITIES OF DAILY LIVING (ADL): TOILETING: INDEPENDENT

## 2022-03-09 NOTE — FACE TO FACE
Face to Face Supporting Documentation - Home Health    The encounter with this patient was in whole or in part the primary reason for home health admission.    Date of encounter:   Patient:                    MRN:                       YOB: 2023  Beverley Dejesus  [id number]  1950     Home health to see patient for:  Physical Therapy evaluation and treatment and Occupational therapy evaluation and treatment    Skilled need for:  Exacerbation of Chronic Disease State Left hip arthritis, neuropathic pain and Recent Deterioration of Health Status obesity    Skilled nursing interventions to include:  Comment: none    Homebound status evidenced by:  Need the aid of supportive devices such as crutches, canes, wheelchairs or walkers or Needs the assistance of another person in order to leave the home. Leaving home requires a considerable and taxing effort. There is a normal inability to leave the home.    Community Physician to provide follow up care: Michael Farris M.D.     Optional Interventions? No      I certify the face to face encounter for this home health care referral meets the CMS requirements and the encounter/clinical assessment with the patient was, in whole, or in part, for the medical condition(s) listed above, which is the primary reason for home health care. Based on my clinical findings: the service(s) are medically necessary, support the need for home health care, and the homebound criteria are met.  I certify that this patient has had a face to face encounter by myself.  Cindy Sevilla M.D. - NPI: [id number]

## 2022-03-09 NOTE — DISCHARGE PLANNING
Received Choice form at: 1838  Agency/Facility Name: [facility]    Referral sent per Choice form @ [phone]  Agency/Facility Name: [facility]    Spoke To: Natividad   Outcome: Pt accepted, as Pt is resumption. DPA to fax D/C summary

## 2022-03-09 NOTE — DISCHARGE SUMMARY
Discharge Summary    Date of Admission: 3/7/2022  Date of Discharge: 3/9/2022  Discharging Attending: Cornelio Salgado M.D.   Discharging Senior Resident: Dr. Cindy Sevilla  Discharging Intern: Dr. Ankur Lazcano    CHIEF COMPLAINT ON ADMISSION  Chief Complaint   Patient presents with   • T-5000 GLF     Fell down this morning while trying to get up out of her chair. Denies LOC. Denies syncope.       Reason for Admission  Prerenal JULIETA, GLF, secondary to hypovolemia    Admission Date  3/7/2022    CODE STATUS  Full Code    HPI & HOSPITAL COURSE  This is a 71 y.o. female here with history of morbid obesity BMI 49.3, bilateral hip osteoarthritis, multiple chronic wounds, and 2 prior admissions for GLF in the past 2 months, history of atrial flutter on dabigatran, who was recently prescribed oral Lasix and potassium supplementation to help with lower extremity edema, who presented after GLF at home after tripping.  On initial evaluation she was noted to be significantly hypotensive SBP 70s-80s, and a flutter RVR with V rates 120s-130s, and in florid JULIETA BUN/creatinine 46/3.37, up from baseline creatinine 0.88.  She was also noted to have hyperkalemia K 5.9 which improved with treatment of the JULIETA and temporizing measures with IV calcium, insulin, dextrose.  Patient was noted to be very significantly dehydrated, and was given IV fluid resuscitation with rapid improvement and resolution of her JULIETA.  Fena was 0.3.  There was no evidence of sepsis.  Renal ultrasound was negative for hydronephrosis.  On day of discharge her creatinine was 1.26.  Her BP and heart rate improved with IV fluid resuscitation and on day of discharge she was able to be restarted on her home metoprolol for a flutter rate control.  Notably on admission she was also found to have very significantly elevated INR 6.82, thought to be due to lack of renal clearance in setting of JULIETA.  There was no evidence of bleeding.  Her dabigatran was held, but she did  not require any reversal agents.  With improvement of her JULIETA, her coagulopathy improved and on day of discharge her INR was 1.63.  30 admission, patient continued to complain of left leg pain.  Left lower extremity DVT ultrasound was obtained which was negative for DVT.  Patient was noted to have area of redness and warmth on the medial aspect of her left knee, and was started on oral Keflex 500 mg 4 times daily on the day of discharge to be continued for a total 7-day course.  Patient's gabapentin was also increased to 300 mg twice daily, and she was provided with a lidocaine patch for the left thigh.  PT/OT evaluated patient and determined that she was appropriate for home health PT/OT, with whom she is already established.  She was instructed on discharge to stop taking her oral Lasix and oral potassium supplementation.  She was also instructed to hold off on taking lisinopril until repeat blood work done and followed up with primary care physician.  All other meds were okay to be resumed and she was also instructed to resume her dabigatran.       Therefore, she is discharged in good and stable condition to home with organized home healthcare and close outpatient follow-up.    The patient met 2-midnight criteria for an inpatient stay at the time of discharge.    Discharge Date  3/9/2022    FOLLOW UP ITEMS POST DISCHARGE  -Stop taking oral Lasix and potassium supplement  -Hold off on taking lisinopril until follow-up with primary care physician and repeat labs  -Fitted with bariatric walker  -Keflex 500 4 times daily for 7 days provided  -Resume dabigatran  -Avoid NSAIDs  -Lidocaine patch for left thigh pain  -Bariatric surgery and nutrition referrals placed for outpatient  -Recommended to follow-up with primary care doctor for labs to assess for renal function within the next 2 weeks    REVIEW OF SYSTEMS  Review of Systems   Constitutional: Negative for chills, fever and malaise/fatigue.   HENT: Negative for  congestion and sore throat.    Eyes: Negative for blurred vision and double vision.   Respiratory: Negative for cough and shortness of breath.    Cardiovascular: Positive for leg swelling. Negative for chest pain.   Gastrointestinal: Negative for abdominal pain, diarrhea, nausea and vomiting.   Genitourinary: Negative for frequency and urgency.   Musculoskeletal: Positive for joint pain. Negative for falls.   Skin: Negative for rash.   Neurological: Negative for dizziness, loss of consciousness and headaches.   Psychiatric/Behavioral: Negative for depression and suicidal ideas.     PHYSICAL EXAM  Physical Exam  Constitutional:       General: She is not in acute distress.     Appearance: Normal appearance. She is obese.   HENT:      Head: Normocephalic and atraumatic.      Right Ear: External ear normal.      Left Ear: External ear normal.      Nose: Nose normal. No congestion.      Mouth/Throat:      Mouth: Mucous membranes are moist.      Pharynx: No oropharyngeal exudate.   Eyes:      General: No scleral icterus.     Extraocular Movements: Extraocular movements intact.      Pupils: Pupils are equal, round, and reactive to light.   Cardiovascular:      Rate and Rhythm: Normal rate and regular rhythm.      Pulses: Normal pulses.      Heart sounds: Normal heart sounds. No murmur heard.  Pulmonary:      Effort: Pulmonary effort is normal. No respiratory distress.      Breath sounds: Normal breath sounds.   Abdominal:      General: Bowel sounds are normal.      Palpations: Abdomen is soft.      Tenderness: There is no abdominal tenderness.   Musculoskeletal:         General: Swelling present. Normal range of motion.      Cervical back: Normal range of motion and neck supple. No rigidity.      Comments: Nonpitting edema bilateral lower extremities.  Area of warmth and erythema overlying skin without induration left medial knee.  Hyperesthesia noted upon light touch of left thigh   Skin:     General: Skin is warm.       Capillary Refill: Capillary refill takes less than 2 seconds.      Coloration: Skin is not pale.      Comments: Moist skin, multiple areas of skin breakdown/chronic wounds   Neurological:      General: No focal deficit present.      Mental Status: She is alert and oriented to person, place, and time.      Cranial Nerves: No cranial nerve deficit.   Psychiatric:         Thought Content: Thought content normal.      Comments: Mood anxious           DISCHARGE DIAGNOSES  Principal Problem:    Acute renal failure (ARF) ([facility]) POA: Yes  Active Problems:    Type 2 diabetes mellitus with hyperglycemia, with long-term current use of insulin ([facility]) (Chronic) POA: Yes    Class 3 severe obesity in adult ([facility]) POA: Yes    Normocytic anemia POA: Unknown    Atrial flutter ([facility]) POA: Yes    Fall from ground level POA: Yes    Hypothyroidism POA: Yes    Elevated INR POA: Yes    Leg swelling POA: Yes    COVID-19 POA: Yes    Hypotension POA: Yes    Hyperkalemia POA: Unknown    Left thigh pain POA: Unknown  Resolved Problems:    * No resolved hospital problems. *      FOLLOW UP  No future appointments.  Michael Farris M.D.  [address]  [address]  [facility] [address]  [phone]    Schedule an appointment as soon as possible for a visit in 1 week  For hospital stay follow up appointment.       MEDICATIONS ON DISCHARGE     Medication List      START taking these medications      Instructions   cephALEXin 500 MG Caps  Commonly known as: KEFLEX   Take 1 Capsule by mouth 4 times a day. For skin infection over the left knee  Dose: 500 mg     lidocaine 5 % Ptch  Commonly known as: LIDODERM   Place 1 Patch on the skin every 24 hours. Place patch on left thigh. Do not place over area with skin breakdown. Leave on for 12 hours and off for 12 hours  Dose: 1 Patch     omeprazole 40 MG delayed-release capsule  Start taking on: March 10, 2022  Commonly known as: PRILOSEC   Take 1 Capsule by mouth every day. For heartburn  Dose: 40 mg         CHANGE how you take these medications      Instructions   gabapentin 300 MG Caps  What changed: when to take this  Commonly known as: NEURONTIN   Take 1 Capsule by mouth 2 times a day.  Dose: 300 mg        CONTINUE taking these medications      Instructions   Alcohol Swabs   Doctor's comments: Per formulary preference. ICD-10 code: E11.65 Uncontrolled type 2 Diabetes Mellitus  Wipe site with prep pad prior to injection.     aspirin 81 MG Chew chewable tablet  Commonly known as: ASA   Chew 1 tablet every day.  Dose: 81 mg     atorvastatin 40 MG Tabs  Commonly known as: LIPITOR   Take 1 tablet by mouth every evening.  Dose: 40 mg     * Blood Glucose Meter Kit   Doctor's comments: Or per formulary preference. ICD-10 code: E11.65 Uncontrolled type 2 Diabetes Mellitus  Test blood sugar as recommended by provider. Accucheck Guide blood glucose monitoring kit.     * Blood Glucose Test Strips   Doctor's comments: Or per formulary preference. ICD-10 code: E11.65 Uncontrolled type 2 Diabetes Mellitus  Use one Accucheck Guide strip to test blood sugar three times daily.     Cholecalciferol 2000 UNIT Tabs   Take 1 Tablet by mouth every day.  Dose: 2,000 Units     dabigatran 150 MG Caps capsule  Commonly known as: PRADAXA   Take 1 capsule by mouth 2 times a day.  Dose: 150 mg     diclofenac sodium 1 % Gel  Commonly known as: Voltaren   Apply 2 g topically 4 times a day as needed.  Dose: 2 g     Empagliflozin 10 MG Tabs   Take 10 mg by mouth every day.  Dose: 10 mg     Insulin Pen Needle 32 G x 4 mm   Doctor's comments: Per patient/formulary preference. ICD-10 code: E11.65 Uncontrolled type 2 Diabetes Mellitus  Use one pen needle in pen device to inject insulin once daily.     Lancets   Doctor's comments: Or per formulary preference. ICD-10 code: E11.65 Uncontrolled type 2 Diabetes Mellitus  Use one Accucheck Guide lancet to test blood sugar three times daily.     Lantus SoloStar 100 UNIT/ML Sopn injection  Generic drug:  insulin glargine   Inject 45 Units under the skin every evening.  Dose: 45 Units     levothyroxine 100 MCG Tabs  Commonly known as: SYNTHROID   Take 1 tablet by mouth every morning on an empty stomach.  Dose: 100 mcg     metoprolol tartrate 50 MG Tabs  Commonly known as: LOPRESSOR   Take 1 Tablet by mouth 2 times a day.  Dose: 50 mg         * This list has 2 medication(s) that are the same as other medications prescribed for you. Read the directions carefully, and ask your doctor or other care provider to review them with you.            STOP taking these medications    furosemide 40 MG Tabs  Commonly known as: LASIX     lisinopril 10 MG Tabs  Commonly known as: PRINIVIL     potassium chloride SA 20 MEQ Tbcr  Commonly known as: Kdur            Allergies  No Known Allergies    DIET  Orders Placed This Encounter   Procedures   • Diet Order Diet: Consistent CHO (Diabetic)     Standing Status:   Standing     Number of Occurrences:   1     Order Specific Question:   Diet:     Answer:   Consistent CHO (Diabetic) [4]       ACTIVITY  As tolerated.  Weight bearing as tolerated    CONSULTATIONS  None    PROCEDURES  None    A total of 45 minutes was spent in patient care on day of discharge including chart review, patient interaction, diagnostics, therapeutics, and documentation.

## 2022-03-09 NOTE — THERAPY
Occupational Therapy   Initial Evaluation     Patient Name: Beverley Dejesus  Age:  71 y.o., Sex:  female  Medical Record #: [id number]  Today's Date: 3/9/2022     Precautions: Fall Risk,Swallow Precautions ( See Comments),Other (See Comments)  Comments: COVID+    Assessment  Patient is 71 y.o. female admitted after sustaining GLF out of her chair at home,with Hx of acute renal failure, afib/flutter, and found to be COVID+.  She lives in a mobile home w/ 3 other tenants, with assist PRN from land lady for IADLs. Today pt demonstrated supervision/min A for self cares and mobility, with verbal encouragement 2/2 fear of falling and weakness from lack of mobility since admit. Will follow for skilled services to maximize functional independence prior to DC. Recommend home with HHOT services.     Plan    Recommend Occupational Therapy 3 times per week until therapy goals are met for the following treatments:  Adaptive Equipment, Cognitive Skill Development, Neuro Re-Education / Balance, Self Care/Activities of Daily Living, Therapeutic Activities and Therapeutic Exercises.    DC Equipment Recommendations: (P) None  Discharge Recommendations: (P) Recommend home health for continued occupational therapy services        03/09/22 0830   Prior Living Situation   Prior Services Skilled Home Health Services   Housing / Facility 1 Story House   Steps Into Home 5   Bathroom Set up Bathtub / Shower Combination;Tub Transfer Bench   Equipment Owned Front-Wheel Walker;Single Point Cane;Tub / Shower Seat   Lives with - Patient's Self Care Capacity Unrelated Adult   Comments Lives with 3 tenants who live independently from one another. Pt's land lady assists with IADLs as needed   Prior Level of ADL Function   Self Feeding Independent   Grooming / Hygiene Independent   Bathing Requires Assist   Dressing Independent   Toileting Independent   Comments has a bath aid 1x/wk for showering   Prior Level of IADL Function   Medication Management  Independent   Laundry Independent   Kitchen Mobility Independent   Finances Independent   Home Management Independent   Shopping Requires Assist   History of Falls   History of Falls Yes   Date of Last Fall   (reason for admit)   Precautions   Precautions Fall Risk;Swallow Precautions ( See Comments)   Cognition    Cognition / Consciousness WDL   Comments pleasant and agreeable, pt reported fear of falling upon DC   Active ROM Upper Body   Active ROM Upper Body  WDL   Strength Upper Body   Upper Body Strength  WDL   Coordination Upper Body   Coordination WDL   Balance Assessment   Sitting Balance (Static) Good   Sitting Balance (Dynamic) Fair +   Standing Balance (Static) Fair   Standing Balance (Dynamic) Fair   Weight Shift Sitting Good   Weight Shift Standing Fair   Comments with FWW   Bed Mobility    Supine to Sit Supervised   Scooting Supervised   Rolling Supervised   ADL Assessment   Grooming Supervision;Standing   Upper Body Dressing Supervision   Lower Body Dressing Supervision   Toileting Supervision   How much help from another person does the patient currently need...   6 Clicks Daily Activity Score 23   Functional Mobility   Sit to Stand Supervised   Bed, Chair, Wheelchair Transfer Supervised   Toilet Transfers Minimal Assist   Transfer Method Stand Step   Mobility room and bathroom distances   Activity Tolerance   Sitting in Chair up in chair post session +6 min on toilet   Sitting Edge of Bed 10 min   Standing 7 min   Patient / Family Goals   Patient / Family Goal #1 to not fall   Short Term Goals   Short Term Goal # 1 Pt will tolerate 10 min standing G/H SPV   Short Term Goal # 2 Pt will demo toilet txf SPV   Education Group   Role of Occupational Therapist Patient Response Patient;Acceptance;Explanation;Demonstration;Verbal Demonstration;Action Demonstration   ADL Patient Response Patient;Acceptance;Demonstration;Explanation;Verbal Demonstration;Action Demonstration   Problem List   Problem List  Decreased Activity Tolerance;Decreased Functional Mobility;Decreased Homemaking Skills   Anticipated Discharge Equipment and Recommendations   DC Equipment Recommendations None   Discharge Recommendations Recommend home health for continued occupational therapy services

## 2022-03-09 NOTE — THERAPY
"Physical Therapy   Initial Evaluation     Patient Name: Beverley Dejesus  Age:  71 y.o., Sex:  female  Medical Record #: [id number]  Today's Date: 3/9/2022     Precautions  Precautions: Fall Risk;Swallow Precautions ( See Comments);Other (See Comments)  Comments: COVID+    Assessment  Patient is 71 y.o. female presenting for a GLF out of her chair at home, acute renal failure, afib/flutter, and found to be COVID+.  She lives w/ 3 roommates in a mobile home who are able to assist w/ shopping and transportation, otherwise was receiving skilled home health PT prior to admit for LE weakness and balance (see flowsheet for PLOF and home set up).  Currently, the pt is limited by LLE thigh pain and generalized weakness BLE.   She was received seated up in the recliner, and able to demo STS from chair spv w/ fww.  The pt also demo'd gait distance 80' using fww spv in room (limited by iso precautions) via slow tamir, limited toe clearance, and increased KEY, no LOB observed.  She was also able to perform SLS >3s BLE w/ UE support spv to demo ability to ascend/descend stairs to access home. Pt reporting concern for falling when she returns home as the tang-walker she currently owns will not fit through doors in the home, and that forces her to use a spc and \"furniture walk\" in the home at baseline.  Discussed community options for seeking AD to better suit her home environment, and she appears to be receptive.  Recommend pt dc home w/ continued HH PT for gait and stability.  Will follow while in house to further improve gait stability w/ spc v. fww     Plan    Recommend Physical Therapy 3 times per week until therapy goals are met for the following treatments:  Bed Mobility, Community Re-integration, Equipment, Gait Training, Manual Therapy, Neuro Re-Education / Balance, Orthotics Training, Self Care/Home Evaluation, Stair Training, Therapeutic Activities, and Therapeutic Exercises    DC Equipment Recommendations: None (pt " owns spc and tang-walker)  Discharge Recommendations: Recommend home health for continued physical therapy services       Subjective/Objective       03/09/22 1132   Prior Living Situation   Prior Services Skilled Home Health Services   Housing / Facility Mobile Home   Steps Into Home 5   Equipment Owned Front-Wheel Walker;Single Point Cane   Lives with - Patient's Self Care Capacity Unrelated Adult  (3 roommates w/ limited assist)   Comments Pt lives w/ 3 roommates who typically do not assist at home, however will assist w/ transport if needed   Prior Level of Functional Mobility   Bed Mobility Independent   Transfer Status Independent   Ambulation Independent   Distance Ambulation (Feet)   (houshold distances)   Assistive Devices Used Single Point Cane   Stairs Independent   Comments pt uses spc at home b/c her tang-walker will not fit through doors   History of Falls   History of Falls Yes   Date of Last Fall   (reason for admit)   Cognition    Cognition / Consciousness WDL   Level of Consciousness Alert   Comments pt pleasant and cooperative, however significantly anxious regarding dc plan   Passive ROM Lower Body   Passive ROM Lower Body WDL   Active ROM Lower Body    Active ROM Lower Body  X   Comments BLE hip flexion limited by body habitus, otherwise WFL   Strength Lower Body   Lower Body Strength  X   Comments Generalized weakness BLE, functional for mobility   Sensation Lower Body   Lower Extremity Sensation   WDL   Comments denies numbness/tingling in LEs   Coordination Lower Body    Coordination Lower Body  WDL   Balance Assessment   Sitting Balance (Static) Good   Sitting Balance (Dynamic) Fair +   Standing Balance (Static) Fair +   Standing Balance (Dynamic) Fair   Weight Shift Sitting Good   Weight Shift Standing Good   Comments stand w/ fww   Gait Analysis   Gait Level Of Assist Supervised   Assistive Device Front Wheel Walker   Distance (Feet) 80   # of Times Distance was Traveled 1   Deviation  Increased Base Of Support;Decreased Toe Off;Bradykinetic   Weight Bearing Status no restrictions   Vision Deficits Impacting Mobility denies   Comments distance limited by isolation precautions   Bed Mobility    Supine to Sit   (pt received seated in chair)   Sit to Supine   (pt position seated in chair end of session)   Comments Pt position seated up in chair pre- and post-   Functional Mobility   Sit to Stand Supervised   Bed, Chair, Wheelchair Transfer Supervised   Transfer Method Stand Step   Mobility STS chair w/ fww   Activity Tolerance   Sitting in Chair >30min   Standing 6min   Edema / Skin Assessment   Edema / Skin  Not Assessed   Short Term Goals    Short Term Goal # 1 Pt will demo supine<>sit eob w/ hob flat and no bed rails spv in 6 visits for independence w/ bed mobility.   Short Term Goal # 2 Pt will demo gait distance 150' using spc spv in a moving environment in 6 visits for household ambulation.  (unless pt finds fww able to use in home)   Short Term Goal # 3 Pt will demo ability to ascend/descend 5 steps w/ UE support spv in 6 visits for access to her home.  (not assessed, anticipate pt able to perform 2/2 SLS >3s BLE w/ UE support)   Education Group   Education Provided Role of Physical Therapist  (Apofore options for AD rental/purchase)   Role of Physical Therapist Patient Response Patient;Acceptance;Explanation;Demonstration;Action Demonstration   Additional Comments pt receptive of [facility] provided   Problem List    Problems Pain   Session Information   Date / Session Number  3/9- 1 (1/3, 3/15)

## 2022-03-09 NOTE — WOUND TEAM
[facility]  Inpatient Services  Initial Wound and Skin Care Evaluation    Admission Date: 3/7/2022     Last order of IP CONSULT TO WOUND CARE was found on 3/8/2022 from Hospital Encounter on 3/7/2022     HPI, PMH, SH: Reviewed    No past surgical history on file.  Social History     Tobacco Use   • Smoking status: Never Smoker   • Smokeless tobacco: Never Used   Substance Use Topics   • Alcohol use: Not Currently     Chief Complaint   Patient presents with   • T-5000 GLF     Fell down this morning while trying to get up out of her chair. Denies LOC. Denies syncope.     Diagnosis: Acute renal failure (ARF) (HCC) [N17.9]    Unit where seen by Wound Team: T722/01     WOUND CONSULT/FOLLOW UP RELATED TO:  Bilateral breasts, pannus, BLE     WOUND HISTORY:  71 y.o. female admitted for GLF. PMH of A-fib, DM II, HTN, and dyslipidemia. Patient has chronic BLE wounds that have been present since around January, patient currently receives wound care via home health.    WOUND ASSESSMENT/LDA  Wound 03/07/22 Full Thickness Wound Pretibial;Other (Comment) Right (Active)   Wound Image   03/09/22 1130   Site Assessment Epithelialization;Fragile;Bleeding    Periwound Assessment Edema;Fragile    Margins Attached edges    Closure Secondary intention    Drainage Amount Scant    Drainage Description Serosanguineous    Treatments Cleansed;Compression    Wound Cleansing Normal Saline Irrigation    Periwound Protectant Viscopaste    Dressing Cleansing/Solutions Not Applicable    Dressing Options Viscopaste;Absorbent Abdominal Pad;Dry Roll Gauze;Tubigrip    Dressing Changed Changed    Dressing Status Clean;Dry;Intact    Dressing Change/Treatment Frequency Daily, and As Needed    NEXT Dressing Change/Treatment Date 03/10/22    NEXT Weekly Photo (Inpatient Only) 03/16/22    Non-staged Wound Description Partial thickness    Wound Length (cm) 7 cm    Wound Width (cm) 5.7 cm    Wound Depth (cm) 1 cm    Wound Surface Area (cm^2)  39.9 cm^2    Wound Volume (cm^3) 39.9 cm^3    Shape Irregular    Wound Odor None    Pulses 1+;Left;Right;DP;PT    Exposed Structures None        Wound 03/07/22 Full Thickness Wound Pretibial;Other (Comment) (Active)   Wound Image   03/09/22 1130   Site Assessment Fully granulated;Fragile    Periwound Assessment Edema;Fragile    Margins Attached edges    Closure Secondary intention    Drainage Amount None    Treatments Cleansed    Wound Cleansing Normal Saline Irrigation    Periwound Protectant Viscopaste    Dressing Cleansing/Solutions Not Applicable    Dressing Options Viscopaste;Absorbent Abdominal Pad;Dry Roll Gauze;Tubigrip    Dressing Changed Changed    Dressing Status Clean;Dry;Intact    Dressing Change/Treatment Frequency Daily, and As Needed    NEXT Dressing Change/Treatment Date 03/10/22    NEXT Weekly Photo (Inpatient Only) 03/16/22    Non-staged Wound Description Partial thickness    Wound Length (cm) 2.5 cm    Wound Width (cm) 3.5 cm    Wound Depth (cm) 0.1 cm    Wound Surface Area (cm^2) 8.75 cm^2    Wound Volume (cm^3) 0.875 cm^3    Shape Irregular    Wound Odor None    Pulses 1+;Left;Right;DP;PT    Exposed Structures None      Vascular:    MIGEL:   No results found.    Lab Values:    Lab Results   Component Value Date/Time    WBC 9.9 03/09/2022 01:44 AM    RBC 3.63 (L) 03/09/2022 01:44 AM    HEMOGLOBIN 10.0 (L) 03/09/2022 01:44 AM    HEMATOCRIT 32.8 (L) 03/09/2022 01:44 AM    CREACTPROT 1.79 (H) 03/08/2022 02:24 PM    SEDRATEWES 12 03/08/2022 02:24 PM    HBA1C 9.8 (H) 01/05/2022 04:30 AM        Culture Results show:  No results found for this or any previous visit (from the past 720 hour(s)).    Pain Level/Medicated:  Tenderness at wounds, but otherwise did not require premedication    INTERVENTIONS BY WOUND TEAM:  Chart and images reviewed. Discussed with bedside RN. All areas of concern (based on picture review, LDA review and discussion with bedside RN) have been thoroughly assessed. Documentation of  areas based on significant findings. This RN in to assess patient. Performed standard wound care which includes appropriate positioning, dressing removal and non-selective debridement. Pictures and measurements obtained weekly if/when required.  Preparation for Dressing removal: Dressing removed without difficulty  Non-selectively Debrided with:  NS and gauze.  Sharp debridement: NA  Becky wound: Cleansed with NS, Prepped with viscopaste  Primary Dressing: Viscopaste, ABD pad  Secondary (Outer) Dressing: Dry roll gauze, tubigrips    Interdisciplinary consultation: Patient, Bedside RN, Wound RNs (Natividad & Lin)    EVALUATION / RATIONALE FOR TREATMENT:  Most Recent Date:  3/9/22: BLE wounds extremely fragile, but healing well. Viscopatch zinc impregnated gauze to encourage re-epithelialization of superficial 100% viable wound bed, and to provide a non-stick wound contact layer. Tubigrip replaced to compress edema and speed healing process. Nystatin ordered for yeast-like MASD under all skin folds.     Goals: Steady decrease in wound area and depth weekly.    WOUND TEAM PLAN OF CARE ([X] for frequency of wound follow up,):   Nursing to follow orders written for wound care. Contact wound team if area fails to progress, deteriorates or with any questions/concerns  Dressing changes by wound team:                   Follow up 3 times weekly:                NPWT change 3 times weekly:     Follow up 1-2 times weekly:    X  Follow up Bi-Monthly:                   Follow up as needed:     Other (explain):     NURSING PLAN OF CARE ORDERS (X):  Dressing changes: See Dressing Care orders: X  Skin care: See Skin Care orders:   RN Prevention Protocol:   Rectal tube care: See Rectal Tube Care orders:   Other orders:    RSKIN:   CURRENTLY IN PLACE (X), APPLIED THIS VISIT (A), ORDERED (O):   Q shift Matias:  X  Q shift pressure point assessments:  X    Surface/Positioning   Pressure redistribution mattress         X   Low Airloss           Bariatric foam      Bariatric HELEN     Waffle cushion        Waffle Overlay          Reposition q 2 hours      TAPs Turning system     Z Hari Pillow     Offloading/Redistribution NA  Sacral Mepilex (Silicone dressing)     Heel Mepilex (Silicone dressing)         Heel float boots (Prevalon boot)             Float Heels off Bed with Pillows           Respiratory NA  Silicone O2 tubing         Gray Foam Ear protectors     Cannula fixation Device (Tender )          High flow offloading Clip    Elastic head band offloading device      Anchorfast                                                         Trach with Optifoam split foam             Containment/Moisture Prevention     Rectal tube or BMS    Purwick/Condom Cath        Carr[place]quillo Catheter    Barrier wipes           Barrier paste       Antifungal tx    O  Interdry    X    Mobilization       Up to chair      X  Ambulate    X  PT/OT  X    Nutrition       Dietician        Diabetes Education      PO    X TF     TPN     NPO   # days     Anticipated discharge plans:   LTACH:        SNF/Rehab:                  Home Health Care:         X  Outpatient Wound Center:            Self/Family Care:        Other:                  Vac Discharge Needs:   Not Applicable Pt not on a wound vac:     X  Regular Vac while inpatient, alternative dressing at DC:        Regular Vac in use and continued at DC:            Reg. Vac w/ Skin Sub/Biologic in use. Will need to be changed 2x wkly:      Veraflo Vac while inpatient, ok to transition to Regular Vac on Discharge:           Veraflo Vac while inpatient, will need to remain on Veraflo Vac upon discharge:

## 2022-03-09 NOTE — DISCHARGE INSTRUCTIONS
-you had kidney injury because you were very dehydrated with lasix. STOP taking lasix!  -Your kidney injury has improvement significantly with IV fluids and is almost back to normal  -Stop taking lasix and also Stop taking the potassium supplement  -Would recommend holding off on taking your home lisinopril as well until you see your primary care doctor and have your kidney function rechecked with blood work  -Follow up with your primary care doctor within 2 weeks of discharge from hospital  -See if you can find a better bariatric walker that fits better in your home at [facility] or [facility]  -For your leg pain: place lidocaine patch on left thigh (not over any area of skin breakdown) for 12 hours then remove for 12 hours  -Tylenol is safe, but ibuprofen/advil/aleve/naproxen is not safe while you are on blood thinners for afib  -It is okay to resume your blood thinner when you go home  -You can also take gabapentin in the morning AND night for your left leg pain  -I have prescribed you 7 days of cephalexin four times per day for skin infection. Take this until all pills are gone.    DRESSING CHANGE WHILE INPATIENT:   Bilateral lower extremities: Nursing to remove old dressings. Cleanse angelique-wounds with moist warm washcloths. Pat dry. Apply a patch of viscopaste over the wounds. (FANFOLD/ACCORDING DO NOT WRAP WITH VISCOPASTE). Secure in place with ABD pad and dry roll gauze. Replace tubigrip. Nursing to change daily and PRN dislodgement and or drainage saturation.    Discharge Instructions    Discharged to home by medical transportation with escort. Discharged via ambulance, hospital escort: Yes.  Special equipment needed: Not Applicable    Be sure to schedule a follow-up appointment with your primary care doctor or any specialists as instructed.     Discharge Plan:   Diet Plan: Discussed  Activity Level: Discussed  Confirmed Follow up Appointment: Patient to Call and Schedule Appointment  Confirmed Symptoms  Management: Discussed  Medication Reconciliation Updated: Yes    I understand that a diet low in cholesterol, fat, and sodium is recommended for good health. Unless I have been given specific instructions below for another diet, I accept this instruction as my diet prescription.     Special Instructions: None    · Is patient discharged on Warfarin / Coumadin?   No     Depression / Suicide Risk    As you are discharged from this [facility] facility, it is important to learn how to keep safe from harming yourself.    Recognize the warning signs:  · Abrupt changes in personality, positive or negative- including increase in energy   · Giving away possessions  · Change in eating patterns- significant weight changes-  positive or negative  · Change in sleeping patterns- unable to sleep or sleeping all the time   · Unwillingness or inability to communicate  · Depression  · Unusual sadness, discouragement and loneliness  · Talk of wanting to die  · Neglect of personal appearance   · Rebelliousness- reckless behavior  · Withdrawal from people/activities they love  · Confusion- inability to concentrate     If you or a loved one observes any of these behaviors or has concerns about self-harm, here's what you can do:  · Talk about it- your feelings and reasons for harming yourself  · Remove any means that you might use to hurt yourself (examples: pills, rope, extension cords, firearm)  · Get professional help from the community (Mental Health, Substance Abuse, psychological counseling)  · Do not be alone:Call your Safe Contact- someone whom you trust who will be there for you.  · Call your local CRISIS HOTLINE 479-1603 or 152-358-6795  · Call your local Children's Mobile Crisis Response Team [place] (824) 720-8663 or www.Spinback  · Call the toll free National Suicide Prevention Hotlines   · National Suicide Prevention Lifeline 133-041-EDBO (4786)  · National Hope Line Network 800-SUICIDE (687-3385)      Acute  Kidney Injury, Adult    Acute kidney injury is a sudden worsening of kidney function. The kidneys are organs that have several jobs. They filter the blood to remove waste products and extra fluid. They also maintain a healthy balance of minerals and hormones in the body, which helps control blood pressure and keep bones strong. With this condition, your kidneys do not do their jobs as well as they should.  This condition ranges from mild to severe. Over time it may develop into long-lasting (chronic) kidney disease. Early detection and treatment may prevent acute kidney injury from developing into a chronic condition.  What are the causes?  Common causes of this condition include:  · A problem with blood flow to the kidneys. This may be caused by:  ? Low blood pressure (hypotension) or shock.  ? Blood loss.  ? Heart and blood vessel (cardiovascular) disease.  ? Severe burns.  ? Liver disease.  · Direct damage to the kidneys. This may be caused by:  ? Certain medicines.  ? A kidney infection.  ? Poisoning.  ? Being around or in contact with toxic substances.  ? A surgical wound.  ? A hard, direct hit to the kidney area.  · A sudden blockage of urine flow. This may be caused by:  ? Cancer.  ? Kidney stones.  ? An enlarged prostate in males.  What are the signs or symptoms?  Symptoms of this condition may not be obvious until the condition becomes severe. Symptoms of this condition can include:  · Tiredness (lethargy), or difficulty staying awake.  · Nausea or vomiting.  · Swelling (edema) of the face, legs, ankles, or feet.  · Problems with urination, such as:  ? Abdominal pain, or pain along the side of your stomach (flank).  ? Decreased urine production.  ? Decrease in the force of urine flow.  · Muscle twitches and cramps, especially in the legs.  · Confusion or trouble concentrating.  · Loss of appetite.  · Fever.  How is this diagnosed?  This condition may be diagnosed with tests, including:  · Blood  tests.  · Urine tests.  · Imaging tests.  · A test in which a sample of tissue is removed from the kidneys to be examined under a microscope (kidney biopsy).  How is this treated?  Treatment for this condition depends on the cause and how severe the condition is. In mild cases, treatment may not be needed. The kidneys may heal on their own. In more severe cases, treatment will involve:  · Treating the cause of the kidney injury. This may involve changing any medicines you are taking or adjusting your dosage.  · Fluids. You may need specialized IV fluids to balance your body's needs.  · Having a catheter placed to drain urine and prevent blockages.  · Preventing problems from occurring. This may mean avoiding certain medicines or procedures that can cause further injury to the kidneys.  In some cases treatment may also require:  · A procedure to remove toxic wastes from the body (dialysis or continuous renal replacement therapy - CRRT).  · Surgery. This may be done to repair a torn kidney, or to remove the blockage from the urinary system.  Follow these instructions at home:  Medicines  · Take over-the-counter and prescription medicines only as told by your health care provider.  · Do not take any new medicines without your health care provider's approval. Many medicines can worsen your kidney damage.  · Do not take any vitamin and mineral supplements without your health care provider's approval. Many nutritional supplements can worsen your kidney damage.  Lifestyle  · If your health care provider prescribed changes to your diet, follow them. You may need to decrease the amount of protein you eat.  · Achieve and maintain a healthy weight. If you need help with this, ask your health care provider.  · Start or continue an exercise plan. Try to exercise at least 30 minutes a day, 5 days a week.  · Do not use any tobacco products, such as cigarettes, chewing tobacco, and e-cigarettes. If you need help quitting, ask your  health care provider.  General instructions  · Keep track of your blood pressure. Report changes in your blood pressure as told by your health care provider.  · Stay up to date with immunizations. Ask your health care provider which immunizations you need.  · Keep all follow-up visits as told by your health care provider. This is important.  Where to find more information  · American Association of Kidney Patients: www.aakp.org  · National Kidney Foundation: www.kidney.org  · American Kidney Fund: www.akfinc.org  · Life Options Rehabilitation Program:  ? www.lifeoptions.org  ? www.kidneyschool.org  Contact a health care provider if:  · Your symptoms get worse.  · You develop new symptoms.  Get help right away if:  · You develop symptoms of worsening kidney disease, which include:  ? Headaches.  ? Abnormally dark or light skin.  ? Easy bruising.  ? Frequent hiccups.  ? Chest pain.  ? Shortness of breath.  ? End of menstruation in women.  ? Seizures.  ? Confusion or altered mental status.  ? Abdominal or back pain.  ? Itchiness.  · You have a fever.  · Your body is producing less urine.  · You have pain or bleeding when you urinate.  Summary  · Acute kidney injury is a sudden worsening of kidney function.  · Acute kidney injury can be caused by problems with blood flow to the kidneys, direct damage to the kidneys, and sudden blockage of urine flow.  · Symptoms of this condition may not be obvious until it becomes severe. Symptoms may include edema, lethargy, confusion, nausea or vomiting, and problems passing urine.  · This condition can usually be diagnosed with blood tests, urine tests, and imaging tests. Sometimes a kidney biopsy is done to diagnose this condition.  · Treatment for this condition often involves treating the underlying cause. It is treated with fluids, medicines, dialysis, diet changes, or surgery.  This information is not intended to replace advice given to you by your health care provider. Make  sure you discuss any questions you have with your health care provider.  Document Released: 07/02/2012 Document Revised: 11/30/2018 Document Reviewed: 12/08/2017  [employer] Patient Education © 2020 [employer]      Cellulitis, Adult    Cellulitis is a skin infection. The infected area is often warm, red, swollen, and sore. It occurs most often in the arms and lower legs. It is very important to get treated for this condition.  What are the causes?  This condition is caused by bacteria. The bacteria enter through a break in the skin, such as a cut, burn, insect bite, open sore, or crack.  What increases the risk?  This condition is more likely to occur in people who:  · Have a weak body defense system (immune system).  · Have open cuts, burns, bites, or scrapes on the skin.  · Are older than 60 years of age.  · Have a blood sugar problem (diabetes).  · Have a long-lasting (chronic) liver disease (cirrhosis) or kidney disease.  · Are very overweight (obese).  · Have a skin problem, such as:  ? Itchy rash (eczema).  ? Slow movement of blood in the veins (venous stasis).  ? Fluid buildup below the skin (edema).  · Have been treated with high-energy rays (radiation).  · Use IV drugs.  What are the signs or symptoms?  Symptoms of this condition include:  · Skin that is:  ? Red.  ? Streaking.  ? Spotting.  ? Swollen.  ? Sore or painful when you touch it.  ? Warm.  · A fever.  · Chills.  · Blisters.  How is this diagnosed?  This condition is diagnosed based on:  · Medical history.  · Physical exam.  · Blood tests.  · Imaging tests.  How is this treated?  Treatment for this condition may include:  · Medicines to treat infections or allergies.  · Home care, such as:  ? Rest.  ? Placing cold or warm cloths (compresses) on the skin.  · Hospital care, if the condition is very bad.  Follow these instructions at home:  Medicines  · Take over-the-counter and prescription medicines only as told by your doctor.  · If you were  prescribed an antibiotic medicine, take it as told by your doctor. Do not stop taking it even if you start to feel better.  General instructions    · Drink enough fluid to keep your pee (urine) pale yellow.  · Do not touch or rub the infected area.  · Raise (elevate) the infected area above the level of your heart while you are sitting or lying down.  · Place cold or warm cloths on the area as told by your doctor.  · Keep all follow-up visits as told by your doctor. This is important.  Contact a doctor if:  · You have a fever.  · You do not start to get better after 1-2 days of treatment.  · Your bone or joint under the infected area starts to hurt after the skin has healed.  · Your infection comes back. This can happen in the same area or another area.  · You have a swollen bump in the area.  · You have new symptoms.  · You feel ill and have muscle aches and pains.  Get help right away if:  · Your symptoms get worse.  · You feel very sleepy.  · You throw up (vomit) or have watery poop (diarrhea) for a long time.  · You see red streaks coming from the area.  · Your red area gets larger.  · Your red area turns dark in color.  These symptoms may represent a serious problem that is an emergency. Do not wait to see if the symptoms will go away. Get medical help right away. Call your local emergency services (911 in the [place] Do not drive yourself to the hospital.  Summary  · Cellulitis is a skin infection. The area is often warm, red, swollen, and sore.  · This condition is treated with medicines, rest, and cold and warm cloths.  · Take all medicines only as told by your doctor.  · Tell your doctor if symptoms do not start to get better after 1-2 days of treatment.  This information is not intended to replace advice given to you by your health care provider. Make sure you discuss any questions you have with your health care provider.  Document Released: 06/05/2009 Document Revised: 05/09/2019 Document Reviewed:  05/09/2019  Elsevier Patient Education © 2020 [employer]

## 2022-03-09 NOTE — PROGRESS NOTES
Bedside report received and patient care assumed. Pt is resting in bed, A&O4, with no complaints of pain, and is on RA. Tele box on. All fall precautions are in place, belongings at bedside table.  Pt was updated on POC, no questions or concerns. Pt educated on use of call light for assistance. Will continue to monitor.

## 2022-03-10 LAB — GLUCOSE BLD STRIP.AUTO-MCNC: 134 MG/DL (ref 65–99)

## 2022-03-10 NOTE — PROGRESS NOTES
Patient discharged home with friend. A&Ox4. IV's taken out, patient taken down via wheelchair and hospital escort. Monitor taken off; monitor room notified. Patient belongings discharged with patient, including clothing, FWW. Discharge summary done with patient. RN provided education regarding follow up care, appointments, and medications. RN also provided education regarding when to call doctor and when to call 911.

## 2022-03-12 LAB
BACTERIA BLD CULT: NORMAL
BACTERIA BLD CULT: NORMAL
SIGNIFICANT IND 70042: NORMAL
SIGNIFICANT IND 70042: NORMAL
SITE SITE: NORMAL
SITE SITE: NORMAL
SOURCE SOURCE: NORMAL
SOURCE SOURCE: NORMAL

## 2022-03-31 LAB — EKG IMPRESSION: NORMAL

## 2023-05-03 NOTE — ASSESSMENT & PLAN NOTE
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 Now  admitted for cycle 2A (full dosing) with Cyclophosphamide, Dexamethasone, Vincristine, Doxorubicin. 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  Uncontrolled.  A1c 16.5.    7/4 advance SSI to high dose and add mealtime coverage  Continue long-acting insulin at night.  Diabetes education as patient has [employer] and will need an insulin covered by her insurance as well as likely home health for med education, compliance monitoring  Hypoglycemia protocol.    7/6: Pending DM educator.    7/8: We will continue lantus to 45 U and continue with sliding scale. Monitor, make changes accordingly.

## 2025-05-02 NOTE — PROGRESS NOTES
Call Center TCM Note      Flowsheet Row Responses   [facility] patient discharged from? [facility]   Does the patient have one of the following disease processes/diagnoses(primary or secondary)? Other   TCM attempt successful? No   Unsuccessful attempts Attempt 1            RENE Vasquez Medical Assistant    5/2/2025, 10:01 EDT         [facility] Medicine Daily Progress Note    Date of Service  1/9/2022    Chief Complaint  Beverley Dejesus is a 71 y.o. female admitted 1/4/2022 with ground level fall    Hospital Course  Ms. Beverley Dejesus has a PMHx of DMT2, atrial flutter, and hypertension patient presents the emergency room on 1/4/2022 with a ground-level fall.  CT scan of the head and C-spine did not reveal any acute pathology.  The patient was assessed to have found to have a significant cellulitis as well as dehydration with renal failure.  She was admitted to the hospital for management of these.     On 1/5/2022 she was noted to have ongoing hypotension despite fluid resuscitation, she was transferred to the ICU for septic shock.  Patient has been treated with broad-spectrum antibiotics and stress dose steroids.  Transferred out of the ICU on 1/7/2022 to telemetry.  Patient still noted BLE pain and existing wounds on ankles were examined. Patient's BP improved with steroids. Patient transferred to the medical floor for further management of care and work on placement.       Interval Problem Update  -Patient seen and examined.  Patient notes bilateral lower extremity/ankles are still fairly sore.  Wounds noted to be improving, no blisters noted.  Discussed with patient management of diabetes at home and lifestyle changes.  Patient became very emotional and reports that she lives in a mobile home with other people, and are only roommates.  No known family in the area except for a niece and a nephew in [place] of which she is estranged to.  Discussed with patient work with physical therapy and update in transfer to a skilled facility.  -Plan of care: Continue to educate patient on management of diabetes; encourage PT/OT; pain management as needed; monitor bilateral lower extremity wounds; continue IV Rocephin.  -Disposition: Referral to skilled placed; pending acceptance  -Lab work: Reviewed; expected; proBNP elevated  -VSS at this time      I  have personally seen and examined the patient at bedside. I discussed the plan of care with patient, bedside RN and .    Consultants/Specialty  NONE    Code Status  Full Code    Disposition  Patient is not medically cleared for discharge.   Anticipate discharge to to skilled nursing facility.  I have placed the appropriate orders for post-discharge needs.    Review of Systems  Review of Systems   Constitutional: Positive for malaise/fatigue. Negative for chills and fever.   HENT: Negative.    Eyes: Negative.    Respiratory: Negative.    Cardiovascular: Negative.    Gastrointestinal: Negative.    Genitourinary: Negative.    Musculoskeletal: Positive for falls, joint pain and myalgias.   Skin: Negative.    Neurological: Positive for weakness.   Endo/Heme/Allergies: Negative.    Psychiatric/Behavioral: Positive for depression.        Physical Exam  Temp:  [36.2 °C (97.1 °F)-37.2 °C (98.9 °F)] 36.4 °C (97.5 °F)  Pulse:  [] 79  Resp:  [16-18] 18  BP: (109-137)/(59-77) 119/64  SpO2:  [89 %-99 %] 90 %    Physical Exam  Vitals and nursing note reviewed.   Constitutional:       Appearance: She is obese.   HENT:      Head: Normocephalic.      Nose: Nose normal.      Mouth/Throat:      Mouth: Mucous membranes are moist.      Pharynx: Oropharynx is clear.   Eyes:      Pupils: Pupils are equal, round, and reactive to light.   Cardiovascular:      Rate and Rhythm: Normal rate. Rhythm irregular.      Pulses: Normal pulses.      Heart sounds: Normal heart sounds.   Pulmonary:      Effort: Pulmonary effort is normal.      Breath sounds: Normal breath sounds.   Abdominal:      General: Bowel sounds are normal.      Palpations: Abdomen is soft.   Musculoskeletal:         General: Tenderness present.      Cervical back: Normal range of motion.      Right lower leg: Edema present.      Left lower leg: Edema present.   Skin:     General: Skin is dry.      Capillary Refill: Capillary refill takes 2 to 3 seconds.    Neurological:      Mental Status: She is alert. Mental status is at baseline.      Motor: Weakness present.         Fluids    Intake/Output Summary (Last 24 hours) at 1/9/2022 0957  Last data filed at 1/9/2022 0455  Gross per 24 hour   Intake 240 ml   Output 250 ml   Net -10 ml       Laboratory  Recent Labs     01/07/22  0545 01/09/22  0843   WBC 8.3 9.3   RBC 2.97* 3.02*   HEMOGLOBIN 8.3* 8.6*   HEMATOCRIT 27.2* 28.3*   MCV 91.6 93.7   MCH 27.9 28.5   MCHC 30.5* 30.4*   RDW 53.8* 53.4*   PLATELETCT 212 213   MPV 9.9 9.9     Recent Labs     01/07/22  0545 01/09/22  0843   SODIUM 140 141   POTASSIUM 4.7 4.0   CHLORIDE 107 107   CO2 25 27   GLUCOSE 220* 115*   BUN 37* 35*   CREATININE 1.29 1.05   CALCIUM 7.8* 8.2*                   Imaging  DX-CHEST-FOR LINE PLACEMENT Perform procedure in: Patient's Room   Final Result      1.  Interval placement of a PICC line which is satisfactory in position.      2.  Bilateral interstitial infiltrates.      IR-PICC LINE PLACEMENT W/ GUIDANCE > AGE 5   Final Result                  Ultrasound-guided PICC placement performed by qualified nursing staff as    above.          DX-CHEST-PORTABLE (1 VIEW)   Final Result      No acute cardiac or pulmonary abnormalities are identified. Lung volumes are low.      EC-ECHOCARDIOGRAM COMPLETE W/ CONT   Final Result      US-RENAL   Final Result      1.  No hydronephrosis   2.  Atrophic appearing kidneys bilaterally with cortical thinning   3.  Incidentally noted cholelithiasis      US-MIGEL SINGLE LEVEL BILAT   Final Result      CT-HEAD W/O   Final Result      1.  No acute intracranial abnormality is identified.   2.  There are periventricular and subcortical white matter changes present.  This finding is nonspecific and could be from previous small vessel ischemia, demyelination, or gliosis.         CT-CSPINE WITHOUT PLUS RECONS   Final Result      Negative for cervical spine fracture or subluxation           Assessment/Plan  * Lower extremity  cellulitis  Assessment & Plan  -Patient states that wounds on her legs started as blisters approximately 3 weeks prior to admission  -Severe cellulitis, circumferential, involving both legs, resulting in septic shock  -Wound cultures positive for Klebsiell oxytoca/[place] ornithiolytica   -Continue IV ceftriaxone for total of 10 days of antibiotics  -Wound care  -SNF referral placed      Relative adrenal insufficiency (HCC)- (present on admission)  Assessment & Plan  -Cortisol was 10 in the setting of septic shock that had required pressors  -Status post IV hydrocortisone that will change to oral hydrocortisone 20 mg daily on 1/8 and consider further taper based on her blood pressure and clinical condition.   -Will reassess for steroid taper in am once BP is maintained      Severe sepsis with septic shock ([facility])- (present on admission)  Assessment & Plan  -Septic shock has resolved    Fall from ground level- (present on admission)  Assessment & Plan  -CT imaging at admission negative for significant injury  -Fall likely related to infection/orthostasis  -PT/OT    Atrial flutter ([facility])- (present on admission)  Assessment & Plan  -Metoprolol, pradaxa    Class 3 severe obesity in adult ([facility])- (present on admission)  Assessment & Plan  -BMI 51  -Patient counseled regarding controlling weight  -Hx of DMT2 - uncontrolled  -Poor diet    Primary hypertension- (present on admission)  Assessment & Plan  -Currently normotensive  -May need to restart Lisinopril and Metoprolol as her blood pressure recovers after sepsis    JULIETA (acute kidney injury) ([facility])- (present on admission)  Assessment & Plan  -Likely prerenal due to dehydration  -Improved with IV fluids      Type 2 diabetes mellitus with hyperglycemia, with long-term current use of insulin ([facility])- (present on admission)  Assessment & Plan  -Uncontrolled with hyperglycemia, hemoglobin A1c is 9.8  -Glargine 15 units daily and sliding scale  -Once she is off steroids, she may  not require supplemental insulin and may be able to transition to orals         VTE prophylaxis: therapeutic anticoagulation with Pradaxa    I have performed a physical exam and reviewed and updated ROS and Plan today (1/9/2022). In review of yesterday's note (1/8/2022), there are no changes except as documented above.    ==============================================================================================================  Please note that this dictation was created using voice recognition software. I have made every reasonable attempt to correct obvious errors, but there may be errors of grammar and possibly content that I did not discover before finalizing the note.    Electronically signed by:  ALEJANDRO Ronquillo, MSN, APRN, FNP-C  Hospitalist Services  [facility]  [phone]  [email or web address]  01/09/22    1010